# Patient Record
Sex: FEMALE | Race: BLACK OR AFRICAN AMERICAN | Employment: OTHER | URBAN - METROPOLITAN AREA
[De-identification: names, ages, dates, MRNs, and addresses within clinical notes are randomized per-mention and may not be internally consistent; named-entity substitution may affect disease eponyms.]

---

## 2017-03-16 ENCOUNTER — OFFICE VISIT (OUTPATIENT)
Dept: FAMILY MEDICINE CLINIC | Age: 72
End: 2017-03-16

## 2017-03-16 VITALS
RESPIRATION RATE: 16 BRPM | BODY MASS INDEX: 30.63 KG/M2 | TEMPERATURE: 98 F | SYSTOLIC BLOOD PRESSURE: 130 MMHG | HEART RATE: 60 BPM | HEIGHT: 64 IN | WEIGHT: 179.4 LBS | DIASTOLIC BLOOD PRESSURE: 78 MMHG | OXYGEN SATURATION: 99 %

## 2017-03-16 DIAGNOSIS — R00.1 BRADYCARDIA: ICD-10-CM

## 2017-03-16 DIAGNOSIS — G40.909 SEIZURE DISORDER (HCC): ICD-10-CM

## 2017-03-16 DIAGNOSIS — E03.9 HYPOTHYROIDISM, UNSPECIFIED TYPE: ICD-10-CM

## 2017-03-16 DIAGNOSIS — E78.5 HYPERLIPIDEMIA, UNSPECIFIED HYPERLIPIDEMIA TYPE: ICD-10-CM

## 2017-03-16 DIAGNOSIS — F32.A DEPRESSION, UNSPECIFIED DEPRESSION TYPE: ICD-10-CM

## 2017-03-16 DIAGNOSIS — I48.91 ATRIAL FIBRILLATION, UNSPECIFIED TYPE (HCC): ICD-10-CM

## 2017-03-16 DIAGNOSIS — E11.65 TYPE 2 DIABETES MELLITUS WITH HYPERGLYCEMIA, WITHOUT LONG-TERM CURRENT USE OF INSULIN (HCC): ICD-10-CM

## 2017-03-16 DIAGNOSIS — I10 ESSENTIAL HYPERTENSION: Primary | ICD-10-CM

## 2017-03-16 DIAGNOSIS — I80.201 DEEP VEIN THROMBOPHLEBITIS OF RIGHT LEG (HCC): ICD-10-CM

## 2017-03-16 RX ORDER — METOPROLOL SUCCINATE 50 MG/1
TABLET, EXTENDED RELEASE ORAL DAILY
COMMUNITY
End: 2017-04-24 | Stop reason: SDUPTHER

## 2017-03-16 RX ORDER — METOPROLOL TARTRATE 50 MG/1
50 TABLET ORAL 2 TIMES DAILY
COMMUNITY
End: 2017-03-16 | Stop reason: ALTCHOICE

## 2017-03-16 RX ORDER — OLMESARTAN MEDOXOMIL 40 MG/1
40 TABLET ORAL DAILY
COMMUNITY
End: 2017-04-24 | Stop reason: SDUPTHER

## 2017-03-16 RX ORDER — HYDROCHLOROTHIAZIDE 25 MG/1
25 TABLET ORAL DAILY
COMMUNITY
End: 2017-04-24 | Stop reason: SDUPTHER

## 2017-03-16 RX ORDER — GUAIFENESIN 100 MG/5ML
81 LIQUID (ML) ORAL DAILY
COMMUNITY
End: 2022-09-07

## 2017-03-16 NOTE — PATIENT INSTRUCTIONS
Learning About Atrial Fibrillation  What is atrial fibrillation? Atrial fibrillation (say \"AY-tree-jennifer kfc-rffe-HUA-shun\") is the most common type of irregular heartbeat (arrhythmia). Normally, the heart beats in a strong, steady rhythm. In atrial fibrillation, a problem with the heart's electrical system causes the two upper parts of the heart (the atria) to quiver, or fibrillate. Your heart rate also may be faster than normal.  Atrial fibrillation can be dangerous because if the heartbeat isn't strong and steady, blood can collect, or pool, in the atria. And pooled blood is more likely to form clots. Clots can travel to the brain, block blood flow, and cause a stroke. Atrial fibrillation can also lead to heart failure. Treatment for atrial fibrillation helps prevent stroke and heart failure. It also helps relieve symptoms. Atrial fibrillation is often caused by another heart problem. It may happen after heart surgery. It may also be caused by other problems, such as an overactive thyroid gland or lung disease. Many people with atrial fibrillation are able to live full and active lives. What are the symptoms? Some people feel symptoms when they have episodes of atrial fibrillation. But other people don't notice any symptoms. If you have symptoms, you may feel:  · A fluttering, racing, or pounding feeling in your chest called palpitations. · Weak or tired. · Dizzy or lightheaded. · Short of breath. · Chest pain. · Confused. You may notice signs of atrial fibrillation when you check your pulse. Your pulse may seem uneven or fast.  What can you expect when you have atrial fibrillation? At first, spells of atrial fibrillation may come on suddenly and last a short time. It may go away on its own or it goes away after treatment. This is called paroxysmal atrial fibrillation. Over time, the spells may last longer and occur more often. They often don't go away on their own.   How is it treated? Treatments can help you feel better and prevent future problems, especially stroke and heart failure. The main types of treatment slow the heart rate, control the heart rhythm, and help prevent stroke. Your treatment will depend on the cause of your atrial fibrillation, your symptoms, and your risk for stroke. · Heart rate treatment. Medicine may be used to slow your heart rate. Your heartbeat may still be irregular. But these medicines keep your heart from beating too fast. They may also help relieve your symptoms. · Heart rhythm treatment. Different treatments may be used to try to stop atrial fibrillation and keep it from returning. They can also relieve symptoms. These treatments include medicine, electrical cardioversion to shock the heart back to a normal rhythm, a procedure called catheter ablation, and heart surgery. · Stroke prevention. You and your doctor can decide how to lower your risk. You may decide to take a blood-thinning medicine, such as aspirin or an anticoagulant. How can you live well with it? You can live well and help manage atrial fibrillation by having a heart-healthy lifestyle. To have a heart-healthy lifestyle:  · Don't smoke. · Eat heart-healthy foods. · Be active. Talk to your doctor about what type and level of exercise is safe for you. · Stay at a healthy weight. Lose weight if you need to. · Manage stress. · Avoid alcohol if it triggers symptoms. · Manage other health problems such as high blood pressure, high cholesterol, and diabetes. · Avoid getting sick from the flu. Get a flu shot every year. When should you call for help? Call 911 anytime you think you may need emergency care. For example, call if:  · You have symptoms of a stroke. These may include:  ¨ Sudden numbness, tingling, weakness, or loss of movement in your face, arm, or leg, especially on only one side of your body. ¨ Sudden vision changes. ¨ Sudden trouble speaking.   ¨ Sudden confusion or trouble understanding simple statements. ¨ Sudden problems with walking or balance. ¨ A sudden, severe headache that is different from past headaches. Call your doctor now or seek immediate medical care if:  · You have new or increased shortness of breath. · You feel dizzy or lightheaded, or you feel like you may faint. Watch closely for changes in your health, and be sure to contact your doctor if you have any problems. Follow-up care is a key part of your treatment and safety. Be sure to make and go to all appointments, and call your doctor if you are having problems. It's also a good idea to know your test results and keep a list of the medicines you take. Where can you learn more? Go to http://cecelia-[place].info/. Enter [phone] in the search box to learn more about \"Learning About Atrial Fibrillation. \"  Current as of: March 28, 2016  Content Version: 11.1  © 2509-0408 [employer]. Care instructions adapted under license by [employer] (which disclaims liability or warranty for this information). If you have questions about a medical condition or this instruction, always ask your healthcare professional. [place] [address] any warranty or liability for your use of this information. Depression and Chronic Disease: Care Instructions  Your Care Instructions  A chronic disease is one that you have for a long time. Some chronic diseases can be controlled, but they usually cannot be cured. Depression is common in people with chronic diseases, but it often goes unnoticed. Many people have concerns about seeking treatment for a mental health problem. You may think it's a sign of weakness, or you don't want people to know about it. It's important to overcome these reasons for not seeking treatment. Treating depression or anxiety is good for your health. Follow-up care is a key part of your treatment and safety.  Be sure to make and go to all appointments, and call your doctor if you are having problems. It's also a good idea to know your test results and keep a list of the medicines you take. How can you care for yourself at home? Watch for symptoms of depression  The symptoms of depression are often subtle at first. You may think they are caused by your disease rather than depression. Or you may think it is normal to be depressed when you have a chronic disease. If you are depressed you may:  · Feel sad or hopeless. · Feel guilty or worthless. · Not enjoy the things you used to enjoy. · Feel hopeless, as though life is not worth living. · Have trouble thinking or remembering. · Have low energy, and you may not eat or sleep well. · Pull away from others. · Think often about death or killing yourself. (Keep the numbers for these national suicide hotlines: 2-392-318-TALK [1-580.454.6510] and 1-872-LPLALIN [1-583.946.8349]. )  Get treatment  By treating your depression, you can feel more hopeful and have more energy. If you feel better, you may take better care of yourself, so your health may improve. · Talk to your doctor if you have any changes in mood during treatment for your disease. · Ask your doctor for help. Counseling, antidepressant medicine, or a combination of the two can help most people with depression. Often a combination works best. Counseling can also help you cope with having a chronic disease. When should you call for help? Call 911 anytime you think you may need emergency care. For example, call if:  · You feel like hurting yourself or someone else. · Someone you know has depression and is about to attempt or is attempting suicide. Call your doctor now or seek immediate medical care if:  · You hear voices. · Someone you know has depression and:  ¨ Starts to give away his or her possessions. ¨ Uses illegal drugs or drinks alcohol heavily.   ¨ Talks or writes about death, including writing suicide notes or talking about guns, knives, or pills. ¨ Starts to spend a lot of time alone. ¨ Acts very aggressively or suddenly appears calm. Watch closely for changes in your health, and be sure to contact your doctor if:  · You do not get better as expected. Where can you learn more? Go to http://cecelia-[place]ia.info/. Enter N041 in the search box to learn more about \"Depression and Chronic Disease: Care Instructions. \"  Current as of: July 26, 2016  Content Version: 11.1  © 4111-9632 [employer]. Care instructions adapted under license by [employer] (which disclaims liability or warranty for this information). If you have questions about a medical condition or this instruction, always ask your healthcare professional. [address] any warranty or liability for your use of this information. Learning About Diabetes Food Guidelines  Your Care Instructions  Meal planning is important to manage diabetes. It helps keep your blood sugar at a target level (which you set with your doctor). You don't have to eat special foods. You can eat what your family eats, including sweets once in a while. But you do have to pay attention to how often you eat and how much you eat of certain foods. You may want to work with a dietitian or a certified diabetes educator (CDE) to help you plan meals and snacks. A dietitian or CDE can also help you lose weight if that is one of your goals. What should you know about eating carbs? Managing the amount of carbohydrate (carbs) you eat is an important part of healthy meals when you have diabetes. Carbohydrate is found in many foods. · Learn which foods have carbs. And learn the amounts of carbs in different foods. ¨ Bread, cereal, pasta, and rice have about 15 grams of carbs in a serving. A serving is 1 slice of bread (1 ounce), ½ cup of cooked cereal, or 1/3 cup of cooked pasta or rice. ¨ Fruits have 15 grams of carbs in a serving.  A serving is 1 small fresh fruit, such as an apple or orange; ½ of a banana; ½ cup of cooked or canned fruit; ½ cup of fruit juice; 1 cup of melon or raspberries; or 2 tablespoons of dried fruit. ¨ Milk and no-sugar-added yogurt have 15 grams of carbs in a serving. A serving is 1 cup of milk or 2/3 cup of no-sugar-added yogurt. ¨ Starchy vegetables have 15 grams of carbs in a serving. A serving is ½ cup of mashed potatoes or sweet potato; 1 cup winter squash; ½ of a small baked potato; ½ cup of cooked beans; or ½ cup cooked corn or green peas. · Learn how much carbs to eat each day and at each meal. A dietitian or CDE can teach you how to keep track of the amount of carbs you eat. This is called carbohydrate counting. · If you are not sure how to count carbohydrate grams, use the Plate Method to plan meals. It is a good, quick way to make sure that you have a balanced meal. It also helps you spread carbs throughout the day. ¨ Divide your plate by types of foods. Put non-starchy vegetables on half the plate, meat or other protein food on one-quarter of the plate, and a grain or starchy vegetable in the final quarter of the plate. To this you can add a small piece of fruit and 1 cup of milk or yogurt, depending on how many carbs you are supposed to eat at a meal.  · Try to eat about the same amount of carbs at each meal. Do not \"save up\" your daily allowance of carbs to eat at one meal.  · Proteins have very little or no carbs per serving. Examples of proteins are beef, chicken, turkey, fish, eggs, tofu, cheese, cottage cheese, and peanut butter. A serving size of meat is 3 ounces, which is about the size of a deck of cards. Examples of meat substitute serving sizes (equal to 1 ounce of meat) are 1/4 cup of cottage cheese, 1 egg, 1 tablespoon of peanut butter, and ½ cup of tofu. How can you eat out and still eat healthy? · Learn to estimate the serving sizes of foods that have carbohydrate.  If you measure food at home, it will be easier to estimate the amount in a serving of restaurant food. · If the meal you order has too much carbohydrate (such as potatoes, corn, or baked beans), ask to have a low-carbohydrate food instead. Ask for a salad or green vegetables. · If you use insulin, check your blood sugar before and after eating out to help you plan how much to eat in the future. · If you eat more carbohydrate at a meal than you had planned, take a walk or do other exercise. This will help lower your blood sugar. What else should you know? · Limit saturated fat, such as the fat from meat and dairy products. This is a healthy choice because people who have diabetes are at higher risk of heart disease. So choose lean cuts of meat and nonfat or low-fat dairy products. Use olive or canola oil instead of butter or shortening when cooking. · Don't skip meals. Your blood sugar may drop too low if you skip meals and take insulin or certain medicines for diabetes. · Check with your doctor before you drink alcohol. Alcohol can cause your blood sugar to drop too low. Alcohol can also cause a bad reaction if you take certain diabetes medicines. Follow-up care is a key part of your treatment and safety. Be sure to make and go to all appointments, and call your doctor if you are having problems. It's also a good idea to know your test results and keep a list of the medicines you take. Where can you learn more? Go to http://cecelia-[place].info/. Enter S108 in the search box to learn more about \"Learning About Diabetes Food Guidelines. \"  Current as of: May 23, 2016  Content Version: 11.1  © 6483-3925 [employer]. Care instructions adapted under license by [employer] (which disclaims liability or warranty for this information).  If you have questions about a medical condition or this instruction, always ask your healthcare professional. Arie Greer disclaims any warranty or liability for your use of this information. Low Sodium Diet (2,000 Milligram): Care Instructions  Your Care Instructions  Too much sodium causes your body to hold on to extra water. This can raise your blood pressure and force your heart and kidneys to work harder. In very serious cases, this could cause you to be put in the hospital. It might even be life-threatening. By limiting sodium, you will feel better and lower your risk of serious problems. The most common source of sodium is salt. People get most of the salt in their diet from canned, prepared, and packaged foods. Fast food and restaurant meals also are very high in sodium. Your doctor will probably limit your sodium to less than 2,000 milligrams (mg) a day. This limit counts all the sodium in prepared and packaged foods and any salt you add to your food. Follow-up care is a key part of your treatment and safety. Be sure to make and go to all appointments, and call your doctor if you are having problems. It's also a good idea to know your test results and keep a list of the medicines you take. How can you care for yourself at home? Read food labels  · Read labels on cans and food packages. The labels tell you how much sodium is in each serving. Make sure that you look at the serving size. If you eat more than the serving size, you have eaten more sodium. · Food labels also tell you the Percent Daily Value for sodium. Choose products with low Percent Daily Values for sodium. · Be aware that sodium can come in forms other than salt, including monosodium glutamate (MSG), sodium citrate, and sodium bicarbonate (baking soda). MSG is often added to Asian food. When you eat out, you can sometimes ask for food without MSG or added salt. Buy low-sodium foods  · Buy foods that are labeled \"unsalted\" (no salt added), \"sodium-free\" (less than 5 mg of sodium per serving), or \"low-sodium\" (less than 140 mg of sodium per serving).  Foods labeled \"reduced-sodium\" and \"light sodium\" may still have too much sodium. Be sure to read the label to see how much sodium you are getting. · Buy fresh vegetables, or frozen vegetables without added sauces. Buy low-sodium versions of canned vegetables, soups, and other canned goods. Prepare low-sodium meals  · Cut back on the amount of salt you use in cooking. This will help you adjust to the taste. Do not add salt after cooking. One teaspoon of salt has about 2,300 mg of sodium. · Take the salt shaker off the table. · Flavor your food with garlic, lemon juice, onion, vinegar, herbs, and spices. Do not use soy sauce, lite soy sauce, steak sauce, onion salt, garlic salt, celery salt, mustard, or ketchup on your food. · Use low-sodium salad dressings, sauces, and ketchup. Or make your own salad dressings and sauces without adding salt. · Use less salt (or none) when recipes call for it. You can often use half the salt a recipe calls for without losing flavor. Other foods such as rice, pasta, and grains do not need added salt. · Rinse canned vegetables, and cook them in fresh water. This removes some--but not all--of the salt. · Avoid water that is naturally high in sodium or that has been treated with water softeners, which add sodium. Call your local water company to find out the sodium content of your water supply. If you buy bottled water, read the label and choose a sodium-free brand. Avoid high-sodium foods  · Avoid eating:  ¨ Smoked, cured, salted, and canned meat, fish, and poultry. ¨ Ham, morales, hot dogs, and luncheon meats. ¨ Regular, hard, and processed cheese and regular peanut butter. ¨ Crackers with salted tops, and other salted snack foods such as pretzels, chips, and salted popcorn. ¨ Frozen prepared meals, unless labeled low-sodium. ¨ Canned and dried soups, broths, and bouillon, unless labeled sodium-free or low-sodium. ¨ Canned vegetables, unless labeled sodium-free or low-sodium.   [facility] fries, pizza, tacos, and other fast foods. ¨ Pickles, olives, ketchup, and other condiments, especially soy sauce, unless labeled sodium-free or low-sodium. Where can you learn more? Go to http://cecelia-[place].info/. Enter M035 in the search box to learn more about \"Low Sodium Diet (2,000 Milligram): Care Instructions. \"  Current as of: July 26, 2016  Content Version: 11.1  © 0629-0710 [employer]. Care instructions adapted under license by [employer] (which disclaims liability or warranty for this information). If you have questions about a medical condition or this instruction, always ask your healthcare professional. [address] any warranty or liability for your use of this information.

## 2017-03-16 NOTE — PROGRESS NOTES
HISTORY OF PRESENT ILLNESS  Vanessa Hollingsworth is a 66 y.o. female. HPI: here as a new patient. Moved from [place]. Multiple medical problems. H/o hypertension, bradycardia s/p pacemaker, h/o a.fib post ablation on anticoagulation. She needs cardiology referral to get establish locally. Per her time to check pace maker next month. Lately mentioned that she has been checking her blood pressure at home and always comes low. Also she feels at that time dizziness and some fatigue. No chest pain or sob. No headache. Wondering if she needs lower dose of medication. This she noted since her last knee replacement in July 2016. i have talked to her that it could be because she is no more in pain from knee and could make her blood pressure improve some. Visit Vitals    /78 (BP 1 Location: Left arm, BP Patient Position: Sitting)    Pulse 60    Temp 98 °F (36.7 °C) (Oral)    Resp 16    Ht 5' 3.5\" (1.613 m)    Wt 179 lb 6.4 oz (81.4 kg)    SpO2 99%    BMI 31.28 kg/m2     H/o type 2 diabetes. Not on medication. Will check labs. No signs of hypoglycemia. Average blood sugar is around 100. She feels her blood sugar is lately higher than what she used to have. Will check labs. No nausea or vomiting. No abdominal pain. No unusual fatigue. Has h/o DVT over rt lower ext after left knee replacement. She is on eliquis. Also h/o a.fib. H/o hypothyroidism and she wants a referral to endocrinologist. Currently will do labs and a referral.   H/o seizure disorder. First episode in 2006. Put on medication. No reoccurrence since then so stopped medication by neurologist in 2015. She was still following specialist in [place]. Now will do referral again for her to get establish locally. Per son she has been depressed since she moved her. It has been 2 wks. Per patient it is situational. Going on since last 7 years since her  passed away. She feels her kids does not understands her.  She is not abused but feels emotional. She feels no friends and family. She is away here. She moved to her daughter's house first then another son and now back in [place] away from what she was comfortable with. Considering counseling. i have also talk to her briefly is time is the key. It is short time to adjust to new place. She was in tears but consolable. No suicidal ideation. Sleep fair. Per son she is trying to do whatever he can. He is driving her around where she wants to be but he has limitation as he has to work. ROS: see HPI     Physical Exam   Constitutional: She is oriented to person, place, and time. No distress. Neck: No thyromegaly present. Cardiovascular: Normal heart sounds. Pulmonary/Chest: No respiratory distress. She has no wheezes. Abdominal: Soft. There is no tenderness. Musculoskeletal: She exhibits no edema. Neurological: She is oriented to person, place, and time. Psychiatric: Her behavior is normal. Thought content normal.       ASSESSMENT and PLAN    ICD-10-CM ICD-9-CM    1. Essential hypertension: for now per patient request will do metoprolol once a day as she was taking succinate Er twice a day. Will observe and f./u next visit and see if her dizziness on and off and low reading of blood pressure at home has been improved or not. Also done cardiology referral.  I10 401.9 olmesartan (BENICAR) 40 mg tablet      hydroCHLOROthiazide (HYDRODIURIL) 25 mg tablet      METABOLIC PANEL, COMPREHENSIVE   2. Type 2 diabetes mellitus with hyperglycemia, without long-term current use of insulin [facility]): checking labs. Continue current management. Said she has been doing well. Not on medication. Will f/u after lab result. E11.65 250.00 CBC W/O DIFF     811.51 METABOLIC PANEL, COMPREHENSIVE      TSH 3RD GENERATION      HEMOGLOBIN A1C WITH EAG   3. Bradycardia/ post pacemaker. s/p ablation: for now sending her to cardiology to get establish. Will make metoprolol succinate Er to once a day from twice daily.  Will follow specialist recommendations. R00.1 427.89 apixaban (ELIQUIS) 5 mg tablet      REFERRAL TO CARDIOLOGY   4. Atrial fibrillation, unspecified type (HCC)/post ablation I48.91 427.31 aspirin 81 mg chewable tablet      apixaban (ELIQUIS) 5 mg tablet      REFERRAL TO CARDIOLOGY   5. h/o Deep vein thrombophlebitis of right leg (HCC)/ post knee replacement in july 2016 I80.201 451.19 apixaban (ELIQUIS) 5 mg tablet      REFERRAL TO CARDIOLOGY   6. Hyperlipidemia, unspecified hyperlipidemia type: checking labs. E78.5 272.4 DOCOSAHEXANOIC ACID/EPA (FISH OIL PO)      COQ10, LIPOSOMAL UBIQUINOL, PO      LIPID PANEL   7. Hypothyroidism, unspecified type: checking labs. Per her request sending to endocrinologist.  E03.9 244.9 REFERRAL TO ENDOCRINOLOGY      T4, FREE   8. Seizure disorder ([facility] [address]): seizure free since 2006. One time episode. Off medication since 2015. Was following neurologist. Now will do referral to get establish locally. G40.909 345.90 REFERRAL TO NEUROLOGY   9. Depression, unspecified depression type: for now sending for counseling. Will f/u next visit. F32.9 311 REFERRAL TO PSYCHOLOGY   Pt understood and agrees with above plan. Not able to give much info about HM . Will obtain records. Follow-up Disposition:  Return in about 1 month (around 4/16/2017).

## 2017-03-16 NOTE — PROGRESS NOTES
1. Have you been to the ER, urgent care clinic since your last visit? Hospitalized since your last visit? Yes 10/12/16 right knee total replacement in [place]. 2. Have you seen or consulted any other health care providers outside of the [address] [place] [address] since your last visit? Include any pap smears or colon screening. No    Last mammo 3/2016 - normal - Dr. Wandy Jeffrey    Last flu vaccine 10/2016     Patient not sure if she had a pneumonia vaccine.

## 2017-03-16 NOTE — MR AVS SNAPSHOT
Visit Information Date & Time Provider Department Dept. Phone Encounter #  
 3/16/2017  9:00 AM James Garrison, [address] [place] [address] [phone] [id number] Follow-up Instructions Return in about 1 month (around 4/16/2017). Upcoming Health Maintenance Date Due DTaP/Tdap/Td series (1 - Tdap) 8/21/1965 BREAST CANCER SCRN MAMMOGRAM 8/21/1994 FOBT Q 1 YEAR AGE 50-75 8/21/1994 ZOSTER VACCINE AGE 60> 8/21/2004 GLAUCOMA SCREENING Q2Y 8/21/2009 OSTEOPOROSIS SCREENING (DEXA) 8/21/2009 Pneumococcal 65+ Low/Medium Risk (1 of 2 - PCV13) 8/21/2009 MEDICARE YEARLY EXAM 8/21/2009 Allergies as of 3/16/2017  Review Complete On: 3/16/2017 By: James Garrison MD  
 No Known Allergies Current Immunizations  Never Reviewed Name Date Influenza Vaccine 10/1/2016 Not reviewed this visit You Were Diagnosed With   
  
 Codes Comments Essential hypertension    -  Primary ICD-10-CM: I10 
ICD-9-CM: 401.9 Type 2 diabetes mellitus with hyperglycemia, without long-term current use of insulin (HCC)     ICD-10-CM: E11.65 ICD-9-CM: 250.00, 790.29 Bradycardia     ICD-10-CM: R00.1 ICD-9-CM: 427.89 Atrial fibrillation, unspecified type ([facility]     ICD-10-CM: I48.91 
ICD-9-CM: 427.31 Deep vein thrombophlebitis of right leg (HCC)     ICD-10-CM: I80.201 ICD-9-CM: 451.19 Hyperlipidemia, unspecified hyperlipidemia type     ICD-10-CM: E78.5 ICD-9-CM: 272.4 Hypothyroidism, unspecified type     ICD-10-CM: E03.9 ICD-9-CM: 244.9 Seizure disorder ([facility].)     ICD-10-CM: G40.909 ICD-9-CM: 345.90 Depression, unspecified depression type     ICD-10-CM: F32.9 ICD-9-CM: 162 Vitals BP Pulse Temp Resp Height(growth percentile) Weight(growth percentile) 130/78 (BP 1 Location: Left arm, BP Patient Position: Sitting) 60 98 °F (36.7 °C) (Oral) 16 5' 3.5\" (1.613 m) 179 lb 6.4 oz (81.4 kg) SpO2 BMI OB Status Smoking Status 99% 31.28 kg/m2 Hysterectomy Never Smoker Vitals History BMI and BSA Data Body Mass Index Body Surface Area  
 31.28 kg/m 2 1.91 m 2 Preferred Pharmacy Pharmacy Name Phone [facility]/PHARMACY #[id number] Pete Orozco, [address] [facility] [facility] [phone] Your Updated Medication List  
  
   
This list is accurate as of: 3/16/17 10:15 AM.  Always use your most recent med list.  
  
  
  
  
 apixaban 5 mg tablet Commonly known as:  [place] Vipul Take 1 Tab by mouth two (2) times a day. aspirin 81 mg chewable tablet Take 81 mg by mouth daily. BENICAR 40 mg tablet Generic drug:  olmesartan Take 40 mg by mouth daily. COQ10 (LIPOSOMAL UBIQUINOL) PO Take 200 mg by mouth daily. FISH OIL PO Take 100 mg by mouth daily. hydroCHLOROthiazide 25 mg tablet Commonly known as:  HYDRODIURIL Take 25 mg by mouth daily. metoprolol succinate 50 mg XL tablet Commonly known as:  TOPROL-XL Take  by mouth daily. Prescriptions Sent to Pharmacy Refills  
 apixaban (ELIQUIS) 5 mg tablet 2 Sig: Take 1 Tab by mouth two (2) times a day. Class: Normal  
 Pharmacy: [facility]/pharmacy [address] [place] [address], [address] [facility] #: [phone] Route: Oral  
  
We Performed the Following REFERRAL TO CARDIOLOGY [XCI14 Custom] Comments:  
 Please evaluate patient for h/o a.fib. Due for pacemaker check. REFERRAL TO ENDOCRINOLOGY [AZA38 Custom] Comments:  
 Please evaluate patient for hypothyroidism REFERRAL TO NEUROLOGY [INZ14 Custom] Comments:  
 Please evaluate patient for seizure disorder REFERRAL TO PSYCHOLOGY [QNZ18 Custom] Comments:  
 Please evaluate patient for depression Follow-up Instructions Return in about 1 month (around 4/16/2017). To-Do List   
 03/16/2017 Lab:  CBC W/O DIFF   
  
 03/16/2017 Lab:  HEMOGLOBIN A1C WITH EAG   
  
 03/16/2017 Lab:  LIPID PANEL   
  
 03/16/2017 Lab: METABOLIC PANEL, COMPREHENSIVE   
  
 03/16/2017 Lab:  T4, FREE   
  
 03/16/2017 Lab:  TSH 3RD GENERATION Referral Information Referral ID Referred By Referred To  
  
 [id number] [facility], Melecio Nageotte, MD   
   
 Visits Status Start Date End Date 1 New Request 3/16/17 3/16/18 If your referral has a status of pending review or denied, additional information will be sent to support the outcome of this decision. Referral ID Referred By Referred To  
 [id number] [facility] R Not Available Visits Status Start Date End Date 1 New Request 3/16/17 3/16/18 If your referral has a status of pending review or denied, additional information will be sent to support the outcome of this decision. Referral ID Referred By Referred To  
 [id number] [facility] R Not Available Visits Status Start Date End Date 1 New Request 3/16/17 3/16/18 If your referral has a status of pending review or denied, additional information will be sent to support the outcome of this decision. Referral ID Referred By Referred To  
 [id number] [facility] R Not Available Visits Status Start Date End Date 1 New Request 3/16/17 3/16/18 If your referral has a status of pending review or denied, additional information will be sent to support the outcome of this decision. Patient Instructions Learning About Atrial Fibrillation What is atrial fibrillation? Atrial fibrillation (say \"AY-tree-jennifer rtu-azwn-QUX-shun\") is the most common type of irregular heartbeat (arrhythmia). Normally, the heart beats in a strong, steady rhythm. In atrial fibrillation, a problem with the heart's electrical system causes the two upper parts of the heart (the atria) to quiver, or fibrillate. Your heart rate also may be faster than normal. 
Atrial fibrillation can be dangerous because if the heartbeat isn't strong and steady, blood can collect, or pool, in the atria.  And pooled blood is more likely to form clots. Clots can travel to the brain, block blood flow, and cause a stroke. Atrial fibrillation can also lead to heart failure. Treatment for atrial fibrillation helps prevent stroke and heart failure. It also helps relieve symptoms. Atrial fibrillation is often caused by another heart problem. It may happen after heart surgery. It may also be caused by other problems, such as an overactive thyroid gland or lung disease. Many people with atrial fibrillation are able to live full and active lives. What are the symptoms? Some people feel symptoms when they have episodes of atrial fibrillation. But other people don't notice any symptoms. If you have symptoms, you may feel: · A fluttering, racing, or pounding feeling in your chest called palpitations. · Weak or tired. · Dizzy or lightheaded. · Short of breath. · Chest pain. · Confused. You may notice signs of atrial fibrillation when you check your pulse. Your pulse may seem uneven or fast. 
What can you expect when you have atrial fibrillation? At first, spells of atrial fibrillation may come on suddenly and last a short time. It may go away on its own or it goes away after treatment. This is called paroxysmal atrial fibrillation. Over time, the spells may last longer and occur more often. They often don't go away on their own. How is it treated? Treatments can help you feel better and prevent future problems, especially stroke and heart failure. The main types of treatment slow the heart rate, control the heart rhythm, and help prevent stroke. Your treatment will depend on the cause of your atrial fibrillation, your symptoms, and your risk for stroke. · Heart rate treatment. Medicine may be used to slow your heart rate. Your heartbeat may still be irregular. But these medicines keep your heart from beating too fast. They may also help relieve your symptoms. · Heart rhythm treatment. Different treatments may be used to try to stop atrial fibrillation and keep it from returning. They can also relieve symptoms. These treatments include medicine, electrical cardioversion to shock the heart back to a normal rhythm, a procedure called catheter ablation, and heart surgery. · Stroke prevention. You and your doctor can decide how to lower your risk. You may decide to take a blood-thinning medicine, such as aspirin or an anticoagulant. How can you live well with it? You can live well and help manage atrial fibrillation by having a heart-healthy lifestyle. To have a heart-healthy lifestyle: · Don't smoke. · Eat heart-healthy foods. · Be active. Talk to your doctor about what type and level of exercise is safe for you. · Stay at a healthy weight. Lose weight if you need to. · Manage stress. · Avoid alcohol if it triggers symptoms. · Manage other health problems such as high blood pressure, high cholesterol, and diabetes. · Avoid getting sick from the flu. Get a flu shot every year. When should you call for help? Call 911 anytime you think you may need emergency care. For example, call if: 
· You have symptoms of a stroke. These may include: 
¨ Sudden numbness, tingling, weakness, or loss of movement in your face, arm, or leg, especially on only one side of your body. ¨ Sudden vision changes. ¨ Sudden trouble speaking. ¨ Sudden confusion or trouble understanding simple statements. ¨ Sudden problems with walking or balance. ¨ A sudden, severe headache that is different from past headaches. Call your doctor now or seek immediate medical care if: 
· You have new or increased shortness of breath. · You feel dizzy or lightheaded, or you feel like you may faint. Watch closely for changes in your health, and be sure to contact your doctor if you have any problems. Follow-up care is a key part of your treatment and safety.  Be sure to make and go to all appointments, and call your doctor if you are having problems. It's also a good idea to know your test results and keep a list of the medicines you take. Where can you learn more? Go to http://cecelia-vir[place]ia.info/. Enter [phone] in the search box to learn more about \"Learning About Atrial Fibrillation. \" Current as of: March 28, 2016 Content Version: 11.1 © 8475-6241 [employer]. Care instructions adapted under license by [employer] (which disclaims liability or warranty for this information). If you have questions about a medical condition or this instruction, always ask your healthcare professional. [address] any warranty or liability for your use of this information. Depression and Chronic Disease: Care Instructions Your Care Instructions A chronic disease is one that you have for a long time. Some chronic diseases can be controlled, but they usually cannot be cured. Depression is common in people with chronic diseases, but it often goes unnoticed. Many people have concerns about seeking treatment for a mental health problem. You may think it's a sign of weakness, or you don't want people to know about it. It's important to overcome these reasons for not seeking treatment. Treating depression or anxiety is good for your health. Follow-up care is a key part of your treatment and safety. Be sure to make and go to all appointments, and call your doctor if you are having problems. It's also a good idea to know your test results and keep a list of the medicines you take. How can you care for yourself at home? Watch for symptoms of depression The symptoms of depression are often subtle at first. You may think they are caused by your disease rather than depression. Or you may think it is normal to be depressed when you have a chronic disease. If you are depressed you may: · Feel sad or hopeless. · Feel guilty or worthless. · Not enjoy the things you used to enjoy. · Feel hopeless, as though life is not worth living. · Have trouble thinking or remembering. · Have low energy, and you may not eat or sleep well. · Pull away from others. · Think often about death or killing yourself. (Keep the numbers for these national suicide hotlines: 3-887-854-TALK [1-970.276.7833] and 9-548-DZDXJVK [1-354.339.1305]. ) Get treatment By treating your depression, you can feel more hopeful and have more energy. If you feel better, you may take better care of yourself, so your health may improve. · Talk to your doctor if you have any changes in mood during treatment for your disease. · Ask your doctor for help. Counseling, antidepressant medicine, or a combination of the two can help most people with depression. Often a combination works best. Counseling can also help you cope with having a chronic disease. When should you call for help? Call 911 anytime you think you may need emergency care. For example, call if: 
· You feel like hurting yourself or someone else. · Someone you know has depression and is about to attempt or is attempting suicide. Call your doctor now or seek immediate medical care if: 
· You hear voices. · Someone you know has depression and: 
¨ Starts to give away his or her possessions. ¨ Uses illegal drugs or drinks alcohol heavily. ¨ Talks or writes about death, including writing suicide notes or talking about guns, knives, or pills. ¨ Starts to spend a lot of time alone. ¨ Acts very aggressively or suddenly appears calm. Watch closely for changes in your health, and be sure to contact your doctor if: 
· You do not get better as expected. Where can you learn more? Go to http://cecelia-[place]ia.info/. Enter B276 in the search box to learn more about \"Depression and Chronic Disease: Care Instructions. \" Current as of: July 26, 2016 Content Version: 11.1 © 4578-3851 Healthwise[employer] Incorporated. Care instructions adapted under license by [employer] (which disclaims liability or warranty for this information). If you have questions about a medical condition or this instruction, always ask your healthcare professional. [address] any warranty or liability for your use of this information. Learning About Diabetes Food Guidelines Your Care Instructions Meal planning is important to manage diabetes. It helps keep your blood sugar at a target level (which you set with your doctor). You don't have to eat special foods. You can eat what your family eats, including sweets once in a while. But you do have to pay attention to how often you eat and how much you eat of certain foods. You may want to work with a dietitian or a certified diabetes educator (CDE) to help you plan meals and snacks. A dietitian or CDE can also help you lose weight if that is one of your goals. What should you know about eating carbs? Managing the amount of carbohydrate (carbs) you eat is an important part of healthy meals when you have diabetes. Carbohydrate is found in many foods. · Learn which foods have carbs. And learn the amounts of carbs in different foods. ¨ Bread, cereal, pasta, and rice have about 15 grams of carbs in a serving. A serving is 1 slice of bread (1 ounce), ½ cup of cooked cereal, or 1/3 cup of cooked pasta or rice. ¨ Fruits have 15 grams of carbs in a serving. A serving is 1 small fresh fruit, such as an apple or orange; ½ of a banana; ½ cup of cooked or canned fruit; ½ cup of fruit juice; 1 cup of melon or raspberries; or 2 tablespoons of dried fruit. ¨ Milk and no-sugar-added yogurt have 15 grams of carbs in a serving. A serving is 1 cup of milk or 2/3 cup of no-sugar-added yogurt. ¨ Starchy vegetables have 15 grams of carbs in a serving.  A serving is ½ cup of mashed potatoes or sweet potato; 1 cup winter squash; ½ of a small baked potato; ½ cup of cooked beans; or ½ cup cooked corn or green peas. · Learn how much carbs to eat each day and at each meal. A dietitian or CDE can teach you how to keep track of the amount of carbs you eat. This is called carbohydrate counting. · If you are not sure how to count carbohydrate grams, use the Plate Method to plan meals. It is a good, quick way to make sure that you have a balanced meal. It also helps you spread carbs throughout the day. ¨ Divide your plate by types of foods. Put non-starchy vegetables on half the plate, meat or other protein food on one-quarter of the plate, and a grain or starchy vegetable in the final quarter of the plate. To this you can add a small piece of fruit and 1 cup of milk or yogurt, depending on how many carbs you are supposed to eat at a meal. 
· Try to eat about the same amount of carbs at each meal. Do not \"save up\" your daily allowance of carbs to eat at one meal. 
· Proteins have very little or no carbs per serving. Examples of proteins are beef, chicken, turkey, fish, eggs, tofu, cheese, cottage cheese, and peanut butter. A serving size of meat is 3 ounces, which is about the size of a deck of cards. Examples of meat substitute serving sizes (equal to 1 ounce of meat) are 1/4 cup of cottage cheese, 1 egg, 1 tablespoon of peanut butter, and ½ cup of tofu. How can you eat out and still eat healthy? · Learn to estimate the serving sizes of foods that have carbohydrate. If you measure food at home, it will be easier to estimate the amount in a serving of restaurant food. · If the meal you order has too much carbohydrate (such as potatoes, corn, or baked beans), ask to have a low-carbohydrate food instead. Ask for a salad or green vegetables. · If you use insulin, check your blood sugar before and after eating out to help you plan how much to eat in the future.  
· If you eat more carbohydrate at a meal than you had planned, take a walk or do other exercise. This will help lower your blood sugar. What else should you know? · Limit saturated fat, such as the fat from meat and dairy products. This is a healthy choice because people who have diabetes are at higher risk of heart disease. So choose lean cuts of meat and nonfat or low-fat dairy products. Use olive or canola oil instead of butter or shortening when cooking. · Don't skip meals. Your blood sugar may drop too low if you skip meals and take insulin or certain medicines for diabetes. · Check with your doctor before you drink alcohol. Alcohol can cause your blood sugar to drop too low. Alcohol can also cause a bad reaction if you take certain diabetes medicines. Follow-up care is a key part of your treatment and safety. Be sure to make and go to all appointments, and call your doctor if you are having problems. It's also a good idea to know your test results and keep a list of the medicines you take. Where can you learn more? Go to http://cecelia-[place].info/. Enter W391 in the search box to learn more about \"Learning About Diabetes Food Guidelines. \" Current as of: May 23, 2016 Content Version: 11.1 © 6919-6452 [employer]. Care instructions adapted under license by [employer] (which disclaims liability or warranty for this information). If you have questions about a medical condition or this instruction, always ask your healthcare professional. [place] [address] any warranty or liability for your use of this information. Low Sodium Diet (2,000 Milligram): Care Instructions Your Care Instructions Too much sodium causes your body to hold on to extra water. This can raise your blood pressure and force your heart and kidneys to work harder. In very serious cases, this could cause you to be put in the hospital. It might even be life-threatening.  By limiting sodium, you will feel better and lower your risk of serious problems. The most common source of sodium is salt. People get most of the salt in their diet from canned, prepared, and packaged foods. Fast food and restaurant meals also are very high in sodium. Your doctor will probably limit your sodium to less than 2,000 milligrams (mg) a day. This limit counts all the sodium in prepared and packaged foods and any salt you add to your food. Follow-up care is a key part of your treatment and safety. Be sure to make and go to all appointments, and call your doctor if you are having problems. It's also a good idea to know your test results and keep a list of the medicines you take. How can you care for yourself at home? Read food labels · Read labels on cans and food packages. The labels tell you how much sodium is in each serving. Make sure that you look at the serving size. If you eat more than the serving size, you have eaten more sodium. · Food labels also tell you the Percent Daily Value for sodium. Choose products with low Percent Daily Values for sodium. · Be aware that sodium can come in forms other than salt, including monosodium glutamate (MSG), sodium citrate, and sodium bicarbonate (baking soda). MSG is often added to Asian food. When you eat out, you can sometimes ask for food without MSG or added salt. Buy low-sodium foods · Buy foods that are labeled \"unsalted\" (no salt added), \"sodium-free\" (less than 5 mg of sodium per serving), or \"low-sodium\" (less than 140 mg of sodium per serving). Foods labeled \"reduced-sodium\" and \"light sodium\" may still have too much sodium. Be sure to read the label to see how much sodium you are getting. · Buy fresh vegetables, or frozen vegetables without added sauces. Buy low-sodium versions of canned vegetables, soups, and other canned goods. Prepare low-sodium meals · Cut back on the amount of salt you use in cooking.  This will help you adjust to the taste. Do not add salt after cooking. One teaspoon of salt has about 2,300 mg of sodium. · Take the salt shaker off the table. · Flavor your food with garlic, lemon juice, onion, vinegar, herbs, and spices. Do not use soy sauce, lite soy sauce, steak sauce, onion salt, garlic salt, celery salt, mustard, or ketchup on your food. · Use low-sodium salad dressings, sauces, and ketchup. Or make your own salad dressings and sauces without adding salt. · Use less salt (or none) when recipes call for it. You can often use half the salt a recipe calls for without losing flavor. Other foods such as rice, pasta, and grains do not need added salt. · Rinse canned vegetables, and cook them in fresh water. This removes somebut not allof the salt. · Avoid water that is naturally high in sodium or that has been treated with water softeners, which add sodium. Call your local water company to find out the sodium content of your water supply. If you buy bottled water, read the label and choose a sodium-free brand. Avoid high-sodium foods · Avoid eating: ¨ Smoked, cured, salted, and canned meat, fish, and poultry. ¨ Ham, morales, hot dogs, and luncheon meats. ¨ Regular, hard, and processed cheese and regular peanut butter. ¨ Crackers with salted tops, and other salted snack foods such as pretzels, chips, and salted popcorn. ¨ Frozen prepared meals, unless labeled low-sodium. ¨ Canned and dried soups, broths, and bouillon, unless labeled sodium-free or low-sodium. ¨ Canned vegetables, unless labeled sodium-free or low-sodium. ¨ Western Consuelo fries, pizza, tacos, and other fast foods. ¨ Pickles, olives, ketchup, and other condiments, especially soy sauce, unless labeled sodium-free or low-sodium. Where can you learn more? Go to http://cecelia-[place].info/. Enter L449 in the search box to learn more about \"Low Sodium Diet (2,000 Milligram): Care Instructions. \" Current as of: July 26, 2016 Content Version: 11.1 © 5586-7329 [employer]CODE[employer]. Care instructions adapted under license by [employer] (which disclaims liability or warranty for this information). If you have questions about a medical condition or this instruction, always ask your healthcare professional. [address] any warranty or liability for your use of this information. Introducing [facility]! Hayde Theron introduces Cellmemore patient portal. Now you can access parts of your medical record, email your doctor's office, and request medication refills online. 1. In your internet browser, go to [email or web address] 2. Click on the First Time User? Click Here link in the Sign In box. You will see the New Member Sign Up page. 3. Enter your Cellmemore Access Code exactly as it appears below. You will not need to use this code after youve completed the sign-up process. If you do not sign up before the expiration date, you must request a new code. · Cellmemore Access Code: [facility] Expires: 6/14/2017  9:25 AM 
 
4. Enter the last four digits of your Social Security Number (xxxx) and Date of Birth (mm/dd/yyyy) as indicated and click Submit. You will be taken to the next sign-up page. 5. Create a Cellmemore ID. This will be your Cellmemore login ID and cannot be changed, so think of one that is secure and easy to remember. 6. Create a Cell[employer]ore password. You can change your password at any time. 7. Enter your Password Reset Question and Answer. This can be used at a later time if you forget your password. 8. Enter your e-mail address. You will receive e-mail notification when new information is available in [address] 9. Click Sign Up. You can now view and download portions of your medical record. 10. Click the Download Summary menu link to download a portable copy of your medical information. If you have questions, please visit the Frequently Asked Questions section of the [employer] website. Remember, [employer] is NOT to be used for urgent needs. For medical emergencies, dial 911. Now available from your iPhone and Android! Please provide this summary of care documentation to your next provider. Your primary care clinician is listed as Margarita Wilson. If you have any questions after today's visit, please call [phone].

## 2017-04-08 ENCOUNTER — HOSPITAL ENCOUNTER (OUTPATIENT)
Dept: LAB | Age: 72
Discharge: HOME | End: 2017-04-08
Payer: MEDICARE

## 2017-04-08 LAB
ALBUMIN SERPL BCP-MCNC: 3.6 G/DL (ref 3.4–5)
ALBUMIN/GLOB SERPL: 1 {RATIO} (ref 0.8–1.7)
ALP SERPL-CCNC: 79 U/L (ref 45–117)
ALT SERPL-CCNC: 17 U/L (ref 13–56)
ANION GAP BLD CALC-SCNC: 7 MMOL/L (ref 3–18)
AST SERPL W P-5'-P-CCNC: 14 U/L (ref 15–37)
BILIRUB SERPL-MCNC: 0.5 MG/DL (ref 0.2–1)
BUN SERPL-MCNC: 25 MG/DL (ref 7–18)
BUN/CREAT SERPL: 21 (ref 12–20)
CALCIUM SERPL-MCNC: 10 MG/DL (ref 8.5–10.1)
CHLORIDE SERPL-SCNC: 103 MMOL/L (ref 100–108)
CHOLEST SERPL-MCNC: 195 MG/DL
CO2 SERPL-SCNC: 30 MMOL/L (ref 21–32)
CREAT SERPL-MCNC: 1.2 MG/DL (ref 0.6–1.3)
ERYTHROCYTE [DISTWIDTH] IN BLOOD BY AUTOMATED COUNT: 14.9 % (ref 11.6–14.5)
EST. AVERAGE GLUCOSE BLD GHB EST-MCNC: 137 MG/DL
GLOBULIN SER CALC-MCNC: 3.6 G/DL (ref 2–4)
GLUCOSE SERPL-MCNC: 103 MG/DL (ref 74–99)
HBA1C MFR BLD: 6.4 % (ref 4.2–5.6)
HCT VFR BLD AUTO: 35.7 % (ref 35–45)
HDLC SERPL-MCNC: 48 MG/DL (ref 40–60)
HDLC SERPL: 4.1 {RATIO} (ref 0–5)
HGB BLD-MCNC: 11.1 G/DL (ref 12–16)
LDLC SERPL CALC-MCNC: 121.2 MG/DL (ref 0–100)
LIPID PROFILE,FLP: ABNORMAL
MCH RBC QN AUTO: 26.3 PG (ref 24–34)
MCHC RBC AUTO-ENTMCNC: 31.1 G/DL (ref 31–37)
MCV RBC AUTO: 84.6 FL (ref 74–97)
PLATELET # BLD AUTO: 205 K/UL (ref 135–420)
PMV BLD AUTO: 11.3 FL (ref 9.2–11.8)
POTASSIUM SERPL-SCNC: 4.2 MMOL/L (ref 3.5–5.5)
PROT SERPL-MCNC: 7.2 G/DL (ref 6.4–8.2)
RBC # BLD AUTO: 4.22 M/UL (ref 4.2–5.3)
SODIUM SERPL-SCNC: 140 MMOL/L (ref 136–145)
T4 FREE SERPL-MCNC: 1 NG/DL (ref 0.7–1.5)
TRIGL SERPL-MCNC: 129 MG/DL (ref ?–150)
TSH SERPL DL<=0.05 MIU/L-ACNC: 0.92 UIU/ML (ref 0.36–3.74)
VLDLC SERPL CALC-MCNC: 25.8 MG/DL
WBC # BLD AUTO: 6.7 K/UL (ref 4.6–13.2)

## 2017-04-08 PROCEDURE — 36415 COLL VENOUS BLD VENIPUNCTURE: CPT | Performed by: FAMILY MEDICINE

## 2017-04-08 PROCEDURE — 80061 LIPID PANEL: CPT | Performed by: FAMILY MEDICINE

## 2017-04-08 PROCEDURE — 85027 COMPLETE CBC AUTOMATED: CPT | Performed by: FAMILY MEDICINE

## 2017-04-08 PROCEDURE — 84439 ASSAY OF FREE THYROXINE: CPT | Performed by: FAMILY MEDICINE

## 2017-04-08 PROCEDURE — 83036 HEMOGLOBIN GLYCOSYLATED A1C: CPT | Performed by: FAMILY MEDICINE

## 2017-04-08 PROCEDURE — 80053 COMPREHEN METABOLIC PANEL: CPT | Performed by: FAMILY MEDICINE

## 2017-04-08 PROCEDURE — 84443 ASSAY THYROID STIM HORMONE: CPT | Performed by: FAMILY MEDICINE

## 2017-04-13 NOTE — PROGRESS NOTES
Spoke with patient (2 verifiers name/) regarding labs showed borderline diabetes and the need to keep follow up appt on 2017 with Dr Leidy Mcgowan for further discussion as she knows the patient is not taking any medication. Patient voiced understanding.

## 2017-04-17 ENCOUNTER — OFFICE VISIT (OUTPATIENT)
Dept: CARDIOLOGY CLINIC | Age: 72
End: 2017-04-17

## 2017-04-17 ENCOUNTER — OFFICE VISIT (OUTPATIENT)
Dept: NEUROLOGY | Age: 72
End: 2017-04-17

## 2017-04-17 ENCOUNTER — CLINICAL SUPPORT (OUTPATIENT)
Dept: CARDIOLOGY CLINIC | Age: 72
End: 2017-04-17

## 2017-04-17 VITALS
BODY MASS INDEX: 29.06 KG/M2 | OXYGEN SATURATION: 98 % | WEIGHT: 180.8 LBS | DIASTOLIC BLOOD PRESSURE: 80 MMHG | SYSTOLIC BLOOD PRESSURE: 151 MMHG | HEART RATE: 60 BPM | HEIGHT: 66 IN | TEMPERATURE: 98.6 F

## 2017-04-17 VITALS
OXYGEN SATURATION: 99 % | DIASTOLIC BLOOD PRESSURE: 80 MMHG | HEART RATE: 60 BPM | BODY MASS INDEX: 31.07 KG/M2 | SYSTOLIC BLOOD PRESSURE: 140 MMHG | WEIGHT: 182 LBS | HEIGHT: 64 IN

## 2017-04-17 DIAGNOSIS — R00.1 BRADYCARDIA: ICD-10-CM

## 2017-04-17 DIAGNOSIS — R00.1 BRADYCARDIA: Primary | ICD-10-CM

## 2017-04-17 DIAGNOSIS — I10 ESSENTIAL HYPERTENSION: ICD-10-CM

## 2017-04-17 DIAGNOSIS — Z86.69 HISTORY OF SEIZURE DISORDER: Primary | ICD-10-CM

## 2017-04-17 DIAGNOSIS — E78.00 PURE HYPERCHOLESTEROLEMIA: ICD-10-CM

## 2017-04-17 DIAGNOSIS — Z95.0 PACEMAKER: ICD-10-CM

## 2017-04-17 DIAGNOSIS — E11.65 TYPE 2 DIABETES MELLITUS WITH HYPERGLYCEMIA, WITHOUT LONG-TERM CURRENT USE OF INSULIN (HCC): ICD-10-CM

## 2017-04-17 DIAGNOSIS — I48.91 ATRIAL FIBRILLATION, UNSPECIFIED TYPE (HCC): Primary | ICD-10-CM

## 2017-04-17 NOTE — MR AVS SNAPSHOT
Visit Information Date & Time Provider Department Dept. Phone Encounter #  
 4/17/2017  9:40 AM Ever Weber MD [facility] [address] [id number] Your Appointments 4/17/2017 11:15 AM  
New Patient with Ankita Villafana MD  
[address] [place] [address]) Appt Note: NP-Seizure disorder id ins card med list arr 30 prior loc confirmed  syb 04/17  
 [address] [facility] [address] [place] [address] [phone]  
  
   
 [place] [id number]  
  
    
 4/24/2017  8:15 AM  
ROUTINE CARE with Nina Stark MD  
[facility] ([address]) Appt Note: routine f/u 1mo; DUE AWV  due now; pt r/s from 04/17/2017 [address] [facility] [address]  
[address] [facility] [address] [facility] [address] Upcoming Health Maintenance Date Due  
 FOOT EXAM Q1 8/21/1955 MICROALBUMIN Q1 8/21/1954 EYE EXAM RETINAL OR DILATED Q1 8/21/1954 DTaP/Tdap/Td series (1 - Tdap) 8/21/1965 BREAST CANCER SCRN MAMMOGRAM 8/21/1994 FOBT Q 1 YEAR AGE 50-75 8/21/1994 ZOSTER VACCINE AGE 60> 8/21/2004 GLAUCOMA SCREENING Q2Y 8/21/2009 OSTEOPOROSIS SCREENING (DEXA) 8/21/2009 Pneumococcal 65+ Low/Medium Risk (1 of 2 - PCV13) 8/21/2009 MEDICARE YEARLY EXAM 8/21/2009 HEMOGLOBIN A1C Q6M 10/8/2017 LIPID PANEL Q1 4/8/2018 Allergies as of 4/17/2017  Review Complete On: 4/17/2017 By: Ever Weber MD  
 Not on File Current Immunizations  Never Reviewed Name Date Influenza Vaccine 10/1/2016 Not reviewed this visit You Were Diagnosed With   
  
 Codes Comments Bradycardia    -  Primary ICD-10-CM: R00.1 ICD-9-CM: 427.89 Atrial fibrillation, unspecified [facility]     ICD-10-CM: I48.91 
ICD-9-CM: 427.31 Vitals BP Pulse Height(growth percentile) Weight(growth percentile) SpO2 BMI 140/80 (BP 1 Location: Right arm, BP Patient Position: Sitting) 60 5' 3.5\" (1.613 m) 182 lb (82.6 kg) 99% 31.73 kg/m2 OB Status Smoking Status Hysterectomy Never Smoker Vitals History BMI and BSA Data Body Mass Index Body Surface Area 31.73 kg/m 2 1.92 m 2 Preferred Pharmacy Pharmacy Name Phone [id number] Ethel Jolly, [address] [facility] [facility] [phone] Your Updated Medication List  
  
   
This list is accurate as of: 4/17/17 10:43 AM.  Always use your most recent med list.  
  
  
  
  
 apixaban 5 mg tablet Commonly known as:  Tami Her Take 1 Tab by mouth two (2) times a day. aspirin 81 mg chewable tablet Take 81 mg by mouth daily. BENICAR 40 mg tablet Generic drug:  olmesartan Take 40 mg by mouth daily. COQ10 (LIPOSOMAL UBIQUINOL) PO Take 200 mg by mouth daily. FISH OIL PO Take 100 mg by mouth daily. hydroCHLOROthiazide 25 mg tablet Commonly known as:  HYDRODIURIL Take 25 mg by mouth daily. metoprolol succinate 50 mg XL tablet Commonly known as:  TOPROL-XL Take  by mouth daily. Per patient she does not take if her BP is low We Performed the Following AMB POC EKG ROUTINE W/ 12 LEADS, INTER & REP [30151 CPT(R)] Introducing [facility] SERVICES! Marlena Vidal introduces [employer] patient portal. Now you can access parts of your medical record, email your doctor's office, and request medication refills online. 1. In your internet browser, go to [email or web address] 2. Click on the First Time User? Click Here link in the Sign In box. You will see the New Member Sign Up page. 3. Enter your [employer] Access Code exactly as it appears below. You will not need to use this code after youve completed the sign-up process. If you do not sign up before the expiration date, you must request a new code. · [employer] Access Code: [facility] Expires: 6/14/2017  9:25 AM 
 
4. Enter the last four digits of your Social Security Number (xxxx) and Date of Birth (mm/dd/yyyy) as indicated and click Submit. You will be taken to the next sign-up page. 5. Create a Capevo ID. This will be your [employer]vo login ID and cannot be changed, so think of one that is secure and easy to remember. 6. Create a [employer]vo password. You can change your password at any time. 7. Enter your Password Reset Question and Answer. This can be used at a later time if you forget your password. 8. Enter your e-mail address. You will receive e-mail notification when new information is available in [address] 9. Click Sign Up. You can now view and download portions of your medical record. 10. Click the Download Summary menu link to download a portable copy of your medical information. If you have questions, please visit the Frequently Asked Questions section of the Capevo website. Remember, [employer] is NOT to be used for urgent needs. For medical emergencies, dial 911. Now available from your iPhone and Android! Please provide this summary of care documentation to your next provider. Your primary care clinician is listed as Angelic Fink. If you have any questions after today's visit, please call [phone].

## 2017-04-17 NOTE — PROGRESS NOTES
1. Have you been to the ER, urgent care clinic since your last visit? Hospitalized since your last visit? no  2. Have you seen or consulted any other health care providers outside of the [address] [place] [address] since your last visit? Include any pap smears or colon screening.   New patient moved here from [place] last seen by previous cardiologist Feb. 2017

## 2017-04-17 NOTE — MR AVS SNAPSHOT
Visit Information Date & Time Provider Department Dept. Phone Encounter #  
 4/17/2017 11:15 AM Kamryn Cortes  [facility] [place] [address] [address] [id number] Follow-up Instructions Return in about 3 months (around 7/17/2017). Your Appointments 4/24/2017  8:15 AM  
ROUTINE CARE with Thanh Iraheta MD  
[address] [facility] ([address] [place] [address]) Appt Note: routine f/u 1mo; DUE AWV  due now; pt r/s from 04/17/2017 [address] [facility] [address]  
[address] [facility] [address] [facility] [address] 7/17/2017  8:45 AM  
Follow Up with Kamryn Cortes MD  
[facility] [address] [place] [address]) Appt Note: 3 month fu  
 [address] [place] [address]  
[phone]  
  
   
 [place] [address]  
  
    
 10/18/2017  8:00 AM  
Follow Up with Adebayo Sagastume MD  
[facility] [address] [facility] ([address] [place] [address]) Appt Note: 6 month f/up w EKG  
 [place] [address] [address] [id number] [phone] [place] Brandi  
  
    
 10/18/2017  8:00 AM  
PROCEDURE with Rhett Acevedo [facility] [address] ([address] [place] [address]) [place] [address] [address] [address]  
[phone] [address] Upcoming Health Maintenance Date Due  
 FOOT EXAM Q1 8/21/1955 MICROALBUMIN Q1 8/21/1954 EYE EXAM RETINAL OR DILATED Q1 8/21/1954 DTaP/Tdap/Td series (1 - Tdap) 8/21/1965 BREAST CANCER SCRN MAMMOGRAM 8/21/1994 FOBT Q 1 YEAR AGE 50-75 8/21/1994 ZOSTER VACCINE AGE 60> 8/21/2004 GLAUCOMA SCREENING Q2Y 8/21/2009 OSTEOPOROSIS SCREENING (DEXA) 8/21/2009 Pneumococcal 65+ Low/Medium Risk (1 of 2 - PCV13) 8/21/2009 MEDICARE YEARLY EXAM 8/21/2009 HEMOGLOBIN A1C Q6M 10/8/2017 LIPID PANEL Q1 4/8/2018 Allergies as of 4/17/2017  Review Complete On: 4/17/2017 By: Danita Wallace MD  
 No Known Allergies Current Immunizations  Never Reviewed Name Date Influenza Vaccine 10/1/2016 Not reviewed this visit You Were Diagnosed With   
  
 Codes Comments History of seizure disorder    -  Primary ICD-10-CM: Z86.69 
ICD-9-CM: V12.49 Type 2 diabetes mellitus with hyperglycemia, without long-term current use of insulin (HCC)     ICD-10-CM: E11.65 ICD-9-CM: 250.00, 790.29 Essential hypertension     ICD-10-CM: I10 
ICD-9-CM: 401.9 Bradycardia     ICD-10-CM: R00.1 ICD-9-CM: 427.89 Vitals BP Pulse Temp Height(growth percentile) Weight(growth percentile) SpO2  
 151/80 60 98.6 °F (37 °C) (Oral) 5' 6\" (1.676 m) 180 lb 12.8 oz (82 kg) 98% BMI OB Status Smoking Status 29.18 kg/m2 Hysterectomy Never Smoker BMI and BSA Data Body Mass Index Body Surface Area  
 29.18 kg/m 2 1.95 m 2 Preferred Pharmacy Pharmacy Name Phone [id number] [address] [facility] [phone] Your Updated Medication List  
  
   
This list is accurate as of: 4/17/17 12:00 PM.  Always use your most recent med list.  
  
  
  
  
 apixaban 5 mg tablet Commonly known as:  Ciera Lukes Take 1 Tab by mouth two (2) times a day. aspirin 81 mg chewable tablet Take 81 mg by mouth daily. BENICAR 40 mg tablet Generic drug:  olmesartan Take 40 mg by mouth daily. COQ10 (LIPOSOMAL UBIQUINOL) PO Take 200 mg by mouth daily. FISH OIL PO Take 100 mg by mouth daily. hydroCHLOROthiazide 25 mg tablet Commonly known as:  HYDRODIURIL Take 25 mg by mouth daily. metoprolol succinate 50 mg XL tablet Commonly known as:  TOPROL-XL Take  by mouth daily. Per patient she does not take if her BP is low Follow-up Instructions Return in about 3 months (around 7/17/2017). Patient Instructions Patient to  Sign release for records from previous neurologist 
 
  
Introducing [facility]! Anatoly More introduces [employer] patient portal. Now you can access parts of your medical record, email your doctor's office, and request medication refills online. 1. In your internet browser, go to [email or web address] 2. Click on the First Time User? Click Here link in the Sign In box. You will see the New Member Sign Up page. 3. Enter your [employer] Access Code exactly as it appears below. You will not need to use this code after youve completed the sign-up process. If you do not sign up before the expiration date, you must request a new code. · [employer] Access Code: [facility] Expires: 6/14/2017  9:25 AM 
 
4. Enter the last four digits of your Social Security Number (xxxx) and Date of Birth (mm/dd/yyyy) as indicated and click Submit. You will be taken to the next sign-up page. 5. Create a [employer] ID. This will be your [employer] login ID and cannot be changed, so think of one that is secure and easy to remember. 6. Create a [employer] password. You can change your password at any time. 7. Enter your Password Reset Question and Answer. This can be used at a later time if you forget your password. 8. Enter your e-mail address. You will receive e-mail notification when new information is available in [address] 9. Click Sign Up. You can now view and download portions of your medical record. 10. Click the Download Summary menu link to download a portable copy of your medical information. If you have questions, please visit the Frequently Asked Questions section of the [employer] website. Remember, [employer] is NOT to be used for urgent needs. For medical emergencies, dial 911. Now available from your iPhone and Android! Please provide this summary of care documentation to your next provider. Your primary care clinician is listed as Magdiel Montgomery. If you have any questions after today's visit, please call [phone].

## 2017-04-17 NOTE — PROGRESS NOTES
I have personally seen and evaluated the device findings. Interrogation reviewed and I agree with assessment.     Octaviano Harmon

## 2017-04-17 NOTE — PROGRESS NOTES
HISTORY OF PRESENT ILLNESS  Osorio Van is a 66 y.o. female. HPI  She is referred to my office for continued cardiac care. She recently moved here from [place] to be with her son. She has had no recurrent cardiac issues lately. She denies recurrent palpitations. She denies chest pain, dyspnea, orthopnea or PND. She has had some dizziness for which she has been holding her metoprolol - believing that her blood pressure is low. She has had no syncope. She has had no symptoms to indicate TIA or amaurosis fugax. She has known history of cardiac problems. 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 She had atrial fibrillation and finally underwent the atrial fibrillation ablation successfully in March of 2016. She was treated with flecainide briefly. As the flecainide was discontinued, she has been anticoagulated with Eliquis. She has previous history of DVT after knee surgery. She has history of hypercholesterolemia, but has not been able to tolerate statins because of severe myalgia and other side effects. She is known to have had type 2 diabetes. She has history of seizure disorder; however, she has not had any recurrent seizures since 2006 off medication. Review of Systems   Constitutional: Negative for malaise/fatigue and weight loss. HENT: Negative for hearing loss. Eyes: Negative for blurred vision and double vision. Respiratory: Negative for shortness of breath. Cardiovascular: Negative for chest pain, palpitations, orthopnea, claudication, leg swelling and PND. Gastrointestinal: Negative for blood in stool, heartburn and melena. Genitourinary: Negative for dysuria, frequency, hematuria and urgency. Musculoskeletal: Positive for joint pain. Negative for back pain. Skin: Negative for itching and rash. Neurological: Positive for dizziness and seizures. Negative for loss of consciousness, weakness and headaches. Psychiatric/Behavioral: Negative for depression and memory loss. Physical Exam   Constitutional: She is oriented to person, place, and time. She appears well-developed and well-nourished. HENT:   Head: Normocephalic and atraumatic. Eyes: Conjunctivae are normal.   Neck: Normal range of motion. Neck supple. No JVD present. Cardiovascular: Normal rate, regular rhythm, S1 normal and S2 normal.   No extrasystoles are present. PMI is not displaced. Exam reveals no friction rub. Murmur heard. Harsh early systolic murmur is present with a grade of 1/6  at the upper right sternal border radiating to the neck  Pulses:       Carotid pulses are 3+ on the right side with bruit, and 3+ on the left side with bruit. Pulmonary/Chest: Effort normal. She has no rales. Abdominal: Soft. There is no tenderness. Musculoskeletal: She exhibits no edema. Neurological: She is alert and oriented to person, place, and time. No cranial nerve deficit. Skin: Skin is warm and dry. Psychiatric: She has a normal mood and affect. Her behavior is normal.     Visit Vitals    /80 (BP 1 Location: Right arm, BP Patient Position: Sitting)    Pulse 60    Ht 5' 3.5\" (1.613 m)    Wt 82.6 kg (182 lb)    SpO2 99%    BMI 31.73 kg/m2       Past Medical History:   Diagnosis Date    Bradycardia     Diabetes (HCC)     DVT (deep venous thrombosis) ([facility] 2010    right    History of ablation March 2016     History of atrial fibrillation     Hypertension     Osteoporosis     Seizure ([facility]     [employer] pacemaker implant Set. 2013 Dr. Celia Watkins, [place]     Thyroid disease        Social History     Social History    Marital status:      Spouse name: N/A    Number of children: N/A    Years of education: N/A     Occupational History    Not on file.      Social History Main Topics    Smoking status: Never Smoker    Smokeless tobacco: Never Used    Alcohol use No    Drug use: No    Sexual activity: Not Currently     Partners: Male     Birth control/ protection: Surgical      Comment: hysterectomy     Other Topics Concern    Not on file     Social History Narrative       Family History   Problem Relation Age of Onset    Diabetes Father     Hypertension Sister        Past Surgical History:   Procedure Laterality Date    HX CATARACT REMOVAL      HX HYSTERECTOMY      HX KNEE REPLACEMENT      left knee in 1/2010 and right knee 7/2016    HX PACEMAKER         Current Outpatient Prescriptions   Medication Sig Dispense Refill    olmesartan (BENICAR) 40 mg tablet Take 40 mg by mouth daily.  aspirin 81 mg chewable tablet Take 81 mg by mouth daily.  DOCOSAHEXANOIC ACID/EPA (FISH OIL PO) Take 100 mg by mouth daily.  COQ10, LIPOSOMAL UBIQUINOL, PO Take 200 mg by mouth daily.  hydroCHLOROthiazide (HYDRODIURIL) 25 mg tablet Take 25 mg by mouth daily.  apixaban (ELIQUIS) 5 mg tablet Take 1 Tab by mouth two (2) times a day. 60 Tab 2    metoprolol succinate (TOPROL-XL) 50 mg XL tablet Take  by mouth daily. EKG: there are no previous tracings available for comparison, atrially paced rhythm. ASSESSMENT and PLAN  Encounter Diagnoses   Name Primary?  Atrial fibrillation, s/p ablation March 2016 Yes    Bradycardia/ status post [facility]ney pacemaker implant Sept. 2013 Dr. Ankita Fitzpatrick, [place]     Essential hypertension     Pure hypercholesterolemia    She has been doing very well. She has had no recurrent atrial fibrillation since March of 2016. This was discussed with the patient and her son, who accompanied her, and they were advised that I would watch for another six months. If there is no evidence of atrial fibrillation by interrogating her pacemaker, I would then consider discontinuing Eliquis altogether. We discussed the increased risk factors for coronary artery disease and the importance of risk modification.   The patient insists that she is unable to tolerate the statin and she was made aware of the increased risk. She was advised to notify me if she develops exertional chest tightness or pressure, throat tightness, jaw pain, upper back pain or arm pain. She was also advised to check her blood pressure if she has any symptoms of dizziness to see if it is really related to low blood pressure before holding her metoprolol. I spent over forty-five minutes with the patient and her son in face-to-face consultation of which greater than fifty percent was spent in counseling and discussion. Addendum: Reviewing medical records from [place], [place], the patient had a negative Lexiscan myocardial perfusion imaging in October 2016 with normal LV function. She has a history of negative vascular study of her renal arteries for renal artery stenosis in 2011.

## 2017-04-17 NOTE — PROGRESS NOTES
[facility]             [address] [place], [address]    4/17/2017    HPI:  Refugio Ford is a 66 y.o., right handed,   female, who presents  With history of seizures. she reports episodes of nausea with loc starting in 2005 . She was diagnosed with seizures and placed on Trileptal she remains seizure-free since 2006 she was followed by a neurologist in [place] He repeated EEG last one in 2016 one year after tapering her off medication. She did not like taking the Trileptal 600 mg daily in the morning 300 at night. Current Outpatient Prescriptions   Medication Sig Dispense Refill    olmesartan (BENICAR) 40 mg tablet Take 40 mg by mouth daily.  aspirin 81 mg chewable tablet Take 81 mg by mouth daily.  DOCOSAHEXANOIC ACID/EPA (FISH OIL PO) Take 100 mg by mouth daily.  COQ10, LIPOSOMAL UBIQUINOL, PO Take 200 mg by mouth daily.  hydroCHLOROthiazide (HYDRODIURIL) 25 mg tablet Take 25 mg by mouth daily.  apixaban (ELIQUIS) 5 mg tablet Take 1 Tab by mouth two (2) times a day. 60 Tab 2    metoprolol succinate (TOPROL-XL) 50 mg XL tablet Take  by mouth daily. Per patient she does not take if her BP is low         Past Medical History:   Diagnosis Date    Bradycardia     Diabetes ([address]     DVT (deep venous thrombosis) ([address] 2010    right    History of ablation March 2016     History of atrial fibrillation     Hypertension     Osteoporosis     Seizure ([address]     [employer] pacemaker implant Set.  2013 Dr. Le Yeager, [place]     Thyroid disease        Past Surgical History:   Procedure Laterality Date    HX CATARACT REMOVAL      HX HYSTERECTOMY      HX KNEE REPLACEMENT      left knee in 1/2010 and right knee 7/2016    HX KNEE REPLACEMENT      right knee    HX PACEMAKER       Family History   Problem Relation Age of Onset    Diabetes Father     Hypertension Sister      No Known Allergies    Review of Systems: Review of Systems - History obtained from the patient  General ROS: negative  Psychological ROS: negative  ENT ROS: negative  Hematological and Lymphatic ROS: negative  Endocrine ROS: negative  Respiratory ROS: no cough, shortness of breath, or wheezing  Cardiovascular ROS: no chest pain or dyspnea on exertion  Gastrointestinal ROS: no abdominal pain, change in bowel habits, or black or bloody stools  Genito-Urinary ROS: no dysuria, trouble voiding, or hematuria  Musculoskeletal ROS: negative  Neurological ROS: negative  Dermatological ROS: negative    PHYSICAL EXAMINATION:    Visit Vitals    /80    Pulse 60    Temp 98.6 °F (37 °C) (Oral)    Ht 5' 6\" (1.676 m)    Wt 82 kg (180 lb 12.8 oz)    SpO2 98%    BMI 29.18 kg/m2     General:  Well defined, nourished, and groomed individual in no acute distress. Neck: Supple, nontender, thyroid within normal limits, no JVD, no bruits, no pain with resistance to active range of motion. Heart: Regular rate and rhythm, no murmurs, rub, or gallop. Normal S1S2. Lungs:  Clear to auscultation bilaterally with equal chest expansion, no cough, no wheeze  Musculoskeletal:  Extremities revealed no edema and had full range of motion of joints. Psych:  Good mood and normal affect    NEUROLOGICAL EXAMINATION:     Mental Status:   Alert and oriented to person, place, and time with recent and remote memory intact. Attention span and concentration are normal. Speech is fluent with a full fund of knowledge. Cranial Nerves:    II, III, IV, VI:  Visual acuity grossly intact. Visual fields are normal.    Pupils are equal, round, and reactive to light and accommodation. Extra-ocular movements are full and fluid. Fundoscopic exam was benign, no ptosis or nystagmus. V-XII: Hearing is grossly intact. Facial features are symmetric, with normal sensation and strength. The palate rises symmetrically and the tongue protrudes midline. Sternocleidomastoids 5/5. Motor Examination: Normal tone, bulk, and strength, 5/5 muscle strength throughout. No cogwheel rigidity or clonus present. Sensory exam:  Normal throughout to pinprick, temperature, and vibration sense. Coordination:  Heel-to-shin wassymmetrical bilaterally. Finger to nose and rapid arm movement testing wasapraxic  No resting or intention tremor    Gait and Station: Slow,Steady while walking  Fair walking on toes, heels, and  tandem walking. Normal arm swing. No Romberg or pronator drift. No muscle wasting or fasiculations noted. Reflexes:  DTRs depresssed throughout. Toes downgoing. Assessment and Plan:   Beatrice Weir is a 66 y.o. right handed female whose history and physical are consistent with history of seizures. Beatrice Weir who has risk factors including   Alex Newark was seen today for seizure. Diagnoses and all orders for this visit:    History of seizure disorder    Type 2 diabetes mellitus with hyperglycemia, without long-term current use of insulin ([facility]    Essential hypertension    Bradycardia/ post pacemaker. s/p ablation      Follow-up Disposition:  Return in about 3 months (around 7/17/2017). Reviewed need for records from previous neurologist before deciding on further evaluation    I spent 50  minutes with the patient in face-to-face consultation, of which greater than 50% was spent in counseling and coordination of care as described above.

## 2017-04-24 ENCOUNTER — OFFICE VISIT (OUTPATIENT)
Dept: FAMILY MEDICINE CLINIC | Age: 72
End: 2017-04-24

## 2017-04-24 VITALS
HEIGHT: 66 IN | RESPIRATION RATE: 16 BRPM | DIASTOLIC BLOOD PRESSURE: 84 MMHG | HEART RATE: 60 BPM | SYSTOLIC BLOOD PRESSURE: 132 MMHG | BODY MASS INDEX: 29.44 KG/M2 | WEIGHT: 183.2 LBS | OXYGEN SATURATION: 99 %

## 2017-04-24 DIAGNOSIS — Z13.39 SCREENING FOR ALCOHOLISM: ICD-10-CM

## 2017-04-24 DIAGNOSIS — I80.201 DEEP VEIN THROMBOPHLEBITIS OF RIGHT LEG (HCC): ICD-10-CM

## 2017-04-24 DIAGNOSIS — R00.1 BRADYCARDIA: ICD-10-CM

## 2017-04-24 DIAGNOSIS — Z12.39 SCREENING FOR BREAST CANCER: ICD-10-CM

## 2017-04-24 DIAGNOSIS — G40.909 SEIZURE DISORDER (HCC): ICD-10-CM

## 2017-04-24 DIAGNOSIS — Z12.11 SCREENING FOR COLON CANCER: ICD-10-CM

## 2017-04-24 DIAGNOSIS — F32.A DEPRESSION, UNSPECIFIED DEPRESSION TYPE: ICD-10-CM

## 2017-04-24 DIAGNOSIS — E03.9 HYPOTHYROIDISM, UNSPECIFIED TYPE: ICD-10-CM

## 2017-04-24 DIAGNOSIS — E11.9 WELL CONTROLLED DIABETES MELLITUS (HCC): ICD-10-CM

## 2017-04-24 DIAGNOSIS — Z00.00 ROUTINE GENERAL MEDICAL EXAMINATION AT A HEALTH CARE FACILITY: Primary | ICD-10-CM

## 2017-04-24 DIAGNOSIS — M54.41 CHRONIC RIGHT-SIDED LOW BACK PAIN WITH RIGHT-SIDED SCIATICA: ICD-10-CM

## 2017-04-24 DIAGNOSIS — I48.91 ATRIAL FIBRILLATION, UNSPECIFIED TYPE (HCC): ICD-10-CM

## 2017-04-24 DIAGNOSIS — I10 ESSENTIAL HYPERTENSION: ICD-10-CM

## 2017-04-24 DIAGNOSIS — G89.29 CHRONIC RIGHT-SIDED LOW BACK PAIN WITH RIGHT-SIDED SCIATICA: ICD-10-CM

## 2017-04-24 PROBLEM — Z71.89 ADVANCE DIRECTIVE DISCUSSED WITH PATIENT: Status: ACTIVE | Noted: 2017-04-24

## 2017-04-24 LAB
MICROALBUMIN UR TEST STR-MCNC: 10 MG/L
MICROALBUMIN/CREAT RATIO POC: <30 MG/G

## 2017-04-24 RX ORDER — HYDROCHLOROTHIAZIDE 25 MG/1
25 TABLET ORAL DAILY
Qty: 90 TAB | Refills: 0 | Status: SHIPPED | OUTPATIENT
Start: 2017-04-24 | End: 2017-07-24 | Stop reason: SDUPTHER

## 2017-04-24 RX ORDER — METOPROLOL SUCCINATE 50 MG/1
50 TABLET, EXTENDED RELEASE ORAL DAILY
Qty: 90 TAB | Refills: 0 | Status: SHIPPED | OUTPATIENT
Start: 2017-04-24 | End: 2017-07-24 | Stop reason: SDUPTHER

## 2017-04-24 RX ORDER — OLMESARTAN MEDOXOMIL 40 MG/1
40 TABLET ORAL DAILY
Qty: 90 TAB | Refills: 0 | Status: SHIPPED | OUTPATIENT
Start: 2017-04-24 | End: 2017-07-24 | Stop reason: SDUPTHER

## 2017-04-24 RX ORDER — GABAPENTIN 100 MG/1
100 CAPSULE ORAL
Qty: 30 CAP | Refills: 1 | Status: SHIPPED | OUTPATIENT
Start: 2017-04-24 | End: 2017-10-17

## 2017-04-24 NOTE — PROGRESS NOTES
This is a Subsequent Medicare Annual Wellness Visit providing Personalized Prevention Plan Services (PPPS) (Performed 12 months after initial AWV and PPPS )    I have reviewed the patient's medical history in detail and updated the computerized patient record. History     Past Medical History:   Diagnosis Date    Bradycardia     [facility]     DVT (deep venous thrombosis) ([facility] 2010    right    History of ablation March 2016     History of atrial fibrillation     Hypertension     Osteoporosis     Seizure ([facility]     [employer] pacemaker implant Set. 2013 Dr. Jeannette Fernandez, [place]     Thyroid disease       Past Surgical History:   Procedure Laterality Date    HX CATARACT REMOVAL      HX HYSTERECTOMY      HX KNEE REPLACEMENT      left knee in 1/2010 and right knee 7/2016    HX KNEE REPLACEMENT      right knee    HX PACEMAKER       Current Outpatient Prescriptions   Medication Sig Dispense Refill    olmesartan (BENICAR) 40 mg tablet Take 40 mg by mouth daily.  aspirin 81 mg chewable tablet Take 81 mg by mouth daily.  DOCOSAHEXANOIC ACID/EPA (FISH OIL PO) Take 100 mg by mouth daily.  COQ10, LIPOSOMAL UBIQUINOL, PO Take 200 mg by mouth daily.  hydroCHLOROthiazide (HYDRODIURIL) 25 mg tablet Take 25 mg by mouth daily.  apixaban (ELIQUIS) 5 mg tablet Take 1 Tab by mouth two (2) times a day. 60 Tab 2    metoprolol succinate (TOPROL-XL) 50 mg XL tablet Take  by mouth daily. Per patient she does not take if her BP is low       No Known Allergies  Family History   Problem Relation Age of Onset    Diabetes Father     Hypertension Sister      Social History   Substance Use Topics    Smoking status: Never Smoker    Smokeless tobacco: Never Used    Alcohol use No     Patient Active Problem List   Diagnosis Code    Type 2 diabetes mellitus with hyperglycemia, without long-term current use of insulin ([facility] E11.65    Essential hypertension I10    Bradycardia/ post pacemaker.  s/p ablation R00.1    Atrial fibrillation (HCC)/ post ablation I48.91    h/o Deep vein thrombophlebitis of right leg (HCC)/ post knee replacement in july 2016 I80.201    Hyperlipidemia E78.5    Hypothyroidism E03.9    Seizure disorder (HCC)/ diagnosed in 2006. was taken off from medication in 2015. since then no seizure. G40.909       Depression Risk Factor Screening:     PHQ 2 / 9, over the last two weeks 3/16/2017   Little interest or pleasure in doing things Not at all   Feeling down, depressed or hopeless Nearly every day   Total Score PHQ 2 3     Alcohol Risk Factor Screening: On any occasion during the past 3 months, have you had more than 3 drinks containing alcohol? No    Do you average more than 7 drinks per week? No      Functional Ability and Level of Safety:     Hearing Loss   Patient denies any hearing loss    Activities of Daily Living   Self-care. Requires assistance with: no ADLs    Fall Risk     Fall Risk Assessment, last 12 mths 3/16/2017   Able to walk? Yes   Fall in past 12 months? No     Abuse Screen   Patient is not abused    Review of Systems   per Dr Robi Squires    Physical Examination     Evaluation of Cognitive Function:  Mood/affect:  happy  Appearance: age appropriate  Family member/caregiver input: patient is here with her son. Son states he thinks his mother should have more activities and walk more to help with her pain in her right leg. Cognitive functions intact     Patient Care Team:  Alma Khan MD as PCP - General (Family Practice)  Margaret Quinones MD (Cardiology)    Advice/Referrals/Counseling   Education and counseling provided:  Are appropriate based on today's review and evaluation  Review nurses note and assessment. Agree with that. Discussed 5 years health plan and given copy in AVS.  Discussed advance directive. Discussed DNR and DNI. She has living wheel made. She wants to make some changes.  She will provide a copy       Assessment/Plan       ICD-10-CM ICD-9-CM    1. Routine general medical examination at a health care facility Z00.00 V70.0    2. Screening for alcoholism Z13.89 V79.1    3. Screening for breast cancer Z12.39 V76.10 TIAN MAMMO BI SCREENING INCL CAD   4. Screening for colon cancer Z12.11 V76.51 REFERRAL TO GASTROENTEROLOGY   Pt understood and agrees with above plan. Review HM  She had dexa scan done 2 years ago. Will wait for records. Waiting for records for vaccination. Tdap and pneumococcal.  She has not taken shingle vaccine due to insurance did not cover. Had an eye exam done in July 2016 at [facility] as she just moved. Flu shot had it in OCT 2016 during her knee replacement at [facility]   Follow-up Disposition:  Return in about 3 months (around 7/24/2017). August Matos

## 2017-04-24 NOTE — PROGRESS NOTES
HISTORY OF PRESENT ILLNESS  Romaine Harry is a 66 y.o. female. HPI: Here for follow up on multiple medical problem. Recently moved to the area from [place]. Recently established as a new patient. Has h/o hypertension, bradycardia, s/p pacemaker, a.fib post ablation. Seen cardiology recently to get establish locally. Had pacemaker checked as well. No evidence of A.fib on interrogation so plan to d/c eliquis after monitoring for 6 more months. Currently denies any concern. Asymptomatic. Also was feeling fatigue and muscle ache. Felt better after stopping statin. Currently off medication and taking fish oil. Visit Vitals    /84 (BP 1 Location: Left arm, BP Patient Position: Sitting)    Pulse 60    Resp 16    Ht 5' 6\" (1.676 m)    Wt 183 lb 3.2 oz (83.1 kg)    SpO2 99%    BMI 29.57 kg/m2     Also prior h/o DVT after knee surgery. Continue on eliquis at this time. Has h/o seizure but no seizure episode since 2006. Now off medication. Seen neurology. Now plan to obtain records and repeat EEG in 3 months. H/o diabetes. Said her fasting sugar is above 100. Recent HBA1C is 6.4. Asymptomatic. No fatigue. No urinary or bowel complains. No abdominal pain. No nausea or vomiting. Was feeling sad/ depressed since she moved here. Mayrosa Yeager it is going on since her  passed away. Not on any medication. Her son was with her last visit and during this visit. Both agree that it is improving. Smile on face. Both feeling adjustment is going well and not concern about seeing counseling or specialist. For now observe. Denies any headache, dizziness, no chest pain or sob. No nausea or vomiting. No abdominal pain, no urinary or bowel complains. H/o hypothyroidism. On medication. Recent labs ok. Asymptomatic.      Lab Results   Component Value Date/Time    Hemoglobin A1c 6.4 04/08/2017 08:35 AM     Lab Results   Component Value Date/Time    TSH 0.92 04/08/2017 08:35 AM     Lab Results   Component Value Date/Time    WBC 6.7 04/08/2017 08:35 AM    HGB 11.1 04/08/2017 08:35 AM    HCT 35.7 04/08/2017 08:35 AM    PLATELET 450 89/48/8092 08:35 AM    MCV 84.6 04/08/2017 08:35 AM     Lab Results   Component Value Date/Time    Sodium 140 04/08/2017 08:35 AM    Potassium 4.2 04/08/2017 08:35 AM    Chloride 103 04/08/2017 08:35 AM    CO2 30 04/08/2017 08:35 AM    Anion gap 7 04/08/2017 08:35 AM    Glucose 103 04/08/2017 08:35 AM    BUN 25 04/08/2017 08:35 AM    Creatinine 1.20 04/08/2017 08:35 AM    BUN/Creatinine ratio 21 04/08/2017 08:35 AM    GFR est AA 54 04/08/2017 08:35 AM    GFR est non-AA 44 04/08/2017 08:35 AM    Calcium 10.0 04/08/2017 08:35 AM    Bilirubin, total 0.5 04/08/2017 08:35 AM    AST (SGOT) 14 04/08/2017 08:35 AM    Alk. phosphatase 79 04/08/2017 08:35 AM    Protein, total 7.2 04/08/2017 08:35 AM    Albumin 3.6 04/08/2017 08:35 AM    Globulin 3.6 04/08/2017 08:35 AM    A-G Ratio 1.0 04/08/2017 08:35 AM    ALT (SGPT) 17 04/08/2017 08:35 AM     Lab Results   Component Value Date/Time    Cholesterol, total 195 04/08/2017 08:35 AM    HDL Cholesterol 48 04/08/2017 08:35 AM    LDL, calculated 121.2 04/08/2017 08:35 AM    VLDL, calculated 25.8 04/08/2017 08:35 AM    Triglyceride 129 04/08/2017 08:35 AM    CHOL/HDL Ratio 4.1 04/08/2017 08:35 AM     Lab Results   Component Value Date/Time    Microalbumin urine (POC) 10 04/24/2017 09:02 AM     H/o chronic lower back pain. Recently feeling radiation of pain to rt hip and rt lower ext mainly back of rt upper thigh. Mild to moderate in intensity. On and off. Not taking any medication. No fall or injury. Review labs. ROS: see HPI     Physical Exam   Constitutional: She is oriented to person, place, and time. No distress. Neck: No thyromegaly present. Cardiovascular: Normal rate, regular rhythm and normal heart sounds. Pulmonary/Chest:   CTA   Abdominal: Soft. Bowel sounds are normal. There is no tenderness. Musculoskeletal: She exhibits no edema. Lymphadenopathy:     She has no cervical adenopathy. Neurological: She is oriented to person, place, and time. Psychiatric: Her behavior is normal.       ASSESSMENT and PLAN  1. Essential hypertension: stable. Last visit changed metoprolol to once a day. She is feeling better. No more dizziness and fatigue feeling. I10 401.9 olmesartan (BENICAR) 40 mg tablet      metoprolol succinate (TOPROL-XL) 50 mg XL tablet      hydroCHLOROthiazide (HYDRODIURIL) 25 mg tablet   2. Bradycardia/ post pacemaker. s/p ablation: stable. Seen cardiology and also EP specialist.  R00.1 427.89 apixaban (ELIQUIS) 5 mg tablet   3. Atrial fibrillation, unspecified type (HCC)/post ablation: per interrogation no evidence of a.fib. Now plan to observe for 6 months and take her off eliquis. I48.91 427.31 apixaban (ELIQUIS) 5 mg tablet   4. h/o Deep vein thrombophlebitis of right leg (HCC)/ post knee replacement in july 2016: now on eliquis. Will observe. See above  I80.201 451.19 apixaban (ELIQUIS) 5 mg tablet   5. Well controlled diabetes mellitus ([facility]): now HBA1C at goal. Continue current plan. Diet modification. E11.9 250.00 AMB POC URINE, MICROALBUMIN, SEMIQUANTITATIVE   6. Hypothyroidism, unspecified type:\" labs ok. Asymptomatic. Will observe and continue current management. E03.9 244.9    7. Seizure disorder (HCC)/ diagnosed in 2006. was taken off from medication in 2015. since then no seizure. : seen neurology. Plan to obtain records, EEG in 3 months. Off medication. G40.909 345.90    8. Chronic right-sided low back pain with right-sided sciatica: going on since long time. Lately feeling radiating pain over rt lower ext. Giving trial of gabapentin. She does not drive. Discussed medication side effects. Will f/u next visit. She is going out of town for a week. She will make a follow up appt once she comes back. Also discuss use heating pad. M54.41 724.2 gabapentin (NEURONTIN) 100 mg capsule    G89.29 724.3      338.29    9. Depression, unspecified depression type: stable. Adjusting gradually to new area. See HPI  F32.9 311    Pt understood and agrees with above plan. Review HM   Follow-up Disposition:  Return in about 3 months (around 7/24/2017).

## 2017-04-24 NOTE — MR AVS SNAPSHOT
Visit Information Date & Time Provider Department Dept. Phone Encounter #  
 4/24/2017  8:15 AM [place], [address] [id number] Follow-up Instructions Return in about 3 months (around 7/24/2017). Your Appointments 7/17/2017  8:45 AM  
Follow Up with Anna Neely MD  
[facility]) Appt Note: 3 month fu  
 [address] [facility] [address] [place] [address] [phone]  
  
   
 [place] [address]  
  
    
 10/18/2017  8:00 AM  
Follow Up with Mendoza Laura MD  
[facility] ([facility]) Appt Note: 6 month f/up w EKG  
 [place] [address] [phone] Haw[place] Brandi  
  
    
 10/18/2017  8:00 AM  
PROCEDURE with Rhett Acevedo [facility] ([facility]) [place] [address] [phone] [address] [place] [address] [address] Upcoming Health Maintenance Date Due  
 FOOT EXAM Q1 8/21/1955 MICROALBUMIN Q1 8/21/1954 EYE EXAM RETINAL OR DILATED Q1 8/21/1954 COLONOSCOPY 8/21/1962 DTaP/Tdap/Td series (1 - Tdap) 8/21/1965 BREAST CANCER SCRN MAMMOGRAM 8/21/1994 GLAUCOMA SCREENING Q2Y 8/21/2009 OSTEOPOROSIS SCREENING (DEXA) 8/21/2009 Pneumococcal 65+ Low/Medium Risk (1 of 2 - PCV13) 8/21/2009 MEDICARE YEARLY EXAM 8/21/2009 HEMOGLOBIN A1C Q6M 10/8/2017 LIPID PANEL Q1 4/8/2018 Allergies as of 4/24/2017  Review Complete On: 4/24/2017 By: Alma Russell LPN No Known Allergies Current Immunizations  Never Reviewed Name Date Influenza Vaccine 10/1/2016 Not reviewed this visit You Were Diagnosed With   
  
 Codes Comments Routine general medical examination at a health care facility    -  Primary ICD-10-CM: Z00.00 ICD-9-CM: V70.0 Screening for alcoholism     ICD-10-CM: Z13.89 ICD-9-CM: V79.1 Essential hypertension     ICD-10-CM: I10 
ICD-9-CM: 401.9 Bradycardia     ICD-10-CM: R00.1 ICD-9-CM: 427.89 Atrial fibrillation, unspecified type ([facility]     ICD-10-CM: I48.91 
ICD-9-CM: 427.31 Deep vein thrombophlebitis of right leg (HCC)     ICD-10-CM: I80.201 ICD-9-CM: 451.19 Well controlled diabetes mellitus ([facility].)     ICD-10-CM: E11.9 ICD-9-CM: 250.00 Hypothyroidism, unspecified type     ICD-10-CM: E03.9 ICD-9-CM: 244.9 Seizure disorder ([facility].)     ICD-10-CM: G40.909 ICD-9-CM: 345.90 Chronic right-sided low back pain with right-sided sciatica     ICD-10-CM: M54.41, G89.29 ICD-9-CM: 724.2, 724.3, 338.29 Screening for breast cancer     ICD-10-CM: Z12.39 
ICD-9-CM: V76.10 Screening for colon cancer     ICD-10-CM: Z12.11 ICD-9-CM: V76.51 Vitals BP Pulse Resp Height(growth percentile) Weight(growth percentile) SpO2  
 132/84 (BP 1 Location: Left arm, BP Patient Position: Sitting) 60 16 5' 6\" (1.676 m) 183 lb 3.2 oz (83.1 kg) 99% BMI OB Status Smoking Status 29.57 kg/m2 Hysterectomy Never Smoker BMI and BSA Data Body Mass Index Body Surface Area  
 29.57 kg/m 2 1.97 m 2 Preferred Pharmacy Pharmacy Name Phone [id number] Esther Silverio, [address] [facility],[address] [facility] [phone] Your Updated Medication List  
  
   
This list is accurate as of: 4/24/17  9:11 AM.  Always use your most recent med list.  
  
  
  
  
 apixaban 5 mg tablet Commonly known as:  Saroj Purchase Take 1 Tab by mouth two (2) times a day. aspirin 81 mg chewable tablet Take 81 mg by mouth daily. COQ10 (LIPOSOMAL UBIQUINOL) PO Take 200 mg by mouth daily. FISH OIL PO Take 100 mg by mouth daily. gabapentin 100 mg capsule Commonly known as:  NEURONTIN Take 1 Cap by mouth nightly as needed. hydroCHLOROthiazide 25 mg tablet Commonly known as:  HYDRODIURIL Take 1 Tab by mouth daily. metoprolol succinate 50 mg XL tablet Commonly known as:  TOPROL-XL Take 1 Tab by mouth daily. Per patient she does not take if her BP is low  
  
 olmesartan 40 mg tablet Commonly known as:  [employer] Take 1 Tab by mouth daily. Prescriptions Sent to Pharmacy Refills  
 olmesartan (BENICAR) 40 mg tablet 0 Sig: Take 1 Tab by mouth daily. Class: Normal  
 Pharmacy: [facility]/pharmacy [address] [place] [address],[address] [place] [address] Ph #: [phone] Route: Oral  
 metoprolol succinate (TOPROL-XL) 50 mg XL tablet 0 Sig: Take 1 Tab by mouth daily. Per patient she does not take if her BP is low Class: Normal  
 Pharmacy: [facility]/pharmacy [address] [place] [address],[address] [place] [address] [place] [address] Ph #: [phone] Route: Oral  
 apixaban (ELIQUIS) 5 mg tablet 0 Sig: Take 1 Tab by mouth two (2) times a day. Class: Normal  
 Pharmacy: [facility]/pharmacy [address] [place] [address], [address] [place] [address],[address] [facility] [place] [address] Ph #: [phone] Route: Oral  
 hydroCHLOROthiazide (HYDRODIURIL) 25 mg tablet 0 Sig: Take 1 Tab by mouth daily. Class: Normal  
 Pharmacy: [facility]/pharmacy [address] [place] [address],[address] [facility] [place] [address] Ph #: [phone] Route: Oral  
 gabapentin (NEURONTIN) 100 mg capsule 1 Sig: Take 1 Cap by mouth nightly as needed. Class: Normal  
 Pharmacy: [facility]/pharmacy [address] [place] [address], [address] [place] [address],[address] [place] [address] Ph #: [phone] Route: Oral  
  
We Performed the Following AMB POC URINE, MICROALBUMIN, SEMIQUANTITATIVE [29635 CPT(R)] REFERRAL TO GASTROENTEROLOGY [DOI78 Custom] Comments:  
 Please evaluate patient for screening colonoscopy Follow-up Instructions Return in about 3 months (around 7/24/2017). To-Do List   
 04/24/2017 Imaging:  TIAN MAMMO BI SCREENING INCL CAD Referral Information Referral ID Referred By Referred To  
  
 [id number] [facility] Not Available Visits Status Start Date End Date 1 New Request 4/24/17 4/24/18 If your referral has a status of pending review or denied, additional information will be sent to support the outcome of this decision. Patient Instructions Medicare Part B Preventive Services Limitations Recommendation Scheduled Bone Mass Measurement 
(age 72 & older, biennial) Requires diagnosis related to osteoporosis or estrogen deficiency. Biennial benefit unless patient has history of long-term glucocorticoid tx or baseline is needed because initial test was by other method Had it 2 years ago. Will wait for records  Due 
  
Cardiovascular Screening Blood Tests (every 5 years) Total cholesterol, HDL, Triglycerides Order as a panel if possible Up to date Colorectal Cancer Screening 
-Fecal occult blood test (annual) -Flexible sigmoidoscopy (5y) 
-Screening colonoscopy (10y) -Barium Enema  Had it in 2006. Due now. Ordered GI referral  Every 5-10 years Counseling to Prevent Tobacco Use (up to 8 sessions per year) - Counseling greater than 3 and up to 10 minutes - Counseling greater than 10 minutes Patients must be asymptomatic of tobacco-related conditions to receive as preventive service N/a Diabetes Screening Tests (at least every 3 years, Medicare covers annually or at 6-month intervals for prediabetic patients) Fasting blood sugar (FBS) or glucose tolerance test (GTT) Patient must be diagnosed with one of the following: 
-Hypertension, Dyslipidemia, obesity, previous impaired FBS or GTT 
Or any two of the following: overweight, FH of diabetes, age ? 72, history of gestational diabetes, birth of baby weighing more than 9 pounds Up to date Diabetes Self-Management Training (DSMT) (no USPSTF recommendation) Requires referral by treating physician for patient with diabetes or renal disease. 10 hours of initial DSMT session of no less than 30 minutes each in a continuous 12-month period. 2 hours of follow-up DSMT in subsequent years. N/a Glaucoma Screening (no USPSTF recommendation) Diabetes mellitus, family history, , age 48 or over,  American, age 72 or over Had it in July at [address] [facility] Human Immunodeficiency Virus (HIV) Screening (annually for increased risk patients) HIV-1 and HIV-2 by EIA, MARCELA, rapid antibody test, or oral mucosa transudate Patient must be at increased risk for HIV infection per USPSTF guidelines or pregnant. Tests covered annually for patients at increased risk. Pregnant patients may receive up to 3 test during pregnancy. N/a Medical Nutrition Therapy (MNT) (for diabetes or renal disease not recommended schedule) Requires referral by treating physician for patient with diabetes or renal disease. Can be provided in same year as diabetes self-management training (DSMT), and CMS recommends medical nutrition therapy take place after DSMT. Up to 3 hours for initial year and 2 hours in subsequent years. N/a Shingles Vaccination A shingles vaccine is also recommended once in a lifetime after age 61 Pt declined  At age 61 Seasonal Influenza Vaccination (annually)  Up to date Had it in [employer] Pneumococcal Vaccination (once after 65)  Not sure. Will wait for records Hepatitis B Vaccinations (if medium/high risk) Medium/high risk factors:  End-stage renal disease, Hemophiliacs who received Factor VIII or IX concentrates, Clients of institutions for the mentally retarded, Persons who live in the same house as a HepB virus carrier, Homosexual men, Illicit injectable drug abusers. NA Screening Mammography (biennial age 54-69) Annually (age 36 or over) Ordered today as due  Due 
  
Screening Pap Tests and Pelvic Examination (up to age 79 and after 79 if unknown history or abnormal study last 10 years) Every 24 months except high risk N/a Ultrasound Screening for Abdominal Aortic Aneurysm (AAA) (once) Patient must be referred through IPPE and not have had a screening for abdominal aortic aneurysm before under Medicare. Limited to patients who meet one of the following criteria: 
- Men who are 73-68 years old and have smoked more than 100 cigarettes in their lifetime. 
-Anyone with a FH of AAA 
-Anyone recommended for screening by USPSTF N/a Learning About Diabetes Food Guidelines Your Care Instructions Meal planning is important to manage diabetes. It helps keep your blood sugar at a target level (which you set with your doctor). You don't have to eat special foods. You can eat what your family eats, including sweets once in a while. But you do have to pay attention to how often you eat and how much you eat of certain foods. You may want to work with a dietitian or a certified diabetes educator (CDE) to help you plan meals and snacks. A dietitian or CDE can also help you lose weight if that is one of your goals. What should you know about eating carbs? Managing the amount of carbohydrate (carbs) you eat is an important part of healthy meals when you have diabetes. Carbohydrate is found in many foods. · Learn which foods have carbs. And learn the amounts of carbs in different foods. ¨ Bread, cereal, pasta, and rice have about 15 grams of carbs in a serving. A serving is 1 slice of bread (1 ounce), ½ cup of cooked cereal, or 1/3 cup of cooked pasta or rice. ¨ Fruits have 15 grams of carbs in a serving. A serving is 1 small fresh fruit, such as an apple or orange; ½ of a banana; ½ cup of cooked or canned fruit; ½ cup of fruit juice; 1 cup of melon or raspberries; or 2 tablespoons of dried fruit. ¨ Milk and no-sugar-added yogurt have 15 grams of carbs in a serving. A serving is 1 cup of milk or 2/3 cup of no-sugar-added yogurt. ¨ Starchy vegetables have 15 grams of carbs in a serving. A serving is ½ cup of mashed potatoes or sweet potato; 1 cup winter squash; ½ of a small baked potato; ½ cup of cooked beans; or ½ cup cooked corn or green peas. · Learn how much carbs to eat each day and at each meal. A dietitian or CDE can teach you how to keep track of the amount of carbs you eat. This is called carbohydrate counting. · If you are not sure how to count carbohydrate grams, use the Plate Method to plan meals. It is a good, quick way to make sure that you have a balanced meal. It also helps you spread carbs throughout the day. ¨ Divide your plate by types of foods. Put non-starchy vegetables on half the plate, meat or other protein food on one-quarter of the plate, and a grain or starchy vegetable in the final quarter of the plate. To this you can add a small piece of fruit and 1 cup of milk or yogurt, depending on how many carbs you are supposed to eat at a meal. 
· Try to eat about the same amount of carbs at each meal. Do not \"save up\" your daily allowance of carbs to eat at one meal. 
· Proteins have very little or no carbs per serving. Examples of proteins are beef, chicken, turkey, fish, eggs, tofu, cheese, cottage cheese, and peanut butter. A serving size of meat is 3 ounces, which is about the size of a deck of cards. Examples of meat substitute serving sizes (equal to 1 ounce of meat) are 1/4 cup of cottage cheese, 1 egg, 1 tablespoon of peanut butter, and ½ cup of tofu. How can you eat out and still eat healthy? · Learn to estimate the serving sizes of foods that have carbohydrate. If you measure food at home, it will be easier to estimate the amount in a serving of restaurant food. · If the meal you order has too much carbohydrate (such as potatoes, corn, or baked beans), ask to have a low-carbohydrate food instead. Ask for a salad or green vegetables.  
· If you use insulin, check your blood sugar before and after eating out to help you plan how much to eat in the future. · If you eat more carbohydrate at a meal than you had planned, take a walk or do other exercise. This will help lower your blood sugar. What else should you know? · Limit saturated fat, such as the fat from meat and dairy products. This is a healthy choice because people who have diabetes are at higher risk of heart disease. So choose lean cuts of meat and nonfat or low-fat dairy products. Use olive or canola oil instead of butter or shortening when cooking. · Don't skip meals. Your blood sugar may drop too low if you skip meals and take insulin or certain medicines for diabetes. · Check with your doctor before you drink alcohol. Alcohol can cause your blood sugar to drop too low. Alcohol can also cause a bad reaction if you take certain diabetes medicines. Follow-up care is a key part of your treatment and safety. Be sure to make and go to all appointments, and call your doctor if you are having problems. It's also a good idea to know your test results and keep a list of the medicines you take. Where can you learn more? Go to http://cecelia-virginia.info/. Enter L899 in the search box to learn more about \"Learning About Diabetes Food Guidelines. \" Current as of: May 23, 2016 Content Version: 11.2 © 8433-2823 [employer]. Care instructions adapted under license by [employer] (which disclaims liability or warranty for this information). If you have questions about a medical condition or this instruction, always ask your healthcare professional. [place] [address] any warranty or liability for your use of this information. Atrial Fibrillation: Care Instructions Your Care Instructions Atrial fibrillation is an irregular and often fast heartbeat. Treating this condition is important for several reasons.  It can cause blood clots, which can travel from your heart to your brain and cause a stroke. If you have a fast heartbeat, you may feel lightheaded, dizzy, and weak. An irregular heartbeat can also increase your risk for heart failure. Atrial fibrillation is often the result of another heart condition, such as high blood pressure or coronary artery disease. Making changes to improve your heart condition will help you stay healthy and active. Follow-up care is a key part of your treatment and safety. Be sure to make and go to all appointments, and call your doctor if you are having problems. It's also a good idea to know your test results and keep a list of the medicines you take. How can you care for yourself at home? Medicines · Take your medicines exactly as prescribed. Call your doctor if you think you are having a problem with your medicine. You will get more details on the specific medicines your doctor prescribes. · If your doctor has given you a blood thinner to prevent a stroke, be sure you get instructions about how to take your medicine safely. Blood thinners can cause serious bleeding problems. · Do not take any vitamins, over-the-counter drugs, or herbal products without talking to your doctor first. 
Lifestyle changes · Do not smoke. Smoking can increase your chance of a stroke and heart attack. If you need help quitting, talk to your doctor about stop-smoking programs and medicines. These can increase your chances of quitting for good. · Eat a heart-healthy diet. · Stay at a healthy weight. Lose weight if you need to. · Limit alcohol to 2 drinks a day for men and 1 drink a day for women. Too much alcohol can cause health problems. · Avoid colds and flu. Get a pneumococcal vaccine shot. If you have had one before, ask your doctor whether you need another dose. Get a flu shot every year. If you must be around people with colds or flu, wash your hands often. Activity · If your doctor recommends it, get more exercise. Walking is a good choice. Bit by bit, increase the amount you walk every day. Try for at least 30 minutes on most days of the week. You also may want to swim, bike, or do other activities. Your doctor may suggest that you join a cardiac rehabilitation program so that you can have help increasing your physical activity safely. · Start light exercise if your doctor says it is okay. Even a small amount will help you get stronger, have more energy, and manage stress. Walking is an easy way to get exercise. Start out by walking a little more than you did in the hospital. Gradually increase the amount you walk. · When you exercise, watch for signs that your heart is working too hard. You are pushing too hard if you cannot talk while you are exercising. If you become short of breath or dizzy or have chest pain, sit down and rest immediately. · Check your pulse regularly. Place two fingers on the artery at the palm side of your wrist, in line with your thumb. If your heartbeat seems uneven or fast, talk to your doctor. When should you call for help? Call 911 anytime you think you may need emergency care. For example, call if: 
· You have symptoms of a heart attack. These may include: ¨ Chest pain or pressure, or a strange feeling in the chest. 
¨ Sweating. ¨ Shortness of breath. ¨ Nausea or vomiting. ¨ Pain, pressure, or a strange feeling in the back, neck, jaw, or upper belly or in one or both shoulders or arms. ¨ Lightheadedness or sudden weakness. ¨ A fast or irregular heartbeat. After you call 911, the  may tell you to chew 1 adult-strength or 2 to 4 low-dose aspirin. Wait for an ambulance. Do not try to drive yourself. · You have symptoms of a stroke. These may include: 
¨ Sudden numbness, tingling, weakness, or loss of movement in your face, arm, or leg, especially on only one side of your body. ¨ Sudden vision changes. ¨ Sudden trouble speaking. ¨ Sudden confusion or trouble understanding simple statements. ¨ Sudden problems with walking or balance. ¨ A sudden, severe headache that is different from past headaches. · You passed out (lost consciousness). Call your doctor now or seek immediate medical care if: 
· You have new or increased shortness of breath. · You feel dizzy or lightheaded, or you feel like you may faint. · Your heart rate becomes irregular. · You can feel your heart flutter in your chest or skip heartbeats. Tell your doctor if these symptoms are new or worse. Watch closely for changes in your health, and be sure to contact your doctor if you have any problems. Where can you learn more? Go to http://cecelia-virginia.info/. Enter U020 in the search box to learn more about \"Atrial Fibrillation: Care Instructions. \" Current as of: January 27, 2016 Content Version: 11.2 © 9694-8172 [employer]. Care instructions adapted under license by [employer] (which disclaims liability or warranty for this information). If you have questions about a medical condition or this instruction, always ask your healthcare professional. [place] [address] any warranty or liability for your use of this information. Low Back Pain: Exercises Your Care Instructions Here are some examples of typical rehabilitation exercises for your condition. Start each exercise slowly. Ease off the exercise if you start to have pain. Your doctor or physical therapist will tell you when you can start these exercises and which ones will work best for you. How to do the exercises Press-up 1. Lie on your stomach, supporting your body with your forearms. 2. Press your elbows down into the floor to raise your upper back. As you do this, relax your stomach muscles and allow your back to arch without using your back muscles.  As your press up, do not let your hips or pelvis come off the floor. 3. Hold for 15 to 30 seconds, then relax. 4. Repeat 2 to 4 times. Alternate arm and leg (bird dog) exercise Note: Do this exercise slowly. Try to keep your body straight at all times, and do not let one hip drop lower than the other. 1. Start on the floor, on your hands and knees. 2. Tighten your belly muscles. 3. Raise one leg off the floor, and hold it straight out behind you. Be careful not to let your hip drop down, because that will twist your trunk. 4. Hold for about 6 seconds, then lower your leg and switch to the other leg. 5. Repeat 8 to 12 times on each leg. 6. Over time, work up to holding for 10 to 30 seconds each time. 7. If you feel stable and secure with your leg raised, try raising the opposite arm straight out in front of you at the same time. Knee-to-chest exercise 1. Lie on your back with your knees bent and your feet flat on the floor. 2. Bring one knee to your chest, keeping the other foot flat on the floor (or keeping the other leg straight, whichever feels better on your lower back). 3. Keep your lower back pressed to the floor. Hold for at least 15 to 30 seconds. 4. Relax, and lower the knee to the starting position. 5. Repeat with the other leg. Repeat 2 to 4 times with each leg. 6. To get more stretch, put your other leg flat on the floor while pulling your knee to your chest. 
Curl-ups 1. Lie on the floor on your back with your knees bent at a 90-degree angle. Your feet should be flat on the floor, about 12 inches from your buttocks. 2. Cross your arms over your chest. If this bothers your neck, try putting your hands behind your neck (not your head), with your elbows spread apart. 3. Slowly tighten your belly muscles and raise your shoulder blades off the floor. 4. Keep your head in line with your body, and do not press your chin to your chest. 
5. Hold this position for 1 or 2 seconds, then slowly lower yourself back down to the floor. 6. Repeat 8 to 12 times. Pelvic tilt exercise 1. Lie on your back with your knees bent. 2. \"Brace\" your stomach. This means to tighten your muscles by pulling in and imagining your belly button moving toward your spine. You should feel like your back is pressing to the floor and your hips and pelvis are rocking back. 3. Hold for about 6 seconds while you breathe smoothly. 4. Repeat 8 to 12 times. Heel dig bridging 1. Lie on your back with both knees bent and your ankles bent so that only your heels are digging into the floor. Your knees should be bent about 90 degrees. 2. Then push your heels into the floor, squeeze your buttocks, and lift your hips off the floor until your shoulders, hips, and knees are all in a straight line. 3. Hold for about 6 seconds as you continue to breathe normally, and then slowly lower your hips back down to the floor and rest for up to 10 seconds. 4. Do 8 to 12 repetitions. Hamstring stretch in doorway 1. Lie on your back in a doorway, with one leg through the open door. 2. Slide your leg up the wall to straighten your knee. You should feel a gentle stretch down the back of your leg. 3. Hold the stretch for at least 15 to 30 seconds. Do not arch your back, point your toes, or bend either knee. Keep one heel touching the floor and the other heel touching the wall. 4. Repeat with your other leg. 5. Do 2 to 4 times for each leg. Hip flexor stretch 1. Kneel on the floor with one knee bent and one leg behind you. Place your forward knee over your foot. Keep your other knee touching the floor. 2. Slowly push your hips forward until you feel a stretch in the upper thigh of your rear leg. 3. Hold the stretch for at least 15 to 30 seconds. Repeat with your other leg. 4. Do 2 to 4 times on each side. Wall sit 1. Stand with your back 10 to 12 inches away from a wall. 2. Lean into the wall until your back is flat against it. 3. Slowly slide down until your knees are slightly bent, pressing your lower back into the wall. 4. Hold for about 6 seconds, then slide back up the wall. 5. Repeat 8 to 12 times. Follow-up care is a key part of your treatment and safety. Be sure to make and go to all appointments, and call your doctor if you are having problems. It's also a good idea to know your test results and keep a list of the medicines you take. Where can you learn more? Go to http://cecelia-[place].info/. Enter R798 in the search box to learn more about \"Low Back Pain: Exercises. \" Current as of: May 23, 2016 Content Version: 11.2 © 5040-4510 [employer]. Care instructions adapted under license by [employer] (which disclaims liability or warranty for this information). If you have questions about a medical condition or this instruction, always ask your healthcare professional. [address] any warranty or liability for your use of this information. Introducing [facility]! Janice Simpson introduces OnHand patient portal. Now you can access parts of your medical record, email your doctor's office, and request medication refills online. 1. In your internet browser, go to [email or web address] 2. Click on the First Time User? Click Here link in the Sign In box. You will see the New Member Sign Up page. 3. Enter your OnHand Access Code exactly as it appears below. You will not need to use this code after youve completed the sign-up process. If you do not sign up before the expiration date, you must request a new code. · OnHand Access Code: [facility] Expires: 6/14/2017  9:25 AM 
 
4. Enter the last four digits of your Social Security Number (xxxx) and Date of Birth (mm/dd/yyyy) as indicated and click Submit. You will be taken to the next sign-up page. 5. Create a [place] ID.  This will be your OnHand login ID and cannot be changed, so think of one that is secure and easy to remember. 6. Create a Cambly password. You can change your password at any time. 7. Enter your Password Reset Question and Answer. This can be used at a later time if you forget your password. 8. Enter your e-mail address. You will receive e-mail notification when new information is available in [address] 9. Click Sign Up. You can now view and download portions of your medical record. 10. Click the Download Summary menu link to download a portable copy of your medical information. If you have questions, please visit the Frequently Asked Questions section of the Cambly website. Remember, Cambly is NOT to be used for urgent needs. For medical emergencies, dial 911. Now available from your iPhone and Android! Please provide this summary of care documentation to your next provider. Your primary care clinician is listed as Charan Rothman. If you have any questions after today's visit, please call [phone].

## 2017-04-24 NOTE — PATIENT INSTRUCTIONS
Medicare Part B Preventive Services Limitations Recommendation Scheduled   Bone Mass Measurement  (age 72 & older, biennial) Requires diagnosis related to osteoporosis or estrogen deficiency. Biennial benefit unless patient has history of long-term glucocorticoid tx or baseline is needed because initial test was by other method Had it 2 years ago. Will wait for records  Due     Cardiovascular Screening Blood Tests (every 5 years)  Total cholesterol, HDL, Triglycerides Order as a panel if possible Up to date     Colorectal Cancer Screening  -Fecal occult blood test (annual)  -Flexible sigmoidoscopy (5y)  -Screening colonoscopy (10y)  -Barium Enema  Had it in 2006. Due now. Ordered GI referral  Every 5-10 years    Counseling to Prevent Tobacco Use (up to 8 sessions per year)  - Counseling greater than 3 and up to 10 minutes  - Counseling greater than 10 minutes Patients must be asymptomatic of tobacco-related conditions to receive as preventive service N/a      Diabetes Screening Tests (at least every 3 years, Medicare covers annually or at 6-month intervals for prediabetic patients)    Fasting blood sugar (FBS) or glucose tolerance test (GTT) Patient must be diagnosed with one of the following:  -Hypertension, Dyslipidemia, obesity, previous impaired FBS or GTT  Or any two of the following: overweight, FH of diabetes, age ? 72, history of gestational diabetes, birth of baby weighing more than 9 pounds Up to date    Diabetes Self-Management Training (DSMT) (no USPSTF recommendation) Requires referral by treating physician for patient with diabetes or renal disease. 10 hours of initial DSMT session of no less than 30 minutes each in a continuous 12-month period. 2 hours of follow-up DSMT in subsequent years.  N/a      Glaucoma Screening (no USPSTF recommendation) Diabetes mellitus, family history, , age 48 or over,  American, age 72 or over Had it in July at [address]  Immunodeficiency Virus (HIV) Screening (annually for increased risk patients)  HIV-1 and HIV-2 by EIA, MARCELA, rapid antibody test, or oral mucosa transudate Patient must be at increased risk for HIV infection per USPSTF guidelines or pregnant. Tests covered annually for patients at increased risk. Pregnant patients may receive up to 3 test during pregnancy. N/a      Medical Nutrition Therapy (MNT) (for diabetes or renal disease not recommended schedule) Requires referral by treating physician for patient with diabetes or renal disease. Can be provided in same year as diabetes self-management training (DSMT), and CMS recommends medical nutrition therapy take place after DSMT. Up to 3 hours for initial year and 2 hours in subsequent years. N/a      Shingles Vaccination A shingles vaccine is also recommended once in a lifetime after age 61 Pt declined  At age 60   Seasonal Influenza Vaccination (annually)  Up to date   Had it in oct  Yearly    Pneumococcal Vaccination (once after 72)  Not sure. Will wait for records     Hepatitis B Vaccinations (if medium/high risk) Medium/high risk factors:  End-stage renal disease,  Hemophiliacs who received Factor VIII or IX concentrates, Clients of institutions for the mentally retarded, Persons who live in the same house as a HepB virus carrier, Homosexual men, Illicit injectable drug abusers. NA    Screening Mammography (biennial age 54-69) Annually (age 36 or over) Ordered today as due  Due     Screening Pap Tests and Pelvic Examination (up to age 79 and after 79 if unknown history or abnormal study last 10 years) Every 25 months except high risk N/a      Ultrasound Screening for Abdominal Aortic Aneurysm (AAA) (once) Patient must be referred through IPPE and not have had a screening for abdominal aortic aneurysm before under Medicare.   Limited to patients who meet one of the following criteria:  - Men who are 73-68 years old and have smoked more than 100 cigarettes in their lifetime.  -Anyone with a FH of AAA  -Anyone recommended for screening by USPSTF N/a             Learning About Diabetes Food Guidelines  Your Care Instructions  Meal planning is important to manage diabetes. It helps keep your blood sugar at a target level (which you set with your doctor). You don't have to eat special foods. You can eat what your family eats, including sweets once in a while. But you do have to pay attention to how often you eat and how much you eat of certain foods. You may want to work with a dietitian or a certified diabetes educator (CDE) to help you plan meals and snacks. A dietitian or CDE can also help you lose weight if that is one of your goals. What should you know about eating carbs? Managing the amount of carbohydrate (carbs) you eat is an important part of healthy meals when you have diabetes. Carbohydrate is found in many foods. · Learn which foods have carbs. And learn the amounts of carbs in different foods. ¨ Bread, cereal, pasta, and rice have about 15 grams of carbs in a serving. A serving is 1 slice of bread (1 ounce), ½ cup of cooked cereal, or 1/3 cup of cooked pasta or rice. ¨ Fruits have 15 grams of carbs in a serving. A serving is 1 small fresh fruit, such as an apple or orange; ½ of a banana; ½ cup of cooked or canned fruit; ½ cup of fruit juice; 1 cup of melon or raspberries; or 2 tablespoons of dried fruit. ¨ Milk and no-sugar-added yogurt have 15 grams of carbs in a serving. A serving is 1 cup of milk or 2/3 cup of no-sugar-added yogurt. ¨ Starchy vegetables have 15 grams of carbs in a serving. A serving is ½ cup of mashed potatoes or sweet potato; 1 cup winter squash; ½ of a small baked potato; ½ cup of cooked beans; or ½ cup cooked corn or green peas. · Learn how much carbs to eat each day and at each meal. A dietitian or CDE can teach you how to keep track of the amount of carbs you eat. This is called carbohydrate counting.   · If you are not sure how to count carbohydrate grams, use the Plate Method to plan meals. It is a good, quick way to make sure that you have a balanced meal. It also helps you spread carbs throughout the day. ¨ Divide your plate by types of foods. Put non-starchy vegetables on half the plate, meat or other protein food on one-quarter of the plate, and a grain or starchy vegetable in the final quarter of the plate. To this you can add a small piece of fruit and 1 cup of milk or yogurt, depending on how many carbs you are supposed to eat at a meal.  · Try to eat about the same amount of carbs at each meal. Do not \"save up\" your daily allowance of carbs to eat at one meal.  · Proteins have very little or no carbs per serving. Examples of proteins are beef, chicken, turkey, fish, eggs, tofu, cheese, cottage cheese, and peanut butter. A serving size of meat is 3 ounces, which is about the size of a deck of cards. Examples of meat substitute serving sizes (equal to 1 ounce of meat) are 1/4 cup of cottage cheese, 1 egg, 1 tablespoon of peanut butter, and ½ cup of tofu. How can you eat out and still eat healthy? · Learn to estimate the serving sizes of foods that have carbohydrate. If you measure food at home, it will be easier to estimate the amount in a serving of restaurant food. · If the meal you order has too much carbohydrate (such as potatoes, corn, or baked beans), ask to have a low-carbohydrate food instead. Ask for a salad or green vegetables. · If you use insulin, check your blood sugar before and after eating out to help you plan how much to eat in the future. · If you eat more carbohydrate at a meal than you had planned, take a walk or do other exercise. This will help lower your blood sugar. What else should you know? · Limit saturated fat, such as the fat from meat and dairy products. This is a healthy choice because people who have diabetes are at higher risk of heart disease.  So choose lean cuts of meat and nonfat or low-fat dairy products. Use olive or canola oil instead of butter or shortening when cooking. · Don't skip meals. Your blood sugar may drop too low if you skip meals and take insulin or certain medicines for diabetes. · Check with your doctor before you drink alcohol. Alcohol can cause your blood sugar to drop too low. Alcohol can also cause a bad reaction if you take certain diabetes medicines. Follow-up care is a key part of your treatment and safety. Be sure to make and go to all appointments, and call your doctor if you are having problems. It's also a good idea to know your test results and keep a list of the medicines you take. Where can you learn more? Go to http://ceceliaLogly[place]ia.info/. Enter W254 in the search box to learn more about \"Learning About Diabetes Food Guidelines. \"  Current as of: May 23, 2016  Content Version: 11.2  © 5964-5433 [employer]. Care instructions adapted under license by [employer] (which disclaims liability or warranty for this information). If you have questions about a medical condition or this instruction, always ask your healthcare professional. [address] any warranty or liability for your use of this information. Atrial Fibrillation: Care Instructions  Your Care Instructions    Atrial fibrillation is an irregular and often fast heartbeat. Treating this condition is important for several reasons. It can cause blood clots, which can travel from your heart to your brain and cause a stroke. If you have a fast heartbeat, you may feel lightheaded, dizzy, and weak. An irregular heartbeat can also increase your risk for heart failure. Atrial fibrillation is often the result of another heart condition, such as high blood pressure or coronary artery disease. Making changes to improve your heart condition will help you stay healthy and active. Follow-up care is a key part of your treatment and safety.  Be sure to make and go to all appointments, and call your doctor if you are having problems. It's also a good idea to know your test results and keep a list of the medicines you take. How can you care for yourself at home? Medicines  · Take your medicines exactly as prescribed. Call your doctor if you think you are having a problem with your medicine. You will get more details on the specific medicines your doctor prescribes. · If your doctor has given you a blood thinner to prevent a stroke, be sure you get instructions about how to take your medicine safely. Blood thinners can cause serious bleeding problems. · Do not take any vitamins, over-the-counter drugs, or herbal products without talking to your doctor first.  Lifestyle changes  · Do not smoke. Smoking can increase your chance of a stroke and heart attack. If you need help quitting, talk to your doctor about stop-smoking programs and medicines. These can increase your chances of quitting for good. · Eat a heart-healthy diet. · Stay at a healthy weight. Lose weight if you need to. · Limit alcohol to 2 drinks a day for men and 1 drink a day for women. Too much alcohol can cause health problems. · Avoid colds and flu. Get a pneumococcal vaccine shot. If you have had one before, ask your doctor whether you need another dose. Get a flu shot every year. If you must be around people with colds or flu, wash your hands often. Activity  · If your doctor recommends it, get more exercise. Walking is a good choice. Bit by bit, increase the amount you walk every day. Try for at least 30 minutes on most days of the week. You also may want to swim, bike, or do other activities. Your doctor may suggest that you join a cardiac rehabilitation program so that you can have help increasing your physical activity safely. · Start light exercise if your doctor says it is okay. Even a small amount will help you get stronger, have more energy, and manage stress.  Walking is an easy way to get exercise. Start out by walking a little more than you did in the hospital. Gradually increase the amount you walk. · When you exercise, watch for signs that your heart is working too hard. You are pushing too hard if you cannot talk while you are exercising. If you become short of breath or dizzy or have chest pain, sit down and rest immediately. · Check your pulse regularly. Place two fingers on the artery at the palm side of your wrist, in line with your thumb. If your heartbeat seems uneven or fast, talk to your doctor. When should you call for help? Call 911 anytime you think you may need emergency care. For example, call if:  · You have symptoms of a heart attack. These may include:  ¨ Chest pain or pressure, or a strange feeling in the chest.  ¨ Sweating. ¨ Shortness of breath. ¨ Nausea or vomiting. ¨ Pain, pressure, or a strange feeling in the back, neck, jaw, or upper belly or in one or both shoulders or arms. ¨ Lightheadedness or sudden weakness. ¨ A fast or irregular heartbeat. After you call 911, the  may tell you to chew 1 adult-strength or 2 to 4 low-dose aspirin. Wait for an ambulance. Do not try to drive yourself. · You have symptoms of a stroke. These may include:  ¨ Sudden numbness, tingling, weakness, or loss of movement in your face, arm, or leg, especially on only one side of your body. ¨ Sudden vision changes. ¨ Sudden trouble speaking. ¨ Sudden confusion or trouble understanding simple statements. ¨ Sudden problems with walking or balance. ¨ A sudden, severe headache that is different from past headaches. · You passed out (lost consciousness). Call your doctor now or seek immediate medical care if:  · You have new or increased shortness of breath. · You feel dizzy or lightheaded, or you feel like you may faint. · Your heart rate becomes irregular. · You can feel your heart flutter in your chest or skip heartbeats.  Tell your doctor if these symptoms are new or worse. Watch closely for changes in your health, and be sure to contact your doctor if you have any problems. Where can you learn more? Go to http://cecelia-[place].info/. Enter U020 in the search box to learn more about \"Atrial Fibrillation: Care Instructions. \"  Current as of: January 27, 2016  Content Version: 11.2  © 9200-0266 [employer]. Care instructions adapted under license by [employer] (which disclaims liability or warranty for this information). If you have questions about a medical condition or this instruction, always ask your healthcare professional. [address] any warranty or liability for your use of this information. Low Back Pain: Exercises  Your Care Instructions  Here are some examples of typical rehabilitation exercises for your condition. Start each exercise slowly. Ease off the exercise if you start to have pain. Your doctor or physical therapist will tell you when you can start these exercises and which ones will work best for you. How to do the exercises  Press-up    1. Lie on your stomach, supporting your body with your forearms. 2. Press your elbows down into the floor to raise your upper back. As you do this, relax your stomach muscles and allow your back to arch without using your back muscles. As your press up, do not let your hips or pelvis come off the floor. 3. Hold for 15 to 30 seconds, then relax. 4. Repeat 2 to 4 times. Alternate arm and leg (bird dog) exercise    Note: Do this exercise slowly. Try to keep your body straight at all times, and do not let one hip drop lower than the other. 1. Start on the floor, on your hands and knees. 2. Tighten your belly muscles. 3. Raise one leg off the floor, and hold it straight out behind you. Be careful not to let your hip drop down, because that will twist your trunk. 4. Hold for about 6 seconds, then lower your leg and switch to the other leg.   5. Repeat 8 to 12 times on each leg. 6. Over time, work up to holding for 10 to 30 seconds each time. 7. If you feel stable and secure with your leg raised, try raising the opposite arm straight out in front of you at the same time. Knee-to-chest exercise    1. Lie on your back with your knees bent and your feet flat on the floor. 2. Bring one knee to your chest, keeping the other foot flat on the floor (or keeping the other leg straight, whichever feels better on your lower back). 3. Keep your lower back pressed to the floor. Hold for at least 15 to 30 seconds. 4. Relax, and lower the knee to the starting position. 5. Repeat with the other leg. Repeat 2 to 4 times with each leg. 6. To get more stretch, put your other leg flat on the floor while pulling your knee to your chest.  Curl-ups    1. Lie on the floor on your back with your knees bent at a 90-degree angle. Your feet should be flat on the floor, about 12 inches from your buttocks. 2. Cross your arms over your chest. If this bothers your neck, try putting your hands behind your neck (not your head), with your elbows spread apart. 3. Slowly tighten your belly muscles and raise your shoulder blades off the floor. 4. Keep your head in line with your body, and do not press your chin to your chest.  5. Hold this position for 1 or 2 seconds, then slowly lower yourself back down to the floor. 6. Repeat 8 to 12 times. Pelvic tilt exercise    1. Lie on your back with your knees bent. 2. \"Brace\" your stomach. This means to tighten your muscles by pulling in and imagining your belly button moving toward your spine. You should feel like your back is pressing to the floor and your hips and pelvis are rocking back. 3. Hold for about 6 seconds while you breathe smoothly. 4. Repeat 8 to 12 times. Heel dig bridging    1. Lie on your back with both knees bent and your ankles bent so that only your heels are digging into the floor.  Your knees should be bent about 90 degrees. 2. Then push your heels into the floor, squeeze your buttocks, and lift your hips off the floor until your shoulders, hips, and knees are all in a straight line. 3. Hold for about 6 seconds as you continue to breathe normally, and then slowly lower your hips back down to the floor and rest for up to 10 seconds. 4. Do 8 to 12 repetitions. Hamstring stretch in doorway    1. Lie on your back in a doorway, with one leg through the open door. 2. Slide your leg up the wall to straighten your knee. You should feel a gentle stretch down the back of your leg. 3. Hold the stretch for at least 15 to 30 seconds. Do not arch your back, point your toes, or bend either knee. Keep one heel touching the floor and the other heel touching the wall. 4. Repeat with your other leg. 5. Do 2 to 4 times for each leg. Hip flexor stretch    1. Kneel on the floor with one knee bent and one leg behind you. Place your forward knee over your foot. Keep your other knee touching the floor. 2. Slowly push your hips forward until you feel a stretch in the upper thigh of your rear leg. 3. Hold the stretch for at least 15 to 30 seconds. Repeat with your other leg. 4. Do 2 to 4 times on each side. Wall sit    1. Stand with your back 10 to 12 inches away from a wall. 2. Lean into the wall until your back is flat against it. 3. Slowly slide down until your knees are slightly bent, pressing your lower back into the wall. 4. Hold for about 6 seconds, then slide back up the wall. 5. Repeat 8 to 12 times. Follow-up care is a key part of your treatment and safety. Be sure to make and go to all appointments, and call your doctor if you are having problems. It's also a good idea to know your test results and keep a list of the medicines you take. Where can you learn more? Go to http://cecelia-[place].info/. Enter E910 in the search box to learn more about \"Low Back Pain: Exercises. \"  Current as of: May 23, 2016  Content Version: 11.2  © 7762-9269 [employer]. Care instructions adapted under license by [employer] (which disclaims liability or warranty for this information). If you have questions about a medical condition or this instruction, always ask your healthcare professional. [address] any warranty or liability for your use of this information.

## 2017-06-21 ENCOUNTER — OFFICE VISIT (OUTPATIENT)
Dept: SURGERY | Age: 72
End: 2017-06-21

## 2017-06-21 VITALS
SYSTOLIC BLOOD PRESSURE: 124 MMHG | TEMPERATURE: 98.4 F | RESPIRATION RATE: 18 BRPM | DIASTOLIC BLOOD PRESSURE: 66 MMHG | WEIGHT: 182.98 LBS | HEART RATE: 64 BPM | HEIGHT: 66 IN | BODY MASS INDEX: 29.41 KG/M2

## 2017-06-21 DIAGNOSIS — Z12.11 ENCOUNTER FOR SCREENING COLONOSCOPY: Primary | ICD-10-CM

## 2017-06-21 NOTE — PROGRESS NOTES
Review of Systems   Constitutional: Negative. HENT: Negative. Eyes: Negative. Respiratory: Negative. Cardiovascular: Negative. Gastrointestinal: Negative. Genitourinary: Negative. Musculoskeletal: Negative. Skin: Positive for rash. Negative for itching. Neurological: Negative. Endo/Heme/Allergies: Negative for environmental allergies and polydipsia. Bruises/bleeds easily. Psychiatric/Behavioral: Negative. Colon Screen    Patient: Leandro Watt MRN: [id number]  SSN: [id number]    YOB: 1945  Age: 66 y.o. Sex: female        Subjective:   Leandro Watt was referred by Dr. Quintin Hough. PCP is Dior Ho MD.  Patient referred for colonoscopy for   Screening colonoscopy. Patient denies rectal pain or bleeding. Abdominal surgeries as described below, specifically hysterectomy  Family history as described below, specifically none. Last colonoscopy was 10 years ago in [place]. Pt to obtain permission to stop eliquis and aspirin prior to procedure. No Known Allergies    Past Medical History:   Diagnosis Date    Bradycardia     Diabetes ([address]     DVT (deep venous thrombosis) ([address] 2010    right    History of ablation March 2016     History of atrial fibrillation     Hypercholesterolemia     Hypertension     Long term current use of anticoagulant therapy     Osteoporosis     Seizure ([address]     Seizures ([address]     on medication from 2006 to 2013 no seizure since 2006    [employer] pacemaker implant Set.  2013 Dr. Towanda Claude, [place]     Thyroid disease      Past Surgical History:   Procedure Laterality Date    CARDIAC SURG PROCEDURE UNLIST      ablation for a-fib    HX CATARACT REMOVAL      HX HYSTERECTOMY      HX KNEE REPLACEMENT      left knee in 1/2010 and right knee 7/2016    HX KNEE REPLACEMENT      right knee    HX PACEMAKER        Family History   Problem Relation Age of Onset    Diabetes Father     Hypertension Sister      Social History Substance Use Topics    Smoking status: Never Smoker    Smokeless tobacco: Never Used    Alcohol use No      Prior to Admission medications    Medication Sig Start Date End Date Taking? Authorizing Provider   olmesartan (BENICAR) 40 mg tablet Take 1 Tab by mouth daily. 4/24/17  Yes Carisa Payton MD   metoprolol succinate (TOPROL-XL) 50 mg XL tablet Take 1 Tab by mouth daily. Per patient she does not take if her BP is low 4/24/17  Yes Carisa Payton MD   apixaban (ELIQUIS) 5 mg tablet Take 1 Tab by mouth two (2) times a day. 4/24/17  Yes Carisa Payton MD   aspirin 81 mg chewable tablet Take 81 mg by mouth daily. Yes Historical Provider   DOCOSAHEXANOIC ACID/EPA (FISH OIL PO) Take 100 mg by mouth daily. Yes Historical Provider   COQ10, LIPOSOMAL UBIQUINOL, PO Take 200 mg by mouth daily. Yes Historical Provider   hydroCHLOROthiazide (HYDRODIURIL) 25 mg tablet Take 1 Tab by mouth daily. 4/24/17   Carisa Payton MD   gabapentin (NEURONTIN) 100 mg capsule Take 1 Cap by mouth nightly as needed.  4/24/17   Carisa Payton MD          Review of Systems:        Risks colonoscopy described- colon injury, missed lesion, anesthesia problems, bleeding       Evangelista Del Valle RN  June 21, 2017  8:51 AM

## 2017-06-21 NOTE — MR AVS SNAPSHOT
Visit Information Date & Time Provider Department Dept. Phone Encounter #  
 6/21/2017  8:30 AM TSS HBV NURSE VISIT [facility] [phone] [id number] Your Appointments 7/17/2017  8:45 AM  
Follow Up with Rohith Blair MD  
[facility]) Appt Note: 3 month fu  
 [address] [facility] [address] [place] [address]  
[phone]  
  
   
 [place] [address]  
  
    
 7/24/2017  8:15 AM  
ROUTINE CARE with Taz Dutton MD  
[facility] ([facility] Appt Note: 3 month followup [address] [facility]  
[address] [facility] [address] [address] [address] [address]  
  
    
 10/18/2017  8:00 AM  
Follow Up with Ramirez Hsu MD  
[facility] ([facility]) Appt Note: 6 month f/up w EKG  
 [place] [address] [address] [id number] [phone] Chandan Paz  
  
    
 10/18/2017  8:00 AM  
PROCEDURE with Rhett Acevedo [facility] ([facility]) [place] [address] [address] [address] [phone] [facility] Upcoming Health Maintenance Date Due  
 FOOT EXAM Q1 8/21/1955 EYE EXAM RETINAL OR DILATED Q1 8/21/1954 COLONOSCOPY 8/21/1962 DTaP/Tdap/Td series (1 - Tdap) 8/21/1965 BREAST CANCER SCRN MAMMOGRAM 8/21/1994 GLAUCOMA SCREENING Q2Y 8/21/2009 OSTEOPOROSIS SCREENING (DEXA) 8/21/2009 Pneumococcal 65+ Low/Medium Risk (1 of 2 - PCV13) 8/21/2009 INFLUENZA AGE 9 TO ADULT 8/1/2017 HEMOGLOBIN A1C Q6M 10/8/2017 LIPID PANEL Q1 4/8/2018 MICROALBUMIN Q1 4/24/2018 MEDICARE YEARLY EXAM 4/25/2018 Allergies as of 6/21/2017  Review Complete On: 6/21/2017 By: Felicia Lentz RN No Known Allergies Current Immunizations  Never Reviewed Name Date Influenza Vaccine 10/1/2016 Not reviewed this visit Vitals BP Pulse Temp Resp Height(growth percentile) Weight(growth percentile) 124/66 64 98.4 °F (36.9 °C) 18 5' 6\" (1.676 m) 182 lb 15.7 oz (83 kg) BMI OB Status Smoking Status 29.53 kg/m2 Hysterectomy Never Smoker BMI and BSA Data Body Mass Index Body Surface Area  
 29.53 kg/m 2 1.97 m 2 Preferred Pharmacy Pharmacy Name Phone [id number], [address] [facility] [facility] [phone] Your Updated Medication List  
  
   
This list is accurate as of: 6/21/17  8:52 AM.  Always use your most recent med list.  
  
  
  
  
 apixaban 5 mg tablet Commonly known as:  Kenziequita Best Take 1 Tab by mouth two (2) times a day. aspirin 81 mg chewable tablet Take 81 mg by mouth daily. COQ10 (LIPOSOMAL UBIQUINOL) PO Take 200 mg by mouth daily. FISH OIL PO Take 100 mg by mouth daily. gabapentin 100 mg capsule Commonly known as:  NEURONTIN Take 1 Cap by mouth nightly as needed. hydroCHLOROthiazide 25 mg tablet Commonly known as:  HYDRODIURIL Take 1 Tab by mouth daily. metoprolol succinate 50 mg XL tablet Commonly known as:  TOPROL-XL Take 1 Tab by mouth daily. Per patient she does not take if her BP is low  
  
 olmesartan 40 mg tablet Commonly known as:  [employer] Take 1 Tab by mouth daily. To-Do List   
 06/27/2017 7:00 AM  
  Appointment with SHANTA BECERRA 2 at [address] [place] [address] ([phone]) OUTSIDE FILMS  - Any outside films related to the study being scheduled should be brought with you on the day of the exam.  If this cannot be done there may be a delay in the reading of the study.   MEDICATIONS  - Patient must bring a complete list of all medications currently taking to include prescriptions, over-the-counter meds, herbals, vitamins & any dietary supplements  GENERAL INSTRUCTIONS  - On the day of your exam do not use any bath powder, deodorant or lotions on the armpit area. -Tenderness of breasts may cause an increase of discomfort during procedure. If you are experiencing breast tenderness on the day of your appointment and would like to reschedule, please call [phone]. Introducing [facility] & HEALTH SERVICES! Gunjan Disla introduces [employer] patient portal. Now you can access parts of your medical record, email your doctor's office, and request medication refills online. 1. In your internet browser, go to [email or web address] 2. Click on the First Time User? Click Here link in the Sign In box. You will see the New Member Sign Up page. 3. Enter your [employer] Access Code exactly as it appears below. You will not need to use this code after youve completed the sign-up process. If you do not sign up before the expiration date, you must request a new code. · [employer] Access Code: [email or web address] Expires: 9/19/2017  8:52 AM 
 
4. Enter the last four digits of your Social Security Number (xxxx) and Date of Birth (mm/dd/yyyy) as indicated and click Submit. You will be taken to the next sign-up page. 5. Create a [employer] ID. This will be your [employer] login ID and cannot be changed, so think of one that is secure and easy to remember. 6. Create a [employer] password. You can change your password at any time. 7. Enter your Password Reset Question and Answer. This can be used at a later time if you forget your password. 8. Enter your e-mail address. You will receive e-mail notification when new information is available in [address] 9. Click Sign Up. You can now view and download portions of your medical record.  
10. Click the Download Summary menu link to download a portable copy of your medical information. If you have questions, please visit the Frequently Asked Questions section of the [employer] website. Remember, [employer] is NOT to be used for urgent needs. For medical emergencies, dial 911. Now available from your iPhone and Android! Please provide this summary of care documentation to your next provider. Your primary care clinician is listed as Hudson Miranda. If you have any questions after today's visit, please call [phone].

## 2017-06-27 ENCOUNTER — HOSPITAL ENCOUNTER (OUTPATIENT)
Dept: MAMMOGRAPHY | Age: 72
Discharge: HOME | End: 2017-06-27
Attending: FAMILY MEDICINE
Payer: MEDICARE

## 2017-06-27 DIAGNOSIS — Z12.39 SCREENING FOR BREAST CANCER: ICD-10-CM

## 2017-06-27 DIAGNOSIS — Z12.31 ENCOUNTER FOR SCREENING MAMMOGRAM FOR BREAST CANCER: ICD-10-CM

## 2017-06-27 PROCEDURE — 77063 BREAST TOMOSYNTHESIS BI: CPT

## 2017-06-27 NOTE — LETTER
7/11/2017 3:53 PM 
 
Ms. Cinthia Glaser [address] [facility] [address] Dear Cinthia Glaser: 
 
Please find your most recent results below. Resulted Orders TIAN 3D CANDELARIA W MAMMO BI SCREENING INCL CAD Narrative SCREENING MAMMOGRAM  BILATERAL  3D tomosynthesis HISTORY: Screening. COMPARISON: [id number] TECHNIQUE: Digital mammography (2-D and 3-D) was performed with CAD. Routine 
views were performed. FINDINGS:  
No suspicious mass or suspicious calcifications are seen . Impression IMPRESSION:   
 
No evidence for malignancy. Recommend routine annual followup. BIRADS 1:  Negative Breast Density B: There are scattered areas of fibroglandular density. RECOMMENDATIONS: 
 
Your mammogram is ok. Yearly mammogram recommended. Please call me if you have any questions: [phone] Sincerely, Timur Qureshi MD

## 2017-07-02 DIAGNOSIS — R00.1 BRADYCARDIA: ICD-10-CM

## 2017-07-02 DIAGNOSIS — I80.201 DEEP VEIN THROMBOPHLEBITIS OF RIGHT LEG (HCC): ICD-10-CM

## 2017-07-02 DIAGNOSIS — I48.91 ATRIAL FIBRILLATION, UNSPECIFIED TYPE (HCC): ICD-10-CM

## 2017-07-02 RX ORDER — APIXABAN 5 MG/1
TABLET, FILM COATED ORAL
Qty: 180 TAB | Refills: 0 | Status: SHIPPED | OUTPATIENT
Start: 2017-07-02 | End: 2017-11-01 | Stop reason: SDUPTHER

## 2017-07-17 ENCOUNTER — DOCUMENTATION ONLY (OUTPATIENT)
Dept: NEUROLOGY | Age: 72
End: 2017-07-17

## 2017-07-17 ENCOUNTER — OFFICE VISIT (OUTPATIENT)
Dept: NEUROLOGY | Age: 72
End: 2017-07-17

## 2017-07-17 VITALS
SYSTOLIC BLOOD PRESSURE: 130 MMHG | BODY MASS INDEX: 29.83 KG/M2 | HEART RATE: 60 BPM | DIASTOLIC BLOOD PRESSURE: 80 MMHG | HEIGHT: 66 IN | WEIGHT: 185.6 LBS | OXYGEN SATURATION: 98 % | TEMPERATURE: 98.4 F

## 2017-07-17 DIAGNOSIS — I48.91 ATRIAL FIBRILLATION, UNSPECIFIED TYPE (HCC): ICD-10-CM

## 2017-07-17 DIAGNOSIS — R00.1 BRADYCARDIA: ICD-10-CM

## 2017-07-17 DIAGNOSIS — I10 ESSENTIAL HYPERTENSION: ICD-10-CM

## 2017-07-17 DIAGNOSIS — Z86.69 HISTORY OF SEIZURE DISORDER: Primary | ICD-10-CM

## 2017-07-17 NOTE — MR AVS SNAPSHOT
Visit Information Date & Time Provider Department Dept. Phone Encounter #  
 7/17/2017  8:45 AM Gregg Oliva MD [address] [phone] [id number] Follow-up Instructions Return in about 1 month (around 8/17/2017). Follow-up and Disposition History Your Appointments 7/24/2017  8:15 AM  
ROUTINE CARE with Kishan Gotti MD  
[facility] ([address] [place] [address] Road) Appt Note: 3 month followup [address] Count includes the [facility] [address] \[facility]\"" Street  
  
    
 8/25/2017  3:00 PM  
Follow Up with Gregg Oliva MD  
[address] [facility] [address] [address] [address]) Appt Note: 1mon f/u; EEG  
 [address] Count includes the [facility] [address] [facility] [address] [facility] [address] [address] [address] [address]  
  
    
 10/18/2017  8:00 AM  
Follow Up with Jerry Carbone MD  
[facility] ([address] [place] [address]) Appt Note: 6 month f/up w EKG  
 [place] [address] [address] [address] [phone] Hawley Brandi  
  
    
 10/18/2017  8:00 AM  
PROCEDURE with Pacer Hv [facility] ([address] [place] [address]) [place] [address] [address] [address] [phone] [address] [facility] [address] Upcoming Health Maintenance Date Due  
 FOOT EXAM Q1 8/21/1955 EYE EXAM RETINAL OR DILATED Q1 8/21/1954 COLONOSCOPY 8/21/1962 DTaP/Tdap/Td series (1 - Tdap) 8/21/1965 GLAUCOMA SCREENING Q2Y 8/21/2009 OSTEOPOROSIS SCREENING (DEXA) 8/21/2009 Pneumococcal 65+ Low/Medium Risk (1 of 2 - PCV13) 8/21/2009 INFLUENZA AGE 9 TO ADULT 8/1/2017 HEMOGLOBIN A1C Q6M 10/8/2017 LIPID PANEL Q1 4/8/2018 MICROALBUMIN Q1 4/24/2018 MEDICARE YEARLY EXAM 4/25/2018 BREAST CANCER SCRN MAMMOGRAM 6/27/2019 Allergies as of 7/17/2017  Review Complete On: 7/17/2017 By: Turner Villarreal MD  
 No Known Allergies Current Immunizations  Never Reviewed Name Date Influenza Vaccine 10/1/2016 Not reviewed this visit You Were Diagnosed With   
  
 Codes Comments History of seizure disorder    -  Primary ICD-10-CM: Z86.69 
ICD-9-CM: V12.49 Essential hypertension     ICD-10-CM: I10 
ICD-9-CM: 401.9 Bradycardia     ICD-10-CM: R00.1 ICD-9-CM: 427.89 Atrial fibrillation, unspecified type ([facility]     ICD-10-CM: I48.91 
ICD-9-CM: 427.31 Vitals BP Pulse Temp Height(growth percentile) Weight(growth percentile) SpO2  
 130/80 60 98.4 °F (36.9 °C) (Oral) 5' 6\" (1.676 m) 185 lb 9.6 oz (84.2 kg) 98% BMI OB Status Smoking Status 29.96 kg/m2 Hysterectomy Never Smoker BMI and BSA Data Body Mass Index Body Surface Area  
 29.96 kg/m 2 1.98 m 2 Preferred Pharmacy Pharmacy Name Phone [id number] [facility] [facility] [phone] Your Updated Medication List  
  
   
This list is accurate as of: 7/17/17  9:40 AM.  Always use your most recent med list.  
  
  
  
  
 aspirin 81 mg chewable tablet Take 81 mg by mouth daily. COQ10 (LIPOSOMAL UBIQUINOL) PO Take 200 mg by mouth daily. ELIQUIS 5 mg tablet Generic drug:  apixaban TAKE 1 TAB BY MOUTH TWO (2) TIMES A DAY. FISH OIL PO Take 100 mg by mouth daily. gabapentin 100 mg capsule Commonly known as:  NEURONTIN Take 1 Cap by mouth nightly as needed. hydroCHLOROthiazide 25 mg tablet Commonly known as:  HYDRODIURIL Take 1 Tab by mouth daily. metoprolol succinate 50 mg XL tablet Commonly known as:  TOPROL-XL Take 1 Tab by mouth daily. Per patient she does not take if her BP is low  
  
 olmesartan 40 mg tablet Commonly known as:  [employer] Take 1 Tab by mouth daily. Follow-up Instructions Return in about 1 month (around 8/17/2017). To-Do List   
 07/17/2017 Neurology:  EEG AWAKE AND ASLEEP Introducing [facility] & HEALTH SERVICES! Lamar Mak introduces Web[employer]y patient portal. Now you can access parts of your medical record, email your doctor's office, and request medication refills online. 1. In your internet browser, go to [email or web address] 2. Click on the First Time User? Click Here link in the Sign In box. You will see the New Member Sign Up page. 3. Enter your Webspy Access Code exactly as it appears below. You will not need to use this code after youve completed the sign-up process. If you do not sign up before the expiration date, you must request a new code. · Web[employer]y Access Code: [email or web address] Expires: 9/19/2017  8:52 AM 
 
4. Enter the last four digits of your Social Security Number (xxxx) and Date of Birth (mm/dd/yyyy) as indicated and click Submit. You will be taken to the next sign-up page. 5. Create a Webspy ID. This will be your Webspy login ID and cannot be changed, so think of one that is secure and easy to remember. 6. Create a Webspy password. You can change your password at any time. 7. Enter your Password Reset Question and Answer. This can be used at a later time if you forget your password. 8. Enter your e-mail address. You will receive e-mail notification when new information is available in [address] 9. Click Sign Up. You can now view and download portions of your medical record. 10. Click the Download Summary menu link to download a portable copy of your medical information. If you have questions, please visit the Frequently Asked Questions section of the Web[employer]y website. Remember, Web[employer]y is NOT to be used for urgent needs. For medical emergencies, dial 911. Now available from your iPhone and Android! Please provide this summary of care documentation to your next provider. Your primary care clinician is listed as Tiffany Snyder. If you have any questions after today's visit, please call [phone].

## 2017-07-17 NOTE — PROGRESS NOTES
[facility]             [address] [place], [address] [facility] [address]    7/17/2017    HPI:  Sindhu Gross is a 66 y.o., right handed,   female, who presents  With history of seizures. she reports episodes of nausea with loc starting in 2005 . She was diagnosed with seizures and placed on Trileptal she remains seizure-free since 2006 she was followed by a neurologist in [place] He repeated EEG last one in 2016 one year after tapering her off medication. She did not like taking the Trileptal 600 mg daily in the morning 300 at night. She returns reporting no seizures. She has not had seizures since starting medication in 2006. She has been off Trileptal for 2 years. She denies any new medical difficulties  Current Outpatient Prescriptions   Medication Sig Dispense Refill    ELIQUIS 5 mg tablet TAKE 1 TAB BY MOUTH TWO (2) TIMES A DAY. 180 Tab 0    olmesartan (BENICAR) 40 mg tablet Take 1 Tab by mouth daily. 90 Tab 0    metoprolol succinate (TOPROL-XL) 50 mg XL tablet Take 1 Tab by mouth daily. Per patient she does not take if her BP is low 90 Tab 0    hydroCHLOROthiazide (HYDRODIURIL) 25 mg tablet Take 1 Tab by mouth daily. 90 Tab 0    aspirin 81 mg chewable tablet Take 81 mg by mouth daily.  DOCOSAHEXANOIC ACID/EPA (FISH OIL PO) Take 100 mg by mouth daily.  COQ10, LIPOSOMAL UBIQUINOL, PO Take 200 mg by mouth daily.  gabapentin (NEURONTIN) 100 mg capsule Take 1 Cap by mouth nightly as needed.  30 Cap 1       Past Medical History:   Diagnosis Date    Bradycardia     Diabetes ([address]     DVT (deep venous thrombosis) ([address] 2010    right    History of ablation March 2016     History of atrial fibrillation     Hypercholesterolemia     Hypertension     Long term current use of anticoagulant therapy     Osteoporosis     Seizure ([address]     Seizures ([address]     on medication from 2006 to 2013 no seizure since 2006    [address] [place] [address] pacemaker implant Set. 2013 Dr. Hermilo Bergeron, [place]     Thyroid disease        Past Surgical History:   Procedure Laterality Date    CARDIAC SURG PROCEDURE UNLIST      ablation for a-fib    HX CATARACT REMOVAL      HX HYSTERECTOMY      HX KNEE REPLACEMENT      left knee in 1/2010 and right knee 7/2016    HX KNEE REPLACEMENT      right knee    HX PACEMAKER       Family History   Problem Relation Age of Onset    Diabetes Father     Hypertension Sister      No Known Allergies    Review of Systems:   Review of Systems - History obtained from the patient  General ROS: negative  Psychological ROS: negative  ENT ROS: negative  Hematological and Lymphatic ROS: negative  Endocrine ROS: negative  Respiratory ROS: no cough, shortness of breath, or wheezing  Cardiovascular ROS: no chest pain or dyspnea on exertion  Gastrointestinal ROS: no abdominal pain, change in bowel habits, or black or bloody stools  Genito-Urinary ROS: no dysuria, trouble voiding, or hematuria  Musculoskeletal ROS: negative  Neurological ROS: negative  Dermatological ROS: negative    PHYSICAL EXAMINATION:    Visit Vitals    /80    Pulse 60    Temp 98.4 °F (36.9 °C) (Oral)    Ht 5' 6\" (1.676 m)    Wt 84.2 kg (185 lb 9.6 oz)    SpO2 98%    BMI 29.96 kg/m2     General:  Well defined, nourished, and groomed individual in no acute distress. Neck: Supple, nontender, thyroid within normal limits, no JVD, no bruits, no pain with resistance to active range of motion. Heart: Regular rate and rhythm, no murmurs, rub, or gallop. Normal S1S2. Lungs:  Clear to auscultation bilaterally with equal chest expansion, no cough, no wheeze  Musculoskeletal:  Extremities revealed no edema and had full range of motion of joints. Psych:  Good mood and normal affect    NEUROLOGICAL EXAMINATION:     Mental Status:   Alert and oriented to person, place, and time with recent and remote memory intact.   Attention span and concentration are normal. Speech is fluent with a full fund of knowledge. Cranial Nerves:    II, III, IV, VI:  Visual acuity grossly intact. Visual fields are normal.    Pupils are equal, round, and reactive to light and accommodation. Extra-ocular movements are full and fluid. , no ptosis or nystagmus. V-XII: Hearing is grossly intact. Facial features are symmetric,     Motor Examination: Normal tone, bulk, and strength, 5/5 muscle strength throughout. No cogwheel rigidity or clonus present. Coordination: No resting or intention tremor    Gait and Station: Slow,Steady while walking Normal arm swing. No muscle wasting or fasiculations noted. Assessment and Plan:   Estrella Johnston is a 66 y.o. right handed female whose history and physical are consistent with history of seizures. Estrella Johnston who has risk factors including   Shakeel Safe was seen today for seizure. Diagnoses and all orders for this visit:    History of seizure disorder  -     EEG AWAKE AND ASLEEP; Future    Essential hypertension  -     EEG AWAKE AND ASLEEP; Future    Bradycardia/ post pacemaker. s/p ablation  -     EEG AWAKE AND ASLEEP; Future    Atrial fibrillation, unspecified type ([address]  -     EEG AWAKE AND ASLEEP; Future      Follow-up Disposition:  Return in about 1 month (around 8/17/2017). Reviewed need for records from previous neurologist  Will obtain eeg since no records received  If eeg normal will not restart aed    I spent 25 minutes with the patient in face-to-face consultation, of which greater than 50% was spent in counseling and coordination of care as described above.

## 2017-07-24 ENCOUNTER — TELEPHONE (OUTPATIENT)
Dept: FAMILY MEDICINE CLINIC | Age: 72
End: 2017-07-24

## 2017-07-24 ENCOUNTER — OFFICE VISIT (OUTPATIENT)
Dept: FAMILY MEDICINE CLINIC | Age: 72
End: 2017-07-24

## 2017-07-24 VITALS
DIASTOLIC BLOOD PRESSURE: 78 MMHG | RESPIRATION RATE: 16 BRPM | TEMPERATURE: 97.7 F | HEIGHT: 66 IN | OXYGEN SATURATION: 97 % | SYSTOLIC BLOOD PRESSURE: 116 MMHG | BODY MASS INDEX: 29.7 KG/M2 | WEIGHT: 184.8 LBS | HEART RATE: 60 BPM

## 2017-07-24 DIAGNOSIS — N95.9 POST MENOPAUSAL PROBLEMS: ICD-10-CM

## 2017-07-24 DIAGNOSIS — B37.2 SKIN YEAST INFECTION: ICD-10-CM

## 2017-07-24 DIAGNOSIS — I48.20 CHRONIC ATRIAL FIBRILLATION (HCC): ICD-10-CM

## 2017-07-24 DIAGNOSIS — E11.9 WELL CONTROLLED DIABETES MELLITUS (HCC): ICD-10-CM

## 2017-07-24 DIAGNOSIS — E03.9 HYPOTHYROIDISM, UNSPECIFIED TYPE: ICD-10-CM

## 2017-07-24 DIAGNOSIS — Z13.5 SCREENING FOR GLAUCOMA: ICD-10-CM

## 2017-07-24 DIAGNOSIS — G40.909 SEIZURE DISORDER (HCC): ICD-10-CM

## 2017-07-24 DIAGNOSIS — F32.89 OTHER DEPRESSION: ICD-10-CM

## 2017-07-24 DIAGNOSIS — I10 ESSENTIAL HYPERTENSION: Primary | ICD-10-CM

## 2017-07-24 RX ORDER — METOPROLOL SUCCINATE 50 MG/1
50 TABLET, EXTENDED RELEASE ORAL DAILY
Qty: 90 TAB | Refills: 3 | Status: SHIPPED | OUTPATIENT
Start: 2017-07-24 | End: 2018-07-11 | Stop reason: SDUPTHER

## 2017-07-24 RX ORDER — HYDROCHLOROTHIAZIDE 25 MG/1
25 TABLET ORAL DAILY
Qty: 90 TAB | Refills: 0 | Status: SHIPPED | OUTPATIENT
Start: 2017-07-24 | End: 2017-10-23 | Stop reason: SDUPTHER

## 2017-07-24 RX ORDER — CLOTRIMAZOLE AND BETAMETHASONE DIPROPIONATE 10; .5 MG/ML; MG/ML
LOTION TOPICAL 2 TIMES DAILY
Qty: 30 ML | Refills: 0 | Status: SHIPPED | OUTPATIENT
Start: 2017-07-24 | End: 2017-07-25 | Stop reason: ALTCHOICE

## 2017-07-24 RX ORDER — OLMESARTAN MEDOXOMIL 40 MG/1
40 TABLET ORAL DAILY
Qty: 90 TAB | Refills: 0 | Status: SHIPPED | OUTPATIENT
Start: 2017-07-24 | End: 2017-11-01 | Stop reason: SDUPTHER

## 2017-07-24 NOTE — PROGRESS NOTES
1. Have you been to the ER, urgent care clinic since your last visit? Hospitalized since your last visit? No    2. Have you seen or consulted any other health care providers outside of the [address] [place] [address] since your last visit? Include any pap smears or colon screening. No    Patient wants to discuss alternate Rx for Eliquis; med cost almost $500 to fill. Patient has not had a dm foot exam or dm eye exam.  Patient is not sure if she had a pneumonia vaccine. Patient wants to discuss medical transportation; interested in signing up for service.

## 2017-07-24 NOTE — TELEPHONE ENCOUNTER
[facility] is requesting a Prior Auth for Rx Lotrisone. Please call [phone]. Sent fax request to nurses.

## 2017-07-24 NOTE — MR AVS SNAPSHOT
Visit Information Date & Time Provider Department Dept. Phone Encounter #  
 7/24/2017  8:15 AM Julio C Garcia, [address] [id number] Follow-up Instructions Return in about 1 week (around 7/31/2017). Your Appointments 8/25/2017  3:00 PM  
Follow Up with Mana Wilson MD  
[facility] [address] Appt Note: 1mon f/u; EEG  
 [address] [facility] [address] [facility] [address] [address] [address] [address]  
  
    
 10/18/2017  8:00 AM  
Follow Up with Marena Bumpers, MD  
[facility] ([facility]) Appt Note: 6 month f/up w EKG  
 [place] [address] [address] [address] [phone] Chandan Paz  
  
    
 10/18/2017  8:00 AM  
PROCEDURE with Pacer Hv C[facility] ([facility]) [place] [address] [address] [address] [phone] [address] [place] [address] [address] Upcoming Health Maintenance Date Due  
 EYE EXAM RETINAL OR DILATED Q1 8/21/1955 COLONOSCOPY 8/21/1962 GLAUCOMA SCREENING Q2Y 8/21/2009 OSTEOPOROSIS SCREENING (DEXA) 8/21/2009 Pneumococcal 65+ Low/Medium Risk (1 of 2 - PCV13) 8/21/2009 INFLUENZA AGE 9 TO ADULT 8/1/2017 HEMOGLOBIN A1C Q6M 10/8/2017 LIPID PANEL Q1 4/8/2018 MICROALBUMIN Q1 4/24/2018 MEDICARE YEARLY EXAM 4/25/2018 FOOT EXAM Q1 7/24/2018 BREAST CANCER SCRN MAMMOGRAM 6/27/2019 DTaP/Tdap/Td series (2 - Td) 7/24/2027 Allergies as of 7/24/2017  Review Complete On: 7/24/2017 By: Julio C Garcia MD  
 No Known Allergies Current Immunizations  Never Reviewed Name Date Influenza Vaccine 10/1/2016 Not reviewed this visit You Were Diagnosed With   
  
 Codes Comments Essential hypertension    -  Primary ICD-10-CM: I10 
ICD-9-CM: 401.9 Well controlled diabetes mellitus ([facility]     ICD-10-CM: E11.9 ICD-9-CM: 250.00 Seizure disorder ([facility]     ICD-10-CM: G40.909 ICD-9-CM: 345.90 Hypothyroidism, unspecified type     ICD-10-CM: E03.9 ICD-9-CM: 244.9 Post menopausal problems     ICD-10-CM: N95.9 ICD-9-CM: 627.9 Screening for glaucoma     ICD-10-CM: Z13.5 ICD-9-CM: V80.1 Skin yeast infection     ICD-10-CM: B37.2 ICD-9-CM: 112.3 Vitals BP Pulse Temp Resp Height(growth percentile) Weight(growth percentile) 116/78 (BP 1 Location: Left arm, BP Patient Position: Sitting) 60 97.7 °F (36.5 °C) (Oral) 16 5' 6\" (1.676 m) 184 lb 12.8 oz (83.8 kg) SpO2 BMI OB Status Smoking Status 97% 29.83 kg/m2 Hysterectomy Never Smoker BMI and BSA Data Body Mass Index Body Surface Area  
 29.83 kg/m 2 1.98 m 2 Preferred Pharmacy Pharmacy Name Phone [facility]/PHARMACY #[id number] Jose Frost, [facility] [facility] [phone] Your Updated Medication List  
  
   
This list is accurate as of: 7/24/17  9:10 AM.  Always use your most recent med list.  
  
  
  
  
 aspirin 81 mg chewable tablet Take 81 mg by mouth daily. clotrimazole-betamethasone 1-0.05 % lotion Commonly known as:  Flavio Kussmaul Apply  to affected area two (2) times a day. COQ10 (LIPOSOMAL UBIQUINOL) PO Take 200 mg by mouth daily. ELIQUIS 5 mg tablet Generic drug:  apixaban TAKE 1 TAB BY MOUTH TWO (2) TIMES A DAY. FISH OIL PO Take 100 mg by mouth daily. gabapentin 100 mg capsule Commonly known as:  NEURONTIN Take 1 Cap by mouth nightly as needed. glucose blood VI test strips strip Commonly known as:  blood glucose test  
by Does Not Apply route See Admin Instructions. Check once a day  
  
 hydroCHLOROthiazide 25 mg tablet Commonly known as:  HYDRODIURIL Take 1 Tab by mouth daily. metoprolol succinate 50 mg XL tablet Commonly known as:  TOPROL-XL Take 1 Tab by mouth daily. Per patient she does not take if her BP is low  
  
 olmesartan 40 mg tablet Commonly known as:  [employer] Take 1 Tab by mouth daily. Prescriptions Sent to Pharmacy Refills  
 hydroCHLOROthiazide (HYDRODIURIL) 25 mg tablet 0 Sig: Take 1 Tab by mouth daily. Class: Normal  
 [facility] [address] [place] [address],[address] [facility] [place] [address] Ph #: [phone] Route: Oral  
 olmesartan (BENICAR) 40 mg tablet 0 Sig: Take 1 Tab by mouth daily. Class: Normal  
 [facility] [address] [place] [address],[address] [place] [address] [place] [address] Ph #: [phone] Route: Oral  
 metoprolol succinate (TOPROL-XL) 50 mg XL tablet 3 Sig: Take 1 Tab by mouth daily. Per patient she does not take if her BP is low Class: Normal  
 [facility] [address] [place] [address],[address] [place] [address] [place] [address] Ph #: [phone] Route: Oral  
 glucose blood VI test strips (BLOOD GLUCOSE TEST) strip 6 Sig: by Does Not Apply route See Admin Instructions. Check once a day Class: Normal  
 [facility] [address] [place] [address],[address] [place] [address] [place] [address] Ph #: [phone] Route: Does Not Apply  
 clotrimazole-betamethasone (LOTRISONE) 1-0.05 % lotion 0 Sig: Apply  to affected area two (2) times a day. Class: Normal  
 [facility] [address] [place] [address],[address] [place] [address] [place] [address] Ph #: [phone] Route: Topical  
  
We Performed the Following REFERRAL TO OPHTHALMOLOGY [REF57 Custom] Comments:  
 Please evaluate patient for diabetic eye exam and screening for glaucoma Follow-up Instructions Return in about 1 week (around 7/31/2017). To-Do List   
 07/24/2017 Imaging:  DEXA BONE DENSITY STUDY AXIAL   
  
 07/24/2017 Lab:  HEMOGLOBIN A1C WITH EAG   
  
 07/24/2017   Lab:  METABOLIC PANEL, COMPREHENSIVE   
  
 07/28/2017 8:00 AM  
 Appointment with [facility] EEG RM 1 at [facility] ([phone]) All patients (\"Awake only\" and \"Awake & Asleep\"): 1) Hair must be clean, free of oils, gels, spray, mousse. 2) Do not consume any caffeine products (coffee, tea, soda, chocolate) for 24 hours prior to test. 3) Have someone available to drive the patient home if patient is sedated. 4) May take medications or eat meals prior to study as normal.  If doing AWAKE ONLY --- There are no sleep instructions for this test and meals are allowed. If doing AWAKE & ASLEEP:  patient must stay up until 2:00 am and then wake up at 6:00 am on the day of study (sleep 4 hours only), without the aid of Caffeine. Additional instructions for \"Awake & Asleep\" only study: ADULT patients should ONLY get four (4) hours of sleep the night prior to study. (Preferred: Stay awake until 2:00 am and sleep until 6:00 am). CHILDREN should ONLY get five (5) hours of sleep the night prior to study. (Preferred: Stay awake until 12:00 midnight and sleep until 5:00 am) Appointments scheduled before 3:00pm:  Please arrive in the [address] [place] [address] and check in with the registrar 15 minutes prior to your scheduled appointment time. This is the same entrance as the [facility], facing [address] Parking: turn off [address] onto [address]. Proceed one block and make a right onto [address] [place] will be on your left). Go to the end of [address] and parking is located on your left. Appointments scheduled at 3:00pm or later:  Registration in the [address] [place] [address] CLOSES at 3:00 p.m. Patients should report to Patient Access/Admitting located on the left after entering the MAIN entrance of [facility]. Patient should plan to arrive 20 minutes prior to their appointment time if going to Patient Access/Admitting. After test, patient may exit from the [address] [place] [address] or [facility] Entrance. Referral Information Referral ID Referred By Referred To  
  
 [id number] [facility], [address] [place], [address] [address] Phone: [phone] Fax: [phone] Visits Status Start Date End Date 1 New Request 7/24/17 7/24/18 If your referral has a status of pending review or denied, additional information will be sent to support the outcome of this decision. Introducing [facility]! Cinthia Friedman introduces WSO2 patient portal. Now you can access parts of your medical record, email your doctor's office, and request medication refills online. 1. In your internet browser, go to [email or web address] [email or web address] 2. Click on the First Time User? Click Here link in the Sign In box. You will see the New Member Sign Up page. 3. Enter your WSO2 Access Code exactly as it appears below. You will not need to use this code after youve completed the sign-up process. If you do not sign up before the expiration date, you must request a new code. · [employer] Access Code: [email or web address] Expires: 9/19/2017  8:52 AM 
 
4. Enter the last four digits of your Social Security Number (xxxx) and Date of Birth (mm/dd/yyyy) as indicated and click Submit. You will be taken to the next sign-up page. 5. Create a WSO2 ID. This will be your WSO2 login ID and cannot be changed, so think of one that is secure and easy to remember. 6. Create a WSO2 password. You can change your password at any time. 7. Enter your Password Reset Question and Answer. This can be used at a later time if you forget your password. 8. Enter your e-mail address. You will receive e-mail notification when new information is available in [address] 9. Click Sign Up. You can now view and download portions of your medical record. 10. Click the Download Summary menu link to download a portable copy of your medical information. If you have questions, please visit the Frequently Asked Questions section of the [employer] website. Remember, [employer] is NOT to be used for urgent needs. For medical emergencies, dial 911. Now available from your iPhone and Android! Please provide this summary of care documentation to your next provider. Your primary care clinician is listed as Girma Marte. If you have any questions after today's visit, please call [phone].

## 2017-07-24 NOTE — PROGRESS NOTES
HISTORY OF PRESENT ILLNESS  Carlo Cook is a 66 y.o. female. HPI: Here for routine follow up. H/o hypertension. Stable vitals. Asymptomatic. Recently moved with her son. Was feeling depressed as she was having some adjustment trouble. Now gradually feeling better. Still has some concern like [facility] citizen [place] has waiting list for transportation and all. Will ask nurse navigator to help her if she has any resources. H/o diabetes. Well controlled. Compliance with medication. Today given info about yearly eye exam. She will schedule an appt. Had colonoscopy in last 10 years . Records from [place] did not receive them yet. Also no records for immunization received yet. Visit Vitals    /78 (BP 1 Location: Left arm, BP Patient Position: Sitting)    Pulse 60    Temp 97.7 °F (36.5 °C) (Oral)    Resp 16    Ht 5' 6\" (1.676 m)    Wt 184 lb 12.8 oz (83.8 kg)    SpO2 97%    BMI 29.83 kg/m2     Review medication list, vitals, problem list,allergies. Denies any headache, dizziness, no chest pain or trouble breathing, no arm or leg weakness. No nausea or vomiting, no weight or appetite changes. No urine or bowel complains, no palpitation, no diaphoresis. Today c/o rash around genital area. External. Redness and itching. Due to scratching some break down on skin. No pain. No vaginal discharge or itching. H/o hypothyroidism. On medication. Asymptomatic. H/o a.fib. Post ablation. Now on anticoagulation. Rate well controlled. Denies any chest pain or sob. No palpitation. H/o seizure disorder. Had EMG ordered. Pending results. Following neurology recommendations. ROS: see HPI     Physical Exam   Constitutional: She is oriented to person, place, and time. No distress. Cardiovascular: Normal heart sounds. Pulmonary/Chest: No respiratory distress. She has no wheezes. Abdominal: Soft. There is no tenderness. Musculoskeletal: She exhibits no edema.    Foot exam: no callus or open skin area,  Monofilament test normal.  Peripheral pulsations of dorsalis pedis palpable both lower ext. Neurological: She is oriented to person, place, and time. Skin:   Generalize erythema around external genitalia. Scattered break of skin due to probably scratching. Itching. Non tender or swelling. Psychiatric: Her behavior is normal.       ASSESSMENT and PLAN    ICD-10-CM ICD-9-CM    1. Essential hypertension: stable. Continue current plan. Low salt diet. I10 401.9 hydroCHLOROthiazide (HYDRODIURIL) 25 mg tablet      olmesartan (BENICAR) 40 mg tablet      metoprolol succinate (TOPROL-XL) 50 mg XL tablet   2. Well controlled diabetes mellitus ([address]): HBA1C at goal. Continue current plan and diet modification. E11.9 250.00 glucose blood VI test strips (BLOOD GLUCOSE TEST) strip      HEMOGLOBIN A1C WITH EAG      METABOLIC PANEL, COMPREHENSIVE      REFERRAL TO OPHTHALMOLOGY   3. Seizure disorder (HCC)/ diagnosed in 2006. was taken off from medication in 2015. since then no seizure. G40.909 345.90    4. Hypothyroidism, unspecified type: continue  Current plan. E03.9 244.9    5. Post menopausal problems N95.9 627.9 DEXA BONE DENSITY STUDY AXIAL   6. Screening for glaucoma Z13.5 V80.1    7. Skin yeast infection: for now given topical cream. F/u next visit. B37.2 112.3 clotrimazole-betamethasone (LOTRISONE) 1-0.05 % lotion   8. A.fib: well controlled on current medication. Continue current plan. 9. Depression: getting adjusted with new place and gradually improving. She has brought the concern about eliquis coverage. Offered her coumadin but she does not want to . Discussed to speak with cardiology office if any resources. Also sent a message to our nurse navigator to help find any resources. Pt understood and agrees with above plan. Review hM   Follow-up Disposition:  Return in about 1 week (around 7/31/2017).

## 2017-07-25 ENCOUNTER — PATIENT OUTREACH (OUTPATIENT)
Dept: FAMILY MEDICINE CLINIC | Age: 72
End: 2017-07-25

## 2017-07-25 DIAGNOSIS — B37.2 SKIN YEAST INFECTION: Primary | ICD-10-CM

## 2017-07-25 RX ORDER — NYSTATIN 100000 U/G
OINTMENT TOPICAL 2 TIMES DAILY
Qty: 15 G | Refills: 0 | Status: SHIPPED | OUTPATIENT
Start: 2017-07-25 | End: 2017-08-25 | Stop reason: ALTCHOICE

## 2017-07-25 NOTE — PROGRESS NOTES
Call placed to patient introduced my self and the purpose of my call. Patient's biggest concern is that she is unable to get to the [facility]. I told her that I would send her the number for [employer] . She said she has not been able to get a return call from [address] [place] [address] after numerous calls. I told her that I would include a couple of other numbers I had found on the Internet. As for the elequist. The patient has lost her Part D coverage since she moved to [place].   I offered to send her an elequist copay card for assistance with medication cost.  She said the cost of her Elequist has gone from 3 dollars to over $400

## 2017-07-26 ENCOUNTER — PATIENT OUTREACH (OUTPATIENT)
Dept: FAMILY MEDICINE CLINIC | Age: 72
End: 2017-07-26

## 2017-07-28 ENCOUNTER — HOSPITAL ENCOUNTER (OUTPATIENT)
Dept: LAB | Age: 72
Discharge: HOME | End: 2017-07-28
Payer: MEDICARE

## 2017-07-28 ENCOUNTER — HOSPITAL ENCOUNTER (OUTPATIENT)
Dept: NEUROLOGY | Age: 72
Discharge: HOME | End: 2017-07-28
Attending: PSYCHIATRY & NEUROLOGY
Payer: MEDICARE

## 2017-07-28 DIAGNOSIS — R00.1 BRADYCARDIA: ICD-10-CM

## 2017-07-28 DIAGNOSIS — I10 ESSENTIAL HYPERTENSION: ICD-10-CM

## 2017-07-28 DIAGNOSIS — Z86.69 HISTORY OF SEIZURE DISORDER: ICD-10-CM

## 2017-07-28 DIAGNOSIS — E11.9 WELL CONTROLLED DIABETES MELLITUS (HCC): ICD-10-CM

## 2017-07-28 DIAGNOSIS — I48.91 ATRIAL FIBRILLATION, UNSPECIFIED TYPE (HCC): ICD-10-CM

## 2017-07-28 LAB
ALBUMIN SERPL BCP-MCNC: 3.9 G/DL (ref 3.4–5)
ALBUMIN/GLOB SERPL: 1 {RATIO} (ref 0.8–1.7)
ALP SERPL-CCNC: 71 U/L (ref 45–117)
ALT SERPL-CCNC: 19 U/L (ref 13–56)
ANION GAP BLD CALC-SCNC: 4 MMOL/L (ref 3–18)
AST SERPL W P-5'-P-CCNC: 12 U/L (ref 15–37)
BILIRUB SERPL-MCNC: 0.5 MG/DL (ref 0.2–1)
BUN SERPL-MCNC: 21 MG/DL (ref 7–18)
BUN/CREAT SERPL: 19 (ref 12–20)
CALCIUM SERPL-MCNC: 10.5 MG/DL (ref 8.5–10.1)
CHLORIDE SERPL-SCNC: 104 MMOL/L (ref 100–108)
CO2 SERPL-SCNC: 32 MMOL/L (ref 21–32)
CREAT SERPL-MCNC: 1.12 MG/DL (ref 0.6–1.3)
EST. AVERAGE GLUCOSE BLD GHB EST-MCNC: 137 MG/DL
GLOBULIN SER CALC-MCNC: 3.8 G/DL (ref 2–4)
GLUCOSE SERPL-MCNC: 117 MG/DL (ref 74–99)
HBA1C MFR BLD: 6.4 % (ref 4.2–5.6)
POTASSIUM SERPL-SCNC: 4.7 MMOL/L (ref 3.5–5.5)
PROT SERPL-MCNC: 7.7 G/DL (ref 6.4–8.2)
SODIUM SERPL-SCNC: 140 MMOL/L (ref 136–145)

## 2017-07-28 PROCEDURE — 95819 EEG AWAKE AND ASLEEP: CPT

## 2017-07-31 ENCOUNTER — OFFICE VISIT (OUTPATIENT)
Dept: FAMILY MEDICINE CLINIC | Age: 72
End: 2017-07-31

## 2017-07-31 VITALS
HEART RATE: 60 BPM | WEIGHT: 188.8 LBS | RESPIRATION RATE: 16 BRPM | BODY MASS INDEX: 30.34 KG/M2 | DIASTOLIC BLOOD PRESSURE: 90 MMHG | SYSTOLIC BLOOD PRESSURE: 136 MMHG | HEIGHT: 66 IN | OXYGEN SATURATION: 95 %

## 2017-07-31 DIAGNOSIS — R21 RASH: Primary | ICD-10-CM

## 2017-07-31 NOTE — PROGRESS NOTES
Chief Complaint   Patient presents with    Hypertension     Patient states she is here for one week follow up for HTN.

## 2017-07-31 NOTE — PATIENT INSTRUCTIONS
Yeast Skin Infection: Care Instructions  Your Care Instructions    Yeast normally lives on your skin. Sometimes too much yeast can overgrow in certain areas of the skin and cause an infection. The infection causes red, scaly, moist patches on your skin that may itch. Common areas for skin yeast infections are skin folds under the breasts or belly area. The warm and moist areas in the skin folds can make it easier for yeast to overgrow. Yeast infections also can be found on other parts of the body such as the groin or armpits. You will probably get a cream or ointment that contains an antifungal medicine. Examples of these are miconazole and clotrimazole. You put it on your skin to treat the infection. Your doctor may give you a prescription for the cream or ointment. Or you may be able to buy it without a prescription at most drugstores. If the infection is severe, the doctor will prescribe antifungal pills. A yeast infection usually goes away after about a week of treatment. But it's important to use the medicine for as long as your doctor tells you to. Follow-up care is a key part of your treatment and safety. Be sure to make and go to all appointments, and call your doctor if you are having problems. It's also a good idea to know your test results and keep a list of the medicines you take. How can you care for yourself at home? · Be safe with medicines. Take your medicines exactly as prescribed. Call your doctor if you think you are having a problem with your medicine. · Keep your skin clean and dry. Your doctor may suggest using powder that contains an antifungal medicine in the skin folds. · Wear loose clothing. When should you call for help? Call your doctor now or seek immediate medical care if:  · You have symptoms of infection, such as:  ¨ Increased pain, swelling, warmth, or redness. ¨ Red streaks leading from the area. ¨ Pus draining from the area. ¨ A fever.   Watch closely for changes in your health, and be sure to contact your doctor if:  · You do not get better as expected. Where can you learn more? Go to http://cecelia-vir[place]ia.info/. Enter H164 in the search box to learn more about \"Yeast Skin Infection: Care Instructions. \"  Current as of: November 3, 2016  Content Version: 11.3  © 4585-1307 [employer]. Care instructions adapted under license by [employer] (which disclaims liability or warranty for this information). If you have questions about a medical condition or this instruction, always ask your healthcare professional. [place] [address] any warranty or liability for your use of this information.

## 2017-07-31 NOTE — MR AVS SNAPSHOT
Visit Information Date & Time Provider Department Dept. Phone Encounter #  
 7/31/2017  1:45 PM Mo Ibarra, [address] [id number] Follow-up Instructions Return in about 3 months (around 10/31/2017). Your Appointments 8/25/2017  3:00 PM  
Follow Up with Rupa Sweeney MD  
[facility] at [address]) Appt Note: 1mon f/u; EEG  
 [address] [facility], [facility] [address] [facility] [address] [facility] [address] [address] [address] [address]  
  
    
 10/18/2017  8:00 AM  
Follow Up with Meliton Rodriguez MD  
[facility] ([address] [place] [address] Road) Appt Note: 6 month f/up w EKG  
 [place] [address] [address] [address] [phone] Chandan Paz  
  
    
 10/18/2017  8:00 AM  
PROCEDURE with Pacer Hv Csi [facility] ([address] [place] [address]) [place] [address] [address] [address]  
[phone] [address] [place] [address] [address] Upcoming Health Maintenance Date Due  
 EYE EXAM RETINAL OR DILATED Q1 8/21/1954 COLONOSCOPY 8/21/1962 GLAUCOMA SCREENING Q2Y 8/21/2009 OSTEOPOROSIS SCREENING (DEXA) 8/21/2009 INFLUENZA AGE 9 TO ADULT 8/1/2017 HEMOGLOBIN A1C Q6M 1/28/2018 LIPID PANEL Q1 4/8/2018 MICROALBUMIN Q1 4/24/2018 MEDICARE YEARLY EXAM 4/25/2018 FOOT EXAM Q1 7/24/2018 Pneumococcal 65+ Low/Medium Risk (2 of 2 - PPSV23) 7/31/2018 BREAST CANCER SCRN MAMMOGRAM 6/27/2019 DTaP/Tdap/Td series (2 - Td) 7/24/2027 Allergies as of 7/31/2017  Review Complete On: 7/31/2017 By: Carlos Han LPN No Known Allergies Current Immunizations  Never Reviewed Name Date Influenza Vaccine 10/1/2016 Not reviewed this visit You Were Diagnosed With   
  
 Codes Comments Rash    -  Primary ICD-10-CM: R21 
ICD-9-CM: 782.1 Vitals BP Pulse Resp Height(growth percentile) Weight(growth percentile) SpO2  
 136/90 (BP 1 Location: Left arm, BP Patient Position: Sitting) 60 16 5' 6\" (1.676 m) 188 lb 12.8 oz (85.6 kg) 95% BMI OB Status Smoking Status 30.47 kg/m2 Hysterectomy Never Smoker BMI and BSA Data Body Mass Index Body Surface Area  
 30.47 kg/m 2 2 m 2 Preferred Pharmacy Pharmacy Name Phone [id number] [place], [address] [facility] [facility] [phone] Your Updated Medication List  
  
   
This list is accurate as of: 7/31/17  2:19 PM.  Always use your most recent med list.  
  
  
  
  
 aspirin 81 mg chewable tablet Take 81 mg by mouth daily. COQ10 (LIPOSOMAL UBIQUINOL) PO Take 200 mg by mouth daily. ELIQUIS 5 mg tablet Generic drug:  apixaban TAKE 1 TAB BY MOUTH TWO (2) TIMES A DAY. FISH OIL PO Take 100 mg by mouth daily. gabapentin 100 mg capsule Commonly known as:  NEURONTIN Take 1 Cap by mouth nightly as needed. glucose blood VI test strips strip Commonly known as:  blood glucose test  
by Does Not Apply route See Admin Instructions. Check once a day  
  
 hydroCHLOROthiazide 25 mg tablet Commonly known as:  HYDRODIURIL Take 1 Tab by mouth daily. metoprolol succinate 50 mg XL tablet Commonly known as:  TOPROL-XL Take 1 Tab by mouth daily. Per patient she does not take if her BP is low  
  
 nystatin 100,000 unit/gram ointment Commonly known as:  MYCOSTATIN Apply  to affected area two (2) times a day. olmesartan 40 mg tablet Commonly known as:  [employer] Take 1 Tab by mouth daily. Follow-up Instructions Return in about 3 months (around 10/31/2017). Patient Instructions Yeast Skin Infection: Care Instructions Your Care Instructions Yeast normally lives on your skin.  Sometimes too much yeast can overgrow in certain areas of the skin and cause an infection. The infection causes red, scaly, moist patches on your skin that may itch. Common areas for skin yeast infections are skin folds under the breasts or belly area. The warm and moist areas in the skin folds can make it easier for yeast to overgrow. Yeast infections also can be found on other parts of the body such as the groin or armpits. You will probably get a cream or ointment that contains an antifungal medicine. Examples of these are miconazole and clotrimazole. You put it on your skin to treat the infection. Your doctor may give you a prescription for the cream or ointment. Or you may be able to buy it without a prescription at most drugstores. If the infection is severe, the doctor will prescribe antifungal pills. A yeast infection usually goes away after about a week of treatment. But it's important to use the medicine for as long as your doctor tells you to. Follow-up care is a key part of your treatment and safety. Be sure to make and go to all appointments, and call your doctor if you are having problems. It's also a good idea to know your test results and keep a list of the medicines you take. How can you care for yourself at home? · Be safe with medicines. Take your medicines exactly as prescribed. Call your doctor if you think you are having a problem with your medicine. · Keep your skin clean and dry. Your doctor may suggest using powder that contains an antifungal medicine in the skin folds. · Wear loose clothing. When should you call for help? Call your doctor now or seek immediate medical care if: 
· You have symptoms of infection, such as: 
¨ Increased pain, swelling, warmth, or redness. ¨ Red streaks leading from the area. ¨ Pus draining from the area. ¨ A fever. Watch closely for changes in your health, and be sure to contact your doctor if: 
· You do not get better as expected. Where can you learn more? Go to http://cecelia-[place].info/. Enter N948 in the search box to learn more about \"Yeast Skin Infection: Care Instructions. \" Current as of: November 3, 2016 Content Version: 11.3 © 2762-9782 [employer]. Care instructions adapted under license by [employer] (which disclaims liability or warranty for this information). If you have questions about a medical condition or this instruction, always ask your healthcare professional. [address] any warranty or liability for your use of this information. Please provide this summary of care documentation to your next provider. Your primary care clinician is listed as Hien Desai. If you have any questions after today's visit, please call [phone].

## 2017-07-31 NOTE — PROGRESS NOTES
HISTORY OF PRESENT ILLNESS  Alisha Mejia is a 66 y.o. female. HPI: Here for follow up on rash over genital area and abdominal fold area. Felt itching over affected area so was scratching and she had break down of skin. Last visit given nystatin and she has significant improvement in redness and itching. Currently healed broken skin. No concern. Feeling much better. Discussed with her that it is a yeast infection. More from moisture / sweating and all and more during summer time. Wear cotton under wear etc. Avoid frequency washing and avoid using any fragrances. Visit Vitals    /90 (BP 1 Location: Left arm, BP Patient Position: Sitting)    Pulse 60    Resp 16    Ht 5' 6\" (1.676 m)    Wt 188 lb 12.8 oz (85.6 kg)    SpO2 95%    BMI 30.47 kg/m2     Review medication list, vitals, problem list,allergies. No other concern at this time. ROS: see HPI     Physical Exam   Constitutional: She is oriented to person, place, and time. No distress. Neurological: She is oriented to person, place, and time. Skin:   No rash over abdominal and inguinal fold area. No rash over external genitalia. ASSESSMENT and PLAN    ICD-10-CM ICD-9-CM    1. Rash/ abdominal fold, inguinal fold: had yeast infection. Improved with nystatin. Will observe and done some education to prevent moisture so can prevent yeast infection. R21 782. 1    Pt understood and agrees with above plan. She has brought pneumococcal 23 paper from pharmacy as she has received this immunization but not has put the date on it. She will think about getting prevnar 13. Per her pneumococcal 23 must be taken few years back. Follow-up Disposition:  Return in about 3 months (around 10/31/2017).

## 2017-08-01 NOTE — PROCEDURES
[place]    Name:  Soumya Doty  MR#:  [id number]  :  1945  Account #:  [de-identified]  Date of Adm:  2017  Date of Service:  2017      REFERRING PHYSICIAN: Dr. Clelia Frankel    EEG NUMBER: [facility]     CLINICAL: This is an apparently wakeful EEG on this 66year-old  patient being evaluated for possible seizure. She has a history of  seizures. She reports episodes of nausea with loss of consciousness  starting in . She was diagnosed with seizures and placed on  Trileptal. She remains free of these events since . MEDICATIONS  Include:  1. Eliquis. 2. Benicar. 3. Toprol. 4. HydroDIURIL. 5. Aspirin. 6. Lisinopril. 7. Neurontin. EEG REPORT: The predominant apparently wakeful background  consists of 40-50 microvolts, sinusoidal and symmetrical, 9.5 to 10.5  Hz waves, which attenuate well with eye opening. Bifrontal 3 to 5  microvolts, 16 to 20 Hz activity seen, which are symmetrical in their  occurrence. Step-flash photic stimulation was performed that caused symmetrical  driving between 8 and 18 flashes per second. IMPRESSION: This is a normal wakeful electroencephalogram. No  epileptiform or focal abnormality was identified.         MD Minal Gallegos / [place]  D:  2017   17:12  T:  2017   22:06  Job #:  [id number]

## 2017-08-10 ENCOUNTER — TELEPHONE (OUTPATIENT)
Dept: FAMILY MEDICINE CLINIC | Age: 72
End: 2017-08-10

## 2017-08-10 NOTE — TELEPHONE ENCOUNTER
Spoke with patient (all identifiers verified) to advise of upcoming eye appointment with Dr. Tania Palacio on 8/31/17 at 1:15 p.m. She was asked to arrive 15 minutes prior to the appointment and bring photo ID, insurance cards, medication list and co-pay if applicable. Patient verbalized understanding. Patient [facility] [address] ., [place], [address] [address]; [phone]).

## 2017-08-25 ENCOUNTER — OFFICE VISIT (OUTPATIENT)
Dept: NEUROLOGY | Age: 72
End: 2017-08-25

## 2017-08-25 VITALS
OXYGEN SATURATION: 98 % | SYSTOLIC BLOOD PRESSURE: 116 MMHG | WEIGHT: 184 LBS | HEIGHT: 66 IN | HEART RATE: 60 BPM | TEMPERATURE: 99.5 F | DIASTOLIC BLOOD PRESSURE: 76 MMHG | BODY MASS INDEX: 29.57 KG/M2 | RESPIRATION RATE: 12 BRPM

## 2017-08-25 DIAGNOSIS — Z86.69 HISTORY OF SEIZURE DISORDER: Primary | ICD-10-CM

## 2017-08-25 DIAGNOSIS — I10 ESSENTIAL HYPERTENSION: ICD-10-CM

## 2017-08-25 DIAGNOSIS — R00.1 BRADYCARDIA: ICD-10-CM

## 2017-08-25 NOTE — PROGRESS NOTES
[facility]             [address] [facility], [address]    8/25/2017    HPI:  Ghazala Connell is a 67 y.o., right handed,   female, who presents  With history of seizures. she reports episodes of nausea with loc starting in 2005 . She was diagnosed with seizures and placed on Trileptal she remains seizure-free since 2006 she was followed by a neurologist in [place] He repeated EEG last one in 2016 one year after tapering her off medication. She did not like taking the Trileptal 600 mg daily in the morning 300 at night. She returns reporting no seizures. She has not had seizures since starting medication in 2006. She has been off Trileptal for 2 years. She denies any new medical difficulties She did get eeg requested  Current Outpatient Prescriptions   Medication Sig Dispense Refill    hydroCHLOROthiazide (HYDRODIURIL) 25 mg tablet Take 1 Tab by mouth daily. 90 Tab 0    olmesartan (BENICAR) 40 mg tablet Take 1 Tab by mouth daily. 90 Tab 0    metoprolol succinate (TOPROL-XL) 50 mg XL tablet Take 1 Tab by mouth daily. Per patient she does not take if her BP is low 90 Tab 3    glucose blood VI test strips (BLOOD GLUCOSE TEST) strip by Does Not Apply route See Admin Instructions. Check once a day 100 Strip 6    ELIQUIS 5 mg tablet TAKE 1 TAB BY MOUTH TWO (2) TIMES A DAY. 180 Tab 0    gabapentin (NEURONTIN) 100 mg capsule Take 1 Cap by mouth nightly as needed. 30 Cap 1    aspirin 81 mg chewable tablet Take 81 mg by mouth daily.  DOCOSAHEXANOIC ACID/EPA (FISH OIL PO) Take 100 mg by mouth daily.  COQ10, LIPOSOMAL UBIQUINOL, PO Take 200 mg by mouth daily.          Past Medical History:   Diagnosis Date    Bradycardia     Diabetes ([address]     DVT (deep venous thrombosis) [facility] [address] 2010    right    History of ablation March 2016     History of atrial fibrillation     Hypercholesterolemia     Hypertension     Long term current use of anticoagulant therapy     Osteoporosis     Seizure ([address]     Seizures ([address]     on medication from 2006 to 2013 no seizure since 2006    [employer] pacemaker implant Set. 2013 Dr. Mauro Iyer, [place]     Thyroid disease        Past Surgical History:   Procedure Laterality Date    CARDIAC SURG PROCEDURE UNLIST      ablation for a-fib    HX CATARACT REMOVAL      HX HYSTERECTOMY      HX KNEE REPLACEMENT      left knee in 1/2010 and right knee 7/2016    HX KNEE REPLACEMENT      right knee    HX PACEMAKER       Family History   Problem Relation Age of Onset    Diabetes Father     Hypertension Sister      No Known Allergies    Review of Systems:   Review of Systems - History obtained from the patient  General ROS: negative  Psychological ROS: negative  ENT ROS: negative  Hematological and Lymphatic ROS: negative  Endocrine ROS: negative  Respiratory ROS: no cough, shortness of breath, or wheezing  Cardiovascular ROS: no chest pain or dyspnea on exertion  Gastrointestinal ROS: no abdominal pain, change in bowel habits, or black or bloody stools  Genito-Urinary ROS: no dysuria, trouble voiding, or hematuria  Musculoskeletal ROS: negative  Neurological ROS: negative  Dermatological ROS: negative    PHYSICAL EXAMINATION:    Visit Vitals    /76 (BP 1 Location: Right arm, BP Patient Position: Sitting)    Pulse 60    Temp 99.5 °F (37.5 °C) (Oral)    Resp 12    Ht 5' 6\" (1.676 m)    Wt 83.5 kg (184 lb)    SpO2 98%    BMI 29.7 kg/m2     General:  Well defined, nourished, and groomed individual in no acute distress. Neck: Supple, nontender, thyroid within normal limits, no JVD, no bruits, no pain with resistance to active range of motion. Heart: Regular rate and rhythm, no murmurs, rub, or gallop. Normal S1S2. Lungs:  Clear to auscultation bilaterally with equal chest expansion, no cough, no wheeze  Musculoskeletal:  Extremities revealed no edema and had full range of motion of joints.     Psych: Good mood and normal affect    NEUROLOGICAL EXAMINATION:     Mental Status:   Alert and oriented to person, place, and time with recent and remote memory intact. Attention span and concentration are normal. Speech is fluent with a full fund of knowledge. Cranial Nerves:    II, III, IV, VI:  Visual acuity grossly intact. Visual fields are normal.    Pupils are equal, round, and reactive to light and accommodation. Extra-ocular movements are full and fluid. , no ptosis or nystagmus. V-XII: Hearing is grossly intact. Facial features are symmetric,     Motor Examination: Normal tone, bulk, and strength, 5/5 muscle strength throughout. No cogwheel rigidity or clonus present. Coordination: No resting or intention tremor    Gait and Station: Slow,Steady while walking Normal arm swing. No muscle wasting or fasiculations noted. eeg normal  Assessment and Plan:   Pawan Otto is a 67 y.o. right handed female whose history and physical are consistent with history of seizures. Pawan Otto who has risk factors including history none for more than 2 years  Diagnoses and all orders for this visit:    1. History of seizure disorder    2. Essential hypertension    3. Bradycardia/ post pacemaker. s/p ablation      Follow-up Disposition:  Return if symptoms worsen or fail to improve. Reviewed eeg normal  aswill not restart aed    I spent 15 minutes with the patient in face-to-face consultation, of which greater than 50% was spent in counseling and coordination of care as described above.

## 2017-08-25 NOTE — MR AVS SNAPSHOT
Visit Information Date & Time Provider Department Dept. Phone Encounter #  
 8/25/2017  3:00 PM aSima Hawkins  [facility] at [address] [place] [address] [id number] Follow-up Instructions Return if symptoms worsen or fail to improve. Follow-up and Disposition History Your Appointments 10/18/2017  8:00 AM  
Follow Up with Corinne Maxwell MD  
[facility] [place] [facility] ([facility]) Appt Note: 6 month f/up w EKG  
 [facility] [address] [address] [address] [phone] Chandan Paz  
  
    
 10/18/2017  8:00 AM  
PROCEDURE with Pacer Hv Csi [facility] [place] [facility] ([facility]) [address] [address] [phone] [place] [address]  
  
    
 11/1/2017  9:30 AM  
ROUTINE CARE with Edelmira Villafana MD  
[address] [place] [address] ([facility]) Appt Note: 3 month followup [address] [place] [address]. [address] Upcoming Health Maintenance Date Due  
 EYE EXAM RETINAL OR DILATED Q1 8/21/1954 COLONOSCOPY 8/21/1962 GLAUCOMA SCREENING Q2Y 8/21/2009 OSTEOPOROSIS SCREENING (DEXA) 8/21/2009 INFLUENZA AGE 9 TO ADULT 8/1/2017 HEMOGLOBIN A1C Q6M 1/28/2018 LIPID PANEL Q1 4/8/2018 MICROALBUMIN Q1 4/24/2018 MEDICARE YEARLY EXAM 4/25/2018 FOOT EXAM Q1 7/24/2018 Pneumococcal 65+ Low/Medium Risk (2 of 2 - PPSV23) 7/31/2018 BREAST CANCER SCRN MAMMOGRAM 6/27/2019 DTaP/Tdap/Td series (2 - Td) 7/24/2027 Allergies as of 8/25/2017  Review Complete On: 8/25/2017 By: Garth Monaco LPN No Known Allergies Current Immunizations  Never Reviewed Name Date Influenza Vaccine 10/1/2016 Not reviewed this visit You Were Diagnosed With   
  
 Codes Comments History of seizure disorder    -  Primary ICD-10-CM: Z86.69 
ICD-9-CM: V12.49 Essential hypertension     ICD-10-CM: I10 
ICD-9-CM: 401.9 Bradycardia     ICD-10-CM: R00.1 ICD-9-CM: 427.89 Vitals BP Pulse Temp Resp Height(growth percentile) Weight(growth percentile) 116/76 (BP 1 Location: Right arm, BP Patient Position: Sitting) 60 99.5 °F (37.5 °C) (Oral) 12 5' 6\" (1.676 m) 184 lb (83.5 kg) SpO2 BMI OB Status Smoking Status 98% 29.7 kg/m2 Hysterectomy Never Smoker Vitals History BMI and BSA Data Body Mass Index Body Surface Area  
 29.7 kg/m 2 1.97 m 2 Preferred Pharmacy Pharmacy Name Phone [id number] [address] [facility] [phone] Your Updated Medication List  
  
   
This list is accurate as of: 8/25/17  4:06 PM.  Always use your most recent med list.  
  
  
  
  
 aspirin 81 mg chewable tablet Take 81 mg by mouth daily. COQ10 (LIPOSOMAL UBIQUINOL) PO Take 200 mg by mouth daily. ELIQUIS 5 mg tablet Generic drug:  apixaban TAKE 1 TAB BY MOUTH TWO (2) TIMES A DAY. FISH OIL PO Take 100 mg by mouth daily. gabapentin 100 mg capsule Commonly known as:  NEURONTIN Take 1 Cap by mouth nightly as needed. glucose blood VI test strips strip Commonly known as:  blood glucose test  
by Does Not Apply route See Admin Instructions. Check once a day  
  
 hydroCHLOROthiazide 25 mg tablet Commonly known as:  HYDRODIURIL Take 1 Tab by mouth daily. metoprolol succinate 50 mg XL tablet Commonly known as:  TOPROL-XL Take 1 Tab by mouth daily. Per patient she does not take if her BP is low  
  
 olmesartan 40 mg tablet Commonly known as:  [employer] Take 1 Tab by mouth daily. Follow-up Instructions Return if symptoms worsen or fail to improve. Please provide this summary of care documentation to your next provider. Your primary care clinician is listed as Gus Ward. If you have any questions after today's visit, please call [phone].

## 2017-10-18 ENCOUNTER — TELEPHONE (OUTPATIENT)
Dept: CARDIOLOGY CLINIC | Age: 72
End: 2017-10-18

## 2017-10-18 ENCOUNTER — CLINICAL SUPPORT (OUTPATIENT)
Dept: CARDIOLOGY CLINIC | Age: 72
End: 2017-10-18

## 2017-10-18 DIAGNOSIS — R00.1 BRADYCARDIA: Primary | ICD-10-CM

## 2017-10-18 DIAGNOSIS — Z95.0 CARDIAC PACEMAKER IN SITU: ICD-10-CM

## 2017-10-19 PROBLEM — Z95.0 CARDIAC PACEMAKER IN SITU: Status: ACTIVE | Noted: 2017-10-19

## 2017-10-19 NOTE — PROGRESS NOTES
I have personally seen and evaluated the device findings. Interrogation reviewed and I agree with assessment.     Jose Henson

## 2017-10-24 ENCOUNTER — ANESTHESIA EVENT (OUTPATIENT)
Dept: ENDOSCOPY | Age: 72
End: 2017-10-24
Payer: MEDICARE

## 2017-10-25 ENCOUNTER — ANESTHESIA (OUTPATIENT)
Dept: ENDOSCOPY | Age: 72
End: 2017-10-25
Payer: MEDICARE

## 2017-10-25 ENCOUNTER — HOSPITAL ENCOUNTER (OUTPATIENT)
Age: 72
Setting detail: OUTPATIENT SURGERY
Discharge: HOME | End: 2017-10-25
Attending: COLON & RECTAL SURGERY | Admitting: COLON & RECTAL SURGERY
Payer: MEDICARE

## 2017-10-25 VITALS
HEART RATE: 61 BPM | HEIGHT: 66 IN | OXYGEN SATURATION: 100 % | RESPIRATION RATE: 19 BRPM | BODY MASS INDEX: 28.93 KG/M2 | WEIGHT: 180 LBS | DIASTOLIC BLOOD PRESSURE: 58 MMHG | TEMPERATURE: 98.3 F | SYSTOLIC BLOOD PRESSURE: 106 MMHG

## 2017-10-25 LAB — GLUCOSE BLD STRIP.AUTO-MCNC: 127 MG/DL (ref 70–110)

## 2017-10-25 PROCEDURE — 76040000019: Performed by: COLON & RECTAL SURGERY

## 2017-10-25 PROCEDURE — 74011250636 HC RX REV CODE- 250/636: Performed by: ANESTHESIOLOGY

## 2017-10-25 PROCEDURE — 82962 GLUCOSE BLOOD TEST: CPT

## 2017-10-25 PROCEDURE — 76060000031 HC ANESTHESIA FIRST 0.5 HR: Performed by: COLON & RECTAL SURGERY

## 2017-10-25 PROCEDURE — 74011250636 HC RX REV CODE- 250/636

## 2017-10-25 RX ORDER — SODIUM CHLORIDE 0.9 % (FLUSH) 0.9 %
5-10 SYRINGE (ML) INJECTION EVERY 8 HOURS
Status: DISCONTINUED | OUTPATIENT
Start: 2017-10-25 | End: 2017-10-25 | Stop reason: HOSPADM

## 2017-10-25 RX ORDER — SODIUM CHLORIDE 0.9 % (FLUSH) 0.9 %
5-10 SYRINGE (ML) INJECTION AS NEEDED
Status: DISCONTINUED | OUTPATIENT
Start: 2017-10-25 | End: 2017-10-25 | Stop reason: HOSPADM

## 2017-10-25 RX ORDER — SODIUM CHLORIDE, SODIUM LACTATE, POTASSIUM CHLORIDE, CALCIUM CHLORIDE 600; 310; 30; 20 MG/100ML; MG/100ML; MG/100ML; MG/100ML
75 INJECTION, SOLUTION INTRAVENOUS CONTINUOUS
Status: DISCONTINUED | OUTPATIENT
Start: 2017-10-25 | End: 2017-10-25 | Stop reason: HOSPADM

## 2017-10-25 RX ORDER — INSULIN LISPRO 100 [IU]/ML
INJECTION, SOLUTION INTRAVENOUS; SUBCUTANEOUS ONCE
Status: CANCELLED | OUTPATIENT
Start: 2017-10-25 | End: 2017-10-25

## 2017-10-25 RX ORDER — DEXTROSE 50 % IN WATER (D50W) INTRAVENOUS SYRINGE
25-50 AS NEEDED
Status: CANCELLED | OUTPATIENT
Start: 2017-10-25

## 2017-10-25 RX ORDER — MAGNESIUM SULFATE 100 %
4 CRYSTALS MISCELLANEOUS AS NEEDED
Status: CANCELLED | OUTPATIENT
Start: 2017-10-25

## 2017-10-25 RX ORDER — INSULIN LISPRO 100 [IU]/ML
INJECTION, SOLUTION INTRAVENOUS; SUBCUTANEOUS ONCE
Status: DISCONTINUED | OUTPATIENT
Start: 2017-10-25 | End: 2017-10-25 | Stop reason: HOSPADM

## 2017-10-25 RX ORDER — LIDOCAINE HYDROCHLORIDE 10 MG/ML
0.1 INJECTION, SOLUTION EPIDURAL; INFILTRATION; INTRACAUDAL; PERINEURAL AS NEEDED
Status: DISCONTINUED | OUTPATIENT
Start: 2017-10-25 | End: 2017-10-25 | Stop reason: HOSPADM

## 2017-10-25 RX ORDER — SODIUM CHLORIDE, SODIUM LACTATE, POTASSIUM CHLORIDE, CALCIUM CHLORIDE 600; 310; 30; 20 MG/100ML; MG/100ML; MG/100ML; MG/100ML
INJECTION, SOLUTION INTRAVENOUS
Status: DISCONTINUED | OUTPATIENT
Start: 2017-10-25 | End: 2017-10-25 | Stop reason: HOSPADM

## 2017-10-25 RX ORDER — SODIUM CHLORIDE 0.9 % (FLUSH) 0.9 %
5-10 SYRINGE (ML) INJECTION AS NEEDED
Status: CANCELLED | OUTPATIENT
Start: 2017-10-25

## 2017-10-25 RX ORDER — PROPOFOL 10 MG/ML
INJECTION, EMULSION INTRAVENOUS AS NEEDED
Status: DISCONTINUED | OUTPATIENT
Start: 2017-10-25 | End: 2017-10-25 | Stop reason: HOSPADM

## 2017-10-25 RX ADMIN — PROPOFOL 50 MG: 10 INJECTION, EMULSION INTRAVENOUS at 09:26

## 2017-10-25 RX ADMIN — PROPOFOL 50 MG: 10 INJECTION, EMULSION INTRAVENOUS at 09:30

## 2017-10-25 RX ADMIN — PROPOFOL 100 MG: 10 INJECTION, EMULSION INTRAVENOUS at 09:19

## 2017-10-25 RX ADMIN — SODIUM CHLORIDE, SODIUM LACTATE, POTASSIUM CHLORIDE, AND CALCIUM CHLORIDE 75 ML/HR: 600; 310; 30; 20 INJECTION, SOLUTION INTRAVENOUS at 08:52

## 2017-10-25 RX ADMIN — PROPOFOL 50 MG: 10 INJECTION, EMULSION INTRAVENOUS at 09:36

## 2017-10-25 RX ADMIN — SODIUM CHLORIDE, SODIUM LACTATE, POTASSIUM CHLORIDE, CALCIUM CHLORIDE: 600; 310; 30; 20 INJECTION, SOLUTION INTRAVENOUS at 09:19

## 2017-10-25 NOTE — DISCHARGE INSTRUCTIONS
Colonoscopy: What to Expect at [address] [place] [address]  After you have a colonoscopy, you will stay at the clinic for 1 to 2 hours until the medicines wear off. Then you can go home. But you will need to arrange for a ride. Your doctor will tell you when you can eat and do your other usual activities. Your doctor will talk to you about when you will need your next colonoscopy. Your doctor can help you decide how often you need to be checked. This will depend on the results of your test and your risk for colorectal cancer. After the test, you may be bloated or have gas pains. You may need to pass gas. If a biopsy was done or a polyp was removed, you may have streaks of blood in your stool (feces) for a few days. This care sheet gives you a general idea about how long it will take for you to recover. But each person recovers at a different pace. Follow the steps below to get better as quickly as possible. How can you care for yourself at home? Activity  · Rest when you feel tired. · You can do your normal activities when it feels okay to do so. Diet  · Follow your doctor's directions for eating. · Unless your doctor has told you not to, drink plenty of fluids. This helps to replace the fluids that were lost during the colon prep. · Do not drink alcohol. Medicines  · If polyps were removed or a biopsy was done during the test, your doctor may tell you not to take aspirin or other anti-inflammatory medicines for a few days. These include ibuprofen (Advil, Motrin) and naproxen (Aleve). Other instructions  · For your safety, do not drive or operate machinery until the medicine wears off and you can think clearly. Your doctor may tell you not to drive or operate machinery until the day after your test.  · Do not sign legal documents or make major decisions until the medicine wears off and you can think clearly. The anesthesia can make it hard for you to fully understand what you are agreeing to.   Follow-up care is a key part of your treatment and safety. Be sure to make and go to all appointments, and call your doctor if you are having problems. It's also a good idea to know your test results and keep a list of the medicines you take. When should you call for help? Call 911 anytime you think you may need emergency care. For example, call if:  · You passed out (lost consciousness). · You pass maroon or bloody stools. · You have severe belly pain. Call your doctor now or seek immediate medical care if:  · Your stools are black and tarlike. · Your stools have streaks of blood, but you did not have a biopsy or any polyps removed. · You have belly pain, or your belly is swollen and firm. · You vomit. · You have a fever. · You are very dizzy. Watch closely for changes in your health, and be sure to contact your doctor if you have any problems. Where can you learn more? Go to [employer]  Enter E264 in the search box to learn more about \"Colonoscopy: What to Expect at Home. \"   © 3724-4801 [employer], [employer]. Care instructions adapted under license by Priscilla Ruvalcaba (which disclaims liability or warranty for this information). This care instruction is for use with your licensed healthcare professional. If you have questions about a medical condition or this instruction, always ask your healthcare professional. [place] [address] any warranty or liability for your use of this information. Content Version: 58.1.816581; Current as of: November 14, 2014      DISCHARGE SUMMARY from Nurse     POST-PROCEDURE INSTRUCTIONS:    Call your Physician if you:  ? Observe any excess bleeding. ? Develop a temperature over 100.5o F.  ? Experience abdominal, shoulder or chest pain. ? Notice any signs of decreased circulation or nerve impairment to an extremity such as a change in color, persistent numbness, tingling, coldness or increase in pain. ?  Vomit blood or you have nausea and vomiting lasting longer than 4 hours. ? Are unable to take medications. ? Are unable to urinate within 8 hours after discharge following general anesthesia or intravenous sedation. For the next 24 hours after receiving general anesthesia or intravenous sedation, or while taking prescription Narcotics, limit your activities:  ? Do NOT drive a motor vehicle, operate hazard machinery or power tools, or perform tasks that require coordination. The medication you received during your procedure may have some effect on your mental awareness. ? Do NOT make important personal or business decisions. The medication you received during your procedure may have some effect on your mental awareness. ? Do NOT drink alcoholic beverages. These drinks do not mix well with the medications that have been given to you. ? Upon discharge from the hospital, you must be accompanied by a responsible adult. ? Resume your diet as directed by your physician. ? Resume medications as your physician has prescribed. ? Please give a list of your current medications to your Primary Care Provider. ? Please update this list whenever your medications are discontinued, doses are changed, or new medications (including over-the-counter products) are added. ? Please carry medication information at all times in case of emergency situations. These are general instructions for a healthy lifestyle:    No smoking/ No tobacco products/ Avoid exposure to second hand smoke.  Surgeon General's Warning:  Quitting smoking now greatly reduces serious risk to your health. Obesity, smoking, and a sedentary lifestyle greatly increase your risk for illness.    A healthy diet, regular physical exercise & weight monitoring are important for maintaining a healthy lifestyle   You may be retaining fluid if you have a history of heart failure or if you experience any of the following symptoms:  Weight gain of 3 pounds or more overnight or 5 pounds in a week, increased swelling in our hands or feet or shortness of breath while lying flat in bed. Please call your doctor as soon as you notice any of these symptoms; do not wait until your next office visit. Recognize signs and symptoms of STROKE:  F  -  Face looks uneven  A  -  Arms unable to move or move unevenly  S  -  Speech slurred or non-existent  T  -  Time to call 911 - as soon as signs and symptoms begin - DO NOT go back to bed or wait to see If you get better - TIME IS BRAIN. Colorectal Screening   Colorectal cancer almost always develops from precancerous polyps (abnormal growths) in the colon or rectum. Screening tests can find precancerous polyps, so that they can be removed before they turn into cancer. Screening tests can also find colorectal cancer early, when treatment works best.  [facility] Speak with your physician about when you should begin screening and how often you should be tested. Additional Information    If you have questions, please call [phone]. Remember, [employer] is NOT to be used for urgent needs. For medical emergencies, dial 911. Educational references and/or instructions provided during this visit included:    post op care      APPOINTMENTS:    Please make a follow-up appointment with your physician. Discharge information has been reviewed with the patient and son. The patient and son verbalized understanding.

## 2017-10-25 NOTE — ANESTHESIA PREPROCEDURE EVALUATION
Anesthetic History   No history of anesthetic complications            Review of Systems / Medical History  Patient summary reviewed and pertinent labs reviewed    Pulmonary  Within defined limits                 Neuro/Psych     seizures: well controlled         Cardiovascular    Hypertension: well controlled        Dysrhythmias : atrial fibrillation  Pacemaker         GI/Hepatic/Renal  Within defined limits              Endo/Other    Diabetes: type 1  Hypothyroidism: well controlled       Other Findings   Comments:   Risk Factors for Postoperative nausea/vomiting:       History of postoperative nausea/vomiting? NO       Female? YES       Motion sickness? NO       Intended opioid administration for postoperative analgesia? NO      Smoking Abstinence  Current Smoker? NO  Elective Surgery? YES  Seen preoperatively by anesthesiologist or proxy prior to day of surgery? YES  Pt abstained from smoking 24 hours prior to anesthesia?  N/A           Physical Exam    Airway  Mallampati: II  TM Distance: 4 - 6 cm  Neck ROM: normal range of motion   Mouth opening: Normal     Cardiovascular  Regular rate and rhythm,  S1 and S2 normal,  no murmur, click, rub, or gallop             Dental  No notable dental hx       Pulmonary  Breath sounds clear to auscultation               Abdominal  GI exam deferred       Other Findings            Anesthetic Plan    ASA: 4  Anesthesia type: MAC          Induction: Intravenous  Anesthetic plan and risks discussed with: Patient

## 2017-10-25 NOTE — H&P
HPI: Hamlet Gilbert is a 67 y.o. female presenting with chief complain of need for colorectal cancer screening. Past Medical History:   Diagnosis Date    Bradycardia     Diabetes ([address]     borderline    DVT (deep venous thrombosis) ([address] 2010    right    History of ablation March 2016     History of atrial fibrillation     Hypercholesterolemia     Hypertension     Long term current use of anticoagulant therapy     Osteoporosis     Seizure ([address]     Seizures ([address]     on medication from 2006 to 2013 no seizure since 2006    [employer] pacemaker implant Set. 2013 Dr. Dalia Zheng, [place]     Thyroid disease        Past Surgical History:   Procedure Laterality Date    CARDIAC SURG PROCEDURE UNLIST      ablation for a-fib    HX CATARACT REMOVAL      HX HYSTERECTOMY      HX KNEE REPLACEMENT      left knee in 1/2010 and right knee 7/2016    HX KNEE REPLACEMENT      right knee    HX PACEMAKER         Family History   Problem Relation Age of Onset    Diabetes Father     Hypertension Sister        Social History     Social History    Marital status:      Spouse name: N/A    Number of children: N/A    Years of education: N/A     Social History Main Topics    Smoking status: Never Smoker    Smokeless tobacco: Never Used    Alcohol use No    Drug use: No    Sexual activity: Not Currently     Partners: Male     Birth control/ protection: Surgical      Comment: hysterectomy     Other Topics Concern    None     Social History Narrative       Review of Systems - negative    Outpatient Prescriptions Marked as Taking for the 10/25/17 encounter [facility] Encounter)   Medication Sig Dispense Refill    hydroCHLOROthiazide (HYDRODIURIL) 25 mg tablet TAKE 1 TABLET BY MOUTH ONCE DAILY. 90 Tab 1    olmesartan (BENICAR) 40 mg tablet Take 1 Tab by mouth daily. 90 Tab 0    metoprolol succinate (TOPROL-XL) 50 mg XL tablet Take 1 Tab by mouth daily.  Per patient she does not take if her BP is low 90 Tab 3  glucose blood VI test strips (BLOOD GLUCOSE TEST) strip by Does Not Apply route See Admin Instructions. Check once a day 100 Strip 6    [DISCONTINUED] hydroCHLOROthiazide (HYDRODIURIL) 25 mg tablet Take 1 Tab by mouth daily. 90 Tab 0    ELIQUIS 5 mg tablet TAKE 1 TAB BY MOUTH TWO (2) TIMES A DAY. 180 Tab 0    aspirin 81 mg chewable tablet Take 81 mg by mouth daily.  DOCOSAHEXANOIC ACID/EPA (FISH OIL PO) Take 100 mg by mouth daily.  COQ10, LIPOSOMAL UBIQUINOL, PO Take 200 mg by mouth daily. No Known Allergies    Vitals:    10/17/17 1039 10/25/17 0841   BP:  152/68   Pulse:  60   Resp:  20   Temp:  98.3 °F (36.8 °C)   SpO2:  100%   Weight: 81.6 kg (180 lb)    Height: 5' 6\" (1.676 m)        Physical Exam   Constitutional: She appears well-developed and well-nourished. HENT:   Head: Normocephalic and atraumatic. Eyes: Conjunctivae and EOM are normal.   Abdominal: Soft. She exhibits no distension. There is no tenderness. Musculoskeletal: Normal range of motion. Lymphadenopathy:     She has no cervical adenopathy. Right: No inguinal adenopathy present. Left: No inguinal adenopathy present. Neurological: She exhibits normal muscle tone. Skin: Skin is warm and dry. No rash noted. Psychiatric: She has a normal mood and affect. Her speech is normal.       Assessment / Plan    colonoscopy    The diagnoses and plan were discussed with the patient. All questions answered. Plan of care agreed to by all concerned.

## 2017-10-25 NOTE — PROCEDURES
[facility]  [address] [facility] [address], [address]  [phone]                    Colonoscopy Procedure Note      Alejandro Grimes  [birth year]  [id number]                Date of Procedure: 10/25/2017    Preoperative diagnosis: Z12.11,  Colon cancer Screening    Postoperative diagnosis: normal exam.     :  Monalisa Lucas MD    Assistant(s): Endoscopy Technician-1: Jeff Zimmerman  Endoscopy RN-1: Abbey Hinojosa RN    Sedation: MAC    Procedure Details:  Prior to the procedure, a history and physical were performed. The patients medications, allergies and sensitivities were reviewed and all questions were answered. After informed consent was obtained for the procedure, with all risks and benefits of procedure explained. The patient was taken to the endoscopy suite and placed in the left lateral decubitus position. Patient identification and proposed procedure were verified prior to the procedure by the nurse and I. Following sequential administration of sedation as per Anesthesia, a digital rectal exam was performed and was normal.  The Olympus video colonoscope was introduced through the anus and advanced to cecum, which was identified by the ileocecal valve and appendiceal orifice. The quality of preparation was good. The colonoscope was slowly withdrawn and the mucosa examined for any abnormalities. Cecal withdrawl time was greater than six minutes. The patient tolerated the procedure well. Findings/Interventions:   Polyps - none    EBL: none    Recommendations: -Repeat colonoscopy in 5 years. Resume normal medication(s).      Discharge Disposition:  Arias Pierce MD  10/25/2017  9:42 AM

## 2017-10-25 NOTE — IP AVS SNAPSHOT
Omar Barajas 
 
 
 [address] [place] [address] [address] [address] [phone] Patient: Alejandro Grimes MRN: [id number] About your hospitalization You were admitted on:  October 25, 2017 You last received care in the:  [facility] You were discharged on:  October 25, 2017 Why you were hospitalized Your primary diagnosis was:  Not on File Things You Need To Do (next 8 weeks) Follow up with Monalisa Lucas MD  
  
Phone:  [phone] Where:  [address] [facility], [address] [facility] Follow up with Farooq Strong MD  
  
Phone:  [phone] Where:  [address] [place], [address], [address], [address] [address] [address] Wednesday Nov 01, 2017 ROUTINE CARE with Farooq Strong MD at  9:30 AM  
Where:  [address] ([facility]) Discharge Orders None A check jeffrey indicates which time of day the medication should be taken. My Medications TAKE these medications as instructed Instructions Each Dose to Equal  
 Morning Noon Evening Bedtime  
 aspirin 81 mg chewable tablet Your last dose was: Your next dose is: Take 81 mg by mouth daily. 81 mg  
    
   
   
   
  
 COQ10 (LIPOSOMAL UBIQUINOL) PO Your last dose was: Your next dose is: Take 200 mg by mouth daily. 200 mg  
    
   
   
   
  
 ELIQUIS 5 mg tablet Generic drug:  apixaban Your last dose was: Your next dose is: TAKE 1 TAB BY MOUTH TWO (2) TIMES A DAY. FISH OIL PO Your last dose was: Your next dose is: Take 100 mg by mouth daily. 100 mg  
    
   
   
   
  
 glucose blood VI test strips strip Commonly known as:  blood glucose test  
   
Your last dose was: Your next dose is: by Does Not Apply route See Admin Instructions. Check once a day  
     
   
   
   
  
 hydroCHLOROthiazide 25 mg tablet Commonly known as:  HYDRODIURIL Your last dose was: Your next dose is: TAKE 1 TABLET BY MOUTH ONCE DAILY. metoprolol succinate 50 mg XL tablet Commonly known as:  TOPROL-XL Your last dose was: Your next dose is: Take 1 Tab by mouth daily. Per patient she does not take if her BP is low 50 mg  
    
   
   
   
  
 olmesartan 40 mg tablet Commonly known as:  [employer] Your last dose was: Your next dose is: Take 1 Tab by mouth daily. 40 mg Discharge Instructions Colonoscopy: What to Expect at [facility] Your Recovery After you have a colonoscopy, you will stay at the clinic for 1 to 2 hours until the medicines wear off. Then you can go home. But you will need to arrange for a ride. Your doctor will tell you when you can eat and do your other usual activities. Your doctor will talk to you about when you will need your next colonoscopy. Your doctor can help you decide how often you need to be checked. This will depend on the results of your test and your risk for colorectal cancer. After the test, you may be bloated or have gas pains. You may need to pass gas. If a biopsy was done or a polyp was removed, you may have streaks of blood in your stool (feces) for a few days. This care sheet gives you a general idea about how long it will take for you to recover. But each person recovers at a different pace. Follow the steps below to get better as quickly as possible. How can you care for yourself at home? Activity · Rest when you feel tired. · You can do your normal activities when it feels okay to do so. Diet · Follow your doctor's directions for eating. · Unless your doctor has told you not to, drink plenty of fluids.  This helps to replace the fluids that were lost during the colon prep. · Do not drink alcohol. Medicines · If polyps were removed or a biopsy was done during the test, your doctor may tell you not to take aspirin or other anti-inflammatory medicines for a few days. These include ibuprofen (Advil, Motrin) and naproxen (Aleve). Other instructions · For your safety, do not drive or operate machinery until the medicine wears off and you can think clearly. Your doctor may tell you not to drive or operate machinery until the day after your test. 
· Do not sign legal documents or make major decisions until the medicine wears off and you can think clearly. The anesthesia can make it hard for you to fully understand what you are agreeing to. Follow-up care is a key part of your treatment and safety. Be sure to make and go to all appointments, and call your doctor if you are having problems. It's also a good idea to know your test results and keep a list of the medicines you take. When should you call for help? Call 911 anytime you think you may need emergency care. For example, call if: 
· You passed out (lost consciousness). · You pass maroon or bloody stools. · You have severe belly pain. Call your doctor now or seek immediate medical care if: 
· Your stools are black and tarlike. · Your stools have streaks of blood, but you did not have a biopsy or any polyps removed. · You have belly pain, or your belly is swollen and firm. · You vomit. · You have a fever. · You are very dizzy. Watch closely for changes in your health, and be sure to contact your doctor if you have any problems. Where can you learn more? Go to [employer] Enter E264 in the search box to learn more about \"Colonoscopy: What to Expect at Home. \"  
© 7682-0971 [employer].  Care instructions adapted under license by [address] (which disclaims liability or warranty for this information). This care instruction is for use with your licensed healthcare professional. If you have questions about a medical condition or this instruction, always ask your healthcare professional. [address] any warranty or liability for your use of this information. Content Version: 07.8.143395; Current as of: November 14, 2014 DISCHARGE SUMMARY from Nurse POST-PROCEDURE INSTRUCTIONS: 
 
Call your Physician if you: 
? Observe any excess bleeding. ? Develop a temperature over 100.5o F. 
? Experience abdominal, shoulder or chest pain. ? Notice any signs of decreased circulation or nerve impairment to an extremity such as a change in color, persistent numbness, tingling, coldness or increase in pain. ? Vomit blood or you have nausea and vomiting lasting longer than 4 hours. ? Are unable to take medications. ? Are unable to urinate within 8 hours after discharge following general anesthesia or intravenous sedation. For the next 24 hours after receiving general anesthesia or intravenous sedation, or while taking prescription Narcotics, limit your activities: 
? Do NOT drive a motor vehicle, operate hazard machinery or power tools, or perform tasks that require coordination. The medication you received during your procedure may have some effect on your mental awareness. ? Do NOT make important personal or business decisions. The medication you received during your procedure may have some effect on your mental awareness. ? Do NOT drink alcoholic beverages. These drinks do not mix well with the medications that have been given to you. ? Upon discharge from the hospital, you must be accompanied by a responsible adult. ? Resume your diet as directed by your physician. ? Resume medications as your physician has prescribed. ? Please give a list of your current medications to your Primary Care Provider. ? Please update this list whenever your medications are discontinued, doses are changed, or new medications (including over-the-counter products) are added. ? Please carry medication information at all times in case of emergency situations. These are general instructions for a healthy lifestyle: No smoking/ No tobacco products/ Avoid exposure to second hand smoke. ? Surgeon General's Warning:  Quitting smoking now greatly reduces serious risk to your health. Obesity, smoking, and a sedentary lifestyle greatly increase your risk for illness. ? A healthy diet, regular physical exercise & weight monitoring are important for maintaining a healthy lifestyle ? You may be retaining fluid if you have a history of heart failure or if you experience any of the following symptoms:  Weight gain of 3 pounds or more overnight or 5 pounds in a week, increased swelling in our hands or feet or shortness of breath while lying flat in bed. Please call your doctor as soon as you notice any of these symptoms; do not wait until your next office visit. Recognize signs and symptoms of STROKE: 
F  -  Face looks uneven A  -  Arms unable to move or move unevenly S  -  Speech slurred or non-existent T  -  Time to call 911 - as soon as signs and symptoms begin - DO NOT go back to bed or wait to see If you get better - TIME IS BRAIN. Colorectal Screening ? Colorectal cancer almost always develops from precancerous polyps (abnormal growths) in the colon or rectum. Screening tests can find precancerous polyps, so that they can be removed before they turn into cancer. Screening tests can also find colorectal cancer early, when treatment works best. 
? Speak with your physician about when you should begin screening and how often you should be tested. Additional Information If you have questions, please call [phone]. Remember, [employer] is NOT to be used for urgent needs. For medical emergencies, dial 911. Educational references and/or instructions provided during this visit included: 
 
post op care APPOINTMENTS: 
 
Please make a follow-up appointment with your physician. Discharge information has been reviewed with the patient and son. The patient and son verbalized understanding. Providers Seen During Your Hospitalization Provider Specialty Primary office phone Charline Eaton MD Colon and Rectal Surgery [phone] Your Primary Care Physician (PCP) Primary Care Physician Office Phone Office Fax [facility], [address] [phone] You are allergic to the following No active allergies Recent Documentation Height Weight BMI OB Status Smoking Status 1.676 m 81.6 kg 29.05 kg/m2 Hysterectomy Never Smoker Emergency Contacts Name Discharge Info Relation Home Work Mobile [address] CAREGIVER [3] Son [22] [phone] Patient Belongings The following personal items are in your possession at time of discharge: 
  Dental Appliances: None  Visual Aid: Glasses Please provide this summary of care documentation to your next provider. Signatures-by signing, you are acknowledging that this After Visit Summary has been reviewed with you and you have received a copy. Patient Signature:  ____________________________________________________________ Date:  ____________________________________________________________  
  
Lamin Sport Provider Signature:  ____________________________________________________________ Date:  ____________________________________________________________

## 2017-10-25 NOTE — ANESTHESIA POSTPROCEDURE EVALUATION
Post-Anesthesia Evaluation & Assessment    Visit Vitals    BP 96/54    Pulse 60    Temp 36.8 °C (98.3 °F)    Resp 21    Ht 5' 6\" (1.676 m)    Wt 81.6 kg (180 lb)    SpO2 100%    BMI 29.05 kg/m2       Post-operative hydration adequate. Pain score (VAS): 0 Pain Scale 1: Numeric (0 - 10) (10/25/17 0946)  Pain Intensity 1: 0 (10/25/17 0946)   Managed. Mental status & Level of consciousness: returned to baseline    Neurological status: returned to baseline    Pulmonary status: airway patent, oxygen given as needed. Complications related to anesthesia: none    Patient has met all discharge requirements.     Additional comments:        Kashif Mariee MD  October 25, 2017

## 2017-11-01 ENCOUNTER — OFFICE VISIT (OUTPATIENT)
Dept: FAMILY MEDICINE CLINIC | Age: 72
End: 2017-11-01

## 2017-11-01 ENCOUNTER — PATIENT OUTREACH (OUTPATIENT)
Dept: FAMILY MEDICINE CLINIC | Age: 72
End: 2017-11-01

## 2017-11-01 VITALS
HEIGHT: 66 IN | RESPIRATION RATE: 16 BRPM | SYSTOLIC BLOOD PRESSURE: 128 MMHG | WEIGHT: 189.6 LBS | BODY MASS INDEX: 30.47 KG/M2 | HEART RATE: 60 BPM | DIASTOLIC BLOOD PRESSURE: 68 MMHG | TEMPERATURE: 98.1 F | OXYGEN SATURATION: 99 %

## 2017-11-01 DIAGNOSIS — R00.1 BRADYCARDIA: ICD-10-CM

## 2017-11-01 DIAGNOSIS — R45.89 FEELING SAD: ICD-10-CM

## 2017-11-01 DIAGNOSIS — I80.201 DEEP VEIN THROMBOPHLEBITIS OF RIGHT LEG (HCC): ICD-10-CM

## 2017-11-01 DIAGNOSIS — Z23 ENCOUNTER FOR IMMUNIZATION: ICD-10-CM

## 2017-11-01 DIAGNOSIS — I10 ESSENTIAL HYPERTENSION: Primary | ICD-10-CM

## 2017-11-01 DIAGNOSIS — I48.91 ATRIAL FIBRILLATION, UNSPECIFIED TYPE (HCC): ICD-10-CM

## 2017-11-01 DIAGNOSIS — E11.9 WELL CONTROLLED DIABETES MELLITUS (HCC): ICD-10-CM

## 2017-11-01 RX ORDER — NYSTATIN 100000 U/G
OINTMENT TOPICAL
Refills: 0 | COMMUNITY
Start: 2017-07-25 | End: 2018-02-21

## 2017-11-01 RX ORDER — OLMESARTAN MEDOXOMIL 40 MG/1
40 TABLET ORAL DAILY
Qty: 90 TAB | Refills: 0 | Status: SHIPPED | OUTPATIENT
Start: 2017-11-01 | End: 2018-02-01 | Stop reason: SDUPTHER

## 2017-11-01 NOTE — PROGRESS NOTES
HISTORY OF PRESENT ILLNESS  Delice Goltz is a 67 y.o. female. HPI: Here for routine follow  Up. H/o hypertension. Well controlled. Vitals stable. Asymptomatic. She moved her wit her son from [place]. Was sad while coming to visit initially due to new changes and was also feeling her life has changed significantly since her  . Was missing her grand kids whom she used to live with etc. Not required any depression medication. Now started doing things on her own. Some adjustment she has adopted here in few months. Looking more comfortable. Yesterday was the 8 years that her  passed away. Was almost tearful while talking about it but still said she is getting adjusted here. Goes to [employer], does not like there but has gym and now once she comes back from vacation she needs a form to fill out to start using that. Has h/o a.fib. On anticoagulation. Prior h/o DVT from knee replacement surgery. Currently no concern. Denies any headache, dizziness, no chest pain or trouble breathing, no arm or leg weakness. No nausea or vomiting, no weight or appetite changes, no depression or anxiety. No urine or bowel complains, no palpitation, no diaphoresis. Complaint with taking medication. No side effects. No easy bruise or rash. Visit Vitals    /68 (BP 1 Location: Right arm, BP Patient Position: Sitting)    Pulse 60    Temp 98.1 °F (36.7 °C) (Oral)    Resp 16    Ht 5' 6\" (1.676 m)    Wt 189 lb 9.6 oz (86 kg)    SpO2 99%    BMI 30.6 kg/m2     Review medication list, vitals, problem list,allergies. H/o diabetes. Not on any medication. On diet modification. She is compliant with diet modification. Review labs.    Lab Results   Component Value Date/Time    Hemoglobin A1c 6.4 2017 09:49 AM     Lab Results   Component Value Date/Time    Sodium 140 2017 09:49 AM    Potassium 4.7 2017 09:49 AM    Chloride 104 2017 09:49 AM    CO2 32 2017 09:49 AM    Anion gap 4 07/28/2017 09:49 AM    Glucose 117 07/28/2017 09:49 AM    BUN 21 07/28/2017 09:49 AM    Creatinine 1.12 07/28/2017 09:49 AM    BUN/Creatinine ratio 19 07/28/2017 09:49 AM    GFR est AA 58 07/28/2017 09:49 AM    GFR est non-AA 48 07/28/2017 09:49 AM    Calcium 10.5 07/28/2017 09:49 AM    Bilirubin, total 0.5 07/28/2017 09:49 AM    AST (SGOT) 12 07/28/2017 09:49 AM    Alk. phosphatase 71 07/28/2017 09:49 AM    Protein, total 7.7 07/28/2017 09:49 AM    Albumin 3.9 07/28/2017 09:49 AM    Globulin 3.8 07/28/2017 09:49 AM    A-G Ratio 1.0 07/28/2017 09:49 AM    ALT (SGPT) 19 07/28/2017 09:49 AM     Lab Results   Component Value Date/Time    Cholesterol, total 195 04/08/2017 08:35 AM    HDL Cholesterol 48 04/08/2017 08:35 AM    LDL, calculated 121.2 04/08/2017 08:35 AM    VLDL, calculated 25.8 04/08/2017 08:35 AM    Triglyceride 129 04/08/2017 08:35 AM    CHOL/HDL Ratio 4.1 04/08/2017 08:35 AM     Lab Results   Component Value Date/Time    TSH 0.92 04/08/2017 08:35 AM     Lab Results   Component Value Date/Time    Microalbumin urine (POC) 10 04/24/2017 09:02 AM     Lab Results   Component Value Date/Time    WBC 6.7 04/08/2017 08:35 AM    HGB 11.1 04/08/2017 08:35 AM    HCT 35.7 04/08/2017 08:35 AM    PLATELET 584 51/64/4637 08:35 AM    MCV 84.6 04/08/2017 08:35 AM         ROS: see HPI     Physical Exam   Constitutional: She is oriented to person, place, and time. No distress. Neck: No thyromegaly present. Cardiovascular: Normal rate, regular rhythm and normal heart sounds. Pulmonary/Chest:   CTA   Abdominal: Soft. Bowel sounds are normal. There is no tenderness. Musculoskeletal: She exhibits no edema. Neurological: She is oriented to person, place, and time. Psychiatric: Her behavior is normal.       ASSESSMENT and PLAN    ICD-10-CM ICD-9-CM    1. Essential hypertension: stable at this time. Low salt diet. Exercise as tolerated. Will continue current plan. I10 401.9 olmesartan (BENICAR) 40 mg tablet   2. Well controlled diabetes mellitus ([facility] [address]): for now on diet modification. Continue current plan. Discussed to start metformin once a day but she would like to continue current management. Will observe. E11.9 250.00 glucose blood VI test strips (BLOOD GLUCOSE TEST) strip   3. Bradycardia/ post pacemaker. s/p ablation: stable. Following cardiology. Will follow their recommendations and advised to continue current plan.  R00.1 427.89 apixaban (ELIQUIS) 5 mg tablet   4. Atrial fibrillation, unspecified type (HCC)/post ablation: stable. Will follow cardiology recommendations. On anticoagulation. I48.91 427.31 apixaban (ELIQUIS) 5 mg tablet   5. h/o Deep vein thrombophlebitis of right leg (HCC)/ post knee replacement in july 2016 I80.201 451.19 apixaban (ELIQUIS) 5 mg tablet   6. Feeling sad: more from environmental changes. Now getting adjusted. Still miss [place] and her . Will observe and f/u next visit. R45.89 300.4    7. Encounter for immunization Z23 V03.89 INFLUENZA VIRUS VACCINE, HIGH DOSE SEASONAL, PRESERVATIVE FREE   Need gym form to be filled up to say ok to do exercise. She will bring it back. Pt understood and agree with the plan   Review HM   She was given contact number to schedule dexa scan. Follow-up Disposition:  Return in about 3 months (around 2/1/2018).

## 2017-11-01 NOTE — PATIENT INSTRUCTIONS
Low Sodium Diet (2,000 Milligram): Care Instructions  Your Care Instructions    Too much sodium causes your body to hold on to extra water. This can raise your blood pressure and force your heart and kidneys to work harder. In very serious cases, this could cause you to be put in the hospital. It might even be life-threatening. By limiting sodium, you will feel better and lower your risk of serious problems. The most common source of sodium is salt. People get most of the salt in their diet from canned, prepared, and packaged foods. Fast food and restaurant meals also are very high in sodium. Your doctor will probably limit your sodium to less than 2,000 milligrams (mg) a day. This limit counts all the sodium in prepared and packaged foods and any salt you add to your food. Follow-up care is a key part of your treatment and safety. Be sure to make and go to all appointments, and call your doctor if you are having problems. It's also a good idea to know your test results and keep a list of the medicines you take. How can you care for yourself at home? Read food labels  · Read labels on cans and food packages. The labels tell you how much sodium is in each serving. Make sure that you look at the serving size. If you eat more than the serving size, you have eaten more sodium. · Food labels also tell you the Percent Daily Value for sodium. Choose products with low Percent Daily Values for sodium. · Be aware that sodium can come in forms other than salt, including monosodium glutamate (MSG), sodium citrate, and sodium bicarbonate (baking soda). MSG is often added to Asian food. When you eat out, you can sometimes ask for food without MSG or added salt. Buy low-sodium foods  · Buy foods that are labeled \"unsalted\" (no salt added), \"sodium-free\" (less than 5 mg of sodium per serving), or \"low-sodium\" (less than 140 mg of sodium per serving).  Foods labeled \"reduced-sodium\" and \"light sodium\" may still have too much sodium. Be sure to read the label to see how much sodium you are getting. · Buy fresh vegetables, or frozen vegetables without added sauces. Buy low-sodium versions of canned vegetables, soups, and other canned goods. Prepare low-sodium meals  · Cut back on the amount of salt you use in cooking. This will help you adjust to the taste. Do not add salt after cooking. One teaspoon of salt has about 2,300 mg of sodium. · Take the salt shaker off the table. · Flavor your food with garlic, lemon juice, onion, vinegar, herbs, and spices. Do not use soy sauce, lite soy sauce, steak sauce, onion salt, garlic salt, celery salt, mustard, or ketchup on your food. · Use low-sodium salad dressings, sauces, and ketchup. Or make your own salad dressings and sauces without adding salt. · Use less salt (or none) when recipes call for it. You can often use half the salt a recipe calls for without losing flavor. Other foods such as rice, pasta, and grains do not need added salt. · Rinse canned vegetables, and cook them in fresh water. This removes some-but not all-of the salt. · Avoid water that is naturally high in sodium or that has been treated with water softeners, which add sodium. Call your local water company to find out the sodium content of your water supply. If you buy bottled water, read the label and choose a sodium-free brand. Avoid high-sodium foods  · Avoid eating:  ¨ Smoked, cured, salted, and canned meat, fish, and poultry. ¨ Ham, morales, hot dogs, and luncheon meats. ¨ Regular, hard, and processed cheese and regular peanut butter. ¨ Crackers with salted tops, and other salted snack foods such as pretzels, chips, and salted popcorn. ¨ Frozen prepared meals, unless labeled low-sodium. ¨ Canned and dried soups, broths, and bouillon, unless labeled sodium-free or low-sodium. ¨ Canned vegetables, unless labeled sodium-free or low-sodium. ¨ [employer] fries, pizza, tacos, and other fast foods.   [employer], olives, ketchup, and other condiments, especially soy sauce, unless labeled sodium-free or low-sodium. Where can you learn more? Go to http://cecelia-[place].info/. Enter W604 in the search box to learn more about \"Low Sodium Diet (2,000 Milligram): Care Instructions. \"  Current as of: May 12, 2017  Content Version: 11.4  © 6219-6685 [employer]. Care instructions adapted under license by [employer] (which disclaims liability or warranty for this information). If you have questions about a medical condition or this instruction, always ask your healthcare professional. [address] any warranty or liability for your use of this information. Atrial Fibrillation: Care Instructions  Your Care Instructions    Atrial fibrillation is an irregular and often fast heartbeat. Treating this condition is important for several reasons. It can cause blood clots, which can travel from your heart to your brain and cause a stroke. If you have a fast heartbeat, you may feel lightheaded, dizzy, and weak. An irregular heartbeat can also increase your risk for heart failure. Atrial fibrillation is often the result of another heart condition, such as high blood pressure or coronary artery disease. Making changes to improve your heart condition will help you stay healthy and active. Follow-up care is a key part of your treatment and safety. Be sure to make and go to all appointments, and call your doctor if you are having problems. It's also a good idea to know your test results and keep a list of the medicines you take. How can you care for yourself at home? Medicines  ? · Take your medicines exactly as prescribed. Call your doctor if you think you are having a problem with your medicine. You will get more details on the specific medicines your doctor prescribes.    ? · If your doctor has given you a blood thinner to prevent a stroke, be sure you get instructions about how to take your medicine safely. Blood thinners can cause serious bleeding problems. ? · Do not take any vitamins, over-the-counter drugs, or herbal products without talking to your doctor first.   ? Lifestyle changes  ? · Do not smoke. Smoking can increase your chance of a stroke and heart attack. If you need help quitting, talk to your doctor about stop-smoking programs and medicines. These can increase your chances of quitting for good. ? · Eat a heart-healthy diet. ? · Stay at a healthy weight. Lose weight if you need to.   ? · Limit alcohol to 2 drinks a day for men and 1 drink a day for women. Too much alcohol can cause health problems. ? · Avoid colds and flu. Get a pneumococcal vaccine shot. If you have had one before, ask your doctor whether you need another dose. Get a flu shot every year. If you must be around people with colds or flu, wash your hands often. Activity  ? · If your doctor recommends it, get more exercise. Walking is a good choice. Bit by bit, increase the amount you walk every day. Try for at least 30 minutes on most days of the week. You also may want to swim, bike, or do other activities. Your doctor may suggest that you join a cardiac rehabilitation program so that you can have help increasing your physical activity safely. ? · Start light exercise if your doctor says it is okay. Even a small amount will help you get stronger, have more energy, and manage stress. Walking is an easy way to get exercise. Start out by walking a little more than you did in the hospital. Gradually increase the amount you walk. ? · When you exercise, watch for signs that your heart is working too hard. You are pushing too hard if you cannot talk while you are exercising. If you become short of breath or dizzy or have chest pain, sit down and rest immediately. ? · Check your pulse regularly. Place two fingers on the artery at the palm side of your wrist, in line with your thumb.  If your heartbeat seems uneven or fast, talk to your doctor. When should you call for help? Call 911 anytime you think you may need emergency care. For example, call if:  ? · You have symptoms of a heart attack. These may include:  ¨ Chest pain or pressure, or a strange feeling in the chest.  ¨ Sweating. ¨ Shortness of breath. ¨ Nausea or vomiting. ¨ Pain, pressure, or a strange feeling in the back, neck, jaw, or upper belly or in one or both shoulders or arms. ¨ Lightheadedness or sudden weakness. ¨ A fast or irregular heartbeat. After you call 911, the  may tell you to chew 1 adult-strength or 2 to 4 low-dose aspirin. Wait for an ambulance. Do not try to drive yourself. ? · You have symptoms of a stroke. These may include:  ¨ Sudden numbness, tingling, weakness, or loss of movement in your face, arm, or leg, especially on only one side of your body. ¨ Sudden vision changes. ¨ Sudden trouble speaking. ¨ Sudden confusion or trouble understanding simple statements. ¨ Sudden problems with walking or balance. ¨ A sudden, severe headache that is different from past headaches. ? · You passed out (lost consciousness). ?Call your doctor now or seek immediate medical care if:  ? · You have new or increased shortness of breath. ? · You feel dizzy or lightheaded, or you feel like you may faint. ? · Your heart rate becomes irregular. ? · You can feel your heart flutter in your chest or skip heartbeats. Tell your doctor if these symptoms are new or worse. ? Watch closely for changes in your health, and be sure to contact your doctor if you have any problems. Where can you learn more? Go to http://cecelia-[place].info/. Enter U020 in the search box to learn more about \"Atrial Fibrillation: Care Instructions. \"  Current as of: September 21, 2016  Content Version: 11.4  © 8061-5198 [employer].  Care instructions adapted under license by [employer] (which disclaims liability or warranty for this information). If you have questions about a medical condition or this instruction, always ask your healthcare professional. [address] any warranty or liability for your use of this information. Deep Vein Thrombosis: Care Instructions  Your Care Instructions    A deep vein thrombosis (DVT) is a blood clot in certain veins of the legs, pelvis, or arms. Blood clots in these veins need to be treated because they can get bigger, break loose, and travel through the bloodstream to the lungs. A blood clot in a lung can be life-threatening. The doctor may have given you a blood thinner (anticoagulant). A blood thinner can stop the blood clot from growing larger and prevent new clots from forming. You will need to take a blood thinner for 3 to 6 months or longer. The doctor has checked you carefully, but problems can develop later. If you notice any problems or new symptoms, get medical treatment right away. Follow-up care is a key part of your treatment and safety. Be sure to make and go to all appointments, and call your doctor if you are having problems. It's also a good idea to know your test results and keep a list of the medicines you take. How can you care for yourself at home? · Take your medicines exactly as prescribed. Call your doctor if you think you are having a problem with your medicine. · If you are taking a blood thinner, be sure you get instructions about how to take your medicine safely. Blood thinners can cause serious bleeding problems. · Wear compression stockings if your doctor recommends them. These stockings are tighter at the feet than on the legs. They may reduce pain and swelling in your legs. But there are different types of stockings, and they need to fit right. So your doctor will recommend what you need. · When you sit, use a pillow to raise the arm or leg that has the blood clot.  Try to keep it above the level of your heart. When should you call for help? Call 911 anytime you think you may need emergency care. For example, call if:  ? · You passed out (lost consciousness). ? · You have symptoms of a blood clot in your lung (called a pulmonary embolism). These include:  ¨ Sudden chest pain. ¨ Trouble breathing. ¨ Coughing up blood. ?Call your doctor now or seek immediate medical care if:  ? · You have new or worse trouble breathing. ? · You are dizzy or lightheaded, or you feel like you may faint. ? · You have symptoms of a blood clot in your arm or leg. These may include:  ¨ Pain in the arm, calf, back of the knee, thigh, or groin. ¨ Redness and swelling in the arm, leg, or groin. ? Watch closely for changes in your health, and be sure to contact your doctor if:  ? · You do not get better as expected. Where can you learn more? Go to http://ceceliaCartiCure[place].info/. Enter R311 in the search box to learn more about \"Deep Vein Thrombosis: Care Instructions. \"  Current as of: March 20, 2017  Content Version: 11.4  © 2099-2427 [employer]. Care instructions adapted under license by [employer] (which disclaims liability or warranty for this information). If you have questions about a medical condition or this instruction, always ask your healthcare professional. [place] [address] any warranty or liability for your use of this information. Learning About Meal Planning for Diabetes  Why plan your meals? Meal planning can be a key part of managing diabetes. Planning meals and snacks with the right balance of carbohydrate, protein, and fat can help you keep your blood sugar at the target level you set with your doctor. You don't have to eat special foods. You can eat what your family eats, including sweets once in a while. But you do have to pay attention to how often you eat and how much you eat of certain foods.   You may want to work with a dietitian or a certified diabetes educator. He or she can give you tips and meal ideas and can answer your questions about meal planning. This health professional can also help you reach a healthy weight if that is one of your goals. What plan is right for you? Your dietitian or diabetes educator may suggest that you start with the plate format or carbohydrate counting. The plate format  The plate format is a simple way to help you manage how you eat. You plan meals by learning how much space each food should take on a plate. Using the plate format helps you spread carbohydrate throughout the day. It can make it easier to keep your blood sugar level within your target range. It also helps you see if you're eating healthy portion sizes. To use the plate format, you put non-starchy vegetables on half your plate. Add meat or meat substitutes on one-quarter of the plate. Put a grain or starchy vegetable (such as brown rice or a potato) on the final quarter of the plate. You can add a small piece of fruit and some low-fat or fat-free milk or yogurt, depending on your carbohydrate goal for each meal.  Here are some tips for using the plate format:  · Make sure that you are not using an oversized plate. A 9-inch plate is best. Many restaurants use larger plates. · Get used to using the plate format at home. Then you can use it when you eat out. · Write down your questions about using the plate format. Talk to your doctor, a dietitian, or a diabetes educator about your concerns. Carbohydrate counting  With carbohydrate counting, you plan meals based on the amount of carbohydrate in each food. Carbohydrate raises blood sugar higher and more quickly than any other nutrient. It is found in desserts, breads and cereals, and fruit. It's also found in starchy vegetables such as potatoes and corn, grains such as rice and pasta, and milk and yogurt.  Spreading carbohydrate throughout the day helps keep your blood sugar levels within your target range. Your daily amount depends on several things, including your weight, how active you are, which diabetes medicines you take, and what your goals are for your blood sugar levels. A registered dietitian or diabetes educator can help you plan how much carbohydrate to include in each meal and snack. A guideline for your daily amount of carbohydrate is:  · 45 to 60 grams at each meal. That's about the same as 3 to 4 carbohydrate servings. · 15 to 20 grams at each snack. That's about the same as 1 carbohydrate serving. The Nutrition Facts label on packaged foods tells you how much carbohydrate is in a serving of the food. First, look at the serving size on the food label. Is that the amount you eat in a serving? All of the nutrition information on a food label is based on that serving size. So if you eat more or less than that, you'll need to adjust the other numbers. Total carbohydrate is the next thing you need to look for on the label. If you count carbohydrate servings, one serving of carbohydrate is 15 grams. For foods that don't come with labels, such as fresh fruits and vegetables, you'll need a guide that lists carbohydrate in these foods. Ask your doctor, dietitian, or diabetes educator about books or other nutrition guides you can use. If you take insulin, you need to know how many grams of carbohydrate are in a meal. This lets you know how much rapid-acting insulin to take before you eat. If you use an insulin pump, you get a constant rate of insulin during the day. So the pump must be programmed at meals to give you extra insulin to cover the rise in blood sugar after meals. When you know how much carbohydrate you will eat, you can take the right amount of insulin. Or, if you always use the same amount of insulin, you need to make sure that you eat the same amount of carbohydrate at meals.   If you need more help to understand carbohydrate counting and food labels, ask your doctor, dietitian, or diabetes educator. How do you get started with meal planning? Here are some tips to get started:  · Plan your meals a week at a time. Don't forget to include snacks too. · Use cookbooks or online recipes to plan several main meals. Plan some quick meals for busy nights. You also can double some recipes that freeze well. Then you can save half for other busy nights when you don't have time to cook. · Make sure you have the ingredients you need for your recipes. If you're running low on basic items, put these items on your shopping list too. · List foods that you use to make breakfasts, lunches, and snacks. List plenty of fruits and vegetables. · Post this list on the refrigerator. Add to it as you think of more things you need. · Take the list to the store to do your weekly shopping. Follow-up care is a key part of your treatment and safety. Be sure to make and go to all appointments, and call your doctor if you are having problems. It's also a good idea to know your test results and keep a list of the medicines you take. Where can you learn more? Go to http://ceceliaGT Channel.info/. Matilde San in the search box to learn more about \"Learning About Meal Planning for Diabetes. \"  Current as of: March 13, 2017  Content Version: 11.4  © 3605-9571 [facility]. Care instructions adapted under license by [employer] (which disclaims liability or warranty for this information). If you have questions about a medical condition or this instruction, always ask your healthcare professional. [place] [address] any warranty or liability for your use of this information. Vaccine Information Statement    Influenza (Flu) Vaccine (Inactivated or Recombinant): What you need to know    Many Vaccine Information Statements are available in [place] and other languages.  See www.immunize.org/vis  Hojas de Información Sobre Vacunas están disponibles en Español y en muchos magno daniel. Visite www.immunize.org/vis    1. Why get vaccinated? Influenza (flu) is a contagious disease that spreads around the [place] every year, usually between October and May. Flu is caused by influenza viruses, and is spread mainly by coughing, sneezing, and close contact. Anyone can get flu. Flu strikes suddenly and can last several days. Symptoms vary by age, but can include:   fever/chills   sore throat   muscle aches   fatigue   cough   headache    runny or stuffy nose    Flu can also lead to pneumonia and blood infections, and cause diarrhea and seizures in children. If you have a medical condition, such as heart or lung disease, flu can make it worse. Flu is more dangerous for some people. Infants and young children, people 72years of age and older, pregnant women, and people with certain health conditions or a weakened immune system are at greatest risk. Each year thousands of people in the [facility] die from flu, and many more are hospitalized. Flu vaccine can:   keep you from getting flu,   make flu less severe if you do get it, and   keep you from spreading flu to your family and other people. 2. Inactivated and recombinant flu vaccines    A dose of flu vaccine is recommended every flu season. Children 6 months through 6years of age may need two doses during the same flu season. Everyone else needs only one dose each flu season. Some inactivated flu vaccines contain a very small amount of a mercury-based preservative called thimerosal. Studies have not shown thimerosal in vaccines to be harmful, but flu vaccines that do not contain thimerosal are available. There is no live flu virus in flu shots. They cannot cause the flu. There are many flu viruses, and they are always changing. Each year a new flu vaccine is made to protect against three or four viruses that are likely to cause disease in the upcoming flu season.  But even when the vaccine doesnt exactly match these viruses, it may still provide some protection    Flu vaccine cannot prevent:   flu that is caused by a virus not covered by the vaccine, or   illnesses that look like flu but are not. It takes about 2 weeks for protection to develop after vaccination, and protection lasts through the flu season. 3. Some people should not get this vaccine    Tell the person who is giving you the vaccine:     If you have any severe, life-threatening allergies. If you ever had a life-threatening allergic reaction after a dose of flu vaccine, or have a severe allergy to any part of this vaccine, you may be advised not to get vaccinated. Most, but not all, types of flu vaccine contain a small amount of egg protein.  If you ever had Guillain-Barré Syndrome (also called GBS). Some people with a history of GBS should not get this vaccine. This should be discussed with your doctor.  If you are not feeling well. It is usually okay to get flu vaccine when you have a mild illness, but you might be asked to come back when you feel better. 4. Risks of a vaccine reaction    With any medicine, including vaccines, there is a chance of reactions. These are usually mild and go away on their own, but serious reactions are also possible. Most people who get a flu shot do not have any problems with it. Minor problems following a flu shot include:    soreness, redness, or swelling where the shot was given     hoarseness   sore, red or itchy eyes   cough   fever   aches   headache   itching   fatigue  If these problems occur, they usually begin soon after the shot and last 1 or 2 days. More serious problems following a flu shot can include the following:     There may be a small increased risk of Guillain-Barré Syndrome (GBS) after inactivated flu vaccine. This risk has been estimated at 1 or 2 additional cases per million people vaccinated.  This is much lower than the risk of severe complications from flu, which can be prevented by flu vaccine.  Young children who get the flu shot along with pneumococcal vaccine (PCV13) and/or DTaP vaccine at the same time might be slightly more likely to have a seizure caused by fever. Ask your doctor for more information. Tell your doctor if a child who is getting flu vaccine has ever had a seizure. Problems that could happen after any injected vaccine:      People sometimes faint after a medical procedure, including vaccination. Sitting or lying down for about 15 minutes can help prevent fainting, and injuries caused by a fall. Tell your doctor if you feel dizzy, or have vision changes or ringing in the ears.  Some people get severe pain in the shoulder and have difficulty moving the arm where a shot was given. This happens very rarely.  Any medication can cause a severe allergic reaction. Such reactions from a vaccine are very rare, estimated at about 1 in a million doses, and would happen within a few minutes to a few hours after the vaccination. As with any medicine, there is a very remote chance of a vaccine causing a serious injury or death. The safety of vaccines is always being monitored. For more information, visit: www.cdc.gov/vaccinesafety/    5. What if there is a serious reaction? What should I look for?  Look for anything that concerns you, such as signs of a severe allergic reaction, very high fever, or unusual behavior. Signs of a severe allergic reaction can include hives, swelling of the face and throat, difficulty breathing, a fast heartbeat, dizziness, and weakness - usually within a few minutes to a few hours after the vaccination. What should I do?  If you think it is a severe allergic reaction or other emergency that cant wait, call 9-1-1 and get the person to the nearest hospital. Otherwise, call your doctor.      Reactions should be reported to the Vaccine Adverse Event Reporting System (VAERS). Your doctor should file this report, or you can do it yourself through  the VAERS web site at www.vaers. [facility].gov, or by calling 2-605.309.2686. VAERS does not give medical advice. 6. The National Vaccine Injury Compensation Program    The [facility] Vaccine Injury Compensation Program (VICP) is a federal program that was created to compensate people who may have been injured by certain vaccines. Persons who believe they may have been injured by a vaccine can learn about the program and about filing a claim by calling 1-815.864.9107 or visiting the [employer] website at www.[email or web address]/vaccinecompensation. There is a time limit to file a claim for compensation. 7. How can I learn more?  Ask your healthcare provider. He or she can give you the vaccine package insert or suggest other sources of information.  Call your local or state health department.  Contact the Centers for Disease Control and Prevention (CDC):  - Call 3-817.983.2386 (1-800-CDC-INFO) or  - Visit CDCs website at www.cdc.gov/flu    Vaccine Information Statement   Inactivated Influenza Vaccine   8/7/2015  42 JC Soto [id number]    Department of Health and Human Services  Centers for Disease Control and Prevention    Office Use Only

## 2017-11-01 NOTE — MR AVS SNAPSHOT
Visit Information Date & Time Provider Department Dept. Phone Encounter #  
 11/1/2017  9:30 AM Taisha Reyna, [address] [id number] Follow-up Instructions Return in about 3 months (around 2/1/2018). Your Appointments 4/18/2018  9:00 AM  
PROCEDURE with [facility] ([facility]) Appt Note: 6 month device check [place] [id number] [place] [id number] [phone] [address] [place] [address] [address] Upcoming Health Maintenance Date Due OSTEOPOROSIS SCREENING (DEXA) 8/21/2009 INFLUENZA AGE 9 TO ADULT 8/1/2017 HEMOGLOBIN A1C Q6M 1/28/2018 LIPID PANEL Q1 4/8/2018 MICROALBUMIN Q1 4/24/2018 MEDICARE YEARLY EXAM 4/25/2018 FOOT EXAM Q1 7/24/2018 Pneumococcal 65+ Low/Medium Risk (2 of 2 - PPSV23) 7/31/2018 EYE EXAM RETINAL OR DILATED Q1 9/6/2018 BREAST CANCER SCRN MAMMOGRAM 6/27/2019 GLAUCOMA SCREENING Q2Y 9/6/2019 DTaP/Tdap/Td series (2 - Td) 7/24/2027 COLONOSCOPY 10/25/2027 Allergies as of 11/1/2017  Review Complete On: 11/1/2017 By: Taisha Reyna MD  
 No Known Allergies Current Immunizations  Never Reviewed Name Date Influenza High Dose Vaccine PF 11/1/2017 Influenza Vaccine 10/1/2016 Not reviewed this visit You Were Diagnosed With   
  
 Codes Comments Essential hypertension    -  Primary ICD-10-CM: I10 
ICD-9-CM: 401.9 Well controlled diabetes mellitus ([facility] [address]     ICD-10-CM: E11.9 ICD-9-CM: 250.00 Bradycardia     ICD-10-CM: R00.1 ICD-9-CM: 427.89 Atrial fibrillation, unspecified type ([address]     ICD-10-CM: I48.91 
ICD-9-CM: 427.31 Deep vein thrombophlebitis of right leg (HCC)     ICD-10-CM: I80.201 ICD-9-CM: 451.19 Feeling sad     ICD-10-CM: R45.89 ICD-9-CM: 300.4 Encounter for immunization     ICD-10-CM: Q81 ICD-9-CM: V03.89 Vitals BP Pulse Temp Resp Height(growth percentile) Weight(growth percentile) 128/68 (BP 1 Location: Right arm, BP Patient Position: Sitting) 60 98.1 °F (36.7 °C) (Oral) 16 5' 6\" (1.676 m) 189 lb 9.6 oz (86 kg) SpO2 BMI OB Status Smoking Status 99% 30.6 kg/m2 Hysterectomy Never Smoker BMI and BSA Data Body Mass Index Body Surface Area  
 30.6 kg/m 2 2 m 2 Preferred Pharmacy Pharmacy Name Phone [facility]/PHARMACY #[id number] Pete Orozco, [address] [facility],[address] [facility] [phone] Your Updated Medication List  
  
   
This list is accurate as of: 11/1/17 10:01 AM.  Always use your most recent med list.  
  
  
  
  
 apixaban 5 mg tablet Commonly known as:  [place] Vipul Take 1 Tab by mouth two (2) times a day. aspirin 81 mg chewable tablet Take 81 mg by mouth daily. COQ10 (LIPOSOMAL UBIQUINOL) PO Take 200 mg by mouth daily. FISH OIL PO Take 100 mg by mouth daily. glucose blood VI test strips strip Commonly known as:  blood glucose test  
by Does Not Apply route See Admin Instructions. Check once a day  
  
 hydroCHLOROthiazide 25 mg tablet Commonly known as:  HYDRODIURIL  
TAKE 1 TABLET BY MOUTH ONCE DAILY. metoprolol succinate 50 mg XL tablet Commonly known as:  TOPROL-XL Take 1 Tab by mouth daily. Per patient she does not take if her BP is low  
  
 nystatin 100,000 unit/gram ointment Commonly known as:  MYCOSTATIN  
APPLY TO AFFECTED AREA TWO (2) TIMES A DAY. olmesartan 40 mg tablet Commonly known as:  [employer] Take 1 Tab by mouth daily. Prescriptions Sent to Pharmacy Refills  
 olmesartan (BENICAR) 40 mg tablet 0 Sig: Take 1 Tab by mouth daily. Class: Normal  
 Pharmacy: [facility]/pharmacy [address] [facility], [address] [facility],[address] #: [phone] Route: Oral  
 glucose blood VI test strips (BLOOD GLUCOSE TEST) strip 6 Sig: by Does Not Apply route See Admin Instructions. Check once a day Class: Normal  
 Pharmacy: [facility]/pharmacy [address] [facility], [address] [facility] [place],[address] [facility] [address] Ph #: [phone] Route: Does Not Apply  
 apixaban (ELIQUIS) 5 mg tablet 0 Sig: Take 1 Tab by mouth two (2) times a day. Class: Normal  
 Pharmacy: [facility]/pharmacy [address] [facility], [address] [facility] [place],[address] R [address] Ph #: [phone] Route: Oral  
  
We Performed the Following INFLUENZA VIRUS VACCINE, HIGH DOSE SEASONAL, PRESERVATIVE FREE [76017 CPT(R)] Follow-up Instructions Return in about 3 months (around 2/1/2018). Patient Instructions Low Sodium Diet (2,000 Milligram): Care Instructions Your Care Instructions Too much sodium causes your body to hold on to extra water. This can raise your blood pressure and force your heart and kidneys to work harder. In very serious cases, this could cause you to be put in the hospital. It might even be life-threatening. By limiting sodium, you will feel better and lower your risk of serious problems. The most common source of sodium is salt. People get most of the salt in their diet from canned, prepared, and packaged foods. Fast food and restaurant meals also are very high in sodium. Your doctor will probably limit your sodium to less than 2,000 milligrams (mg) a day. This limit counts all the sodium in prepared and packaged foods and any salt you add to your food. Follow-up care is a key part of your treatment and safety. Be sure to make and go to all appointments, and call your doctor if you are having problems. It's also a good idea to know your test results and keep a list of the medicines you take. How can you care for yourself at home? Read food labels · Read labels on cans and food packages. The labels tell you how much sodium is in each serving. Make sure that you look at the serving size. If you eat more than the serving size, you have eaten more sodium. · Food labels also tell you the Percent Daily Value for sodium.  Choose products with low Percent Daily Values for sodium. · Be aware that sodium can come in forms other than salt, including monosodium glutamate (MSG), sodium citrate, and sodium bicarbonate (baking soda). MSG is often added to Asian food. When you eat out, you can sometimes ask for food without MSG or added salt. Buy low-sodium foods · Buy foods that are labeled \"unsalted\" (no salt added), \"sodium-free\" (less than 5 mg of sodium per serving), or \"low-sodium\" (less than 140 mg of sodium per serving). Foods labeled \"reduced-sodium\" and \"light sodium\" may still have too much sodium. Be sure to read the label to see how much sodium you are getting. · Buy fresh vegetables, or frozen vegetables without added sauces. Buy low-sodium versions of canned vegetables, soups, and other canned goods. Prepare low-sodium meals · Cut back on the amount of salt you use in cooking. This will help you adjust to the taste. Do not add salt after cooking. One teaspoon of salt has about 2,300 mg of sodium. · Take the salt shaker off the table. · Flavor your food with garlic, lemon juice, onion, vinegar, herbs, and spices. Do not use soy sauce, lite soy sauce, steak sauce, onion salt, garlic salt, celery salt, mustard, or ketchup on your food. · Use low-sodium salad dressings, sauces, and ketchup. Or make your own salad dressings and sauces without adding salt. · Use less salt (or none) when recipes call for it. You can often use half the salt a recipe calls for without losing flavor. Other foods such as rice, pasta, and grains do not need added salt. · Rinse canned vegetables, and cook them in fresh water. This removes some-but not all-of the salt. · Avoid water that is naturally high in sodium or that has been treated with water softeners, which add sodium. Call your local water company to find out the sodium content of your water supply. If you buy bottled water, read the label and choose a sodium-free brand. Avoid high-sodium foods · Avoid eating: ¨ Smoked, cured, salted, and canned meat, fish, and poultry. ¨ Ham, morales, hot dogs, and luncheon meats. ¨ Regular, hard, and processed cheese and regular peanut butter. ¨ Crackers with salted tops, and other salted snack foods such as pretzels, chips, and salted popcorn. ¨ Frozen prepared meals, unless labeled low-sodium. ¨ Canned and dried soups, broths, and bouillon, unless labeled sodium-free or low-sodium. ¨ Canned vegetables, unless labeled sodium-free or low-sodium. ¨ Western Consuelo fries, pizza, tacos, and other fast foods. ¨ Pickles, olives, ketchup, and other condiments, especially soy sauce, unless labeled sodium-free or low-sodium. Where can you learn more? Go to http://cecelia-virginia.info/. Enter S106 in the search box to learn more about \"Low Sodium Diet (2,000 Milligram): Care Instructions. \" Current as of: May 12, 2017 Content Version: 11.4 © 6473-0809 [employer]. Care instructions adapted under license by [employer] (which disclaims liability or warranty for this information). If you have questions about a medical condition or this instruction, always ask your healthcare professional. [address] any warranty or liability for your use of this information. Atrial Fibrillation: Care Instructions Your Care Instructions Atrial fibrillation is an irregular and often fast heartbeat. Treating this condition is important for several reasons. It can cause blood clots, which can travel from your heart to your brain and cause a stroke. If you have a fast heartbeat, you may feel lightheaded, dizzy, and weak. An irregular heartbeat can also increase your risk for heart failure. Atrial fibrillation is often the result of another heart condition, such as high blood pressure or coronary artery disease. Making changes to improve your heart condition will help you stay healthy and active. Follow-up care is a key part of your treatment and safety. Be sure to make and go to all appointments, and call your doctor if you are having problems. It's also a good idea to know your test results and keep a list of the medicines you take. How can you care for yourself at home? Medicines ? · Take your medicines exactly as prescribed. Call your doctor if you think you are having a problem with your medicine. You will get more details on the specific medicines your doctor prescribes. ? · If your doctor has given you a blood thinner to prevent a stroke, be sure you get instructions about how to take your medicine safely. Blood thinners can cause serious bleeding problems. ? · Do not take any vitamins, over-the-counter drugs, or herbal products without talking to your doctor first. ? Lifestyle changes ? · Do not smoke. Smoking can increase your chance of a stroke and heart attack. If you need help quitting, talk to your doctor about stop-smoking programs and medicines. These can increase your chances of quitting for good. ? · Eat a heart-healthy diet. ? · Stay at a healthy weight. Lose weight if you need to.  
? · Limit alcohol to 2 drinks a day for men and 1 drink a day for women. Too much alcohol can cause health problems. ? · Avoid colds and flu. Get a pneumococcal vaccine shot. If you have had one before, ask your doctor whether you need another dose. Get a flu shot every year. If you must be around people with colds or flu, wash your hands often. Activity ? · If your doctor recommends it, get more exercise. Walking is a good choice. Bit by bit, increase the amount you walk every day. Try for at least 30 minutes on most days of the week. You also may want to swim, bike, or do other activities. Your doctor may suggest that you join a cardiac rehabilitation program so that you can have help increasing your physical activity safely. ? · Start light exercise if your doctor says it is okay. Even a small amount will help you get stronger, have more energy, and manage stress. Walking is an easy way to get exercise. Start out by walking a little more than you did in the hospital. Gradually increase the amount you walk. ? · When you exercise, watch for signs that your heart is working too hard. You are pushing too hard if you cannot talk while you are exercising. If you become short of breath or dizzy or have chest pain, sit down and rest immediately. ? · Check your pulse regularly. Place two fingers on the artery at the palm side of your wrist, in line with your thumb. If your heartbeat seems uneven or fast, talk to your doctor. When should you call for help? Call 911 anytime you think you may need emergency care. For example, call if: 
? · You have symptoms of a heart attack. These may include: ¨ Chest pain or pressure, or a strange feeling in the chest. 
¨ Sweating. ¨ Shortness of breath. ¨ Nausea or vomiting. ¨ Pain, pressure, or a strange feeling in the back, neck, jaw, or upper belly or in one or both shoulders or arms. ¨ Lightheadedness or sudden weakness. ¨ A fast or irregular heartbeat. After you call 911, the  may tell you to chew 1 adult-strength or 2 to 4 low-dose aspirin. Wait for an ambulance. Do not try to drive yourself. ? · You have symptoms of a stroke. These may include: 
¨ Sudden numbness, tingling, weakness, or loss of movement in your face, arm, or leg, especially on only one side of your body. ¨ Sudden vision changes. ¨ Sudden trouble speaking. ¨ Sudden confusion or trouble understanding simple statements. ¨ Sudden problems with walking or balance. ¨ A sudden, severe headache that is different from past headaches. ? · You passed out (lost consciousness). ?Call your doctor now or seek immediate medical care if: 
? · You have new or increased shortness of breath. ? · You feel dizzy or lightheaded, or you feel like you may faint. ? · Your heart rate becomes irregular. ? · You can feel your heart flutter in your chest or skip heartbeats. Tell your doctor if these symptoms are new or worse. ? Watch closely for changes in your health, and be sure to contact your doctor if you have any problems. Where can you learn more? Go to http://cecelia-virginia.info/. Enter U020 in the search box to learn more about \"Atrial Fibrillation: Care Instructions. \" Current as of: September 21, 2016 Content Version: 11.4 © 9471-8043 [employer]ent Software. Care instructions adapted under license by "[employer] (which disclaims liability or warranty for this information). If you have questions about a medical condition or this instruction, always ask your healthcare professional. [address] any warranty or liability for your use of this information. Deep Vein Thrombosis: Care Instructions Your Care Instructions A deep vein thrombosis (DVT) is a blood clot in certain veins of the legs, pelvis, or arms. Blood clots in these veins need to be treated because they can get bigger, break loose, and travel through the bloodstream to the lungs. A blood clot in a lung can be life-threatening. The doctor may have given you a blood thinner (anticoagulant). A blood thinner can stop the blood clot from growing larger and prevent new clots from forming. You will need to take a blood thinner for 3 to 6 months or longer. The doctor has checked you carefully, but problems can develop later. If you notice any problems or new symptoms, get medical treatment right away. Follow-up care is a key part of your treatment and safety. Be sure to make and go to all appointments, and call your doctor if you are having problems. It's also a good idea to know your test results and keep a list of the medicines you take. How can you care for yourself at home? · Take your medicines exactly as prescribed. Call your doctor if you think you are having a problem with your medicine. · If you are taking a blood thinner, be sure you get instructions about how to take your medicine safely. Blood thinners can cause serious bleeding problems. · Wear compression stockings if your doctor recommends them. These stockings are tighter at the feet than on the legs. They may reduce pain and swelling in your legs. But there are different types of stockings, and they need to fit right. So your doctor will recommend what you need. · When you sit, use a pillow to raise the arm or leg that has the blood clot. Try to keep it above the level of your heart. When should you call for help? Call 911 anytime you think you may need emergency care. For example, call if: 
? · You passed out (lost consciousness). ? · You have symptoms of a blood clot in your lung (called a pulmonary embolism). These include: 
¨ Sudden chest pain. ¨ Trouble breathing. ¨ Coughing up blood. ?Call your doctor now or seek immediate medical care if: 
? · You have new or worse trouble breathing. ? · You are dizzy or lightheaded, or you feel like you may faint. ? · You have symptoms of a blood clot in your arm or leg. These may include: 
¨ Pain in the arm, calf, back of the knee, thigh, or groin. ¨ Redness and swelling in the arm, leg, or groin. ? Watch closely for changes in your health, and be sure to contact your doctor if: 
? · You do not get better as expected. Where can you learn more? Go to http://cecelia-virginia.info/. Enter X467 in the search box to learn more about \"Deep Vein Thrombosis: Care Instructions. \" Current as of: March 20, 2017 Content Version: 11.4 © 1860-7331 [employer].  Care instructions adapted under license by [employer] (which disclaims liability or warranty for this information). If you have questions about a medical condition or this instruction, always ask your healthcare professional. [address] any warranty or liability for your use of this information. Learning About Meal Planning for Diabetes Why plan your meals? Meal planning can be a key part of managing diabetes. Planning meals and snacks with the right balance of carbohydrate, protein, and fat can help you keep your blood sugar at the target level you set with your doctor. You don't have to eat special foods. You can eat what your family eats, including sweets once in a while. But you do have to pay attention to how often you eat and how much you eat of certain foods. You may want to work with a dietitian or a certified diabetes educator. He or she can give you tips and meal ideas and can answer your questions about meal planning. This health professional can also help you reach a healthy weight if that is one of your goals. What plan is right for you? Your dietitian or diabetes educator may suggest that you start with the plate format or carbohydrate counting. The plate format The plate format is a simple way to help you manage how you eat. You plan meals by learning how much space each food should take on a plate. Using the plate format helps you spread carbohydrate throughout the day. It can make it easier to keep your blood sugar level within your target range. It also helps you see if you're eating healthy portion sizes. To use the plate format, you put non-starchy vegetables on half your plate. Add meat or meat substitutes on one-quarter of the plate. Put a grain or starchy vegetable (such as brown rice or a potato) on the final quarter of the plate. You can add a small piece of fruit and some low-fat or fat-free milk or yogurt, depending on your carbohydrate goal for each meal. 
Here are some tips for using the plate format: · Make sure that you are not using an oversized plate. A 9-inch plate is best. Many restaurants use larger plates. · Get used to using the plate format at home. Then you can use it when you eat out. · Write down your questions about using the plate format. Talk to your doctor, a dietitian, or a diabetes educator about your concerns. Carbohydrate counting With carbohydrate counting, you plan meals based on the amount of carbohydrate in each food. Carbohydrate raises blood sugar higher and more quickly than any other nutrient. It is found in desserts, breads and cereals, and fruit. It's also found in starchy vegetables such as potatoes and corn, grains such as rice and pasta, and milk and yogurt. Spreading carbohydrate throughout the day helps keep your blood sugar levels within your target range. Your daily amount depends on several things, including your weight, how active you are, which diabetes medicines you take, and what your goals are for your blood sugar levels. A registered dietitian or diabetes educator can help you plan how much carbohydrate to include in each meal and snack. A guideline for your daily amount of carbohydrate is: · 45 to 60 grams at each meal. That's about the same as 3 to 4 carbohydrate servings. · 15 to 20 grams at each snack. That's about the same as 1 carbohydrate serving. The Nutrition Facts label on packaged foods tells you how much carbohydrate is in a serving of the food. First, look at the serving size on the food label. Is that the amount you eat in a serving? All of the nutrition information on a food label is based on that serving size. So if you eat more or less than that, you'll need to adjust the other numbers. Total carbohydrate is the next thing you need to look for on the label. If you count carbohydrate servings, one serving of carbohydrate is 15 grams.  
For foods that don't come with labels, such as fresh fruits and vegetables, you'll need a guide that lists carbohydrate in these foods. Ask your doctor, dietitian, or diabetes educator about books or other nutrition guides you can use. If you take insulin, you need to know how many grams of carbohydrate are in a meal. This lets you know how much rapid-acting insulin to take before you eat. If you use an insulin pump, you get a constant rate of insulin during the day. So the pump must be programmed at meals to give you extra insulin to cover the rise in blood sugar after meals. When you know how much carbohydrate you will eat, you can take the right amount of insulin. Or, if you always use the same amount of insulin, you need to make sure that you eat the same amount of carbohydrate at meals. If you need more help to understand carbohydrate counting and food labels, ask your doctor, dietitian, or diabetes educator. How do you get started with meal planning? Here are some tips to get started: 
· Plan your meals a week at a time. Don't forget to include snacks too. · Use cookbooks or online recipes to plan several main meals. Plan some quick meals for busy nights. You also can double some recipes that freeze well. Then you can save half for other busy nights when you don't have time to cook. · Make sure you have the ingredients you need for your recipes. If you're running low on basic items, put these items on your shopping list too. · List foods that you use to make breakfasts, lunches, and snacks. List plenty of fruits and vegetables. · Post this list on the refrigerator. Add to it as you think of more things you need. · Take the list to the store to do your weekly shopping. Follow-up care is a key part of your treatment and safety. Be sure to make and go to all appointments, and call your doctor if you are having problems. It's also a good idea to know your test results and keep a list of the medicines you take. Where can you learn more? Go to http://cecelia-virginia.info/. Justina Lowry in the search box to learn more about \"Learning About Meal Planning for Diabetes. \" Current as of: March 13, 2017 Content Version: 11.4 © 2178-2070 Egny[employer]. Care instructions adapted under license by [employer] (which disclaims liability or warranty for this information). If you have questions about a medical condition or this instruction, always ask your healthcare professional. [place] [address] any warranty or liability for your use of this information. Vaccine Information Statement Influenza (Flu) Vaccine (Inactivated or Recombinant): What you need to know Many Vaccine Information Statements are available in Chinese and other languages. See www.immunize.org/vis Hojas de Información Sobre Vacunas están disponibles en Español y en muchos otros idiomas. Visite www.immunize.org/vis 1. Why get vaccinated? Influenza (flu) is a contagious disease that spreads around the [facility] every year, usually between October and May. Flu is caused by influenza viruses, and is spread mainly by coughing, sneezing, and close contact. Anyone can get flu. Flu strikes suddenly and can last several days. Symptoms vary by age, but can include: 
 fever/chills  sore throat  muscle aches  fatigue  cough  headache  runny or stuffy nose Flu can also lead to pneumonia and blood infections, and cause diarrhea and seizures in children. If you have a medical condition, such as heart or lung disease, flu can make it worse. Flu is more dangerous for some people. Infants and young children, people 72years of age and older, pregnant women, and people with certain health conditions or a weakened immune system are at greatest risk. Each year thousands of people in the [facility] die from flu, and many more are hospitalized.   
 
Flu vaccine can: 
 keep you from getting flu, 
  make flu less severe if you do get it, and 
 keep you from spreading flu to your family and other people. 2. Inactivated and recombinant flu vaccines A dose of flu vaccine is recommended every flu season. Children 6 months through 6years of age may need two doses during the same flu season. Everyone else needs only one dose each flu season. Some inactivated flu vaccines contain a very small amount of a mercury-based preservative called thimerosal. Studies have not shown thimerosal in vaccines to be harmful, but flu vaccines that do not contain thimerosal are available. There is no live flu virus in flu shots. They cannot cause the flu. There are many flu viruses, and they are always changing. Each year a new flu vaccine is made to protect against three or four viruses that are likely to cause disease in the upcoming flu season. But even when the vaccine doesnt exactly match these viruses, it may still provide some protection Flu vaccine cannot prevent: 
 flu that is caused by a virus not covered by the vaccine, or 
 illnesses that look like flu but are not. It takes about 2 weeks for protection to develop after vaccination, and protection lasts through the flu season. 3. Some people should not get this vaccine Tell the person who is giving you the vaccine:  If you have any severe, life-threatening allergies. If you ever had a life-threatening allergic reaction after a dose of flu vaccine, or have a severe allergy to any part of this vaccine, you may be advised not to get vaccinated. Most, but not all, types of flu vaccine contain a small amount of egg protein.  If you ever had Guillain-Barré Syndrome (also called GBS). Some people with a history of GBS should not get this vaccine. This should be discussed with your doctor.  If you are not feeling well.    
It is usually okay to get flu vaccine when you have a mild illness, but you might be asked to come back when you feel better. 4. Risks of a vaccine reaction With any medicine, including vaccines, there is a chance of reactions. These are usually mild and go away on their own, but serious reactions are also possible. Most people who get a flu shot do not have any problems with it. Minor problems following a flu shot include:  
 soreness, redness, or swelling where the shot was given  hoarseness  sore, red or itchy eyes  cough  fever  aches  headache  itching  fatigue If these problems occur, they usually begin soon after the shot and last 1 or 2 days. More serious problems following a flu shot can include the following:  There may be a small increased risk of Guillain-Barré Syndrome (GBS) after inactivated flu vaccine. This risk has been estimated at 1 or 2 additional cases per million people vaccinated. This is much lower than the risk of severe complications from flu, which can be prevented by flu vaccine.  Young children who get the flu shot along with pneumococcal vaccine (PCV13) and/or DTaP vaccine at the same time might be slightly more likely to have a seizure caused by fever. Ask your doctor for more information. Tell your doctor if a child who is getting flu vaccine has ever had a seizure. Problems that could happen after any injected vaccine:  People sometimes faint after a medical procedure, including vaccination. Sitting or lying down for about 15 minutes can help prevent fainting, and injuries caused by a fall. Tell your doctor if you feel dizzy, or have vision changes or ringing in the ears.  Some people get severe pain in the shoulder and have difficulty moving the arm where a shot was given. This happens very rarely.  Any medication can cause a severe allergic reaction.  Such reactions from a vaccine are very rare, estimated at about 1 in a million doses, and would happen within a few minutes to a few hours after the vaccination. As with any medicine, there is a very remote chance of a vaccine causing a serious injury or death. The safety of vaccines is always being monitored. For more information, visit: www.cdc.gov/vaccinesafety/ 
 
5. What if there is a serious reaction? What should I look for?  Look for anything that concerns you, such as signs of a severe allergic reaction, very high fever, or unusual behavior. Signs of a severe allergic reaction can include hives, swelling of the face and throat, difficulty breathing, a fast heartbeat, dizziness, and weakness  usually within a few minutes to a few hours after the vaccination. What should I do?  If you think it is a severe allergic reaction or other emergency that cant wait, call 9-1-1 and get the person to the nearest hospital. Otherwise, call your doctor.  Reactions should be reported to the Vaccine Adverse Event Reporting System (VAERS). Your doctor should file this report, or you can do it yourself through  the VAERS web site at www.vaers. [facility].gov, or by calling 0-823.428.1145. VAERS does not give medical advice. 6. The National Vaccine Injury Compensation Program 
 
The [facility] Vaccine Injury Compensation Program (VICP) is a federal program that was created to compensate people who may have been injured by certain vaccines. Persons who believe they may have been injured by a vaccine can learn about the program and about filing a claim by calling 9-417.383.3737 or visiting the [address] website at www.[facility].gov/vaccinecompensation. There is a time limit to file a claim for compensation. 7. How can I learn more?  Ask your healthcare provider. He or she can give you the vaccine package insert or suggest other sources of information.  Call your local or state health department.  
 Contact the Centers for Disease Control and Prevention (CDC): 
 - Call 8-435.353.4852 (1-800-CDC-INFO) or 
- Visit CDCs website at www.cdc.gov/flu Vaccine Information Statement Inactivated Influenza Vaccine 8/7/2015 
[address] [id number] [facility] and [place] Centers for Disease Control and Prevention Office Use Only Please provide this summary of care documentation to your next provider. Your primary care clinician is listed as [place]. If you have any questions after today's visit, please call [phone].

## 2017-11-01 NOTE — PROGRESS NOTES
1. Have you been to the ER, urgent care clinic since your last visit? Hospitalized since your last visit? No    2. Have you seen or consulted any other health care providers outside of the [address] [place] [place] [address] since your last visit? Include any pap smears or colon screening. No    Last flu vaccine 10/2016; requests flu vaccine today.

## 2017-11-01 NOTE — PROGRESS NOTES
Patient reports that she has been trying with very little success to get rides from either the [employer] transportation or [employer]. [employer] does not answer the phone and when she leaves messages no one ever calls her back. She shared with me the flier for a program called [employer] which is [facility] for seniors. I recommended that she contact her health insurance company and see if there is a transportation program through them.

## 2017-11-01 NOTE — PROGRESS NOTES
Verbal order for high dose influenza vaccine    Patient given high dose influenza vaccine in R deltoid. Patient tolerated well. No adverse effects noted.     Lot [id number]  Exp 5/24/2018  [address]  [address] [facility] [address] [phone]

## 2018-01-12 PROBLEM — Z98.890 S/P COLONOSCOPY: Status: ACTIVE | Noted: 2018-01-12

## 2018-02-01 DIAGNOSIS — I10 ESSENTIAL HYPERTENSION: ICD-10-CM

## 2018-02-02 RX ORDER — OLMESARTAN MEDOXOMIL 40 MG/1
TABLET ORAL
Qty: 90 TAB | Refills: 0 | Status: SHIPPED | OUTPATIENT
Start: 2018-02-02 | End: 2018-04-18 | Stop reason: SDUPTHER

## 2018-02-07 DIAGNOSIS — I48.91 ATRIAL FIBRILLATION, UNSPECIFIED TYPE (HCC): ICD-10-CM

## 2018-02-07 DIAGNOSIS — R00.1 BRADYCARDIA: ICD-10-CM

## 2018-02-07 DIAGNOSIS — I80.201 DEEP VEIN THROMBOPHLEBITIS OF RIGHT LEG (HCC): ICD-10-CM

## 2018-02-08 RX ORDER — APIXABAN 5 MG/1
TABLET, FILM COATED ORAL
Qty: 180 TAB | Refills: 0 | Status: SHIPPED | OUTPATIENT
Start: 2018-02-08 | End: 2018-03-21 | Stop reason: ALTCHOICE

## 2018-02-21 ENCOUNTER — OFFICE VISIT (OUTPATIENT)
Dept: FAMILY MEDICINE CLINIC | Age: 73
End: 2018-02-21

## 2018-02-21 VITALS
BODY MASS INDEX: 30.53 KG/M2 | HEIGHT: 66 IN | TEMPERATURE: 98.1 F | WEIGHT: 190 LBS | RESPIRATION RATE: 16 BRPM | OXYGEN SATURATION: 99 % | DIASTOLIC BLOOD PRESSURE: 64 MMHG | HEART RATE: 60 BPM | SYSTOLIC BLOOD PRESSURE: 106 MMHG

## 2018-02-21 DIAGNOSIS — Z78.0 POST-MENOPAUSAL: ICD-10-CM

## 2018-02-21 DIAGNOSIS — E11.65 TYPE 2 DIABETES MELLITUS WITH HYPERGLYCEMIA, WITHOUT LONG-TERM CURRENT USE OF INSULIN (HCC): Primary | ICD-10-CM

## 2018-02-21 DIAGNOSIS — E03.9 HYPOTHYROIDISM, UNSPECIFIED TYPE: ICD-10-CM

## 2018-02-21 DIAGNOSIS — I48.0 PAROXYSMAL ATRIAL FIBRILLATION (HCC): ICD-10-CM

## 2018-02-21 DIAGNOSIS — I10 ESSENTIAL HYPERTENSION: ICD-10-CM

## 2018-02-21 NOTE — PATIENT INSTRUCTIONS
Learning About Diabetes Food Guidelines  Your Care Instructions    Meal planning is important to manage diabetes. It helps keep your blood sugar at a target level (which you set with your doctor). You don't have to eat special foods. You can eat what your family eats, including sweets once in a while. But you do have to pay attention to how often you eat and how much you eat of certain foods. You may want to work with a dietitian or a certified diabetes educator (CDE) to help you plan meals and snacks. A dietitian or CDE can also help you lose weight if that is one of your goals. What should you know about eating carbs? Managing the amount of carbohydrate (carbs) you eat is an important part of healthy meals when you have diabetes. Carbohydrate is found in many foods. · Learn which foods have carbs. And learn the amounts of carbs in different foods. ¨ Bread, cereal, pasta, and rice have about 15 grams of carbs in a serving. A serving is 1 slice of bread (1 ounce), ½ cup of cooked cereal, or 1/3 cup of cooked pasta or rice. ¨ Fruits have 15 grams of carbs in a serving. A serving is 1 small fresh fruit, such as an apple or orange; ½ of a banana; ½ cup of cooked or canned fruit; ½ cup of fruit juice; 1 cup of melon or raspberries; or 2 tablespoons of dried fruit. ¨ Milk and no-sugar-added yogurt have 15 grams of carbs in a serving. A serving is 1 cup of milk or 2/3 cup of no-sugar-added yogurt. ¨ Starchy vegetables have 15 grams of carbs in a serving. A serving is ½ cup of mashed potatoes or sweet potato; 1 cup winter squash; ½ of a small baked potato; ½ cup of cooked beans; or ½ cup cooked corn or green peas. · Learn how much carbs to eat each day and at each meal. A dietitian or CDE can teach you how to keep track of the amount of carbs you eat. This is called carbohydrate counting. · If you are not sure how to count carbohydrate grams, use the Plate Method to plan meals.  It is a good, quick way to make sure that you have a balanced meal. It also helps you spread carbs throughout the day. ¨ Divide your plate by types of foods. Put non-starchy vegetables on half the plate, meat or other protein food on one-quarter of the plate, and a grain or starchy vegetable in the final quarter of the plate. To this you can add a small piece of fruit and 1 cup of milk or yogurt, depending on how many carbs you are supposed to eat at a meal.  · Try to eat about the same amount of carbs at each meal. Do not \"save up\" your daily allowance of carbs to eat at one meal.  · Proteins have very little or no carbs per serving. Examples of proteins are beef, chicken, turkey, fish, eggs, tofu, cheese, cottage cheese, and peanut butter. A serving size of meat is 3 ounces, which is about the size of a deck of cards. Examples of meat substitute serving sizes (equal to 1 ounce of meat) are 1/4 cup of cottage cheese, 1 egg, 1 tablespoon of peanut butter, and ½ cup of tofu. How can you eat out and still eat healthy? · Learn to estimate the serving sizes of foods that have carbohydrate. If you measure food at home, it will be easier to estimate the amount in a serving of restaurant food. · If the meal you order has too much carbohydrate (such as potatoes, corn, or baked beans), ask to have a low-carbohydrate food instead. Ask for a salad or green vegetables. · If you use insulin, check your blood sugar before and after eating out to help you plan how much to eat in the future. · If you eat more carbohydrate at a meal than you had planned, take a walk or do other exercise. This will help lower your blood sugar. What else should you know? · Limit saturated fat, such as the fat from meat and dairy products. This is a healthy choice because people who have diabetes are at higher risk of heart disease. So choose lean cuts of meat and nonfat or low-fat dairy products.  Use olive or canola oil instead of butter or shortening when cooking. · Don't skip meals. Your blood sugar may drop too low if you skip meals and take insulin or certain medicines for diabetes. · Check with your doctor before you drink alcohol. Alcohol can cause your blood sugar to drop too low. Alcohol can also cause a bad reaction if you take certain diabetes medicines. Follow-up care is a key part of your treatment and safety. Be sure to make and go to all appointments, and call your doctor if you are having problems. It's also a good idea to know your test results and keep a list of the medicines you take. Where can you learn more? Go to http://cecelia-[place].info/. Enter A092 in the search box to learn more about \"Learning About Diabetes Food Guidelines. \"  Current as of: March 13, 2017  Content Version: 11.4  © 8647-1141 [employer]. Care instructions adapted under license by [employer] (which disclaims liability or warranty for this information). If you have questions about a medical condition or this instruction, always ask your healthcare professional. [place] [address] any warranty or liability for your use of this information. Low Sodium Diet (2,000 Milligram): Care Instructions  Your Care Instructions    Too much sodium causes your body to hold on to extra water. This can raise your blood pressure and force your heart and kidneys to work harder. In very serious cases, this could cause you to be put in the hospital. It might even be life-threatening. By limiting sodium, you will feel better and lower your risk of serious problems. The most common source of sodium is salt. People get most of the salt in their diet from canned, prepared, and packaged foods. Fast food and restaurant meals also are very high in sodium. Your doctor will probably limit your sodium to less than 2,000 milligrams (mg) a day.  This limit counts all the sodium in prepared and packaged foods and any salt you add to your food.  Follow-up care is a key part of your treatment and safety. Be sure to make and go to all appointments, and call your doctor if you are having problems. It's also a good idea to know your test results and keep a list of the medicines you take. How can you care for yourself at home? Read food labels  · Read labels on cans and food packages. The labels tell you how much sodium is in each serving. Make sure that you look at the serving size. If you eat more than the serving size, you have eaten more sodium. · Food labels also tell you the Percent Daily Value for sodium. Choose products with low Percent Daily Values for sodium. · Be aware that sodium can come in forms other than salt, including monosodium glutamate (MSG), sodium citrate, and sodium bicarbonate (baking soda). MSG is often added to Asian food. When you eat out, you can sometimes ask for food without MSG or added salt. Buy low-sodium foods  · Buy foods that are labeled \"unsalted\" (no salt added), \"sodium-free\" (less than 5 mg of sodium per serving), or \"low-sodium\" (less than 140 mg of sodium per serving). Foods labeled \"reduced-sodium\" and \"light sodium\" may still have too much sodium. Be sure to read the label to see how much sodium you are getting. · Buy fresh vegetables, or frozen vegetables without added sauces. Buy low-sodium versions of canned vegetables, soups, and other canned goods. Prepare low-sodium meals  · Cut back on the amount of salt you use in cooking. This will help you adjust to the taste. Do not add salt after cooking. One teaspoon of salt has about 2,300 mg of sodium. · Take the salt shaker off the table. · Flavor your food with garlic, lemon juice, onion, vinegar, herbs, and spices. Do not use soy sauce, lite soy sauce, steak sauce, onion salt, garlic salt, celery salt, mustard, or ketchup on your food. · Use low-sodium salad dressings, sauces, and ketchup.  Or make your own salad dressings and sauces without adding salt.  · Use less salt (or none) when recipes call for it. You can often use half the salt a recipe calls for without losing flavor. Other foods such as rice, pasta, and grains do not need added salt. · Rinse canned vegetables, and cook them in fresh water. This removes some-but not all-of the salt. · Avoid water that is naturally high in sodium or that has been treated with water softeners, which add sodium. Call your local water company to find out the sodium content of your water supply. If you buy bottled water, read the label and choose a sodium-free brand. Avoid high-sodium foods  · Avoid eating:  ¨ Smoked, cured, salted, and canned meat, fish, and poultry. ¨ Ham, morales, hot dogs, and luncheon meats. ¨ Regular, hard, and processed cheese and regular peanut butter. ¨ Crackers with salted tops, and other salted snack foods such as pretzels, chips, and salted popcorn. ¨ Frozen prepared meals, unless labeled low-sodium. ¨ Canned and dried soups, broths, and bouillon, unless labeled sodium-free or low-sodium. ¨ Canned vegetables, unless labeled sodium-free or low-sodium. ¨ [employer] fries, pizza, tacos, and other fast foods. ¨ Pickles, olives, ketchup, and other condiments, especially soy sauce, unless labeled sodium-free or low-sodium. Where can you learn more? Go to http://cecelia-[place].info/. Enter K425 in the search box to learn more about \"Low Sodium Diet (2,000 Milligram): Care Instructions. \"  Current as of: May 12, 2017  Content Version: 11.4  © 0961-5239 [employer]. Care instructions adapted under license by [employer] (which disclaims liability or warranty for this information). If you have questions about a medical condition or this instruction, always ask your healthcare professional. [address] any warranty or liability for your use of this information.        Advance Directives: Care Instructions  Your Care Instructions  An advance directive is a legal way to state your wishes at the end of your life. It tells your family and your doctor what to do if you can no longer say what you want. There are two main types of advance directives. You can change them any time that your wishes change. · A living will tells your family and your doctor your wishes about life support and other treatment. · A durable power of  for health care lets you name a person to make treatment decisions for you when you can't speak for yourself. This person is called a health care agent. If you do not have an advance directive, decisions about your medical care may be made by a doctor or a  who doesn't know you. It may help to think of an advance directive as a gift to the people who care for you. If you have one, they won't have to make tough decisions by themselves. Follow-up care is a key part of your treatment and safety. Be sure to make and go to all appointments, and call your doctor if you are having problems. It's also a good idea to know your test results and keep a list of the medicines you take. How can you care for yourself at home? · Discuss your wishes with your loved ones and your doctor. This way, there are no surprises. · Many states have a unique form. Or you might use a universal form that has been approved by many states. This kind of form can sometimes be completed and stored online. Your electronic copy will then be available wherever you have a connection to the Internet. In most cases, doctors will respect your wishes even if you have a form from a different state. · You don't need a  to do an advance directive. But you may want to get legal advice. · Think about these questions when you prepare an advance directive:  ¨ Who do you want to make decisions about your medical care if you are not able to? Many people choose a family member or close friend.   ¨ Do you know enough about life support methods that might be used? If not, talk to your doctor so you understand. ¨ What are you most afraid of that might happen? You might be afraid of having pain, losing your independence, or being kept alive by machines. ¨ Where would you prefer to die? Choices include your home, a hospital, or a nursing home. ¨ Would you like to have information about hospice care to support you and your family? ¨ Do you want to donate organs when you die? ¨ Do you want certain Zoroastrian practices performed before you die? If so, put your wishes in the advance directive. · Read your advance directive every year, and make changes as needed. When should you call for help? Be sure to contact your doctor if you have any questions. Where can you learn more? Go to http://cecelia-[place].info/. Enter R264 in the search box to learn more about \"Advance Directives: Care Instructions. \"  Current as of: September 24, 2016  Content Version: 11.4  © 3269-7987 [employer]. Care instructions adapted under license by [employer] (which disclaims liability or warranty for this information). If you have questions about a medical condition or this instruction, always ask your healthcare professional. [place] [address] any warranty or liability for your use of this information. Deciding About Being on a Ventilator When You Have a Terminal Illness  Deciding About Being on a Ventilator When You Have a Terminal Illness  ? Your Care Instructions  ? A ventilator is a life-support machine that helps you breathe if you can no longer breathe on your own. The machine provides oxygen to your lungs through a tube. The tube enters your mouth and goes down your throat to your lungs. Most people on ventilators have to be fed through another tube that goes into the stomach. ?You may feel that being on a ventilator would prevent a \"natural\" death or would keep you alive longer than necessary.  Or you may feel that being on a ventilator would extend your life so you can do certain things, such as saying good-bye to loved ones. ?The decision about whether to be on a ventilator is a personal one. Be sure to talk it over with your doctor and loved ones. ?Follow-up care is a key part of your treatment and safety. Be sure to make and go to all appointments, and call your doctor if you are having problems. It's also a good idea to know your test results and keep a list of the medicines you take. ? Why might you want to be on a ventilator? ? · You think you may be able to return to your normal activities. ? · You need help breathing because of a short illness or a problem that is not related to your terminal illness. ? · You would like more time to say good-bye.   ? · You feel that there are more benefits than risks. ? Why might you not want to be on a ventilator? ? · You have other long-term health problems. ? · You may not be able to return to your normal activities. ? · You want a calm, peaceful death. You do not want to spend the rest of your life on a ventilator. ? · You feel that there are more risks than benefits. When should you call for help? ? Be sure to contact your doctor if:  ? · You want to learn more about being on a ventilator. ? · You change your mind about being on a ventilator. Where can you learn more? Go to http://cecelia-[place].info/. Enter V679 in the search box to learn more about \"Deciding About Being on a Ventilator When You Have a Terminal Illness. \"  Current as of: September 24, 2016  Content Version: 11.4  © 4900-8973 [employer]. Care instructions adapted under license by [employer] (which disclaims liability or warranty for this information).  If you have questions about a medical condition or this instruction, always ask your healthcare professional. [address] any warranty or liability for your use of this information.

## 2018-02-21 NOTE — PROGRESS NOTES
HISTORY OF PRESENT ILLNESS  Palak Lynn is a 67 y.o. female. HPI: Here for routine follow up. H/o hypertension. Today vitals stable. Taking her medication with compliance. No side effects. Asymptomatic. Also h/o a.fib. Post ablation. Currently regular HR. Said on and off dizziness , happens once a week. Random even while sitting. Discuss to drink more fluid, eat on time and take time to change her position. Last for few seconds. Surrounding was spinning around. No headache. No ext weakness, no chest pain or sob. No nausea or vomiting, no vision changes. No leg swelling. Also borderline diabetes. Managed by diet modification. She has been compliant with diet modification but said fasting sugar is staying around 120-130 which is higher than usual for her. Visit Vitals    /64 (BP 1 Location: Right arm, BP Patient Position: Sitting)    Pulse 60    Temp 98.1 °F (36.7 °C) (Oral)    Resp 16    Ht 5' 6\" (1.676 m)    Wt 190 lb (86.2 kg)    SpO2 99%    BMI 30.67 kg/m2     Denies any headache,no chest pain or trouble breathing, no arm or leg weakness. No nausea or vomiting, no weight or appetite changes, no depression or anxiety. No urine or bowel complains, no palpitation, no diaphoresis. Also discussed high BMI. Discussed to do exercise as tolerated. Loosing weight will help. She is active as much as she can. Also complaint with diet modification. Review labs.    Lab Results   Component Value Date/Time    Hemoglobin A1c 6.4 (H) 07/28/2017 09:49 AM     Lab Results   Component Value Date/Time    Sodium 140 07/28/2017 09:49 AM    Potassium 4.7 07/28/2017 09:49 AM    Chloride 104 07/28/2017 09:49 AM    CO2 32 07/28/2017 09:49 AM    Anion gap 4 07/28/2017 09:49 AM    Glucose 117 (H) 07/28/2017 09:49 AM    BUN 21 (H) 07/28/2017 09:49 AM    Creatinine 1.12 07/28/2017 09:49 AM    BUN/Creatinine ratio 19 07/28/2017 09:49 AM    GFR est AA 58 (L) 07/28/2017 09:49 AM    GFR est non-AA 48 (L) 07/28/2017 09:49 AM    Calcium 10.5 (H) 07/28/2017 09:49 AM    Bilirubin, total 0.5 07/28/2017 09:49 AM    AST (SGOT) 12 (L) 07/28/2017 09:49 AM    Alk. phosphatase 71 07/28/2017 09:49 AM    Protein, total 7.7 07/28/2017 09:49 AM    Albumin 3.9 07/28/2017 09:49 AM    Globulin 3.8 07/28/2017 09:49 AM    A-G Ratio 1.0 07/28/2017 09:49 AM    ALT (SGPT) 19 07/28/2017 09:49 AM     Lab Results   Component Value Date/Time    WBC 6.7 04/08/2017 08:35 AM    HGB 11.1 (L) 04/08/2017 08:35 AM    HCT 35.7 04/08/2017 08:35 AM    PLATELET 145 89/57/6511 08:35 AM    MCV 84.6 04/08/2017 08:35 AM     Lab Results   Component Value Date/Time    Cholesterol, total 195 04/08/2017 08:35 AM    HDL Cholesterol 48 04/08/2017 08:35 AM    LDL, calculated 121.2 (H) 04/08/2017 08:35 AM    VLDL, calculated 25.8 04/08/2017 08:35 AM    Triglyceride 129 04/08/2017 08:35 AM    CHOL/HDL Ratio 4.1 04/08/2017 08:35 AM     Lab Results   Component Value Date/Time    TSH 0.92 04/08/2017 08:35 AM     Lab Results   Component Value Date/Time    Microalbumin urine (POC) 10 04/24/2017 09:02 AM     Said lately feeling lower back discomfort more over L5-S1. No radiation of pain. Discomfort more when walking and prolong sitting. No radiation of pain. Mild to moderate in tensity. No lower ext weakness, no fall or injury. No loss of urine or bowel control. H/o hypothyroidism. On medication. Asymptomatic. Shows compliance with medication and no side effects. ROS: see HPI    Physical Exam   Constitutional: She is oriented to person, place, and time. No distress. Cardiovascular: Normal rate, regular rhythm and normal heart sounds. Pulmonary/Chest:   CTA   Abdominal: Soft. Bowel sounds are normal. There is no tenderness. Musculoskeletal: She exhibits no edema. Mild discomfort around L5-S1, no paraspinal muscle discomfort present. ROM wnl. Neurological: She is oriented to person, place, and time.    Psychiatric: Her behavior is normal.       ASSESSMENT and PLAN ICD-10-CM ICD-9-CM    1. Type 2 diabetes mellitus with hyperglycemia, without long-term current use of insulin ([facility] [address]): for now on diet modification. Not taking any medication. Per her fasting sugar is around 120-130. Will repeat labs and further discussion after lab result. Discussed that can consider once a day metformin along with life style modification. Agree to wait for labs. E11.65 250.00 HEMOGLOBIN A1C WITH EAG     450.80 METABOLIC PANEL, COMPREHENSIVE      MICROALBUMIN, UR, RAND W/ MICROALBUMIN/CREA RATIO      TSH 3RD GENERATION      LIPID PANEL      CBC W/O DIFF   2. Paroxysmal atrial fibrillation [facility]): for now stable. Asymptomatic. Will continue current management. I48.0 427.31     post ablation   3. Essential hypertension: stable at this time. Low salt diet. Exercise as tolerated. Will continue current plan. I10 401.9    4. Hypothyroidism, unspecified type: asymptomatic. Complaint with medication. Recheck labs. E03.9 244.9    5. BMI 30.0-30.9,adult:  discussed high BMI. Discussed to do exercise as tolerated. Loosing weight will help. She is active as much as she can. Also complaint with diet modification. Z68.30 V85.30    6. Post-menopausal Z78.0 V49.81 DEXA BONE DENSITY STUDY AXIAL   Pt understood and agree with the plan   Review HM   Moved with one of her son from [place] after lost her . Was depressed. Now feeling some what better. Still trying to adjust her with her life style. Will observe and f/u next visit. Follow-up Disposition:  Return in about 3 months (around 5/21/2018).

## 2018-02-21 NOTE — MR AVS SNAPSHOT
[address] 
[phone] Patient: Yasmani Guaman MRN: [id number] Visit Information Date & Time Provider Department Dept. Phone Encounter #  
 2/21/2018  9:00 AM Angie Wan [facility] [phone] [id number] Follow-up Instructions Return in about 3 months (around 5/21/2018). Your Appointments 3/19/2018  1:40 PM  
Follow Up with Marc Arzola MD  
[facility] ([facility]) Appt Note: 6 month f/up w EKG; 10/3 LMOM to call office to r/s appt/letter mailed. .s.b; called 2x and mailed r/s letter - plk; 6 month f/up w EKG/r/s'd with the pt/JBJ  
 Mariel Stanton [id number]  
[phone] [address] [facility] [address] 4/18/2018  9:00 AM  
PROCEDURE with Pacer Hv C[facility] ([facility]) Appt Note: 6 month device check Mariel Stanton [id number] [phone] [address] [facility] [address] Upcoming Health Maintenance Date Due OSTEOPOROSIS SCREENING (DEXA) 8/21/2009 HEMOGLOBIN A1C Q6M 1/28/2018 LIPID PANEL Q1 4/8/2018 MICROALBUMIN Q1 4/24/2018 MEDICARE YEARLY EXAM 4/25/2018 FOOT EXAM Q1 7/24/2018 Pneumococcal 65+ Low/Medium Risk (2 of 2 - PPSV23) 7/31/2018 EYE EXAM RETINAL OR DILATED Q1 9/6/2018 BREAST CANCER SCRN MAMMOGRAM 6/27/2019 GLAUCOMA SCREENING Q2Y 9/6/2019 COLONOSCOPY 10/25/2022 DTaP/Tdap/Td series (2 - Td) 7/24/2027 Allergies as of 2/21/2018  Review Complete On: 2/21/2018 By: Angie Wan MD  
 No Known Allergies Current Immunizations  Never Reviewed Name Date Influenza High Dose Vaccine PF 11/1/2017 Influenza Vaccine 10/1/2016 Not reviewed this visit You Were Diagnosed With   
  
 Codes Comments Type 2 diabetes mellitus with hyperglycemia, without long-term current use of insulin (HCC)    -  Primary ICD-10-CM: E11.65 ICD-9-CM: 250.00, 790.29 Paroxysmal atrial fibrillation (HCC)     ICD-10-CM: I48.0 ICD-9-CM: 427.31 post ablation Essential hypertension     ICD-10-CM: I10 
ICD-9-CM: 401.9 Hypothyroidism, unspecified type     ICD-10-CM: E03.9 ICD-9-CM: 244.9 BMI 30.0-30.9,adult     ICD-10-CM: Z68.30 ICD-9-CM: V85.30 Post-menopausal     ICD-10-CM: Z78.0 ICD-9-CM: V49.81 Vitals BP Pulse Temp Resp Height(growth percentile) Weight(growth percentile) 106/64 (BP 1 Location: Right arm, BP Patient Position: Sitting) 60 98.1 °F (36.7 °C) (Oral) 16 5' 6\" (1.676 m) 190 lb (86.2 kg) SpO2 BMI OB Status Smoking Status 99% 30.67 kg/m2 Hysterectomy Never Smoker Vitals History BMI and BSA Data Body Mass Index Body Surface Area  
 30.67 kg/m 2 2 m 2 Preferred Pharmacy Pharmacy Name Phone [facility] #[id number] [address] [facility] [phone] Your Updated Medication List  
  
   
This list is accurate as of 2/21/18  9:20 AM.  Always use your most recent med list.  
  
  
  
  
 aspirin 81 mg chewable tablet Take 81 mg by mouth daily. COQ10 (LIPOSOMAL UBIQUINOL) PO Take 200 mg by mouth daily. ELIQUIS 5 mg tablet Generic drug:  apixaban TAKE 1 TABLET BY MOUTH TWICE A DAY  
  
 FISH OIL PO Take 100 mg by mouth daily. glucose blood VI test strips strip Commonly known as:  blood glucose test  
by Does Not Apply route See Admin Instructions. Check once a day  
  
 hydroCHLOROthiazide 25 mg tablet Commonly known as:  HYDRODIURIL  
TAKE 1 TABLET BY MOUTH ONCE DAILY. metoprolol succinate 50 mg XL tablet Commonly known as:  TOPROL-XL Take 1 Tab by mouth daily. Per patient she does not take if her BP is low  
  
 olmesartan 40 mg tablet Commonly known as:  BENICAR  
TAKE 1 TABLET BY MOUTH EVERY DAY Follow-up Instructions Return in about 3 months (around 5/21/2018). To-Do List   
 02/21/2018 Lab:  CBC W/O DIFF   
  
 02/21/2018 Imaging:  DEXA BONE DENSITY STUDY AXIAL   
  
 02/21/2018 Lab:  HEMOGLOBIN A1C WITH EAG   
  
 02/21/2018 Lab:  LIPID PANEL   
  
 02/21/2018 Lab:  METABOLIC PANEL, COMPREHENSIVE   
  
 02/21/2018 Lab:  MICROALBUMIN, UR, RAND W/ MICROALBUMIN/CREA RATIO   
  
 02/21/2018 Lab:  TSH 3RD GENERATION Patient Instructions Learning About Diabetes Food Guidelines Your Care Instructions Meal planning is important to manage diabetes. It helps keep your blood sugar at a target level (which you set with your doctor). You don't have to eat special foods. You can eat what your family eats, including sweets once in a while. But you do have to pay attention to how often you eat and how much you eat of certain foods. You may want to work with a dietitian or a certified diabetes educator (CDE) to help you plan meals and snacks. A dietitian or CDE can also help you lose weight if that is one of your goals. What should you know about eating carbs? Managing the amount of carbohydrate (carbs) you eat is an important part of healthy meals when you have diabetes. Carbohydrate is found in many foods. · Learn which foods have carbs. And learn the amounts of carbs in different foods. ¨ Bread, cereal, pasta, and rice have about 15 grams of carbs in a serving. A serving is 1 slice of bread (1 ounce), ½ cup of cooked cereal, or 1/3 cup of cooked pasta or rice. ¨ Fruits have 15 grams of carbs in a serving. A serving is 1 small fresh fruit, such as an apple or orange; ½ of a banana; ½ cup of cooked or canned fruit; ½ cup of fruit juice; 1 cup of melon or raspberries; or 2 tablespoons of dried fruit. ¨ Milk and no-sugar-added yogurt have 15 grams of carbs in a serving.  A serving is 1 cup of milk or 2/3 cup of no-sugar-added yogurt. ¨ Starchy vegetables have 15 grams of carbs in a serving. A serving is ½ cup of mashed potatoes or sweet potato; 1 cup winter squash; ½ of a small baked potato; ½ cup of cooked beans; or ½ cup cooked corn or green peas. · Learn how much carbs to eat each day and at each meal. A dietitian or CDE can teach you how to keep track of the amount of carbs you eat. This is called carbohydrate counting. · If you are not sure how to count carbohydrate grams, use the Plate Method to plan meals. It is a good, quick way to make sure that you have a balanced meal. It also helps you spread carbs throughout the day. ¨ Divide your plate by types of foods. Put non-starchy vegetables on half the plate, meat or other protein food on one-quarter of the plate, and a grain or starchy vegetable in the final quarter of the plate. To this you can add a small piece of fruit and 1 cup of milk or yogurt, depending on how many carbs you are supposed to eat at a meal. 
· Try to eat about the same amount of carbs at each meal. Do not \"save up\" your daily allowance of carbs to eat at one meal. 
· Proteins have very little or no carbs per serving. Examples of proteins are beef, chicken, turkey, fish, eggs, tofu, cheese, cottage cheese, and peanut butter. A serving size of meat is 3 ounces, which is about the size of a deck of cards. Examples of meat substitute serving sizes (equal to 1 ounce of meat) are 1/4 cup of cottage cheese, 1 egg, 1 tablespoon of peanut butter, and ½ cup of tofu. How can you eat out and still eat healthy? · Learn to estimate the serving sizes of foods that have carbohydrate. If you measure food at home, it will be easier to estimate the amount in a serving of restaurant food. · If the meal you order has too much carbohydrate (such as potatoes, corn, or baked beans), ask to have a low-carbohydrate food instead. Ask for a salad or green vegetables. · If you use insulin, check your blood sugar before and after eating out to help you plan how much to eat in the future. · If you eat more carbohydrate at a meal than you had planned, take a walk or do other exercise. This will help lower your blood sugar. What else should you know? · Limit saturated fat, such as the fat from meat and dairy products. This is a healthy choice because people who have diabetes are at higher risk of heart disease. So choose lean cuts of meat and nonfat or low-fat dairy products. Use olive or canola oil instead of butter or shortening when cooking. · Don't skip meals. Your blood sugar may drop too low if you skip meals and take insulin or certain medicines for diabetes. · Check with your doctor before you drink alcohol. Alcohol can cause your blood sugar to drop too low. Alcohol can also cause a bad reaction if you take certain diabetes medicines. Follow-up care is a key part of your treatment and safety. Be sure to make and go to all appointments, and call your doctor if you are having problems. It's also a good idea to know your test results and keep a list of the medicines you take. Where can you learn more? Go to http://cecelia-vir[place]ia.info/. Enter H955 in the search box to learn more about \"Learning About Diabetes Food Guidelines. \" Current as of: March 13, 2017 Content Version: 11.4 © 7321-6568 Healthwise, Incorporated. Care instructions adapted under license by nodishes.co.uk (which disclaims liability or warranty for this information). If you have questions about a medical condition or this instruction, always ask your healthcare professional. [place] [address] any warranty or liability for your use of this information. Low Sodium Diet (2,000 Milligram): Care Instructions Your Care Instructions Too much sodium causes your body to hold on to extra water.  This can raise your blood pressure and force your heart and kidneys to work harder. In very serious cases, this could cause you to be put in the hospital. It might even be life-threatening. By limiting sodium, you will feel better and lower your risk of serious problems. The most common source of sodium is salt. People get most of the salt in their diet from canned, prepared, and packaged foods. Fast food and restaurant meals also are very high in sodium. Your doctor will probably limit your sodium to less than 2,000 milligrams (mg) a day. This limit counts all the sodium in prepared and packaged foods and any salt you add to your food. Follow-up care is a key part of your treatment and safety. Be sure to make and go to all appointments, and call your doctor if you are having problems. It's also a good idea to know your test results and keep a list of the medicines you take. How can you care for yourself at home? Read food labels · Read labels on cans and food packages. The labels tell you how much sodium is in each serving. Make sure that you look at the serving size. If you eat more than the serving size, you have eaten more sodium. · Food labels also tell you the Percent Daily Value for sodium. Choose products with low Percent Daily Values for sodium. · Be aware that sodium can come in forms other than salt, including monosodium glutamate (MSG), sodium citrate, and sodium bicarbonate (baking soda). MSG is often added to Asian food. When you eat out, you can sometimes ask for food without MSG or added salt. Buy low-sodium foods · Buy foods that are labeled \"unsalted\" (no salt added), \"sodium-free\" (less than 5 mg of sodium per serving), or \"low-sodium\" (less than 140 mg of sodium per serving). Foods labeled \"reduced-sodium\" and \"light sodium\" may still have too much sodium. Be sure to read the label to see how much sodium you are getting. · Buy fresh vegetables, or frozen vegetables without added sauces.  Buy low-sodium versions of canned vegetables, soups, and other canned goods. Prepare low-sodium meals · Cut back on the amount of salt you use in cooking. This will help you adjust to the taste. Do not add salt after cooking. One teaspoon of salt has about 2,300 mg of sodium. · Take the salt shaker off the table. · Flavor your food with garlic, lemon juice, onion, vinegar, herbs, and spices. Do not use soy sauce, lite soy sauce, steak sauce, onion salt, garlic salt, celery salt, mustard, or ketchup on your food. · Use low-sodium salad dressings, sauces, and ketchup. Or make your own salad dressings and sauces without adding salt. · Use less salt (or none) when recipes call for it. You can often use half the salt a recipe calls for without losing flavor. Other foods such as rice, pasta, and grains do not need added salt. · Rinse canned vegetables, and cook them in fresh water. This removes some-but not all-of the salt. · Avoid water that is naturally high in sodium or that has been treated with water softeners, which add sodium. Call your local water company to find out the sodium content of your water supply. If you buy bottled water, read the label and choose a sodium-free brand. Avoid high-sodium foods · Avoid eating: ¨ Smoked, cured, salted, and canned meat, fish, and poultry. ¨ Ham, morales, hot dogs, and luncheon meats. ¨ Regular, hard, and processed cheese and regular peanut butter. ¨ Crackers with salted tops, and other salted snack foods such as pretzels, chips, and salted popcorn. ¨ Frozen prepared meals, unless labeled low-sodium. ¨ Canned and dried soups, broths, and bouillon, unless labeled sodium-free or low-sodium. ¨ Canned vegetables, unless labeled sodium-free or low-sodium. ¨ Western Consuelo fries, pizza, tacos, and other fast foods. ¨ Pickles, olives, ketchup, and other condiments, especially soy sauce, unless labeled sodium-free or low-sodium. Where can you learn more? Go to http://cecelia-vir[place]ia.info/. Enter X863 in the search box to learn more about \"Low Sodium Diet (2,000 Milligram): Care Instructions. \" Current as of: May 12, 2017 Content Version: 11.4 © 2619-6253 [employer]ave Media. Care instructions adapted under license by [employer] (which disclaims liability or warranty for this information). If you have questions about a medical condition or this instruction, always ask your healthcare professional. [place] [address] any warranty or liability for your use of this information. Advance Directives: Care Instructions Your Care Instructions An advance directive is a legal way to state your wishes at the end of your life. It tells your family and your doctor what to do if you can no longer say what you want. There are two main types of advance directives. You can change them any time that your wishes change. · A living will tells your family and your doctor your wishes about life support and other treatment. · A durable power of  for health care lets you name a person to make treatment decisions for you when you can't speak for yourself. This person is called a health care agent. If you do not have an advance directive, decisions about your medical care may be made by a doctor or a  who doesn't know you. It may help to think of an advance directive as a gift to the people who care for you. If you have one, they won't have to make tough decisions by themselves. Follow-up care is a key part of your treatment and safety. Be sure to make and go to all appointments, and call your doctor if you are having problems. It's also a good idea to know your test results and keep a list of the medicines you take. How can you care for yourself at home? · Discuss your wishes with your loved ones and your doctor. This way, there are no surprises. · Many states have a unique form. Or you might use a universal form that has been approved by many states. This kind of form can sometimes be completed and stored online. Your electronic copy will then be available wherever you have a connection to the Internet. In most cases, doctors will respect your wishes even if you have a form from a different state. · You don't need a  to do an advance directive. But you may want to get legal advice. · Think about these questions when you prepare an advance directive: ¨ Who do you want to make decisions about your medical care if you are not able to? Many people choose a family member or close friend. ¨ Do you know enough about life support methods that might be used? If not, talk to your doctor so you understand. ¨ What are you most afraid of that might happen? You might be afraid of having pain, losing your independence, or being kept alive by machines. ¨ Where would you prefer to die? Choices include your home, a hospital, or a nursing home. ¨ Would you like to have information about hospice care to support you and your family? ¨ Do you want to donate organs when you die? ¨ Do you want certain Confucianism practices performed before you die? If so, put your wishes in the advance directive. · Read your advance directive every year, and make changes as needed. When should you call for help? Be sure to contact your doctor if you have any questions. Where can you learn more? Go to http://cecelia-[place].info/. Enter R264 in the search box to learn more about \"Advance Directives: Care Instructions. \" Current as of: September 24, 2016 Content Version: 11.4 © 7650-0657 [employer]iere. Care instructions adapted under license by [employer]version Sound (which disclaims liability or warranty for this information).  If you have questions about a medical condition or this instruction, always ask your healthcare professional. [employer] disclaims any warranty or liability for your use of this information. Deciding About Being on a Ventilator When You Have a Terminal Illness Deciding About Being on a Ventilator When You Have a Terminal Illness ? Your Care Instructions ? A ventilator is a life-support machine that helps you breathe if you can no longer breathe on your own. The machine provides oxygen to your lungs through a tube. The tube enters your mouth and goes down your throat to your lungs. Most people on ventilators have to be fed through another tube that goes into the stomach. ?You may feel that being on a ventilator would prevent a \"natural\" death or would keep you alive longer than necessary. Or you may feel that being on a ventilator would extend your life so you can do certain things, such as saying good-bye to loved ones. ?The decision about whether to be on a ventilator is a personal one. Be sure to talk it over with your doctor and loved ones. ?Follow-up care is a key part of your treatment and safety. Be sure to make and go to all appointments, and call your doctor if you are having problems. It's also a good idea to know your test results and keep a list of the medicines you take. ? Why might you want to be on a ventilator? ? · You think you may be able to return to your normal activities. ? · You need help breathing because of a short illness or a problem that is not related to your terminal illness. ? · You would like more time to say good-bye.  
? · You feel that there are more benefits than risks. ? Why might you not want to be on a ventilator? ? · You have other long-term health problems. ? · You may not be able to return to your normal activities. ? · You want a calm, peaceful death. You do not want to spend the rest of your life on a ventilator. ? · You feel that there are more risks than benefits. When should you call for help? ? Be sure to contact your doctor if: ? · You want to learn more about being on a ventilator. ? · You change your mind about being on a ventilator. Where can you learn more? Go to http://cecelia-vir[place]ia.info/. Enter Q767 in the search box to learn more about \"Deciding About Being on a Ventilator When You Have a Terminal Illness. \" Current as of: September 24, 2016 Content Version: 11.4 © 6989-7014 Up & Net. Care instructions adapted under license by [employer] (which disclaims liability or warranty for this information). If you have questions about a medical condition or this instruction, always ask your healthcare professional. [address] any warranty or liability for your use of this information. Please provide this summary of care documentation to your next provider. Your primary care clinician is listed as Margie Barnes. If you have any questions after today's visit, please call [phone].

## 2018-02-21 NOTE — ACP (ADVANCE CARE PLANNING)
Non-Provider Advance Care Planning (ACP) Note    Date of ACP Conversation: 2/21/2018  Persons included in Conversation: patient  Length of ACP Conversation in minutes: <16 minutes (Non-Billable)    Conversation requested by:   Provider      Authorized Decision Maker (if patient is incapable of making informed decisions): This person is:  Thomas Goins ACP for ALL Patients with Decision Making Capacity:    Advance Directive Conversation with Patients who have not yet planned:  Importance of advance care planning, including choosing a healthcare agent to communicate patient's healthcare decisions if patient lost the ability to make decisions, such as after a sudden illness or accident    Review of Existing Advance Directive: (Select questions covered)  pt has living wheel done but wanted to make changes. she is in process for that.      Interventions Provided:  Recommended review of completed ACP document annually or upon change in health status  She is in process to make some changes to her living wheel

## 2018-02-21 NOTE — PROGRESS NOTES
1. Have you been to the ER, urgent care clinic since your last visit? Hospitalized since your last visit? No    2. Have you seen or consulted any other health care providers outside of the [address] [place] [address] since your last visit? Include any pap smears or colon screening.  No

## 2018-02-26 ENCOUNTER — OFFICE VISIT (OUTPATIENT)
Dept: FAMILY MEDICINE CLINIC | Age: 73
End: 2018-02-26

## 2018-02-26 VITALS
OXYGEN SATURATION: 98 % | DIASTOLIC BLOOD PRESSURE: 78 MMHG | HEIGHT: 66 IN | RESPIRATION RATE: 16 BRPM | HEART RATE: 62 BPM | TEMPERATURE: 99 F | SYSTOLIC BLOOD PRESSURE: 124 MMHG | BODY MASS INDEX: 29.89 KG/M2 | WEIGHT: 186 LBS

## 2018-02-26 DIAGNOSIS — R05.9 COUGH: ICD-10-CM

## 2018-02-26 DIAGNOSIS — J06.9 UPPER RESPIRATORY TRACT INFECTION, UNSPECIFIED TYPE: Primary | ICD-10-CM

## 2018-02-26 LAB
QUICKVUE INFLUENZA TEST: NEGATIVE
VALID INTERNAL CONTROL?: YES

## 2018-02-26 RX ORDER — CODEINE PHOSPHATE AND GUAIFENESIN 10; 100 MG/5ML; MG/5ML
5 SOLUTION ORAL
Qty: 115 ML | Refills: 0 | Status: SHIPPED | OUTPATIENT
Start: 2018-02-26 | End: 2018-03-21

## 2018-02-26 RX ORDER — AZITHROMYCIN 250 MG/1
TABLET, FILM COATED ORAL
Qty: 6 TAB | Refills: 0 | Status: SHIPPED | OUTPATIENT
Start: 2018-02-26 | End: 2018-03-19 | Stop reason: ALTCHOICE

## 2018-02-26 NOTE — PROGRESS NOTES
Hernan Capellan, 67 y.o.,  female    SUBJECTIVE  Cough x 4 days (Dr. Lexi Presley pt)    C/o cough, nasal congestion, myalgia and mild sob. Denies fever, wheezing. Says has tried otc cough meds without much relief. She has difficulty sleeping at night due to cough. Pt has h/o controlled DM and Afib post ablation. Denies cp, palpitations, lightheadedness. Received flu vaccine this season    ROS:  See HPI, all others negative        Patient Active Problem List   Diagnosis Code    Type 2 diabetes mellitus with hyperglycemia, without long-term current use of insulin ([facility] E11.65    Essential hypertension I10    Bradycardia/ post pacemaker. s/p ablation R00.1    Atrial fibrillation (HCC)/ post ablation I48.91    h/o Deep vein thrombophlebitis of right leg (HCC)/ post knee replacement in july 2016 I80.201    Hyperlipidemia E78.5    Hypothyroidism E03.9    Seizure disorder (HCC)/ diagnosed in 2006. was taken off from medication in 2015. since then no seizure. G41.12    Advance directive discussed with patient/ living wheel and she will provide a copy / want to consider changes. will provide updates  Z71.89    Cardiac pacemaker in situ Z95.0    S/P colonoscopy Z98.890       Current Outpatient Prescriptions   Medication Sig Dispense Refill    guaiFENesin-codeine (CHERATUSSIN AC) 100-10 mg/5 mL solution Take 5 mL by mouth three (3) times daily as needed for Cough. Max Daily Amount: 15 mL. 115 mL 0    azithromycin (ZITHROMAX) 250 mg tablet Take two tablets today then one tablet daily 6 Tab 0    ELIQUIS 5 mg tablet TAKE 1 TABLET BY MOUTH TWICE A  Tab 0    olmesartan (BENICAR) 40 mg tablet TAKE 1 TABLET BY MOUTH EVERY DAY 90 Tab 0    glucose blood VI test strips (BLOOD GLUCOSE TEST) strip by Does Not Apply route See Admin Instructions. Check once a day 100 Strip 6    hydroCHLOROthiazide (HYDRODIURIL) 25 mg tablet TAKE 1 TABLET BY MOUTH ONCE DAILY.  90 Tab 1    metoprolol succinate (TOPROL-XL) 50 mg XL tablet Take 1 Tab by mouth daily. Per patient she does not take if her BP is low 90 Tab 3    aspirin 81 mg chewable tablet Take 81 mg by mouth daily.  DOCOSAHEXANOIC ACID/EPA (FISH OIL PO) Take 100 mg by mouth daily.  COQ10, LIPOSOMAL UBIQUINOL, PO Take 200 mg by mouth daily. No Known Allergies    Past Medical History:   Diagnosis Date    Bradycardia     Diabetes ([address]     borderline    DVT (deep venous thrombosis) ([address] 2010    right    History of ablation March 2016     History of atrial fibrillation     Hypercholesterolemia     Hypertension     Long term current use of anticoagulant therapy     Osteoporosis     Seizure ([address]     Seizures ([address]     on medication from 2006 to 2013 no seizure since 2006    [employer] pacemaker implant Set. 2013 Dr. Jacinto Doran, [place]     Thyroid disease        Social History     Social History    Marital status:      Spouse name: N/A    Number of children: N/A    Years of education: N/A     Occupational History    Not on file. Social History Main Topics    Smoking status: Never Smoker    Smokeless tobacco: Never Used    Alcohol use No    Drug use: No    Sexual activity: Not Currently     Partners: Male     Birth control/ protection: Surgical      Comment: hysterectomy     Other Topics Concern    Not on file     Social History Narrative       Family History   Problem Relation Age of Onset    Diabetes Father     Hypertension Sister          OBJECTIVE    Physical Exam:     Visit Vitals    /78 (BP 1 Location: Left arm, BP Patient Position: Sitting)    Pulse 62    Temp 99 °F (37.2 °C) (Oral)    Resp 16    Ht 5' 6\" (1.676 m)    Wt 186 lb (84.4 kg)    SpO2 98%    BMI 30.02 kg/m2       General: alert, mildly ill-appearing,  in no apparent distress or pain  Head: atraumatic.  Non-tender maxillary and frontal sinuses  Eyes: Lids with no discharge, no matting, conjunctivae clear and non injected, full EOMs, PERLLA  Ears: pinna non-tender, external auditory canal patent, TM intact  Mouth/throat:tonsils non enlarged, pharynx non erythematous and no lesion, nasal mucosa boggy  Neck: supple, no adenopathy palpated  CVS: normal rate, regular rhythm  Lungs:clear to ausculation bilaterally, no crackles, wheezing or rhonchi noted  Psych:  mood and affect normal    Results for orders placed or performed in visit on 02/26/18   AMB POC RAPID INFLUENZA TEST   Result Value Ref Range    VALID INTERNAL CONTROL POC Yes     QuickVue Influenza test Negative Negative         ASSESSMENT/PLAN  Diagnoses and all orders for this visit:    1. Upper respiratory tract infection, unspecified type  Likely viral, symptomatic treatment for now  Given zpack if not improvement by end of the week. -     guaiFENesin-codeine (CHERATUSSIN AC) 100-10 mg/5 mL solution; Take 5 mL by mouth three (3) times daily as needed for Cough. Max Daily Amount: 15 mL. 2. Cough  -     AMB POC RAPID INFLUENZA TEST      Other orders  -     azithromycin (ZITHROMAX) 250 mg tablet; Take two tablets today then one tablet daily      Follow-up Disposition:  Return w/ dr. Lyly Rivera. Patient understands plan of care. Patient has provided input and agrees with goals.

## 2018-02-26 NOTE — PROGRESS NOTES
1. Have you been to the ER, urgent care clinic since your last visit? Hospitalized since your last visit? No    2. Have you seen or consulted any other health care providers outside of the [address] [place] [address] since your last visit? Include any pap smears or colon screening.  No

## 2018-02-26 NOTE — MR AVS SNAPSHOT
[facility] [address] [facility] 
[phone] Patient: Ang George MRN: [id number] NAF:[birth year] Visit Information Date & Time Provider Department Dept. Phone Encounter #  
 2/26/2018  1:00 PM Varghese [place], [address] [place] [address] Road [id number] Follow-up Instructions Return w/ dr. Valery Monroe. Your Appointments 3/19/2018  1:40 PM  
Follow Up with Hue Almeida MD  
[facility] ([facility]) Appt Note: 6 month f/up w EKG; 10/3 LMOM to call office to r/s appt/letter mailed. .s.b; called 2x and mailed r/s letter - plk; 6 month f/up w EKG/r/s'd with the pt/JBJ  
 Mariel Mckeon [id number]  
[phone] [address] [facility] [address] 4/18/2018  9:00 AM  
PROCEDURE with Pacer Hv Csi [facility] ([facility]) Appt Note: 6 month device check Mariel Mckeon [id number] [phone] [address] [facility] [address] Upcoming Health Maintenance Date Due OSTEOPOROSIS SCREENING (DEXA) 8/21/2009 HEMOGLOBIN A1C Q6M 1/28/2018 LIPID PANEL Q1 4/8/2018 MICROALBUMIN Q1 4/24/2018 MEDICARE YEARLY EXAM 4/25/2018 FOOT EXAM Q1 7/24/2018 Pneumococcal 65+ Low/Medium Risk (2 of 2 - PPSV23) 7/31/2018 EYE EXAM RETINAL OR DILATED Q1 9/6/2018 BREAST CANCER SCRN MAMMOGRAM 6/27/2019 GLAUCOMA SCREENING Q2Y 9/6/2019 COLONOSCOPY 10/25/2022 DTaP/Tdap/Td series (2 - Td) 7/24/2027 Allergies as of 2/26/2018  Review Complete On: 2/26/2018 By: Flores Velazquez LPN No Known Allergies Current Immunizations  Never Reviewed Name Date Influenza High Dose Vaccine PF 11/1/2017 Influenza Vaccine 10/1/2016 Not reviewed this visit You Were Diagnosed With   
  
 Codes Comments Upper respiratory tract infection, unspecified type    -  Primary ICD-10-CM: J06.9 ICD-9-CM: 465.9 Cough     ICD-10-CM: R05 ICD-9-CM: 603. 2 Type 2 diabetes mellitus with hyperglycemia, without long-term current use of insulin (HCC)     ICD-10-CM: E11.65 ICD-9-CM: 250.00, 790.29 Paroxysmal atrial fibrillation (HCC)     ICD-10-CM: I48.0 ICD-9-CM: 427.31 Vitals BP Pulse Temp Resp Height(growth percentile) Weight(growth percentile) 124/78 (BP 1 Location: Left arm, BP Patient Position: Sitting) 62 99 °F (37.2 °C) (Oral) 16 5' 6\" (1.676 m) 186 lb (84.4 kg) SpO2 BMI OB Status Smoking Status 98% 30.02 kg/m2 Hysterectomy Never Smoker Vitals History BMI and BSA Data Body Mass Index Body Surface Area 30.02 kg/m 2 1.98 m 2 Preferred Pharmacy Pharmacy Name Phone [facility][id number] Sarah Pearson, [address] [place] [address],[address] [place] [phone] Your Updated Medication List  
  
   
This list is accurate as of 2/26/18  1:41 PM.  Always use your most recent med list.  
  
  
  
  
 aspirin 81 mg chewable tablet Take 81 mg by mouth daily. azithromycin 250 mg tablet Commonly known as:  Tyra Ramon Take two tablets today then one tablet daily COQ10 (LIPOSOMAL UBIQUINOL) PO Take 200 mg by mouth daily. ELIQUIS 5 mg tablet Generic drug:  apixaban TAKE 1 TABLET BY MOUTH TWICE A DAY  
  
 FISH OIL PO Take 100 mg by mouth daily. glucose blood VI test strips strip Commonly known as:  blood glucose test  
by Does Not Apply route See Admin Instructions. Check once a day  
  
 guaiFENesin-codeine 100-10 mg/5 mL solution Commonly known as:  Glennette Curling Take 5 mL by mouth three (3) times daily as needed for Cough. Max Daily Amount: 15 mL.  
  
 hydroCHLOROthiazide 25 mg tablet Commonly known as:  HYDRODIURIL  
TAKE 1 TABLET BY MOUTH ONCE DAILY. metoprolol succinate 50 mg XL tablet Commonly known as:  TOPROL-XL Take 1 Tab by mouth daily. Per patient she does not take if her BP is low  
  
 olmesartan 40 mg tablet Commonly known as:  BENICAR  
TAKE 1 TABLET BY MOUTH EVERY DAY Prescriptions Printed Refills  
 guaiFENesin-codeine (CHERATUSSIN AC) 100-10 mg/5 mL solution 0 Sig: Take 5 mL by mouth three (3) times daily as needed for Cough. Max Daily Amount: 15 mL. Class: Print Route: Oral  
 azithromycin (ZITHROMAX) 250 mg tablet 0 Sig: Take two tablets today then one tablet daily Class: Print We Performed the Following AMB POC RAPID INFLUENZA TEST [14464 CPT(R)] Follow-up Instructions Return w/ dr. Yoshi Pascual. To-Do List   
 03/19/2018 11:30 AM  
  Appointment with [facility] BONE DEXA RM 1 at [facility] RAD BONE DENSITY ([phone]) OUTSIDE FILMS  - Any outside films related to the study being scheduled should be brought with you on the day of the exam.  If this cannot be done there may be a delay in the reading of the study. MEDICATIONS  - Patient must bring a complete list of all medications currently taking to include prescriptions, over-the-counter meds, herbals, vitamins & any dietary supplements - Patient must discontinue use of calcium, vitamins, or calcium supplements including antacids (calcium based) 24 hours before scan. GENERAL INSTRUCTIONS  - [facility] cannot accommodate patients on stretchers, patient must be able to walk into the room and be able to sit up for a portion of the exam.  
  
  
 Please provide this summary of care documentation to your next provider. Your primary care clinician is listed as Yanci Moore. If you have any questions after today's visit, please call [phone].

## 2018-03-10 ENCOUNTER — HOSPITAL ENCOUNTER (OUTPATIENT)
Dept: LAB | Age: 73
Discharge: HOME | End: 2018-03-10
Payer: MEDICARE

## 2018-03-10 LAB
ALBUMIN SERPL-MCNC: 3.7 G/DL (ref 3.4–5)
ALBUMIN/GLOB SERPL: 0.9 {RATIO} (ref 0.8–1.7)
ALP SERPL-CCNC: 73 U/L (ref 45–117)
ALT SERPL-CCNC: 21 U/L (ref 13–56)
ANION GAP SERPL CALC-SCNC: 8 MMOL/L (ref 3–18)
AST SERPL-CCNC: 20 U/L (ref 15–37)
BILIRUB SERPL-MCNC: 0.4 MG/DL (ref 0.2–1)
BUN SERPL-MCNC: 30 MG/DL (ref 7–18)
BUN/CREAT SERPL: 24 (ref 12–20)
CALCIUM SERPL-MCNC: 10.7 MG/DL (ref 8.5–10.1)
CHLORIDE SERPL-SCNC: 101 MMOL/L (ref 100–108)
CHOLEST SERPL-MCNC: 173 MG/DL
CO2 SERPL-SCNC: 29 MMOL/L (ref 21–32)
CREAT SERPL-MCNC: 1.25 MG/DL (ref 0.6–1.3)
CREAT UR-MCNC: 101 MG/DL (ref 30–125)
ERYTHROCYTE [DISTWIDTH] IN BLOOD BY AUTOMATED COUNT: 13.5 % (ref 11.6–14.5)
EST. AVERAGE GLUCOSE BLD GHB EST-MCNC: 151 MG/DL
GLOBULIN SER CALC-MCNC: 4.2 G/DL (ref 2–4)
GLUCOSE SERPL-MCNC: 107 MG/DL (ref 74–99)
HBA1C MFR BLD: 6.9 % (ref 4.2–5.6)
HCT VFR BLD AUTO: 37.6 % (ref 35–45)
HDLC SERPL-MCNC: 37 MG/DL (ref 40–60)
HDLC SERPL: 4.7 {RATIO} (ref 0–5)
HGB BLD-MCNC: 12 G/DL (ref 12–16)
LDLC SERPL CALC-MCNC: 93.4 MG/DL (ref 0–100)
LIPID PROFILE,FLP: ABNORMAL
MCH RBC QN AUTO: 26.8 PG (ref 24–34)
MCHC RBC AUTO-ENTMCNC: 31.9 G/DL (ref 31–37)
MCV RBC AUTO: 84.1 FL (ref 74–97)
MICROALBUMIN UR-MCNC: 0.84 MG/DL (ref 0–3)
MICROALBUMIN/CREAT UR-RTO: 8 MG/G (ref 0–30)
PLATELET # BLD AUTO: 269 K/UL (ref 135–420)
PMV BLD AUTO: 11.3 FL (ref 9.2–11.8)
POTASSIUM SERPL-SCNC: 4.3 MMOL/L (ref 3.5–5.5)
PROT SERPL-MCNC: 7.9 G/DL (ref 6.4–8.2)
RBC # BLD AUTO: 4.47 M/UL (ref 4.2–5.3)
SODIUM SERPL-SCNC: 138 MMOL/L (ref 136–145)
TRIGL SERPL-MCNC: 213 MG/DL (ref ?–150)
TSH SERPL DL<=0.05 MIU/L-ACNC: 1.31 UIU/ML (ref 0.36–3.74)
VLDLC SERPL CALC-MCNC: 42.6 MG/DL
WBC # BLD AUTO: 8 K/UL (ref 4.6–13.2)

## 2018-03-10 PROCEDURE — 36415 COLL VENOUS BLD VENIPUNCTURE: CPT | Performed by: FAMILY MEDICINE

## 2018-03-10 PROCEDURE — 83036 HEMOGLOBIN GLYCOSYLATED A1C: CPT | Performed by: FAMILY MEDICINE

## 2018-03-10 PROCEDURE — 85027 COMPLETE CBC AUTOMATED: CPT | Performed by: FAMILY MEDICINE

## 2018-03-10 PROCEDURE — 80053 COMPREHEN METABOLIC PANEL: CPT | Performed by: FAMILY MEDICINE

## 2018-03-10 PROCEDURE — 80061 LIPID PANEL: CPT | Performed by: FAMILY MEDICINE

## 2018-03-10 PROCEDURE — 82043 UR ALBUMIN QUANTITATIVE: CPT | Performed by: FAMILY MEDICINE

## 2018-03-10 PROCEDURE — 84443 ASSAY THYROID STIM HORMONE: CPT | Performed by: FAMILY MEDICINE

## 2018-03-12 DIAGNOSIS — E83.52 SERUM CALCIUM ELEVATED: Primary | ICD-10-CM

## 2018-03-12 NOTE — PROGRESS NOTES
Contacted patient and verified identity using name and date of birth (2- identifiers)  Spoke with patient and advised of DM increase and need for medication. Patient requested to be seen in the office to discuss starting medication.  Scheduled appt for Wednesday, March 21, 2018 11:30 AM

## 2018-03-12 NOTE — PROGRESS NOTES
Let pt know that diabetes test went up. Need to start metformin. Please ask pt can make an appt to discuss it further or can order medication and f/u in a month. Also increase triglyceride compare to prior labs. Send diet info. Elevated calcium. Please see if we can add intact PTH on drawn blood or can ask pt to redraw. Thanks.

## 2018-03-19 ENCOUNTER — CLINICAL SUPPORT (OUTPATIENT)
Dept: CARDIOLOGY CLINIC | Age: 73
End: 2018-03-19

## 2018-03-19 ENCOUNTER — OFFICE VISIT (OUTPATIENT)
Dept: CARDIOLOGY CLINIC | Age: 73
End: 2018-03-19

## 2018-03-19 ENCOUNTER — HOSPITAL ENCOUNTER (OUTPATIENT)
Dept: BONE DENSITY | Age: 73
Discharge: HOME | End: 2018-03-19
Attending: FAMILY MEDICINE
Payer: MEDICARE

## 2018-03-19 ENCOUNTER — HOSPITAL ENCOUNTER (OUTPATIENT)
Dept: LAB | Age: 73
Discharge: HOME | End: 2018-03-19
Payer: MEDICARE

## 2018-03-19 VITALS
WEIGHT: 190 LBS | OXYGEN SATURATION: 98 % | BODY MASS INDEX: 30.53 KG/M2 | DIASTOLIC BLOOD PRESSURE: 82 MMHG | HEIGHT: 66 IN | HEART RATE: 60 BPM | SYSTOLIC BLOOD PRESSURE: 120 MMHG

## 2018-03-19 DIAGNOSIS — I10 ESSENTIAL HYPERTENSION: ICD-10-CM

## 2018-03-19 DIAGNOSIS — E83.52 SERUM CALCIUM ELEVATED: ICD-10-CM

## 2018-03-19 DIAGNOSIS — Z78.0 POST-MENOPAUSAL: ICD-10-CM

## 2018-03-19 DIAGNOSIS — R00.1 BRADYCARDIA: Primary | ICD-10-CM

## 2018-03-19 DIAGNOSIS — R00.1 BRADYCARDIA: ICD-10-CM

## 2018-03-19 DIAGNOSIS — I48.0 PAROXYSMAL ATRIAL FIBRILLATION (HCC): Primary | ICD-10-CM

## 2018-03-19 DIAGNOSIS — E78.00 PURE HYPERCHOLESTEROLEMIA: ICD-10-CM

## 2018-03-19 DIAGNOSIS — Z95.0 CARDIAC PACEMAKER IN SITU: ICD-10-CM

## 2018-03-19 DIAGNOSIS — R79.89 ELEVATED PTHRP LEVEL: Primary | ICD-10-CM

## 2018-03-19 LAB
CALCIUM SERPL-MCNC: 10.4 MG/DL (ref 8.5–10.1)
PTH-INTACT SERPL-MCNC: 132 PG/ML (ref 18.4–88)

## 2018-03-19 PROCEDURE — 36415 COLL VENOUS BLD VENIPUNCTURE: CPT | Performed by: FAMILY MEDICINE

## 2018-03-19 PROCEDURE — 83970 ASSAY OF PARATHORMONE: CPT | Performed by: FAMILY MEDICINE

## 2018-03-19 PROCEDURE — 77080 DXA BONE DENSITY AXIAL: CPT

## 2018-03-19 NOTE — MR AVS SNAPSHOT
[address] [address] [address] [address] [phone] Patient: Kristin Russell MRN: [id number] SHX:[birth year] Visit Information Date & Time Provider Department Dept. Phone Encounter #  
 3/19/2018  1:40 PM Vy Hensley MD [facility] [address] [id number] Your Appointments 3/21/2018 11:30 AM  
FOLLOW UP EXAM with Feng Estes MD  
[facility] ([facility]) Appt Note: Follow-up abnormal labs [address] [facility]  
[address] [facility] [address] [facility] Upcoming Health Maintenance Date Due Bone Densitometry (Dexa) Screening 8/21/2009 MEDICARE YEARLY EXAM 4/25/2018 FOOT EXAM Q1 7/24/2018 Pneumococcal 65+ Low/Medium Risk (2 of 2 - PPSV23) 7/31/2018 EYE EXAM RETINAL OR DILATED Q1 9/6/2018 HEMOGLOBIN A1C Q6M 9/10/2018 MICROALBUMIN Q1 3/10/2019 LIPID PANEL Q1 3/10/2019 BREAST CANCER SCRN MAMMOGRAM 6/27/2019 GLAUCOMA SCREENING Q2Y 9/6/2019 COLONOSCOPY 10/25/2022 DTaP/Tdap/Td series (2 - Td) 7/24/2027 Allergies as of 3/19/2018  Review Complete On: 3/19/2018 By: Vy Hensley MD  
 Not on File Current Immunizations  Never Reviewed Name Date Influenza High Dose Vaccine PF 11/1/2017 Influenza Vaccine 10/1/2016 Not reviewed this visit You Were Diagnosed With   
  
 Codes Comments Paroxysmal atrial fibrillation (HCC)    -  Primary ICD-10-CM: I48.0 ICD-9-CM: 427.31 Bradycardia     ICD-10-CM: R00.1 ICD-9-CM: 427.89 Essential hypertension     ICD-10-CM: I10 
ICD-9-CM: 401.9 Pure hypercholesterolemia     ICD-10-CM: E78.00 ICD-9-CM: 272.0 Vitals BP Pulse Height(growth percentile) Weight(growth percentile) SpO2 BMI  
 120/82 60 5' 6\" (1.676 m) 190 lb (86.2 kg) 98% 30.67 kg/m2 OB Status Smoking Status Hysterectomy Never Smoker Vitals History BMI and BSA Data Body Mass Index Body Surface Area  
 30.67 kg/m 2 2 m 2 Preferred Pharmacy Pharmacy Name Phone [id number] Fidel Garcia, [address] [facility] [facility] [phone] Your Updated Medication List  
  
   
This list is accurate as of 3/19/18  2:29 PM.  Always use your most recent med list.  
  
  
  
  
 aspirin 81 mg chewable tablet Take 81 mg by mouth daily. COQ10 (LIPOSOMAL UBIQUINOL) PO Take 200 mg by mouth daily. ELIQUIS 5 mg tablet Generic drug:  apixaban TAKE 1 TABLET BY MOUTH TWICE A DAY  
  
 FISH OIL PO Take 100 mg by mouth daily. glucose blood VI test strips strip Commonly known as:  blood glucose test  
by Does Not Apply route See Admin Instructions. Check once a day  
  
 guaiFENesin-codeine 100-10 mg/5 mL solution Commonly known as:  Durel Sabot Take 5 mL by mouth three (3) times daily as needed for Cough. Max Daily Amount: 15 mL.  
  
 hydroCHLOROthiazide 25 mg tablet Commonly known as:  HYDRODIURIL  
TAKE 1 TABLET BY MOUTH ONCE DAILY. metoprolol succinate 50 mg XL tablet Commonly known as:  TOPROL-XL Take 1 Tab by mouth daily. Per patient she does not take if her BP is low  
  
 olmesartan 40 mg tablet Commonly known as:  BENICAR  
TAKE 1 TABLET BY MOUTH EVERY DAY We Performed the Following AMB POC EKG ROUTINE W/ 12 LEADS, INTER & REP [10491 CPT(R)] Please provide this summary of care documentation to your next provider. Your primary care clinician is listed as Tyrese Escobar. If you have any questions after today's visit, please call [phone].

## 2018-03-19 NOTE — PROGRESS NOTES
1. Have you been to the ER, urgent care clinic since your last visit? Hospitalized since your last visit? No     2. Have you seen or consulted any other health care providers outside of the [address] [place] [address] since your last visit? Include any pap smears or colon screening.  No

## 2018-03-19 NOTE — PROGRESS NOTES
HISTORY OF PRESENT ILLNESS  Alber Allison is a 67 y.o. female. HPI  She has been feeling well. She has had no recurrent palpitations. She denies chest pain, dyspnea, orthopnea or PND. She has had no dizziness or syncope. She has had no symptoms to indicate TIA or amaurosis fugax. She is concerned that her insurance company decided not to cover Eliquis. The interrogation of her pacemaker demonstrated no recurrent atrial fibrillation. She has known history of cardiac problems. 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 She had atrial fibrillation and finally underwent the atrial fibrillation ablation successfully in March of 2016. She was treated with flecainide briefly. As the flecainide was discontinued, she has been anticoagulated with Eliquis. She has previous history of DVT after knee surgery. She has history of hypercholesterolemia, but has not been able to tolerate statins because of severe myalgia and other side effects. She is known to have had type 2 diabetes. She has history of seizure disorder; however, she has not had any recurrent seizures since 2006 off medication. Review of Systems   Constitutional: Negative for malaise/fatigue and weight loss. HENT: Negative for hearing loss. Eyes: Negative for blurred vision and double vision. Respiratory: Negative for shortness of breath. Cardiovascular: Positive for palpitations. Negative for chest pain, orthopnea, claudication, leg swelling and PND. Gastrointestinal: Negative for blood in stool, heartburn and melena. Genitourinary: Negative for dysuria, frequency, hematuria and urgency. Musculoskeletal: Negative for back pain and joint pain. Skin: Negative for itching and rash. Neurological: Negative for dizziness, loss of consciousness and weakness. Psychiatric/Behavioral: Negative for depression and memory loss.        Physical Exam   Constitutional: She is oriented to person, place, and time. She appears well-developed and well-nourished. HENT:   Head: Normocephalic and atraumatic. Eyes: Conjunctivae are normal. Pupils are equal, round, and reactive to light. Neck: Normal range of motion. Neck supple. No JVD present. Cardiovascular: Normal rate, regular rhythm, S1 normal and S2 normal.   No extrasystoles are present. PMI is not displaced. Exam reveals no gallop and no friction rub. No murmur heard. Pulses:       Carotid pulses are 3+ on the right side, and 3+ on the left side. Pulmonary/Chest: Effort normal. She has no rales. Abdominal: Soft. There is no tenderness. Musculoskeletal: She exhibits no edema. Neurological: She is alert and oriented to person, place, and time. No cranial nerve deficit. Skin: Skin is warm and dry. Psychiatric: She has a normal mood and affect. Her behavior is normal.     Visit Vitals    /82    Pulse 60    Ht 5' 6\" (1.676 m)    Wt 86.2 kg (190 lb)    SpO2 98%    BMI 30.67 kg/m2       Past Medical History:   Diagnosis Date    Bradycardia     Diabetes (HCC)     borderline    DVT (deep venous thrombosis) ([address] 2010    right    History of ablation March 2016     History of atrial fibrillation     Hypercholesterolemia     Hypertension     Long term current use of anticoagulant therapy     Osteoporosis     Seizure ([address]     Seizures ([address]     on medication from 2006 to 2013 no seizure since 2006    [employer] pacemaker implant Set. 2013 Dr. Debbi Fields, [place]     Thyroid disease        Social History     Social History    Marital status:      Spouse name: N/A    Number of children: N/A    Years of education: N/A     Occupational History    Not on file.      Social History Main Topics    Smoking status: Never Smoker    Smokeless tobacco: Never Used    Alcohol use No    Drug use: No    Sexual activity: Not Currently     Partners: Male     Birth control/ protection: Surgical      Comment: hysterectomy Other Topics Concern    Not on file     Social History Narrative       Family History   Problem Relation Age of Onset    Diabetes Father     Hypertension Sister        Past Surgical History:   Procedure Laterality Date    CARDIAC SURG PROCEDURE UNLIST      ablation for a-fib    COLONOSCOPY N/A 10/25/2017    COLONOSCOPY performed by Lily Pacheco [facility] at [facility] END[facility]COPY    HX CATARACT REMOVAL      HX HYSTERECTOMY      HX KNEE REPLACEMENT      left knee in 1/2010 and right knee 7/2016    HX KNEE REPLACEMENT      right knee    HX PACEMAKER         Current Outpatient Prescriptions   Medication Sig Dispense Refill    ELIQUIS 5 mg tablet TAKE 1 TABLET BY MOUTH TWICE A  Tab 0    olmesartan (BENICAR) 40 mg tablet TAKE 1 TABLET BY MOUTH EVERY DAY 90 Tab 0    glucose blood VI test strips (BLOOD GLUCOSE TEST) strip by Does Not Apply route See Admin Instructions. Check once a day 100 Strip 6    hydroCHLOROthiazide (HYDRODIURIL) 25 mg tablet TAKE 1 TABLET BY MOUTH ONCE DAILY. 90 Tab 1    metoprolol succinate (TOPROL-XL) 50 mg XL tablet Take 1 Tab by mouth daily. Per patient she does not take if her BP is low 90 Tab 3    aspirin 81 mg chewable tablet Take 81 mg by mouth daily.  DOCOSAHEXANOIC ACID/EPA (FISH OIL PO) Take 100 mg by mouth daily.  COQ10, LIPOSOMAL UBIQUINOL, PO Take 200 mg by mouth daily.  guaiFENesin-codeine (CHERATUSSIN AC) 100-10 mg/5 mL solution Take 5 mL by mouth three (3) times daily as needed for Cough. Max Daily Amount: 15 mL. 115 mL 0       EKG: unchanged from previous tracings, atrially paced rhythm  . ASSESSMENT and PLAN  Encounter Diagnoses   Name Primary?  Paroxysmal atrial fibrillation (HCC),s/p ablation Mar.2016 Yes    Bradycardia/ post pacemaker. s/p ablation     Essential hypertension     Pure hypercholesterolemia    She has been doing well. She has had no recurrent atrial fibrillation since the catheter based ablation for atrial fibrillation.   She has difficulties with insurance coverage for the Eliquis. It seems reasonable to discontinue Eliquis at this time however the patient is very reluctant to stop the Eliquis because of fear of recurrent atrial fibrillation. After a long discussion, we agreed upon discontinuing the Eliquis altogether with more frequent follow-up of her pacemaker to monitor for recurrent atrial fibrillation.

## 2018-03-19 NOTE — PROGRESS NOTES
Let pt know that PTH is elevated . for now will consider sending her to endocrinologist to r/o hyperparathyroidism. Thanks.

## 2018-03-21 ENCOUNTER — OFFICE VISIT (OUTPATIENT)
Dept: FAMILY MEDICINE CLINIC | Age: 73
End: 2018-03-21

## 2018-03-21 VITALS
BODY MASS INDEX: 30.37 KG/M2 | RESPIRATION RATE: 16 BRPM | OXYGEN SATURATION: 94 % | DIASTOLIC BLOOD PRESSURE: 72 MMHG | TEMPERATURE: 98.5 F | HEIGHT: 66 IN | SYSTOLIC BLOOD PRESSURE: 140 MMHG | WEIGHT: 189 LBS | HEART RATE: 60 BPM

## 2018-03-21 DIAGNOSIS — E03.9 HYPOTHYROIDISM, UNSPECIFIED TYPE: ICD-10-CM

## 2018-03-21 DIAGNOSIS — I48.91 ATRIAL FIBRILLATION, UNSPECIFIED TYPE (HCC): ICD-10-CM

## 2018-03-21 DIAGNOSIS — R79.89 ELEVATED PTHRP LEVEL: ICD-10-CM

## 2018-03-21 DIAGNOSIS — E04.1 THYROID NODULE: ICD-10-CM

## 2018-03-21 DIAGNOSIS — E11.9 WELL CONTROLLED DIABETES MELLITUS (HCC): Primary | ICD-10-CM

## 2018-03-21 DIAGNOSIS — I10 ESSENTIAL HYPERTENSION: ICD-10-CM

## 2018-03-21 DIAGNOSIS — E78.1 HYPERTRIGLYCERIDEMIA: ICD-10-CM

## 2018-03-21 RX ORDER — PRAVASTATIN SODIUM 10 MG/1
10 TABLET ORAL
Qty: 30 TAB | Refills: 1 | Status: SHIPPED | OUTPATIENT
Start: 2018-03-21 | End: 2018-04-18 | Stop reason: SDUPTHER

## 2018-03-21 RX ORDER — METFORMIN HYDROCHLORIDE 500 MG/1
500 TABLET ORAL 2 TIMES DAILY WITH MEALS
Qty: 60 TAB | Refills: 2 | Status: SHIPPED | OUTPATIENT
Start: 2018-03-21 | End: 2018-06-16 | Stop reason: SDUPTHER

## 2018-03-21 NOTE — PROGRESS NOTES
I have personally seen and evaluated the device findings. Interrogation reviewed and I agree with assessment.     Fausto Mosqueda

## 2018-03-21 NOTE — PROGRESS NOTES
1. Have you been to the ER, urgent care clinic since your last visit? Hospitalized since your last visit? No    2. Have you seen or consulted any other health care providers outside of the [address] [place] [address] since your last visit? Include any pap smears or colon screening. No    Patient has not had endocrinology appointment; was out of town and was unable to attend 5/2017 appointment with Dr. Pranay Conrad. Endocrinology appointment was rescheduled during today's office visit. Patient made aware she has an upcoming endocrinology appointment with Dr. Cam on Tuesday, 4/24/18 at 11:15 a.m and to arrive 30 minutes prior for check-in. Patient verbalized understanding. Specialist office will mail new patient packet regarding appointment, as well.

## 2018-03-21 NOTE — PROGRESS NOTES
HISTORY OF PRESENT ILLNESS  Yasmani Guaman is a 67 y.o. female. HPI: Here to discuss lab results and few concerns. Discussed labs. Elevated HBA1C up to 6.9. Pt has elevated fasting blood sugar at home. Currently not on any medication and on diet modification. Discussed to restart medication. Also discussed cr 1.25 and GFR 55. Discuss to start metformin . discussed medication side effects. She agreed to start it. Also noted elevated total calcium and PTH. She agree to see endocrinologist. Also h/o hypothyroidism and thyroid nodules. She used to follow endocrinologist when she was at [facility]. Had ultrasound almost a year ago. No trouble swallowing. No change in voice. No weight or appetite changes. No mood changes. Recent TSH was wnl.   H/o a.fib. Post ablation and now off eliquis and on aspirin. Review cardiology notes and their recommendations. Had DVT post knee replacement. Denies any headache, dizziness, no chest pain or trouble breathing, no arm or leg weakness. No nausea or vomiting, no weight or appetite changes, no depression or anxiety. No urine or bowel complains, no palpitation, no diaphoresis. H/o hypertension. Stable on current medication. Per her at home systolic blood pressure is around 160. Advised her to bring her machine for few visits to compare with manual blood pressure. She agrees. Today vitals stable. She is asymptomatic as well. Visit Vitals    /72 (BP 1 Location: Left arm, BP Patient Position: Sitting)    Pulse 60    Temp 98.5 °F (36.9 °C) (Oral)    Resp 16    Ht 5' 6\" (1.676 m)    Wt 189 lb (85.7 kg)    SpO2 94%    BMI 30.51 kg/m2     Review medication list, vitals, problem list,allergies.    Lab Results   Component Value Date/Time    Calcium 10.4 (H) 03/19/2018 10:27 AM    PTH, Intact 132.0 (H) 03/19/2018 10:27 AM     Lab Results   Component Value Date/Time    WBC 8.0 03/10/2018 08:00 AM    HGB 12.0 03/10/2018 08:00 AM    HCT 37.6 03/10/2018 08:00 AM    PLATELET 651 03/10/2018 08:00 AM    MCV 84.1 03/10/2018 08:00 AM     Lab Results   Component Value Date/Time    Sodium 138 03/10/2018 08:00 AM    Potassium 4.3 03/10/2018 08:00 AM    Chloride 101 03/10/2018 08:00 AM    CO2 29 03/10/2018 08:00 AM    Anion gap 8 03/10/2018 08:00 AM    Glucose 107 (H) 03/10/2018 08:00 AM    BUN 30 (H) 03/10/2018 08:00 AM    Creatinine 1.25 03/10/2018 08:00 AM    BUN/Creatinine ratio 24 (H) 03/10/2018 08:00 AM    GFR est AA 51 (L) 03/10/2018 08:00 AM    GFR est non-AA 42 (L) 03/10/2018 08:00 AM    Calcium 10.4 (H) 03/19/2018 10:27 AM    Bilirubin, total 0.4 03/10/2018 08:00 AM    AST (SGOT) 20 03/10/2018 08:00 AM    Alk. phosphatase 73 03/10/2018 08:00 AM    Protein, total 7.9 03/10/2018 08:00 AM    Albumin 3.7 03/10/2018 08:00 AM    Globulin 4.2 (H) 03/10/2018 08:00 AM    A-G Ratio 0.9 03/10/2018 08:00 AM    ALT (SGPT) 21 03/10/2018 08:00 AM     Lab Results   Component Value Date/Time    Cholesterol, total 173 03/10/2018 08:00 AM    HDL Cholesterol 37 (L) 03/10/2018 08:00 AM    LDL, calculated 93.4 03/10/2018 08:00 AM    VLDL, calculated 42.6 03/10/2018 08:00 AM    Triglyceride 213 (H) 03/10/2018 08:00 AM    CHOL/HDL Ratio 4.7 03/10/2018 08:00 AM     Lab Results   Component Value Date/Time    TSH 1.31 03/10/2018 08:00 AM     Lab Results   Component Value Date/Time    Microalbumin/Creat ratio (mg/g creat) 8 03/10/2018 08:00 AM    Microalbumin,urine random 0.84 03/10/2018 08:00 AM    Microalbumin urine (POC) 10 04/24/2017 09:02 AM     Lab Results   Component Value Date/Time    Hemoglobin A1c 6.9 (H) 03/10/2018 08:00 AM         ROS: see HPI     Physical Exam   Constitutional: She is oriented to person, place, and time. No distress. Neck: Thyromegaly (thyroid nodule ) present. Cardiovascular: Normal rate, regular rhythm and normal heart sounds. Pulmonary/Chest:   CTA   Abdominal: Soft. Bowel sounds are normal. There is no tenderness. Musculoskeletal: She exhibits no edema.    Lymphadenopathy: She has no cervical adenopathy. Neurological: She is oriented to person, place, and time. Psychiatric: Her behavior is normal.       ASSESSMENT and PLAN    ICD-10-CM ICD-9-CM    1. Well controlled diabetes mellitus ([facility]): HBA1C around 6.9. Elevated fasting blood sugar at home. On diet modification but still blood sugar is high. Will consider restarting metformin. Discussed medication side effects. F/u next visit. E11.9 250.00 metFORMIN (GLUCOPHAGE) 500 mg tablet      METABOLIC PANEL, BASIC      pravastatin (PRAVACHOL) 10 mg tablet   2. Elevated PTHrP level ([facility] send her to endo. Pending appt. E21.5 252.9    3. Hypertriglyceridemia: she was not able to tolerate statin in the past. Now will consider starting pravastatin and see. Discussed medication side effects. She is willing to start medication and f/u next visit. E78.1 272.1 pravastatin (PRAVACHOL) 10 mg tablet   4. Essential hypertension: stable at this time. Low salt diet. Exercise as tolerated. Will continue current plan. Advised to bring blood pressure monitor to compare it with manual  Reading. I10 401.9    5. Atrial fibrillation, unspecified type [facility]): see below. I48.91 427.31     post ablation. also prior DVT after knee replacement. per cardiology discussion with pt holding eliquis now and on aspirin. also coverage concern from insurnace   6. Thyroid nodule: will be seeing endo. If needed will consider thyroid ultrasound. E04.1 241.0    7. Hypothyroidism, unspecified type: TSh wnl. Asymptomatic. Continue current plan. E03.9 244.9    Pt understood and agree with the plan   Review    Follow-up Disposition:  Return in about 1 month (around 4/21/2018).

## 2018-03-21 NOTE — MR AVS SNAPSHOT
[address] 
[phone] Patient: Brigid Vivar MRN: [id number] [id number] Visit Information Date & Time Provider Department Dept. Phone Encounter #  
 3/21/2018 11:30 AM Yanci Moore [facility] [phone] [id number] Follow-up Instructions Return in about 1 month (around 4/21/2018). Your Appointments 7/5/2018  9:15 AM  
PROCEDURE with Erlinr Kristina [facility] ([place]) Appt Note: 3 mo  [facility] [place] [address] [address] [address] [phone] [address] [facility] [address] 9/18/2018  9:00 AM  
Follow Up with Sarah Aragon MD  
[facility] ([place]) Appt Note: 6 mo f/u  
 [address] [place] [address] [address] [address] [address] [phone] 2300 [facility] [address] Upcoming Health Maintenance Date Due Bone Densitometry (Dexa) Screening 8/21/2009 MEDICARE YEARLY EXAM 4/25/2018 FOOT EXAM Q1 7/24/2018 Pneumococcal 65+ Low/Medium Risk (2 of 2 - PPSV23) 7/31/2018 EYE EXAM RETINAL OR DILATED Q1 9/6/2018 HEMOGLOBIN A1C Q6M 9/10/2018 MICROALBUMIN Q1 3/10/2019 LIPID PANEL Q1 3/10/2019 BREAST CANCER SCRN MAMMOGRAM 6/27/2019 GLAUCOMA SCREENING Q2Y 9/6/2019 COLONOSCOPY 10/25/2022 DTaP/Tdap/Td series (2 - Td) 7/24/2027 Allergies as of 3/21/2018  Review Complete On: 3/21/2018 By: Yanci Moore MD  
  
 Severity Noted Reaction Type Reactions Statins-hmg-coa Reductase Inhibitors  03/21/2018    Other (comments) Felt sick Current Immunizations  Never Reviewed Name Date Influenza High Dose Vaccine PF 11/1/2017 Influenza Vaccine 10/1/2016 Not reviewed this visit You Were Diagnosed With   
  
 Codes Comments Well controlled diabetes mellitus ([facility]    -  Primary ICD-10-CM: E11.9 ICD-9-CM: 250.00 Elevated PTHrP level (HCC)     ICD-10-CM: E21.5 ICD-9-CM: 252.9 Hypertriglyceridemia     ICD-10-CM: E78.1 ICD-9-CM: 272.1 Essential hypertension     ICD-10-CM: I10 
ICD-9-CM: 401.9 Atrial fibrillation, unspecified type ([facility]     ICD-10-CM: I48.91 
ICD-9-CM: 427.31 post ablation. also prior DVT after knee replacement. per cardiology discussion with pt holding eliquis now and on aspirin. also coverage concern from insurnace Thyroid nodule     ICD-10-CM: E04.1 ICD-9-CM: 241.0 Hypothyroidism, unspecified type     ICD-10-CM: E03.9 ICD-9-CM: 226. 9 Vitals BP Pulse Temp Resp Height(growth percentile) Weight(growth percentile) 140/72 (BP 1 Location: Left arm, BP Patient Position: Sitting) 60 98.5 °F (36.9 °C) (Oral) 16 5' 6\" (1.676 m) 189 lb (85.7 kg) SpO2 BMI OB Status Smoking Status 94% 30.51 kg/m2 Hysterectomy Never Smoker BMI and BSA Data Body Mass Index Body Surface Area 30.51 kg/m 2 2 m 2 Preferred Pharmacy Pharmacy Name Phone [id number] [address] [facility] [phone] Your Updated Medication List  
  
   
This list is accurate as of 3/21/18 12:07 PM.  Always use your most recent med list.  
  
  
  
  
 aspirin 81 mg chewable tablet Take 81 mg by mouth daily. COQ10 (LIPOSOMAL UBIQUINOL) PO Take 200 mg by mouth daily. FISH OIL PO Take 100 mg by mouth daily. glucose blood VI test strips strip Commonly known as:  blood glucose test  
by Does Not Apply route See Admin Instructions. Check once a day  
  
 hydroCHLOROthiazide 25 mg tablet Commonly known as:  HYDRODIURIL  
TAKE 1 TABLET BY MOUTH ONCE DAILY. metFORMIN 500 mg tablet Commonly known as:  GLUCOPHAGE Take 1 Tab by mouth two (2) times daily (with meals). metoprolol succinate 50 mg XL tablet Commonly known as:  TOPROL-XL Take 1 Tab by mouth daily. Per patient she does not take if her BP is low  
  
 olmesartan 40 mg tablet Commonly known as:  BENICAR  
TAKE 1 TABLET BY MOUTH EVERY DAY  
  
 pravastatin 10 mg tablet Commonly known as:  PRAVACHOL Take 1 Tab by mouth nightly. Prescriptions Sent to Pharmacy Refills  
 metFORMIN (GLUCOPHAGE) 500 mg tablet 2 Sig: Take 1 Tab by mouth two (2) times daily (with meals). Class: Normal  
 Pharmacy: [facility]/pharmacy [address] [place] [address],[address] [place] [address] Ph #: [phone] Route: Oral  
 pravastatin (PRAVACHOL) 10 mg tablet 1 Sig: Take 1 Tab by mouth nightly. Class: Normal  
 Pharmacy: [facility]/pharmacy [address] [place] [address],[address] [place] [address] Ph #: [phone] Route: Oral  
  
Follow-up Instructions Return in about 1 month (around 4/21/2018). To-Do List   
 03/21/2018 Lab:  METABOLIC PANEL, BASIC Patient Instructions Learning About Meal Planning for Diabetes Why plan your meals? Meal planning can be a key part of managing diabetes. Planning meals and snacks with the right balance of carbohydrate, protein, and fat can help you keep your blood sugar at the target level you set with your doctor. You don't have to eat special foods. You can eat what your family eats, including sweets once in a while. But you do have to pay attention to how often you eat and how much you eat of certain foods. You may want to work with a dietitian or a certified diabetes educator. He or she can give you tips and meal ideas and can answer your questions about meal planning. This health professional can also help you reach a healthy weight if that is one of your goals. What plan is right for you? Your dietitian or diabetes educator may suggest that you start with the plate format or carbohydrate counting. The plate format The plate format is a simple way to help you manage how you eat.  You plan meals by learning how much space each food should take on a plate. Using the plate format helps you spread carbohydrate throughout the day. It can make it easier to keep your blood sugar level within your target range. It also helps you see if you're eating healthy portion sizes. To use the plate format, you put non-starchy vegetables on half your plate. Add meat or meat substitutes on one-quarter of the plate. Put a grain or starchy vegetable (such as brown rice or a potato) on the final quarter of the plate. You can add a small piece of fruit and some low-fat or fat-free milk or yogurt, depending on your carbohydrate goal for each meal. 
Here are some tips for using the plate format: · Make sure that you are not using an oversized plate. A 9-inch plate is best. Many restaurants use larger plates. · Get used to using the plate format at home. Then you can use it when you eat out. · Write down your questions about using the plate format. Talk to your doctor, a dietitian, or a diabetes educator about your concerns. Carbohydrate counting With carbohydrate counting, you plan meals based on the amount of carbohydrate in each food. Carbohydrate raises blood sugar higher and more quickly than any other nutrient. It is found in desserts, breads and cereals, and fruit. It's also found in starchy vegetables such as potatoes and corn, grains such as rice and pasta, and milk and yogurt. Spreading carbohydrate throughout the day helps keep your blood sugar levels within your target range. Your daily amount depends on several things, including your weight, how active you are, which diabetes medicines you take, and what your goals are for your blood sugar levels. A registered dietitian or diabetes educator can help you plan how much carbohydrate to include in each meal and snack. A guideline for your daily amount of carbohydrate is: · 45 to 60 grams at each meal. That's about the same as 3 to 4 carbohydrate servings. · 15 to 20 grams at each snack. That's about the same as 1 carbohydrate serving. The Nutrition Facts label on packaged foods tells you how much carbohydrate is in a serving of the food. First, look at the serving size on the food label. Is that the amount you eat in a serving? All of the nutrition information on a food label is based on that serving size. So if you eat more or less than that, you'll need to adjust the other numbers. Total carbohydrate is the next thing you need to look for on the label. If you count carbohydrate servings, one serving of carbohydrate is 15 grams. For foods that don't come with labels, such as fresh fruits and vegetables, you'll need a guide that lists carbohydrate in these foods. Ask your doctor, dietitian, or diabetes educator about books or other nutrition guides you can use. If you take insulin, you need to know how many grams of carbohydrate are in a meal. This lets you know how much rapid-acting insulin to take before you eat. If you use an insulin pump, you get a constant rate of insulin during the day. So the pump must be programmed at meals to give you extra insulin to cover the rise in blood sugar after meals. When you know how much carbohydrate you will eat, you can take the right amount of insulin. Or, if you always use the same amount of insulin, you need to make sure that you eat the same amount of carbohydrate at meals. If you need more help to understand carbohydrate counting and food labels, ask your doctor, dietitian, or diabetes educator. How do you get started with meal planning? Here are some tips to get started: 
· Plan your meals a week at a time. Don't forget to include snacks too. · Use cookbooks or online recipes to plan several main meals. Plan some quick meals for busy nights. You also can double some recipes that freeze well. Then you can save half for other busy nights when you don't have time to cook. · Make sure you have the ingredients you need for your recipes. If you're running low on basic items, put these items on your shopping list too. · List foods that you use to make breakfasts, lunches, and snacks. List plenty of fruits and vegetables. · Post this list on the refrigerator. Add to it as you think of more things you need. · Take the list to the store to do your weekly shopping. Follow-up care is a key part of your treatment and safety. Be sure to make and go to all appointments, and call your doctor if you are having problems. It's also a good idea to know your test results and keep a list of the medicines you take. Where can you learn more? Go to http://cecelia-vir[place].info/. Kip Bernstein in the search box to learn more about \"Learning About Meal Planning for Diabetes. \" Current as of: March 13, 2017 Content Version: 11.4 © 1905-2026 "[employer]. Care instructions adapted under license by [employer] (which disclaims liability or warranty for this information). If you have questions about a medical condition or this instruction, always ask your healthcare professional. [address] any warranty or liability for your use of this information. Learning About Low-Fat Eating What is low-fat eating? Most food has some fat in it. Your body needs some fat to be healthy. But some kinds of fats are healthier than others. In a low-fat eating plan, you try to choose healthier fats and eat fewer unhealthy fats. Healthy fats include olive and canola oil. Try to avoid eating too much saturated fat (such as in cheese and meats) and trans fat (a type of fat found in many packaged snack foods and other baked goods). You do not need to cut all fat from your diet. But you can make healthier choices about the types and amount of fat you eat.  
Even though it is a good idea to choose healthier fats, it is still important to be careful of how much fat you eat, because all fats are high in calories. What are the different types of fats? Unhealthy fats · Saturated fat. Saturated fats are mostly in animal foods, such as meat and dairy foods. Tropical oils, such as coconut oil, palm oil, and cocoa butter, are also saturated fats. · Trans fat. Trans fats include shortening, partially hydrogenated vegetable oils, and hydrogenated vegetable oils. Trans fats are made when a liquid fat is turned into a solid fat (for example, when corn oil is made into stick margarine). They are in many processed foods, such as cookies, crackers, and snack foods. Healthy fats · Monounsaturated fat. Monounsaturated fats are liquid at room temperature but get solid when refrigerated. Eating foods that are high in this fat may help lower your \"bad\" (LDL) cholesterol, keep your \"good\" (HDL) cholesterol level up, and lower your chances of getting heart disease. This fat is found in canola oil, olive oil, peanut oil, olives, avocados, nuts, and nut butters. · Polyunsaturated fat. Polyunsaturated fats are liquid at room temperature. They are in safflower, sunflower, and corn oils. They are also the main fat in seafood. Omega-3 fatty acids are types of polyunsaturated fat. Omega-3 fatty acids may lower your chances of getting heart disease. Fatty fish such as salmon and mackerel contain these healthy fatty acids. So do ground flaxseeds and flaxseed oil, soybeans, walnuts, and seeds. Why cut down on unhealthy fats? Eating foods that contain saturated fats can raise the LDL (\"bad\") cholesterol in your blood. Having a high level of LDL cholesterol increases your chance of hardening of the arteries (atherosclerosis), which can lead to heart disease, heart attack, and stroke. Trans fat raises the level of \"bad\" LDL cholesterol in your blood and may lower the \"good\" HDL cholesterol in your blood.  Trans fat can raise your risk of heart disease, heart attack, and stroke. In general: · No more than 10% of your daily calories should come from saturated fat. This is about 20 grams in a 2,000-calorie diet. · No more than 10% of your daily calories should come from polyunsaturated fat. This is about 20 grams in a 2,000-calorie diet. · Monounsaturated fats can be up to 15% of your daily calories. This is about 25 to 30 grams in a 2,000-calorie diet. If you're not sure how much fat you should be eating or how many calories you need each day to stay at a healthy weight, talk to a registered dietitian. He or she can help you create a plan that's right for you. What can you do to cut down on fat? Foods like cheese, butter, sausage, and desserts can have a lot of unhealthy fats. Try these tips for healthier meals at home and when you eat out. At home · Fill up on fruits, vegetables, and whole grains. · Think of meat as a side dish instead of as the main part of your meal. 
· When you do eat meat, make it extra-lean ground beef (97% lean), ground turkey breast (without skin added), meats with fat trimmed off before cooking, or skinless chicken. · Try main dishes that use whole wheat pasta, brown rice, dried beans, or vegetables. · Use cooking methods that use little or no fat, such as broiling, steaming, or grilling. Use cooking spray instead of oil. If you use oil, use a monounsaturated oil, such as canola or olive oil. · Read food labels on canned, bottled, or packaged foods. Choose those with little saturated fat and no trans fat. When eating out at a restaurant · Order foods that are broiled or poached instead of fried or breaded. · Cut back on the amount of butter or margarine that you use on bread. Use small amounts of olive oil instead. · Order sauces, gravies, and salad dressings on the side, and use only a little. · When you order pasta, choose tomato sauce instead of cream sauce. · Ask for salsa with your baked potato instead of sour cream, butter, cheese, or morales. Where can you learn more? Go to http://cecelia-[place].info/. Enter Q003 in the search box to learn more about \"Learning About Low-Fat Eating. \" Current as of: May 12, 2017 Content Version: 11.4 © 2349-7210 [employer]. Care instructions adapted under license by [employer] (which disclaims liability or warranty for this information). If you have questions about a medical condition or this instruction, always ask your healthcare professional. [address] any warranty or liability for your use of this information. Please provide this summary of care documentation to your next provider. Your primary care clinician is listed as Shellie Peabody. If you have any questions after today's visit, please call [phone].

## 2018-03-21 NOTE — PATIENT INSTRUCTIONS
Learning About Meal Planning for Diabetes  Why plan your meals? Meal planning can be a key part of managing diabetes. Planning meals and snacks with the right balance of carbohydrate, protein, and fat can help you keep your blood sugar at the target level you set with your doctor. You don't have to eat special foods. You can eat what your family eats, including sweets once in a while. But you do have to pay attention to how often you eat and how much you eat of certain foods. You may want to work with a dietitian or a certified diabetes educator. He or she can give you tips and meal ideas and can answer your questions about meal planning. This health professional can also help you reach a healthy weight if that is one of your goals. What plan is right for you? Your dietitian or diabetes educator may suggest that you start with the plate format or carbohydrate counting. The plate format  The plate format is a simple way to help you manage how you eat. You plan meals by learning how much space each food should take on a plate. Using the plate format helps you spread carbohydrate throughout the day. It can make it easier to keep your blood sugar level within your target range. It also helps you see if you're eating healthy portion sizes. To use the plate format, you put non-starchy vegetables on half your plate. Add meat or meat substitutes on one-quarter of the plate. Put a grain or starchy vegetable (such as brown rice or a potato) on the final quarter of the plate. You can add a small piece of fruit and some low-fat or fat-free milk or yogurt, depending on your carbohydrate goal for each meal.  Here are some tips for using the plate format:  · Make sure that you are not using an oversized plate. A 9-inch plate is best. Many restaurants use larger plates. · Get used to using the plate format at home. Then you can use it when you eat out. · Write down your questions about using the plate format.  Talk to your doctor, a dietitian, or a diabetes educator about your concerns. Carbohydrate counting  With carbohydrate counting, you plan meals based on the amount of carbohydrate in each food. Carbohydrate raises blood sugar higher and more quickly than any other nutrient. It is found in desserts, breads and cereals, and fruit. It's also found in starchy vegetables such as potatoes and corn, grains such as rice and pasta, and milk and yogurt. Spreading carbohydrate throughout the day helps keep your blood sugar levels within your target range. Your daily amount depends on several things, including your weight, how active you are, which diabetes medicines you take, and what your goals are for your blood sugar levels. A registered dietitian or diabetes educator can help you plan how much carbohydrate to include in each meal and snack. A guideline for your daily amount of carbohydrate is:  · 45 to 60 grams at each meal. That's about the same as 3 to 4 carbohydrate servings. · 15 to 20 grams at each snack. That's about the same as 1 carbohydrate serving. The Nutrition Facts label on packaged foods tells you how much carbohydrate is in a serving of the food. First, look at the serving size on the food label. Is that the amount you eat in a serving? All of the nutrition information on a food label is based on that serving size. So if you eat more or less than that, you'll need to adjust the other numbers. Total carbohydrate is the next thing you need to look for on the label. If you count carbohydrate servings, one serving of carbohydrate is 15 grams. For foods that don't come with labels, such as fresh fruits and vegetables, you'll need a guide that lists carbohydrate in these foods. Ask your doctor, dietitian, or diabetes educator about books or other nutrition guides you can use.   If you take insulin, you need to know how many grams of carbohydrate are in a meal. This lets you know how much rapid-acting insulin to take before you eat. If you use an insulin pump, you get a constant rate of insulin during the day. So the pump must be programmed at meals to give you extra insulin to cover the rise in blood sugar after meals. When you know how much carbohydrate you will eat, you can take the right amount of insulin. Or, if you always use the same amount of insulin, you need to make sure that you eat the same amount of carbohydrate at meals. If you need more help to understand carbohydrate counting and food labels, ask your doctor, dietitian, or diabetes educator. How do you get started with meal planning? Here are some tips to get started:  · Plan your meals a week at a time. Don't forget to include snacks too. · Use cookbooks or online recipes to plan several main meals. Plan some quick meals for busy nights. You also can double some recipes that freeze well. Then you can save half for other busy nights when you don't have time to cook. · Make sure you have the ingredients you need for your recipes. If you're running low on basic items, put these items on your shopping list too. · List foods that you use to make breakfasts, lunches, and snacks. List plenty of fruits and vegetables. · Post this list on the refrigerator. Add to it as you think of more things you need. · Take the list to the store to do your weekly shopping. Follow-up care is a key part of your treatment and safety. Be sure to make and go to all appointments, and call your doctor if you are having problems. It's also a good idea to know your test results and keep a list of the medicines you take. Where can you learn more? Go to http://[facility]celia-[place].info/. Margarita Backers in the search box to learn more about \"Learning About Meal Planning for Diabetes. \"  Current as of: March 13, 2017  Content Version: 11.4  © 8110-4610 [employer].  Care instructions adapted under license by [employer] (which disclaims liability or warranty for this information). If you have questions about a medical condition or this instruction, always ask your healthcare professional. [address] any warranty or liability for your use of this information. Learning About Low-Fat Eating  What is low-fat eating? Most food has some fat in it. Your body needs some fat to be healthy. But some kinds of fats are healthier than others. In a low-fat eating plan, you try to choose healthier fats and eat fewer unhealthy fats. Healthy fats include olive and canola oil. Try to avoid eating too much saturated fat (such as in cheese and meats) and trans fat (a type of fat found in many packaged snack foods and other baked goods). You do not need to cut all fat from your diet. But you can make healthier choices about the types and amount of fat you eat. Even though it is a good idea to choose healthier fats, it is still important to be careful of how much fat you eat, because all fats are high in calories. What are the different types of fats? Unhealthy fats  · Saturated fat. Saturated fats are mostly in animal foods, such as meat and dairy foods. Tropical oils, such as coconut oil, palm oil, and cocoa butter, are also saturated fats. · Trans fat. Trans fats include shortening, partially hydrogenated vegetable oils, and hydrogenated vegetable oils. Trans fats are made when a liquid fat is turned into a solid fat (for example, when corn oil is made into stick margarine). They are in many processed foods, such as cookies, crackers, and snack foods. Healthy fats  · Monounsaturated fat. Monounsaturated fats are liquid at room temperature but get solid when refrigerated. Eating foods that are high in this fat may help lower your \"bad\" (LDL) cholesterol, keep your \"good\" (HDL) cholesterol level up, and lower your chances of getting heart disease.  This fat is found in canola oil, olive oil, peanut oil, olives, avocados, nuts, and nut butters. · Polyunsaturated fat. Polyunsaturated fats are liquid at room temperature. They are in safflower, sunflower, and corn oils. They are also the main fat in seafood. Omega-3 fatty acids are types of polyunsaturated fat. Omega-3 fatty acids may lower your chances of getting heart disease. Fatty fish such as salmon and mackerel contain these healthy fatty acids. So do ground flaxseeds and flaxseed oil, soybeans, walnuts, and seeds. Why cut down on unhealthy fats? Eating foods that contain saturated fats can raise the LDL (\"bad\") cholesterol in your blood. Having a high level of LDL cholesterol increases your chance of hardening of the arteries (atherosclerosis), which can lead to heart disease, heart attack, and stroke. Trans fat raises the level of \"bad\" LDL cholesterol in your blood and may lower the \"good\" HDL cholesterol in your blood. Trans fat can raise your risk of heart disease, heart attack, and stroke. In general:  · No more than 10% of your daily calories should come from saturated fat. This is about 20 grams in a 2,000-calorie diet. · No more than 10% of your daily calories should come from polyunsaturated fat. This is about 20 grams in a 2,000-calorie diet. · Monounsaturated fats can be up to 15% of your daily calories. This is about 25 to 30 grams in a 2,000-calorie diet. If you're not sure how much fat you should be eating or how many calories you need each day to stay at a healthy weight, talk to a registered dietitian. He or she can help you create a plan that's right for you. What can you do to cut down on fat? Foods like cheese, butter, sausage, and desserts can have a lot of unhealthy fats. Try these tips for healthier meals at home and when you eat out. At home  · Fill up on fruits, vegetables, and whole grains.   · Think of meat as a side dish instead of as the main part of your meal.  · When you do eat meat, make it extra-lean ground beef (97% lean), ground turkey breast (without skin added), meats with fat trimmed off before cooking, or skinless chicken. · Try main dishes that use whole wheat pasta, brown rice, dried beans, or vegetables. · Use cooking methods that use little or no fat, such as broiling, steaming, or grilling. Use cooking spray instead of oil. If you use oil, use a monounsaturated oil, such as canola or olive oil. · Read food labels on canned, bottled, or packaged foods. Choose those with little saturated fat and no trans fat. When eating out at a restaurant  · Order foods that are broiled or poached instead of fried or breaded. · Cut back on the amount of butter or margarine that you use on bread. Use small amounts of olive oil instead. · Order sauces, gravies, and salad dressings on the side, and use only a little. · When you order pasta, choose tomato sauce instead of cream sauce. · Ask for salsa with your baked potato instead of sour cream, butter, cheese, or morales. Where can you learn more? Go to http://cecelia-[place].info/. Enter E363 in the search box to learn more about \"Learning About Low-Fat Eating. \"  Current as of: May 12, 2017  Content Version: 11.4  © 3577-1593 [employer]wise, [employer]. Care instructions adapted under license by [employer] (which disclaims liability or warranty for this information). If you have questions about a medical condition or this instruction, always ask your healthcare professional. [place] [address] any warranty or liability for your use of this information.

## 2018-03-22 NOTE — PROGRESS NOTES
Let pt know that bone density test showed osteopenia. For now continue calcium and vitamin D supplement. Further discussion on follow up visit. Thanks.

## 2018-03-22 NOTE — PROGRESS NOTES
Contacted patient and verified identity using name and date of birth (2- identifiers)  Spoke with patient and she verbalized understanding of osteopenia and need to continue with [facility] + D.

## 2018-04-14 ENCOUNTER — HOSPITAL ENCOUNTER (OUTPATIENT)
Dept: LAB | Age: 73
Discharge: HOME | End: 2018-04-14
Payer: MEDICARE

## 2018-04-14 DIAGNOSIS — E11.9 WELL CONTROLLED DIABETES MELLITUS (HCC): ICD-10-CM

## 2018-04-14 LAB
ANION GAP SERPL CALC-SCNC: 5 MMOL/L (ref 3–18)
BUN SERPL-MCNC: 25 MG/DL (ref 7–18)
BUN/CREAT SERPL: 21 (ref 12–20)
CALCIUM SERPL-MCNC: 10.8 MG/DL (ref 8.5–10.1)
CHLORIDE SERPL-SCNC: 105 MMOL/L (ref 100–108)
CO2 SERPL-SCNC: 30 MMOL/L (ref 21–32)
CREAT SERPL-MCNC: 1.21 MG/DL (ref 0.6–1.3)
GLUCOSE SERPL-MCNC: 104 MG/DL (ref 74–99)
POTASSIUM SERPL-SCNC: 4.3 MMOL/L (ref 3.5–5.5)
SODIUM SERPL-SCNC: 140 MMOL/L (ref 136–145)

## 2018-04-14 PROCEDURE — 36415 COLL VENOUS BLD VENIPUNCTURE: CPT | Performed by: FAMILY MEDICINE

## 2018-04-14 PROCEDURE — 80048 BASIC METABOLIC PNL TOTAL CA: CPT | Performed by: FAMILY MEDICINE

## 2018-04-17 NOTE — PROGRESS NOTES
Contacted patient and verified identity using name and date of birth (2- identifiers)  Spoke with patient and she verbalized understanding of need for Endo referral for elevated calcium- scheduled for Dr. Marco Antonio Pinzon on 4/24/18 at 11:15 a. m.

## 2018-04-18 ENCOUNTER — OFFICE VISIT (OUTPATIENT)
Dept: FAMILY MEDICINE CLINIC | Age: 73
End: 2018-04-18

## 2018-04-18 VITALS
OXYGEN SATURATION: 97 % | DIASTOLIC BLOOD PRESSURE: 72 MMHG | HEIGHT: 66 IN | TEMPERATURE: 97.8 F | SYSTOLIC BLOOD PRESSURE: 136 MMHG | WEIGHT: 188 LBS | BODY MASS INDEX: 30.22 KG/M2 | RESPIRATION RATE: 16 BRPM | HEART RATE: 61 BPM

## 2018-04-18 DIAGNOSIS — R79.89 ELEVATED PTHRP LEVEL: ICD-10-CM

## 2018-04-18 DIAGNOSIS — E78.1 HYPERTRIGLYCERIDEMIA: ICD-10-CM

## 2018-04-18 DIAGNOSIS — I10 ESSENTIAL HYPERTENSION: ICD-10-CM

## 2018-04-18 DIAGNOSIS — I48.91 ATRIAL FIBRILLATION, UNSPECIFIED TYPE (HCC): ICD-10-CM

## 2018-04-18 DIAGNOSIS — E11.9 WELL CONTROLLED DIABETES MELLITUS (HCC): ICD-10-CM

## 2018-04-18 DIAGNOSIS — Z00.00 MEDICARE ANNUAL WELLNESS VISIT, SUBSEQUENT: Primary | ICD-10-CM

## 2018-04-18 DIAGNOSIS — R45.89 FEELING SAD: ICD-10-CM

## 2018-04-18 DIAGNOSIS — E03.9 HYPOTHYROIDISM, UNSPECIFIED TYPE: ICD-10-CM

## 2018-04-18 RX ORDER — OLMESARTAN MEDOXOMIL 40 MG/1
40 TABLET ORAL DAILY
Qty: 90 TAB | Refills: 1 | Status: SHIPPED | OUTPATIENT
Start: 2018-04-18 | End: 2018-11-01

## 2018-04-18 RX ORDER — METOPROLOL SUCCINATE 50 MG/1
50 TABLET, EXTENDED RELEASE ORAL DAILY
Qty: 90 TAB | Refills: 3 | Status: CANCELLED | OUTPATIENT
Start: 2018-04-18

## 2018-04-18 RX ORDER — HYDROCHLOROTHIAZIDE 25 MG/1
25 TABLET ORAL DAILY
Qty: 90 TAB | Refills: 1 | Status: SHIPPED | OUTPATIENT
Start: 2018-04-18 | End: 2018-07-11 | Stop reason: ALTCHOICE

## 2018-04-18 RX ORDER — PRAVASTATIN SODIUM 10 MG/1
10 TABLET ORAL
Qty: 90 TAB | Refills: 1 | Status: SHIPPED | OUTPATIENT
Start: 2018-04-18 | End: 2018-11-06 | Stop reason: SDUPTHER

## 2018-04-18 NOTE — MR AVS SNAPSHOT
[address] 
[phone] Patient: Marlen Salter MRN: [id number] ICX:[id number]/06/3766 Visit Information Date & Time Provider Department Dept. Phone Encounter #  
 4/18/2018  9:30 AM Medardo King, 933 [facility] [id number] Follow-up Instructions Return in about 3 months (around 7/18/2018). Your Appointments 7/11/2018  9:30 AM  
PROCEDURE with Pacer Kristina [facility] Appt Note: 3 mo  medronic ck; pacemaker check beginning of July to check afib burden [facility] [place] [address] [address] [address] [phone] 1212 [facility] [address] 9/18/2018  9:00 AM  
Follow Up with Karen Hanley MD  
[facility] ([facility] Appt Note: 6 mo f/u  
 [address] [place] [address] [address] [address] [address] [phone] 1212 [facility] [address] Upcoming Health Maintenance Date Due  
 MEDICARE YEARLY EXAM 4/25/2018 FOOT EXAM Q1 7/24/2018 Pneumococcal 65+ Low/Medium Risk (2 of 2 - PPSV23) 7/31/2018 EYE EXAM RETINAL OR DILATED Q1 9/6/2018 HEMOGLOBIN A1C Q6M 9/10/2018 MICROALBUMIN Q1 3/10/2019 LIPID PANEL Q1 3/10/2019 BREAST CANCER SCRN MAMMOGRAM 6/27/2019 GLAUCOMA SCREENING Q2Y 9/6/2019 COLONOSCOPY 10/25/2022 DTaP/Tdap/Td series (2 - Td) 7/24/2027 Allergies as of 4/18/2018  Review Complete On: 4/18/2018 By: Medardo King MD  
  
 Severity Noted Reaction Type Reactions Statins-hmg-coa Reductase Inhibitors  03/21/2018    Other (comments) Felt sick Current Immunizations  Never Reviewed Name Date Influenza High Dose Vaccine PF 11/1/2017 Influenza Vaccine 10/1/2016 Not reviewed this visit You Were Diagnosed With   
  
 Codes Comments Medicare annual wellness visit, subsequent    -  Primary ICD-10-CM: Z00.00 ICD-9-CM: V70.0 Hypertriglyceridemia     ICD-10-CM: E78.1 ICD-9-CM: 272.1 Well controlled diabetes mellitus ([facility]     ICD-10-CM: E11.9 ICD-9-CM: 250.00 Essential hypertension     ICD-10-CM: I10 
ICD-9-CM: 401.9 Atrial fibrillation, unspecified type ([facility] 75.)     ICD-10-CM: I48.91 
ICD-9-CM: 427.31 Hypothyroidism, unspecified type     ICD-10-CM: E03.9 ICD-9-CM: 244.9 Elevated PTHrP level (HCC)     ICD-10-CM: E21.5 ICD-9-CM: 252.9 Feeling sad     ICD-10-CM: R45.89 ICD-9-CM: 300.4 Vitals BP Pulse Temp Resp Height(growth percentile) Weight(growth percentile) 136/72 (BP 1 Location: Right arm, BP Patient Position: Sitting) 61 97.8 °F (36.6 °C) (Oral) 16 5' 6\" (1.676 m) 188 lb (85.3 kg) SpO2 BMI OB Status Smoking Status 97% 30.34 kg/m2 Hysterectomy Never Smoker BMI and BSA Data Body Mass Index Body Surface Area  
 30.34 kg/m 2 1.99 m 2 Preferred Pharmacy Pharmacy Name Phone [facility] #[id number] [facility], [facility],[address] [facility] [phone] Your Updated Medication List  
  
   
This list is accurate as of 4/18/18 10:09 AM.  Always use your most recent med list.  
  
  
  
  
 aspirin 81 mg chewable tablet Take 81 mg by mouth daily. COQ10 (LIPOSOMAL UBIQUINOL) PO Take 200 mg by mouth daily. FISH OIL PO Take 100 mg by mouth daily. glucose blood VI test strips strip Commonly known as:  blood glucose test  
by Does Not Apply route See Admin Instructions. Check once a day  
  
 hydroCHLOROthiazide 25 mg tablet Commonly known as:  HYDRODIURIL Take 1 Tab by mouth daily. metFORMIN 500 mg tablet Commonly known as:  GLUCOPHAGE Take 1 Tab by mouth two (2) times daily (with meals). metoprolol succinate 50 mg XL tablet Commonly known as:  TOPROL-XL  
 Take 1 Tab by mouth daily. Per patient she does not take if her BP is low  
  
 olmesartan 40 mg tablet Commonly known as:  [employer] Take 1 Tab by mouth daily. pravastatin 10 mg tablet Commonly known as:  PRAVACHOL Take 1 Tab by mouth nightly. Prescriptions Sent to Pharmacy Refills  
 pravastatin (PRAVACHOL) 10 mg tablet 1 Sig: Take 1 Tab by mouth nightly. Class: Normal  
 Pharmacy: [facility]/pharmacy [address] [place] [address], [address] [place] [address],[address] [place] [address] Ph #: [phone] Route: Oral  
 olmesartan (BENICAR) 40 mg tablet 1 Sig: Take 1 Tab by mouth daily. Class: Normal  
 Pharmacy: [facility]/pharmacy [address] [place] [address], [address] [place] [address],[address] [place] [address] Ph #: [phone] Route: Oral  
 hydroCHLOROthiazide (HYDRODIURIL) 25 mg tablet 1 Sig: Take 1 Tab by mouth daily. Class: Normal  
 Pharmacy: [facility]/pharmacy [address] [place] [address], [address] [place] [address],[address] [place] [address] Ph #: [phone] Route: Oral  
  
Follow-up Instructions Return in about 3 months (around 7/18/2018). To-Do List   
 04/18/2018 Lab:  HEMOGLOBIN A1C WITH EAG   
  
 04/18/2018 Lab:  METABOLIC PANEL, COMPREHENSIVE Patient Instructions Medicare Part B Preventive Services Limitations Recommendation Scheduled Bone Mass Measurement 
(age 72 & older, biennial) Requires diagnosis related to osteoporosis or estrogen deficiency. Biennial benefit unless patient has history of long-term glucocorticoid tx or baseline is needed because initial test was by other method 03/19/2018 Cardiovascular Screening Blood Tests (every 5 years) Total cholesterol, HDL, Triglycerides Order as a panel if possible 03/10/2018 Colorectal Cancer Screening 
-Fecal occult blood test (annual) -Flexible sigmoidoscopy (5y) 
-Screening colonoscopy (10y) -Barium Enema  10/25/17 Q5 years Counseling to Prevent Tobacco Use (up to 8 sessions per year) - Counseling greater than 3 and up to 10 minutes - Counseling greater than 10 minutes Patients must be asymptomatic of tobacco-related conditions to receive as preventive service Not applicable Diabetes Screening Tests (at least every 3 years, Medicare covers annually or at 6-month intervals for prediabetic patients) Fasting blood sugar (FBS) or glucose tolerance test (GTT) Patient must be diagnosed with one of the following: 
-Hypertension, Dyslipidemia, obesity, previous impaired FBS or GTT 
Or any two of the following: overweight, FH of diabetes, age ? 72, history of gestational diabetes, birth of baby weighing more than 9 pounds 03/10/2018 Diabetes Self-Management Training (DSMT) (no USPSTF recommendation) Requires referral by treating physician for patient with diabetes or renal disease. 10 hours of initial DSMT session of no less than 30 minutes each in a continuous 12-month period. 2 hours of follow-up DSMT in subsequent years. UTD Glaucoma Screening (no USPSTF recommendation) Diabetes mellitus, family history, , age 48 or over,  American, age 72 or over 09/06/2017 Every 1-2 year Human Immunodeficiency Virus (HIV) Screening (annually for increased risk patients) HIV-1 and HIV-2 by EIA, MARCELA, rapid antibody test, or oral mucosa transudate Patient must be at increased risk for HIV infection per USPSTF guidelines or pregnant. Tests covered annually for patients at increased risk. Pregnant patients may receive up to 3 test during pregnancy. Not applicable Medical Nutrition Therapy (MNT) (for diabetes or renal disease not recommended schedule) Requires referral by treating physician for patient with diabetes or renal disease. Can be provided in same year as diabetes self-management training (DSMT), and CMS recommends medical nutrition therapy take place after DSMT. Up to 3 hours for initial year and 2 hours in subsequent years. UTD Shingles Vaccination A shingles vaccine is also recommended once in a lifetime after age 61 Declined Seasonal Influenza Vaccination (annually)  11/1/17 Pneumococcal Vaccination (once after 65)  Not indicated Hepatitis B Vaccinations (if medium/high risk) Medium/high risk factors:  End-stage renal disease, Hemophiliacs who received Factor VIII or IX concentrates, Clients of institutions for the mentally retarded, Persons who live in the same house as a HepB virus carrier, Homosexual men, Illicit injectable drug abusers. Not applicable Screening Mammography (biennial age 54-69) Annually (age 36 or over) 06/27/2017 Screening Pap Tests and Pelvic Examination (up to age 79 and after 79 if unknown history or abnormal study last 10 years) Every 24 months except high risk Not applicable Ultrasound Screening for Abdominal Aortic Aneurysm (AAA) (once) Patient must be referred through IPPE and not have had a screening for abdominal aortic aneurysm before under Medicare. Limited to patients who meet one of the following criteria: 
- Men who are 73-68 years old and have smoked more than 100 cigarettes in their lifetime. 
-Anyone with a FH of AAA 
-Anyone recommended for screening by USPSTF Not applicable Learning About Diabetes Food Guidelines Your Care Instructions Meal planning is important to manage diabetes. It helps keep your blood sugar at a target level (which you set with your doctor). You don't have to eat special foods. You can eat what your family eats, including sweets once in a while. But you do have to pay attention to how often you eat and how much you eat of certain foods. You may want to work with a dietitian or a certified diabetes educator (CDE) to help you plan meals and snacks. A dietitian or CDE can also help you lose weight if that is one of your goals. What should you know about eating carbs? Managing the amount of carbohydrate (carbs) you eat is an important part of healthy meals when you have diabetes.  Carbohydrate is found in many foods. 
· Learn which foods have carbs. And learn the amounts of carbs in different foods. ¨ Bread, cereal, pasta, and rice have about 15 grams of carbs in a serving. A serving is 1 slice of bread (1 ounce), ½ cup of cooked cereal, or 1/3 cup of cooked pasta or rice. ¨ Fruits have 15 grams of carbs in a serving. A serving is 1 small fresh fruit, such as an apple or orange; ½ of a banana; ½ cup of cooked or canned fruit; ½ cup of fruit juice; 1 cup of melon or raspberries; or 2 tablespoons of dried fruit. ¨ Milk and no-sugar-added yogurt have 15 grams of carbs in a serving. A serving is 1 cup of milk or 2/3 cup of no-sugar-added yogurt. ¨ Starchy vegetables have 15 grams of carbs in a serving. A serving is ½ cup of mashed potatoes or sweet potato; 1 cup winter squash; ½ of a small baked potato; ½ cup of cooked beans; or ½ cup cooked corn or green peas. · Learn how much carbs to eat each day and at each meal. A dietitian or CDE can teach you how to keep track of the amount of carbs you eat. This is called carbohydrate counting. · If you are not sure how to count carbohydrate grams, use the Plate Method to plan meals. It is a good, quick way to make sure that you have a balanced meal. It also helps you spread carbs throughout the day. ¨ Divide your plate by types of foods. Put non-starchy vegetables on half the plate, meat or other protein food on one-quarter of the plate, and a grain or starchy vegetable in the final quarter of the plate. To this you can add a small piece of fruit and 1 cup of milk or yogurt, depending on how many carbs you are supposed to eat at a meal. 
· Try to eat about the same amount of carbs at each meal. Do not \"save up\" your daily allowance of carbs to eat at one meal. 
· Proteins have very little or no carbs per serving.  Examples of proteins are beef, chicken, turkey, fish, eggs, tofu, cheese, cottage cheese, and peanut butter. A serving size of meat is 3 ounces, which is about the size of a deck of cards. Examples of meat substitute serving sizes (equal to 1 ounce of meat) are 1/4 cup of cottage cheese, 1 egg, 1 tablespoon of peanut butter, and ½ cup of tofu. How can you eat out and still eat healthy? · Learn to estimate the serving sizes of foods that have carbohydrate. If you measure food at home, it will be easier to estimate the amount in a serving of restaurant food. · If the meal you order has too much carbohydrate (such as potatoes, corn, or baked beans), ask to have a low-carbohydrate food instead. Ask for a salad or green vegetables. · If you use insulin, check your blood sugar before and after eating out to help you plan how much to eat in the future. · If you eat more carbohydrate at a meal than you had planned, take a walk or do other exercise. This will help lower your blood sugar. What else should you know? · Limit saturated fat, such as the fat from meat and dairy products. This is a healthy choice because people who have diabetes are at higher risk of heart disease. So choose lean cuts of meat and nonfat or low-fat dairy products. Use olive or canola oil instead of butter or shortening when cooking. · Don't skip meals. Your blood sugar may drop too low if you skip meals and take insulin or certain medicines for diabetes. · Check with your doctor before you drink alcohol. Alcohol can cause your blood sugar to drop too low. Alcohol can also cause a bad reaction if you take certain diabetes medicines. Follow-up care is a key part of your treatment and safety. Be sure to make and go to all appointments, and call your doctor if you are having problems. It's also a good idea to know your test results and keep a list of the medicines you take. Where can you learn more? Go to http://cecelia-vir[place]ia.info/.  
Enter E179 in the search box to learn more about \"Learning About Diabetes Food Guidelines. \" Current as of: March 13, 2017 Content Version: 11.4 © 4318-0596 [employer]. Care instructions adapted under license by [employer] (which disclaims liability or warranty for this information). If you have questions about a medical condition or this instruction, always ask your healthcare professional. [address] any warranty or liability for your use of this information. Hypothyroidism: Care Instructions Your Care Instructions You have hypothyroidism, which means that your body is not making enough thyroid hormone. This hormone helps your body use energy. If your thyroid level is low, you may feel tired, be constipated, have an increase in your blood pressure, or have dry skin or memory problems. You may also get cold easily, even when it is warm. Women with low thyroid levels may have heavy menstrual periods. A blood test to find your thyroid-stimulating hormone (TSH) level is used to check for hypothyroidism. A high TSH level may mean that you have low thyroid. When your body is not making enough thyroid hormone, TSH levels rise in an effort to make the body produce more. The treatment for hypothyroidism is to take thyroid hormone pills. You should start to feel better in 1 to 2 weeks. But it can take several months to see changes in the TSH level. You will need regular visits with your doctor to make sure you have the right dose of medicine. Most people need treatment for the rest of their lives. You will need to see your doctor regularly to have blood tests and to make sure you are doing well. Follow-up care is a key part of your treatment and safety. Be sure to make and go to all appointments, and call your doctor if you are having problems. It's also a good idea to know your test results and keep a list of the medicines you take. How can you care for yourself at home? · Take your thyroid hormone medicine exactly as prescribed. Call your doctor if you think you are having a problem with your medicine. Most people do not have side effects if they take the right amount of medicine regularly. ¨ Take the medicine 30 minutes before breakfast, and do not take it with calcium, vitamins, or iron. ¨ Do not take extra doses of your thyroid medicine. It will not help you get better any faster, and it may cause side effects. ¨ If you forget to take a dose, do NOT take a double dose of medicine. Take your usual dose the next day. · Tell your doctor about all prescription, herbal, or over-the-counter products you take. · Take care of yourself. Eat a healthy diet, get enough sleep, and get regular exercise. When should you call for help? Call 911 anytime you think you may need emergency care. For example, call if: 
? · You passed out (lost consciousness). ? · You have severe trouble breathing. ? · You have a very slow heartbeat (less than 60 beats a minute). ? · You have a low body temperature (95°F or below). ?Call your doctor now or seek immediate medical care if: 
? · You feel tired, sluggish, or weak. ? · You have trouble remembering things or concentrating. ? · You do not begin to feel better 2 weeks after starting your medicine. ? Watch closely for changes in your health, and be sure to contact your doctor if you have any problems. Where can you learn more? Go to http://cecelia-virginia.info/. Enter T499 in the search box to learn more about \"Hypothyroidism: Care Instructions. \" Current as of: May 12, 2017 Content Version: 11.4 © 1983-4863 [employer]. Care instructions adapted under license by [employer] (which disclaims liability or warranty for this information).  If you have questions about a medical condition or this instruction, always ask your healthcare professional. [employer] disclaims any warranty or liability for your use of this information. Hypercalcemia: Care Instructions Your Care Instructions Hypercalcemia is too much calcium in the blood. You need calcium to have strong bones. It also helps your muscles, heart, and nerves work as they should. But too much is dangerous. Several problems can cause too much calcium in the blood. It can happen because of medicines or certain health problems. Some diseases can make your intestines take in too much calcium. And some can take calcium from your bones. A noncancerous tumor can grow in the glands that control calcium levels. And some cancers can cause high calcium levels. These high levels may make you lose fluids (become dehydrated). You may get confused and very tired. Some people also have nausea, vomiting, and constipation. Your doctor will treat you based on how serious the problem is and what is causing it. Since too much calcium can be dangerous, it is important to treat it. You may get fluids to stop dehydration. You also may get medicine to help your body get rid of calcium through your urine or put it back into your bones. If a tumor is the cause, you may need surgery. Follow-up care is a key part of your treatment and safety. Be sure to make and go to all appointments, and call your doctor if you are having problems. It's also a good idea to know your test results and keep a list of the medicines you take. How can you care for yourself at home? · Take your medicines exactly as prescribed. Call your doctor if you think you are having a problem with your medicine. · Make sure your doctor knows about all the medicines (including over-the-counter or herbal products) you are taking. If a medicine is causing your high calcium levels, your doctor will have you stop taking it.  
· Drink plenty of fluids, enough so that your urine is light yellow or clear like water. If you have kidney, heart, or liver disease and have to limit fluids, talk with your doctor before you increase the amount of fluids you drink. · Get at least 30 minutes of exercise on most days of the week. Walking is a good choice. You also may want to do other activities, such as running, swimming, cycling, or playing tennis or team sports. Exercise helps the calcium go back into your bones. · Do not reduce how much calcium you eat. · Let your doctor know if you take vitamins or other supplements that have calcium or vitamin D. When should you call for help? Call your doctor now or seek immediate medical care if: 
? · You are confused or have trouble thinking clearly. ? Watch closely for changes in your health, and be sure to contact your doctor if: 
? · You are feeling so tired or weak that you cannot do your usual activities. ? · You do not get better as expected. Where can you learn more? Go to http://cecelia-virginia.info/. Enter Z269 in the search box to learn more about \"Hypercalcemia: Care Instructions. \" Current as of: May 12, 2017 Content Version: 11.4 © 5266-6496 [employer]. Care instructions adapted under license by [employer] (which disclaims liability or warranty for this information). If you have questions about a medical condition or this instruction, always ask your healthcare professional. [address] any warranty or liability for your use of this information. Please provide this summary of care documentation to your next provider. Your primary care clinician is listed as Sonal Chavarria. If you have any questions after today's visit, please call [phone].

## 2018-04-18 NOTE — PROGRESS NOTES
1. Have you been to the ER, urgent care clinic since your last visit? Hospitalized since your last visit? No    2. Have you seen or consulted any other health care providers outside of the [address] [place] [address] since your last visit? Include any pap smears or colon screening. No    Patient is currently using One Touch Ultra 2 meter; she requests a new glucometer as she has had current one for five or more years. Patient states she has an appointment with Dr. Kati Chopra next week; used to see Dr. Ramona Downing but was told she had to switch to Dr. Kati Chopra due to insurance.

## 2018-04-18 NOTE — PROGRESS NOTES
HISTORY OF PRESENT ILLNESS  Kevin Reynolds is a 67 y.o. female. HPI: here for routine follow up. H/o diabetes. Well controlled. Recently started her on metformin as before she was on diet modification. Checking blood sugar at home and it is between 100-130. No hypoglycemia. Complaint with medication and no side effects. Also h/o hypertension. Vitals stable today. Asymptomatic. Complaint with medication and no side effects. Denies any headache, dizziness, no chest pain or trouble breathing, no arm or leg weakness. No nausea or vomiting, no weight or appetite changes. No urine or bowel complains, no palpitation, no diaphoresis. Some feeling of sadness. It is improving gradually. She looks more comfortable then how she started her medical care to this office. She is trying to adjust to the area. Lived long time at [facility] and here it is hard for her to adjust. Lives with her son. Feel safe but feels it is not same as [place]. No major depression or anxiety. Sleep is fair. H/o elevated triglyceride. On statin. Tolerating it well at this time. Also discussed diet modification. Noted elevated calcium and elevated PTH. Now pending appt with endo. H/o hypothyroidism. Recent TSh wnl. Asymptomatic. No heat or cold intolerance. No trouble swallowing ir change in voice. Visit Vitals    /72 (BP 1 Location: Right arm, BP Patient Position: Sitting)    Pulse 61    Temp 97.8 °F (36.6 °C) (Oral)    Resp 16    Ht 5' 6\" (1.676 m)    Wt 188 lb (85.3 kg)    SpO2 97%    BMI 30.34 kg/m2     Review labs.    Lab Results   Component Value Date/Time    WBC 8.0 03/10/2018 08:00 AM    HGB 12.0 03/10/2018 08:00 AM    HCT 37.6 03/10/2018 08:00 AM    PLATELET 349 59/05/3155 08:00 AM    MCV 84.1 03/10/2018 08:00 AM     Lab Results   Component Value Date/Time    Sodium 140 04/14/2018 07:50 AM    Potassium 4.3 04/14/2018 07:50 AM    Chloride 105 04/14/2018 07:50 AM    CO2 30 04/14/2018 07:50 AM    Anion gap 5 04/14/2018 07:50 AM Glucose 104 (H) 04/14/2018 07:50 AM    BUN 25 (H) 04/14/2018 07:50 AM    Creatinine 1.21 04/14/2018 07:50 AM    BUN/Creatinine ratio 21 (H) 04/14/2018 07:50 AM    GFR est AA 53 (L) 04/14/2018 07:50 AM    GFR est non-AA 44 (L) 04/14/2018 07:50 AM    Calcium 10.8 (H) 04/14/2018 07:50 AM    Bilirubin, total 0.4 03/10/2018 08:00 AM    AST (SGOT) 20 03/10/2018 08:00 AM    Alk. phosphatase 73 03/10/2018 08:00 AM    Protein, total 7.9 03/10/2018 08:00 AM    Albumin 3.7 03/10/2018 08:00 AM    Globulin 4.2 (H) 03/10/2018 08:00 AM    A-G Ratio 0.9 03/10/2018 08:00 AM    ALT (SGPT) 21 03/10/2018 08:00 AM     Lab Results   Component Value Date/Time    Cholesterol, total 173 03/10/2018 08:00 AM    HDL Cholesterol 37 (L) 03/10/2018 08:00 AM    LDL, calculated 93.4 03/10/2018 08:00 AM    VLDL, calculated 42.6 03/10/2018 08:00 AM    Triglyceride 213 (H) 03/10/2018 08:00 AM    CHOL/HDL Ratio 4.7 03/10/2018 08:00 AM     Lab Results   Component Value Date/Time    TSH 1.31 03/10/2018 08:00 AM     Lab Results   Component Value Date/Time    Hemoglobin A1c 6.9 (H) 03/10/2018 08:00 AM     Lab Results   Component Value Date/Time    Microalbumin/Creat ratio (mg/g creat) 8 03/10/2018 08:00 AM    Microalbumin,urine random 0.84 03/10/2018 08:00 AM    Microalbumin urine (POC) 10 04/24/2017 09:02 AM         ROS: see HPI     Physical Exam   Constitutional: She is oriented to person, place, and time. No distress. Neck: No thyromegaly present. Cardiovascular: Normal rate, regular rhythm and normal heart sounds. Pulmonary/Chest:   CTA   Abdominal: Soft. Bowel sounds are normal. There is no tenderness. Musculoskeletal: She exhibits no edema. Lymphadenopathy:     She has no cervical adenopathy. Neurological: She is oriented to person, place, and time. Psychiatric: Her behavior is normal. Thought content normal.       ASSESSMENT and PLAN  1. Hypertriglyceridemia: on statin. Diet modification discussed.   E78.1 272.1 pravastatin (PRAVACHOL) 10 mg tablet   2. Well controlled diabetes mellitus: for now HBA1C stable. Started metformin as before she was on diet modification. Will observe and f/u next visit. On statin and ARB. Up to date with an eye exam and foot exam.  ([facility] [facility] E11.9 250.00 pravastatin (PRAVACHOL) 10 mg tablet      HEMOGLOBIN A1C WITH EAG      METABOLIC PANEL, COMPREHENSIVE   3. Essential hypertension: stable at this time. Low salt diet. Exercise as tolerated. Will continue current plan. I10 401.9 olmesartan (BENICAR) 40 mg tablet      hydroCHLOROthiazide (HYDRODIURIL) 25 mg tablet   4. Atrial fibrillation, unspecified type ([facility] [address]): stable. Asymptomatic. On aspirin now as review discussion with cardiologist last visit . No concern. I48.91 427.31    5. Hypothyroidism, unspecified type: asymptomatic. For now coming up appt with endo. Will follow their recommendations. E03.9 244.9    6. Elevated [facility] pending appt with endo. E21.5 252.9    7. Feeling sad: improving gradually. Not on any medication. More due to moved to the area and missing her prior friends and other daughter and son from [place]  R45.89 300.4    Pt understood and agree with the plan   Review    Follow-up Disposition:  Return in about 3 months (around 7/18/2018).

## 2018-04-18 NOTE — PROGRESS NOTES
This is the Subsequent Medicare Annual Wellness Exam, performed 12 months or more after the Initial AWV or the last Subsequent AWV    I have reviewed the patient's medical history in detail and updated the computerized patient record. History     Past Medical History:   Diagnosis Date    Bradycardia     Diabetes ([address]     borderline    DVT (deep venous thrombosis) ([address] 2010    right    History of ablation March 2016     History of atrial fibrillation     Hypercholesterolemia     Hypertension     Long term current use of anticoagulant therapy     Osteoporosis     Seizure ([address]     Seizures ([address]     on medication from 2006 to 2013 no seizure since 2006    [employer] pacemaker implant Set. 2013 Dr. Hermelinda Marks, [place]     Thyroid disease       Past Surgical History:   Procedure Laterality Date    CARDIAC SURG PROCEDURE UNLIST      ablation for a-fib    COLONOSCOPY N/A 10/25/2017    COLONOSCOPY performed by Manasa Lugo MD at [facility] ENDOSCOPY    HX CATARACT REMOVAL      HX HYSTERECTOMY      HX KNEE REPLACEMENT      left knee in 1/2010 and right knee 7/2016    HX KNEE REPLACEMENT      right knee    HX PACEMAKER       Current Outpatient Prescriptions   Medication Sig Dispense Refill    metFORMIN (GLUCOPHAGE) 500 mg tablet Take 1 Tab by mouth two (2) times daily (with meals). 60 Tab 2    pravastatin (PRAVACHOL) 10 mg tablet Take 1 Tab by mouth nightly. 30 Tab 1    olmesartan (BENICAR) 40 mg tablet TAKE 1 TABLET BY MOUTH EVERY DAY 90 Tab 0    glucose blood VI test strips (BLOOD GLUCOSE TEST) strip by Does Not Apply route See Admin Instructions. Check once a day 100 Strip 6    hydroCHLOROthiazide (HYDRODIURIL) 25 mg tablet TAKE 1 TABLET BY MOUTH ONCE DAILY. 90 Tab 1    metoprolol succinate (TOPROL-XL) 50 mg XL tablet Take 1 Tab by mouth daily. Per patient she does not take if her BP is low 90 Tab 3    aspirin 81 mg chewable tablet Take 81 mg by mouth daily.       DOCOSAHEXANOIC ACID/EPA (FISH OIL PO) Take 100 mg by mouth daily.  COQ10, LIPOSOMAL UBIQUINOL, PO Take 200 mg by mouth daily. Allergies   Allergen Reactions    Statins-Hmg-Coa Reductase Inhibitors Other (comments)     Felt sick      Family History   Problem Relation Age of Onset    Diabetes Father     Hypertension Sister      Social History   Substance Use Topics    Smoking status: Never Smoker    Smokeless tobacco: Never Used    Alcohol use No     Patient Active Problem List   Diagnosis Code    Type 2 diabetes mellitus with hyperglycemia, without long-term current use of insulin (HCC) E11.65    Essential hypertension I10    Bradycardia/ post pacemaker. s/p ablation R00.1    Atrial fibrillation (HCC)/ post ablation I48.91    h/o Deep vein thrombophlebitis of right leg (HCC)/ post knee replacement in july 2016 I80.201    Hyperlipidemia E78.5    Hypothyroidism E03.9    Seizure disorder (HCC)/ diagnosed in 2006. was taken off from medication in 2015. since then no seizure. G41.12    Advance directive discussed with patient/ living wheel and she will provide a copy / want to consider changes. will provide updates  Z71.89    Cardiac pacemaker in situ Z95.0    S/P colonoscopy Z98.890    Thyroid nodule E04.1       Depression Risk Factor Screening:     PHQ over the last two weeks 2/21/2018   Little interest or pleasure in doing things Not at all   Feeling down, depressed or hopeless Not at all   Total Score PHQ 2 0     Alcohol Risk Factor Screening: You do not drink alcohol or very rarely.- None per patient report    Functional Ability and Level of Safety:   Hearing Loss  Hearing is good. Activities of Daily Living  The home contains: no safety equipment. Patient needs help with:  transportation- states she does not drive    Fall Risk  Fall Risk Assessment, last 12 mths 2/21/2018   Able to walk? Yes   Fall in past 12 months?  No       Abuse Screen  Patient is not abused    Cognitive Screening   Evaluation of Cognitive Function:  Has your family/caregiver stated any concerns about your memory: no  Normal    Patient Care Team   Patient Care Team:  Addison Diego MD as PCP - General (Family Practice)  Sindy Santiago MD (Cardiology)  Papi Mason MD as Physician (Colon and Rectal Surgery)  Evelyn Byrne MD (Ophthalmology)  Yogesh Corona MD (Endocrinology)    Assessment/Plan   Education and counseling provided:  Are appropriate based on today's review and evaluation  Review nurses note and assessment. Agree with that. Discussed 5 years health plan and given copy in AVS.  Discussed advance directive. She has a living wheel and she is going to make some changes and then provide a copy. See ACP note. Diagnoses and all orders for this visit:    1. Medicare annual wellness visit, subsequent          There are no preventive care reminders to display for this patient.

## 2018-04-18 NOTE — PATIENT INSTRUCTIONS
Medicare Part B Preventive Services Limitations Recommendation Scheduled   Bone Mass Measurement  (age 72 & older, biennial) Requires diagnosis related to osteoporosis or estrogen deficiency. Biennial benefit unless patient has history of long-term glucocorticoid tx or baseline is needed because initial test was by other method 03/19/2018    Cardiovascular Screening Blood Tests (every 5 years)  Total cholesterol, HDL, Triglycerides Order as a panel if possible 03/10/2018    Colorectal Cancer Screening  -Fecal occult blood test (annual)  -Flexible sigmoidoscopy (5y)  -Screening colonoscopy (10y)  -Barium Enema  10/25/17 Q5 years   Counseling to Prevent Tobacco Use (up to 8 sessions per year)  - Counseling greater than 3 and up to 10 minutes  - Counseling greater than 10 minutes Patients must be asymptomatic of tobacco-related conditions to receive as preventive service Not applicable    Diabetes Screening Tests (at least every 3 years, Medicare covers annually or at 6-month intervals for prediabetic patients)    Fasting blood sugar (FBS) or glucose tolerance test (GTT) Patient must be diagnosed with one of the following:  -Hypertension, Dyslipidemia, obesity, previous impaired FBS or GTT  Or any two of the following: overweight, FH of diabetes, age ? 72, history of gestational diabetes, birth of baby weighing more than 9 pounds 03/10/2018    Diabetes Self-Management Training (DSMT) (no USPSTF recommendation) Requires referral by treating physician for patient with diabetes or renal disease. 10 hours of initial DSMT session of no less than 30 minutes each in a continuous 12-month period. 2 hours of follow-up DSMT in subsequent years.  UTD    Glaucoma Screening (no USPSTF recommendation) Diabetes mellitus, family history, , age 48 or over,  American, age 72 or over 09/06/2017 Every 1-2 year   Human Immunodeficiency Virus (HIV) Screening (annually for increased risk patients)  HIV-1 and HIV-2 by EIA, MARCELA, rapid antibody test, or oral mucosa transudate Patient must be at increased risk for HIV infection per USPSTF guidelines or pregnant. Tests covered annually for patients at increased risk. Pregnant patients may receive up to 3 test during pregnancy. Not applicable    Medical Nutrition Therapy (MNT) (for diabetes or renal disease not recommended schedule) Requires referral by treating physician for patient with diabetes or renal disease. Can be provided in same year as diabetes self-management training (DSMT), and CMS recommends medical nutrition therapy take place after DSMT. Up to 3 hours for initial year and 2 hours in subsequent years. UTD    Shingles Vaccination A shingles vaccine is also recommended once in a lifetime after age 61 [address] [facility] Vaccination (annually)  11/1/17    Pneumococcal Vaccination (once after 72)  Not indicated    Hepatitis B Vaccinations (if medium/high risk) Medium/high risk factors:  End-stage renal disease,  Hemophiliacs who received Factor VIII or IX concentrates, Clients of institutions for the mentally retarded, Persons who live in the same house as a HepB virus carrier, Homosexual men, Illicit injectable drug abusers. Not applicable    Screening Mammography (biennial age 54-69) Annually (age 36 or over) 06/27/2017    Screening Pap Tests and Pelvic Examination (up to age 79 and after 79 if unknown history or abnormal study last 10 years) Every 25 months except high risk Not applicable    Ultrasound Screening for Abdominal Aortic Aneurysm (AAA) (once) Patient must be referred through [facility] and not have had a screening for abdominal aortic aneurysm before under Medicare.   Limited to patients who meet one of the following criteria:  - Men who are 73-68 years old and have smoked more than 100 cigarettes in their lifetime.  -Anyone with a FH of AAA  -Anyone recommended for screening by USPSTF Not applicable           Learning About Diabetes Food Guidelines  Your Care Instructions    Meal planning is important to manage diabetes. It helps keep your blood sugar at a target level (which you set with your doctor). You don't have to eat special foods. You can eat what your family eats, including sweets once in a while. But you do have to pay attention to how often you eat and how much you eat of certain foods. You may want to work with a dietitian or a certified diabetes educator (CDE) to help you plan meals and snacks. A dietitian or CDE can also help you lose weight if that is one of your goals. What should you know about eating carbs? Managing the amount of carbohydrate (carbs) you eat is an important part of healthy meals when you have diabetes. Carbohydrate is found in many foods. · Learn which foods have carbs. And learn the amounts of carbs in different foods. ¨ Bread, cereal, pasta, and rice have about 15 grams of carbs in a serving. A serving is 1 slice of bread (1 ounce), ½ cup of cooked cereal, or 1/3 cup of cooked pasta or rice. ¨ Fruits have 15 grams of carbs in a serving. A serving is 1 small fresh fruit, such as an apple or orange; ½ of a banana; ½ cup of cooked or canned fruit; ½ cup of fruit juice; 1 cup of melon or raspberries; or 2 tablespoons of dried fruit. ¨ Milk and no-sugar-added yogurt have 15 grams of carbs in a serving. A serving is 1 cup of milk or 2/3 cup of no-sugar-added yogurt. ¨ Starchy vegetables have 15 grams of carbs in a serving. A serving is ½ cup of mashed potatoes or sweet potato; 1 cup winter squash; ½ of a small baked potato; ½ cup of cooked beans; or ½ cup cooked corn or green peas. · Learn how much carbs to eat each day and at each meal. A dietitian or CDE can teach you how to keep track of the amount of carbs you eat. This is called carbohydrate counting. · If you are not sure how to count carbohydrate grams, use the Plate Method to plan meals.  It is a good, quick way to make sure that you have a balanced meal. It also helps you spread carbs throughout the day. ¨ Divide your plate by types of foods. Put non-starchy vegetables on half the plate, meat or other protein food on one-quarter of the plate, and a grain or starchy vegetable in the final quarter of the plate. To this you can add a small piece of fruit and 1 cup of milk or yogurt, depending on how many carbs you are supposed to eat at a meal.  · Try to eat about the same amount of carbs at each meal. Do not \"save up\" your daily allowance of carbs to eat at one meal.  · Proteins have very little or no carbs per serving. Examples of proteins are beef, chicken, turkey, fish, eggs, tofu, cheese, cottage cheese, and peanut butter. A serving size of meat is 3 ounces, which is about the size of a deck of cards. Examples of meat substitute serving sizes (equal to 1 ounce of meat) are 1/4 cup of cottage cheese, 1 egg, 1 tablespoon of peanut butter, and ½ cup of tofu. How can you eat out and still eat healthy? · Learn to estimate the serving sizes of foods that have carbohydrate. If you measure food at home, it will be easier to estimate the amount in a serving of restaurant food. · If the meal you order has too much carbohydrate (such as potatoes, corn, or baked beans), ask to have a low-carbohydrate food instead. Ask for a salad or green vegetables. · If you use insulin, check your blood sugar before and after eating out to help you plan how much to eat in the future. · If you eat more carbohydrate at a meal than you had planned, take a walk or do other exercise. This will help lower your blood sugar. What else should you know? · Limit saturated fat, such as the fat from meat and dairy products. This is a healthy choice because people who have diabetes are at higher risk of heart disease. So choose lean cuts of meat and nonfat or low-fat dairy products. Use olive or canola oil instead of butter or shortening when cooking. · Don't skip meals.  Your blood sugar may drop too low if you skip meals and take insulin or certain medicines for diabetes. · Check with your doctor before you drink alcohol. Alcohol can cause your blood sugar to drop too low. Alcohol can also cause a bad reaction if you take certain diabetes medicines. Follow-up care is a key part of your treatment and safety. Be sure to make and go to all appointments, and call your doctor if you are having problems. It's also a good idea to know your test results and keep a list of the medicines you take. Where can you learn more? Go to http://cecelia-[place].info/. Enter O644 in the search box to learn more about \"Learning About Diabetes Food Guidelines. \"  Current as of: March 13, 2017  Content Version: 11.4  © 8217-6867 [employer]. Care instructions adapted under license by [employer] (which disclaims liability or warranty for this information). If you have questions about a medical condition or this instruction, always ask your healthcare professional. [place] [address] any warranty or liability for your use of this information. Hypothyroidism: Care Instructions  Your Care Instructions    You have hypothyroidism, which means that your body is not making enough thyroid hormone. This hormone helps your body use energy. If your thyroid level is low, you may feel tired, be constipated, have an increase in your blood pressure, or have dry skin or memory problems. You may also get cold easily, even when it is warm. Women with low thyroid levels may have heavy menstrual periods. A blood test to find your thyroid-stimulating hormone (TSH) level is used to check for hypothyroidism. A high TSH level may mean that you have low thyroid. When your body is not making enough thyroid hormone, TSH levels rise in an effort to make the body produce more. The treatment for hypothyroidism is to take thyroid hormone pills.  You should start to feel better in 1 to 2 weeks. But it can take several months to see changes in the TSH level. You will need regular visits with your doctor to make sure you have the right dose of medicine. Most people need treatment for the rest of their lives. You will need to see your doctor regularly to have blood tests and to make sure you are doing well. Follow-up care is a key part of your treatment and safety. Be sure to make and go to all appointments, and call your doctor if you are having problems. It's also a good idea to know your test results and keep a list of the medicines you take. How can you care for yourself at home? · Take your thyroid hormone medicine exactly as prescribed. Call your doctor if you think you are having a problem with your medicine. Most people do not have side effects if they take the right amount of medicine regularly. ¨ Take the medicine 30 minutes before breakfast, and do not take it with calcium, vitamins, or iron. ¨ Do not take extra doses of your thyroid medicine. It will not help you get better any faster, and it may cause side effects. ¨ If you forget to take a dose, do NOT take a double dose of medicine. Take your usual dose the next day. · Tell your doctor about all prescription, herbal, or over-the-counter products you take. · Take care of yourself. Eat a healthy diet, get enough sleep, and get regular exercise. When should you call for help? Call 911 anytime you think you may need emergency care. For example, call if:  ? · You passed out (lost consciousness). ? · You have severe trouble breathing. ? · You have a very slow heartbeat (less than 60 beats a minute). ? · You have a low body temperature (95°F or below). ?Call your doctor now or seek immediate medical care if:  ? · You feel tired, sluggish, or weak. ? · You have trouble remembering things or concentrating. ? · You do not begin to feel better 2 weeks after starting your medicine. ? Watch closely for changes in your health, and be sure to contact your doctor if you have any problems. Where can you learn more? Go to http://cecelia-[place].info/. Enter K637 in the search box to learn more about \"Hypothyroidism: Care Instructions. \"  Current as of: May 12, 2017  Content Version: 11.4  © 0071-7374 [employer]. Care instructions adapted under license by [employer] (which disclaims liability or warranty for this information). If you have questions about a medical condition or this instruction, always ask your healthcare professional. [address] any warranty or liability for your use of this information. Hypercalcemia: Care Instructions  Your Care Instructions    Hypercalcemia is too much calcium in the blood. You need calcium to have strong bones. It also helps your muscles, heart, and nerves work as they should. But too much is dangerous. Several problems can cause too much calcium in the blood. It can happen because of medicines or certain health problems. Some diseases can make your intestines take in too much calcium. And some can take calcium from your bones. A noncancerous tumor can grow in the glands that control calcium levels. And some cancers can cause high calcium levels. These high levels may make you lose fluids (become dehydrated). You may get confused and very tired. Some people also have nausea, vomiting, and constipation. Your doctor will treat you based on how serious the problem is and what is causing it. Since too much calcium can be dangerous, it is important to treat it. You may get fluids to stop dehydration. You also may get medicine to help your body get rid of calcium through your urine or put it back into your bones. If a tumor is the cause, you may need surgery. Follow-up care is a key part of your treatment and safety. Be sure to make and go to all appointments, and call your doctor if you are having problems.  It's also a good idea to know your test results and keep a list of the medicines you take. How can you care for yourself at home? · Take your medicines exactly as prescribed. Call your doctor if you think you are having a problem with your medicine. · Make sure your doctor knows about all the medicines (including over-the-counter or herbal products) you are taking. If a medicine is causing your high calcium levels, your doctor will have you stop taking it. · Drink plenty of fluids, enough so that your urine is light yellow or clear like water. If you have kidney, heart, or liver disease and have to limit fluids, talk with your doctor before you increase the amount of fluids you drink. · Get at least 30 minutes of exercise on most days of the week. Walking is a good choice. You also may want to do other activities, such as running, swimming, cycling, or playing tennis or team sports. Exercise helps the calcium go back into your bones. · Do not reduce how much calcium you eat. · Let your doctor know if you take vitamins or other supplements that have calcium or vitamin D. When should you call for help? Call your doctor now or seek immediate medical care if:  ? · You are confused or have trouble thinking clearly. ? Watch closely for changes in your health, and be sure to contact your doctor if:  ? · You are feeling so tired or weak that you cannot do your usual activities. ? · You do not get better as expected. Where can you learn more? Go to http://cecelia-[place].info/. Enter Q996 in the search box to learn more about \"Hypercalcemia: Care Instructions. \"  Current as of: May 12, 2017  Content Version: 11.4  © 7044-6313 [employer]. Care instructions adapted under license by [employer] (which disclaims liability or warranty for this information).  If you have questions about a medical condition or this instruction, always ask your healthcare professional. [place] [address] any warranty or liability for your use of this information.

## 2018-05-01 ENCOUNTER — HOSPITAL ENCOUNTER (OUTPATIENT)
Dept: NUCLEAR MEDICINE | Age: 73
Discharge: HOME | End: 2018-05-01
Attending: INTERNAL MEDICINE
Payer: MEDICARE

## 2018-05-01 DIAGNOSIS — E21.3 HYPERPARATHYROIDISM, UNSPECIFIED (HCC): ICD-10-CM

## 2018-05-01 DIAGNOSIS — E83.52 HYPERCALCEMIA: ICD-10-CM

## 2018-05-01 DIAGNOSIS — I10 ESSENTIAL (PRIMARY) HYPERTENSION: ICD-10-CM

## 2018-05-01 DIAGNOSIS — E78.2 MIXED HYPERLIPIDEMIA: ICD-10-CM

## 2018-05-01 DIAGNOSIS — E11.65 TYPE 2 DIABETES MELLITUS WITH HYPERGLYCEMIA (HCC): ICD-10-CM

## 2018-05-01 PROCEDURE — 78072 PARATHYRD PLANAR W/SPECT&CT: CPT

## 2018-07-11 ENCOUNTER — CLINICAL SUPPORT (OUTPATIENT)
Dept: CARDIOLOGY CLINIC | Age: 73
End: 2018-07-11

## 2018-07-11 ENCOUNTER — OFFICE VISIT (OUTPATIENT)
Dept: FAMILY MEDICINE CLINIC | Age: 73
End: 2018-07-11

## 2018-07-11 VITALS
SYSTOLIC BLOOD PRESSURE: 144 MMHG | RESPIRATION RATE: 16 BRPM | DIASTOLIC BLOOD PRESSURE: 86 MMHG | BODY MASS INDEX: 29.7 KG/M2 | OXYGEN SATURATION: 99 % | TEMPERATURE: 97.9 F | HEART RATE: 60 BPM | WEIGHT: 184.8 LBS | HEIGHT: 66 IN

## 2018-07-11 DIAGNOSIS — R00.1 BRADYCARDIA: Primary | ICD-10-CM

## 2018-07-11 DIAGNOSIS — R79.89 ELEVATED PTHRP LEVEL: ICD-10-CM

## 2018-07-11 DIAGNOSIS — Z95.0 CARDIAC PACEMAKER IN SITU: ICD-10-CM

## 2018-07-11 DIAGNOSIS — I10 ESSENTIAL HYPERTENSION: Primary | ICD-10-CM

## 2018-07-11 RX ORDER — SPIRONOLACTONE 25 MG/1
1 TABLET ORAL DAILY
Refills: 4 | COMMUNITY
Start: 2018-05-16

## 2018-07-11 RX ORDER — METOPROLOL SUCCINATE 50 MG/1
50 TABLET, EXTENDED RELEASE ORAL DAILY
Qty: 90 TAB | Refills: 3 | Status: SHIPPED | OUTPATIENT
Start: 2018-07-11 | End: 2018-08-20 | Stop reason: SDUPTHER

## 2018-07-11 NOTE — MR AVS SNAPSHOT
[address] 
[phone] Patient: Rena Cronin MRN: [id number]:[id number] Visit Information Date & Time Provider Department Dept. Phone Encounter #  
 7/11/2018 11:15 AM Billie Perez, [facility] [phone] [id number] Follow-up Instructions Return in about 2 months (around 9/11/2018). Your Appointments 9/18/2018  9:00 AM  
Follow Up with Alisha Landis MD  
[facility] [place] ([facility]) Appt Note: 6 mo f/u  
 [address] [facility] [address] [place] [id number] [phone] [address] Upcoming Health Maintenance Date Due  
 FOOT EXAM Q1 7/24/2018 Pneumococcal 65+ Low/Medium Risk (2 of 2 - PPSV23) 7/31/2018 Influenza Age 5 to Adult 8/1/2018 EYE EXAM RETINAL OR DILATED Q1 9/6/2018 HEMOGLOBIN A1C Q6M 9/10/2018 MICROALBUMIN Q1 3/10/2019 LIPID PANEL Q1 3/10/2019 MEDICARE YEARLY EXAM 4/19/2019 BREAST CANCER SCRN MAMMOGRAM 6/27/2019 GLAUCOMA SCREENING Q2Y 9/6/2019 COLONOSCOPY 10/25/2022 DTaP/Tdap/Td series (2 - Td) 7/24/2027 Allergies as of 7/11/2018  Review Complete On: 7/11/2018 By: Billie Perez MD  
  
 Severity Noted Reaction Type Reactions Statins-hmg-coa Reductase Inhibitors  03/21/2018    Other (comments) Felt sick Current Immunizations  Never Reviewed Name Date Influenza High Dose Vaccine PF 11/1/2017 Influenza Vaccine 10/1/2016 Not reviewed this visit You Were Diagnosed With   
  
 Codes Comments Essential hypertension    -  Primary ICD-10-CM: I10 
ICD-9-CM: 401.9 Elevated PTHrP level (HCC)     ICD-10-CM: E21.5 ICD-9-CM: 252.9 Vitals BP Pulse Temp Resp Height(growth percentile) Weight(growth percentile) 144/86 (BP 1 Location: Left arm, BP Patient Position: Sitting) 60 97.9 °F (36.6 °C) (Oral) 16 5' 6\" (1.676 m) 184 lb 12.8 oz (83.8 kg) SpO2 BMI OB Status Smoking Status 99% 29.83 kg/m2 Hysterectomy Never Smoker BMI and BSA Data Body Mass Index Body Surface Area  
 29.83 kg/m 2 1.98 m 2 Preferred Pharmacy Pharmacy Name Phone [facility] #[id number] [address] [facility] [phone] Your Updated Medication List  
  
   
This list is accurate as of 7/11/18 11:27 AM.  Always use your most recent med list.  
  
  
  
  
 aspirin 81 mg chewable tablet Take 81 mg by mouth daily. COQ10 (LIPOSOMAL UBIQUINOL) PO Take 200 mg by mouth daily. FISH OIL PO Take 100 mg by mouth daily. glucose blood VI test strips strip Commonly known as:  blood glucose test  
by Does Not Apply route See Admin Instructions. Check once a day  
  
 metFORMIN 500 mg tablet Commonly known as:  GLUCOPHAGE  
TAKE 1 TAB BY MOUTH TWO (2) TIMES DAILY (WITH MEALS). metoprolol succinate 50 mg XL tablet Commonly known as:  TOPROL-XL Take 1 Tab by mouth daily. Per patient she does not take if her BP is low  
  
 olmesartan 40 mg tablet Commonly known as:  [employer] Take 1 Tab by mouth daily. pravastatin 10 mg tablet Commonly known as:  PRAVACHOL Take 1 Tab by mouth nightly. spironolactone 25 mg tablet Commonly known as:  ALDACTONE Take 1 Tab by mouth daily. Prescriptions Sent to Pharmacy Refills  
 metoprolol succinate (TOPROL-XL) 50 mg XL tablet 3 Sig: Take 1 Tab by mouth daily. Per patient she does not take if her BP is low Class: Normal  
 Pharmacy: [facility]/pharmacy [address] [place] [address],[address] #: [phone] Route: Oral  
  
Follow-up Instructions Return in about 2 months (around 9/11/2018). Patient Instructions Low Sodium Diet (2,000 Milligram): Care Instructions Your Care Instructions Too much sodium causes your body to hold on to extra water. This can raise your blood pressure and force your heart and kidneys to work harder. In very serious cases, this could cause you to be put in the hospital. It might even be life-threatening. By limiting sodium, you will feel better and lower your risk of serious problems. The most common source of sodium is salt. People get most of the salt in their diet from canned, prepared, and packaged foods. Fast food and restaurant meals also are very high in sodium. Your doctor will probably limit your sodium to less than 2,000 milligrams (mg) a day. This limit counts all the sodium in prepared and packaged foods and any salt you add to your food. Follow-up care is a key part of your treatment and safety. Be sure to make and go to all appointments, and call your doctor if you are having problems. It's also a good idea to know your test results and keep a list of the medicines you take. How can you care for yourself at home? Read food labels · Read labels on cans and food packages. The labels tell you how much sodium is in each serving. Make sure that you look at the serving size. If you eat more than the serving size, you have eaten more sodium. · Food labels also tell you the Percent Daily Value for sodium. Choose products with low Percent Daily Values for sodium. · Be aware that sodium can come in forms other than salt, including monosodium glutamate (MSG), sodium citrate, and sodium bicarbonate (baking soda). MSG is often added to Asian food. When you eat out, you can sometimes ask for food without MSG or added salt. Buy low-sodium foods · Buy foods that are labeled \"unsalted\" (no salt added), \"sodium-free\" (less than 5 mg of sodium per serving), or \"low-sodium\" (less than 140 mg of sodium per serving). Foods labeled \"reduced-sodium\" and \"light sodium\" may still have too much sodium.  Be sure to read the label to see how much sodium you are getting. · Buy fresh vegetables, or frozen vegetables without added sauces. Buy low-sodium versions of canned vegetables, soups, and other canned goods. Prepare low-sodium meals · Cut back on the amount of salt you use in cooking. This will help you adjust to the taste. Do not add salt after cooking. One teaspoon of salt has about 2,300 mg of sodium. · Take the salt shaker off the table. · Flavor your food with garlic, lemon juice, onion, vinegar, herbs, and spices. Do not use soy sauce, lite soy sauce, steak sauce, onion salt, garlic salt, celery salt, mustard, or ketchup on your food. · Use low-sodium salad dressings, sauces, and ketchup. Or make your own salad dressings and sauces without adding salt. · Use less salt (or none) when recipes call for it. You can often use half the salt a recipe calls for without losing flavor. Other foods such as rice, pasta, and grains do not need added salt. · Rinse canned vegetables, and cook them in fresh water. This removes some-but not all-of the salt. · Avoid water that is naturally high in sodium or that has been treated with water softeners, which add sodium. Call your local water company to find out the sodium content of your water supply. If you buy bottled water, read the label and choose a sodium-free brand. Avoid high-sodium foods · Avoid eating: ¨ Smoked, cured, salted, and canned meat, fish, and poultry. ¨ Ham, morales, hot dogs, and luncheon meats. ¨ Regular, hard, and processed cheese and regular peanut butter. ¨ Crackers with salted tops, and other salted snack foods such as pretzels, chips, and salted popcorn. ¨ Frozen prepared meals, unless labeled low-sodium. ¨ Canned and dried soups, broths, and bouillon, unless labeled sodium-free or low-sodium. ¨ Canned vegetables, unless labeled sodium-free or low-sodium. ¨ [place] fries, pizza, tacos, and other fast foods. ¨ Pickles, olives, ketchup, and other condiments, especially soy sauce, unless labeled sodium-free or low-sodium. Where can you learn more? Go to http://cecelia-[place]ia.info/. Enter U950 in the search box to learn more about \"Low Sodium Diet (2,000 Milligram): Care Instructions. \" Current as of: May 12, 2017 Content Version: 11.7 © 6776-9973 [employer]. Care instructions adapted under license by [employer] (which disclaims liability or warranty for this information). If you have questions about a medical condition or this instruction, always ask your healthcare professional. [place] [address] any warranty or liability for your use of this information. Hyperparathyroidism: Care Instructions Your Care Instructions Hyperparathyroidism means that your parathyroid glands are too active. These are tiny glands in the neck. They sit behind the thyroid gland. They make a hormone that helps control how much calcium is in the blood. When these glands make too much hormone, the amount of calcium in the blood goes up. Most people with this problem have no symptoms. But it can cause constipation, nausea, vomiting, fatigue, and depression. It also can lead to high blood pressure, ulcers, kidney stones, and weak bones. This problem often is caused by a tumor on the parathyroid glands. The tumor usually is not cancer. You may need surgery to take out one or more of the glands. Follow-up care is a key part of your treatment and safety. Be sure to make and go to all appointments, and call your doctor if you are having problems. It's also a good idea to know your test results and keep a list of the medicines you take. How can you care for yourself at home? · Take your medicines exactly as prescribed. Call your doctor if you think you are having a problem with your medicine. You will get more details on the specific medicines your doctor prescribes. · You will need to see your doctor regularly to check your condition. You also will have tests to check the level of calcium in your blood and to make sure your kidneys are working well. · Drink plenty of fluids (enough so that your urine is light yellow or clear like water) to prevent dehydration. Choose water and other caffeine-free clear liquids. If you have kidney, heart, or liver disease and have to limit fluids, talk with your doctor before you increase the amount of fluids you drink. · Get at least 30 minutes of exercise on most days of the week. Walking is a good choice. You also may want to do other activities, such as running, swimming, cycling, or playing tennis or team sports. · If you are taking any diuretic medicines or calcium supplements, talk to your doctor about whether you should keep taking them. When should you call for help? Call 911 anytime you think you may need emergency care. For example, call if: 
  · You passed out (lost consciousness).  
 Call your doctor now or seek immediate medical care if: 
  · You are confused or have trouble thinking.  
  · Your vomiting and nausea do not go away with treatment.  
 Watch closely for changes in your health, and be sure to contact your doctor if: 
  · You get weaker and more tired even after treatment.  
  · You feel depressed or have aches and pains.  
  · You are constipated.  
  · You have increased thirst and urination.  
  · You do not feel hungry.  
  · You do not get better as expected. Where can you learn more? Go to http://cecelia-vir[place]ia.info/. Enter D624 in the search box to learn more about \"Hyperparathyroidism: Care Instructions. \" Current as of: May 12, 2017 Content Version: 11.7 © 4063-7285 [employer] Inc[employer]. Care instructions adapted under license by [employer] (which disclaims liability or warranty for this information).  If you have questions about a medical condition or this instruction, always ask your healthcare professional. [place] [address] any warranty or liability for your use of this information. Please provide this summary of care documentation to your next provider. Your primary care clinician is listed as Oanh Orozco. If you have any questions after today's visit, please call [phone].

## 2018-07-11 NOTE — PATIENT INSTRUCTIONS
Low Sodium Diet (2,000 Milligram): Care Instructions  Your Care Instructions    Too much sodium causes your body to hold on to extra water. This can raise your blood pressure and force your heart and kidneys to work harder. In very serious cases, this could cause you to be put in the hospital. It might even be life-threatening. By limiting sodium, you will feel better and lower your risk of serious problems. The most common source of sodium is salt. People get most of the salt in their diet from canned, prepared, and packaged foods. Fast food and restaurant meals also are very high in sodium. Your doctor will probably limit your sodium to less than 2,000 milligrams (mg) a day. This limit counts all the sodium in prepared and packaged foods and any salt you add to your food. Follow-up care is a key part of your treatment and safety. Be sure to make and go to all appointments, and call your doctor if you are having problems. It's also a good idea to know your test results and keep a list of the medicines you take. How can you care for yourself at home? Read food labels  · Read labels on cans and food packages. The labels tell you how much sodium is in each serving. Make sure that you look at the serving size. If you eat more than the serving size, you have eaten more sodium. · Food labels also tell you the Percent Daily Value for sodium. Choose products with low Percent Daily Values for sodium. · Be aware that sodium can come in forms other than salt, including monosodium glutamate (MSG), sodium citrate, and sodium bicarbonate (baking soda). MSG is often added to Asian food. When you eat out, you can sometimes ask for food without MSG or added salt. Buy low-sodium foods  · Buy foods that are labeled \"unsalted\" (no salt added), \"sodium-free\" (less than 5 mg of sodium per serving), or \"low-sodium\" (less than 140 mg of sodium per serving).  Foods labeled \"reduced-sodium\" and \"light sodium\" may still have too much sodium. Be sure to read the label to see how much sodium you are getting. · Buy fresh vegetables, or frozen vegetables without added sauces. Buy low-sodium versions of canned vegetables, soups, and other canned goods. Prepare low-sodium meals  · Cut back on the amount of salt you use in cooking. This will help you adjust to the taste. Do not add salt after cooking. One teaspoon of salt has about 2,300 mg of sodium. · Take the salt shaker off the table. · Flavor your food with garlic, lemon juice, onion, vinegar, herbs, and spices. Do not use soy sauce, lite soy sauce, steak sauce, onion salt, garlic salt, celery salt, mustard, or ketchup on your food. · Use low-sodium salad dressings, sauces, and ketchup. Or make your own salad dressings and sauces without adding salt. · Use less salt (or none) when recipes call for it. You can often use half the salt a recipe calls for without losing flavor. Other foods such as rice, pasta, and grains do not need added salt. · Rinse canned vegetables, and cook them in fresh water. This removes some-but not all-of the salt. · Avoid water that is naturally high in sodium or that has been treated with water softeners, which add sodium. Call your local water company to find out the sodium content of your water supply. If you buy bottled water, read the label and choose a sodium-free brand. Avoid high-sodium foods  · Avoid eating:  ¨ Smoked, cured, salted, and canned meat, fish, and poultry. ¨ Ham, morales, hot dogs, and luncheon meats. ¨ Regular, hard, and processed cheese and regular peanut butter. ¨ Crackers with salted tops, and other salted snack foods such as pretzels, chips, and salted popcorn. ¨ Frozen prepared meals, unless labeled low-sodium. ¨ Canned and dried soups, broths, and bouillon, unless labeled sodium-free or low-sodium. ¨ Canned vegetables, unless labeled sodium-free or low-sodium. ¨ [employer] [place] fries, pizza, tacos, and other fast foods.   Matt Luna olives, ketchup, and other condiments, especially soy sauce, unless labeled sodium-free or low-sodium. Where can you learn more? Go to http://cecelia-[place].info/. Enter M075 in the search box to learn more about \"Low Sodium Diet (2,000 Milligram): Care Instructions. \"  Current as of: May 12, 2017  Content Version: 11.7  © 8288-4519 [employer]. Care instructions adapted under license by [employer] (which disclaims liability or warranty for this information). If you have questions about a medical condition or this instruction, always ask your healthcare professional. [address] any warranty or liability for your use of this information. Hyperparathyroidism: Care Instructions  Your Care Instructions  Hyperparathyroidism means that your parathyroid glands are too active. These are tiny glands in the neck. They sit behind the thyroid gland. They make a hormone that helps control how much calcium is in the blood. When these glands make too much hormone, the amount of calcium in the blood goes up. Most people with this problem have no symptoms. But it can cause constipation, nausea, vomiting, fatigue, and depression. It also can lead to high blood pressure, ulcers, kidney stones, and weak bones. This problem often is caused by a tumor on the parathyroid glands. The tumor usually is not cancer. You may need surgery to take out one or more of the glands. Follow-up care is a key part of your treatment and safety. Be sure to make and go to all appointments, and call your doctor if you are having problems. It's also a good idea to know your test results and keep a list of the medicines you take. How can you care for yourself at home? · Take your medicines exactly as prescribed. Call your doctor if you think you are having a problem with your medicine. You will get more details on the specific medicines your doctor prescribes.   · You will need to see your doctor regularly to check your condition. You also will have tests to check the level of calcium in your blood and to make sure your kidneys are working well. · Drink plenty of fluids (enough so that your urine is light yellow or clear like water) to prevent dehydration. Choose water and other caffeine-free clear liquids. If you have kidney, heart, or liver disease and have to limit fluids, talk with your doctor before you increase the amount of fluids you drink. · Get at least 30 minutes of exercise on most days of the week. Walking is a good choice. You also may want to do other activities, such as running, swimming, cycling, or playing tennis or team sports. · If you are taking any diuretic medicines or calcium supplements, talk to your doctor about whether you should keep taking them. When should you call for help? Call 911 anytime you think you may need emergency care. For example, call if:    · You passed out (lost consciousness).    Call your doctor now or seek immediate medical care if:    · You are confused or have trouble thinking.     · Your vomiting and nausea do not go away with treatment.    Watch closely for changes in your health, and be sure to contact your doctor if:    · You get weaker and more tired even after treatment.     · You feel depressed or have aches and pains.     · You are constipated.     · You have increased thirst and urination.     · You do not feel hungry.     · You do not get better as expected. Where can you learn more? Go to http://cecelia-[place]ia.info/. Enter D624 in the search box to learn more about \"Hyperparathyroidism: Care Instructions. \"  Current as of: May 12, 2017  Content Version: 11.7  © 1624-8215 [employer]. Care instructions adapted under license by [facility] (which disclaims liability or warranty for this information).  If you have questions about a medical condition or this instruction, always ask your healthcare professional. [address] any warranty or liability for your use of this information.

## 2018-07-11 NOTE — PROGRESS NOTES
1. Have you been to the ER, urgent care clinic since your last visit? Hospitalized since your last visit? No    2. Have you seen or consulted any other health care providers outside of the [address] [place] [address] since your last visit? Include any pap smears or colon screening.  Dr. Sandra Figueroa: 5/2018    Patient has not had dm foot exam.

## 2018-07-11 NOTE — PROGRESS NOTES
HISTORY OF PRESENT ILLNESS  Nicolasa Spann is a 67 y.o. female. HPI: Here for follow up after endo appt. Had an elevated calcium and elevated PTH. Sent to endo and had parathyroid scan and showed no adenoma. Now has a follow up appt to discuss treatment option further. Review endo notes and parathyroid scan result which was available in the chart. Noted changed HCTZ to spironolactone. She has h./o hypertension. Today borderline blood pressure but said at home was wnl. Checks blood pressure at home and never comes high more than 130/90. Denies any concern. No leg swelling. Denies any headache, dizziness, no chest pain or trouble breathing, no arm or leg weakness. No nausea or vomiting, no weight or appetite changes, no mood changes. No urine or bowel complains, no palpitation, no diaphoresis. Denies any change in voice. No trouble swallowing. Has also h/o hypothyroidism. Asymptomatic. Visit Vitals    /86 (BP 1 Location: Left arm, BP Patient Position: Sitting)    Pulse 60    Temp 97.9 °F (36.6 °C) (Oral)    Resp 16    Ht 5' 6\" (1.676 m)    Wt 184 lb 12.8 oz (83.8 kg)    SpO2 99%    BMI 29.83 kg/m2     Review medication list, vitals, problem list,allergies. ROS: see HPI     Physical Exam   Constitutional: She is oriented to person, place, and time. No distress. Cardiovascular: Normal heart sounds. Pulmonary/Chest: No respiratory distress. She has no wheezes. Musculoskeletal: She exhibits no edema. Neurological: She is oriented to person, place, and time. Psychiatric: Her behavior is normal.       ASSESSMENT and PLAN    ICD-10-CM ICD-9-CM    1. Essential hypertension: stable at this time. Low salt diet. Exercise as tolerated. Will continue current plan. I10 401.9 metoprolol succinate (TOPROL-XL) 50 mg XL tablet   2. Elevated PTHrP level ([address]): seen endo and will follow their recommendations.  Discussed parathyroid scan result with her and also continue spironolactone per their recommendations and d/c hctz. Low salt diet. E21.5 252.9    Pt understood and agree with the plan   Review hM   Follow-up Disposition:  Return in about 2 months (around 9/11/2018).

## 2018-07-12 NOTE — PROGRESS NOTES
I have personally seen and evaluated the device findings. Interrogation reviewed and I agree with assessment.     Sammi Jones

## 2018-08-20 DIAGNOSIS — I10 ESSENTIAL HYPERTENSION: ICD-10-CM

## 2018-08-20 RX ORDER — METOPROLOL SUCCINATE 50 MG/1
50 TABLET, EXTENDED RELEASE ORAL DAILY
Qty: 90 TAB | Refills: 1 | Status: SHIPPED | OUTPATIENT
Start: 2018-08-20 | End: 2019-02-04 | Stop reason: SDUPTHER

## 2018-08-20 NOTE — TELEPHONE ENCOUNTER
This pharmacy faxed over request for the following prescriptions to be filled:    Medication requested :   Requested Prescriptions     Pending Prescriptions Disp Refills    metoprolol succinate (TOPROL-XL) 50 mg XL tablet 90 Tab 3     Sig: Take 1 Tab by mouth daily.  Per patient she does not take if her BP is low       PCP: Kerri Blackmon or Print: [facility]  Mail order or Local pharmacy [phone]    Scheduled appointment if not seen by current providers in office:LOV 07/11/18 Upcoming 09/18/18

## 2018-09-13 DIAGNOSIS — E11.9 WELL CONTROLLED DIABETES MELLITUS (HCC): ICD-10-CM

## 2018-09-14 RX ORDER — METFORMIN HYDROCHLORIDE 500 MG/1
TABLET ORAL
Qty: 180 TAB | Refills: 0 | Status: SHIPPED | OUTPATIENT
Start: 2018-09-14 | End: 2018-12-09 | Stop reason: SDUPTHER

## 2018-09-18 ENCOUNTER — CLINICAL SUPPORT (OUTPATIENT)
Dept: CARDIOLOGY CLINIC | Age: 73
End: 2018-09-18

## 2018-09-18 ENCOUNTER — HOSPITAL ENCOUNTER (OUTPATIENT)
Dept: MAMMOGRAPHY | Age: 73
Discharge: HOME | End: 2018-09-18
Attending: FAMILY MEDICINE
Payer: MEDICARE

## 2018-09-18 ENCOUNTER — OFFICE VISIT (OUTPATIENT)
Dept: CARDIOLOGY CLINIC | Age: 73
End: 2018-09-18

## 2018-09-18 ENCOUNTER — OFFICE VISIT (OUTPATIENT)
Dept: FAMILY MEDICINE CLINIC | Age: 73
End: 2018-09-18

## 2018-09-18 VITALS
TEMPERATURE: 98.2 F | WEIGHT: 184 LBS | SYSTOLIC BLOOD PRESSURE: 130 MMHG | BODY MASS INDEX: 29.57 KG/M2 | OXYGEN SATURATION: 99 % | HEART RATE: 60 BPM | DIASTOLIC BLOOD PRESSURE: 82 MMHG | HEIGHT: 66 IN | RESPIRATION RATE: 16 BRPM

## 2018-09-18 VITALS
HEIGHT: 66 IN | OXYGEN SATURATION: 98 % | BODY MASS INDEX: 29.73 KG/M2 | HEART RATE: 60 BPM | SYSTOLIC BLOOD PRESSURE: 110 MMHG | DIASTOLIC BLOOD PRESSURE: 70 MMHG | WEIGHT: 185 LBS

## 2018-09-18 DIAGNOSIS — I10 ESSENTIAL HYPERTENSION: ICD-10-CM

## 2018-09-18 DIAGNOSIS — Z95.0 CARDIAC PACEMAKER IN SITU: ICD-10-CM

## 2018-09-18 DIAGNOSIS — Z12.31 VISIT FOR SCREENING MAMMOGRAM: ICD-10-CM

## 2018-09-18 DIAGNOSIS — N28.9 RENAL INSUFFICIENCY: ICD-10-CM

## 2018-09-18 DIAGNOSIS — I48.91 ATRIAL FIBRILLATION, UNSPECIFIED TYPE (HCC): ICD-10-CM

## 2018-09-18 DIAGNOSIS — E11.65 TYPE 2 DIABETES MELLITUS WITH HYPERGLYCEMIA, WITHOUT LONG-TERM CURRENT USE OF INSULIN (HCC): Primary | ICD-10-CM

## 2018-09-18 DIAGNOSIS — E83.52 HYPERCALCEMIA: ICD-10-CM

## 2018-09-18 DIAGNOSIS — R00.1 BRADYCARDIA: ICD-10-CM

## 2018-09-18 DIAGNOSIS — E78.00 PURE HYPERCHOLESTEROLEMIA: ICD-10-CM

## 2018-09-18 DIAGNOSIS — I48.0 PAROXYSMAL ATRIAL FIBRILLATION (HCC): Primary | ICD-10-CM

## 2018-09-18 DIAGNOSIS — R00.1 BRADYCARDIA: Primary | ICD-10-CM

## 2018-09-18 DIAGNOSIS — E78.5 HYPERLIPIDEMIA, UNSPECIFIED HYPERLIPIDEMIA TYPE: ICD-10-CM

## 2018-09-18 DIAGNOSIS — E03.9 HYPOTHYROIDISM, UNSPECIFIED TYPE: ICD-10-CM

## 2018-09-18 DIAGNOSIS — R79.89 ELEVATED PTHRP LEVEL: ICD-10-CM

## 2018-09-18 PROBLEM — E11.21 TYPE 2 DIABETES WITH NEPHROPATHY (HCC): Status: ACTIVE | Noted: 2018-09-18

## 2018-09-18 PROCEDURE — 77063 BREAST TOMOSYNTHESIS BI: CPT

## 2018-09-18 RX ORDER — CHOLECALCIFEROL (VITAMIN D3) 125 MCG
CAPSULE ORAL DAILY
COMMUNITY

## 2018-09-18 NOTE — PATIENT INSTRUCTIONS
Learning About Physical Activity What is physical activity? Physical activity is any kind of activity that gets your body moving. The types of physical activity that can help you get fit and stay healthy include: · Aerobic or \"cardio\" activities that make your heart beat faster and make you breathe harder, such as brisk walking, riding a bike, or running. Aerobic activities strengthen your heart and lungs and build up your endurance. · Strength training activities that make your muscles work against, or \"resist,\" something, such as lifting weights or doing push-ups. These activities help tone and strengthen your muscles. · Stretches that allow you to move your joints and muscles through their full range of motion. Stretching helps you be more flexible and avoid injury. What are the benefits of physical activity? Being active is one of the best things you can do to get fit and stay healthy. It helps you to: · Feel stronger and have more energy to do all the things you like to do. · Focus better at school or work and perform better in sports. · Feel, think, and sleep better. · Reach and stay at a healthy weight. · Lose fat and build lean muscle. · Lower your risk for serious health problems. · Keep your bones, muscles, and joints strong. Being fit lets you do more physical activity. And it lets you work out harder without as much effort. How can you make physical activity part of your life? Get at least 30 minutes of exercise on most days of the week. Walking is a good choice. You also may want to do other activities, such as running, swimming, cycling, or playing tennis or team sports. Pick activities that you like-ones that make your heart beat faster, your muscles stronger, and your muscles and joints more flexible. If you find more than one thing you like doing, do them all. You don't have to do the same thing every day. Get your heart pumping every day. Any activity that makes your heart beat faster and keeps it at that rate for a while counts. Here are some great ways to get your heart beating faster: · Go for a brisk walk, run, or bike ride. · Go for a hike or swim. · Go in-line skating. · Play a game of touch football, basketball, or soccer. · Ride a bike. · Play tennis or racquetball. · Climb stairs. Even some household chores can be aerobic-just do them at a faster pace. Vacuuming, raking or mowing the lawn, sweeping the garage, and washing and waxing the car all can help get your heart rate up. Strengthen your muscles during the week. You don't have to lift heavy weights or grow big, bulky muscles to get stronger. Doing a few simple activities that make your muscles work against, or \"resist,\" something can help you get stronger. For example, you can: · Do push-ups or sit-ups, which use your own body weight as resistance. · Lift weights or dumbbells or use stretch bands at home or in a gym or community center. Stretch your muscles often. Stretching will help you as you become more active. It can help you stay flexible, loosen tight muscles, and avoid injury. It can also help improve your balance and posture and can be a great way to relax. Be sure to stretch the muscles you'll be using when you work out. It's best to warm your muscles slightly before you stretch them. Walk or do some other light aerobic activity for a few minutes, and then start stretching. When you stretch your muscles: · Do it slowly. Stretching is not about going fast or making sudden movements. · Don't push or bounce during a stretch. · Hold each stretch for at least 15 to 30 seconds, if you can. You should feel a stretch in the muscle, but not pain. · Breathe out as you do the stretch. Then breathe in as you hold the stretch. Don't hold your breath.  
If you're worried about how more activity might affect your health, have a checkup before you start. Follow any special advice your doctor gives you for getting a smart start. Where can you learn more? Go to http://cecelia-[place].info/. Enter F424 in the search box to learn more about \"Learning About Physical Activity. \" Current as of: December 7, 2017 Content Version: 11.7 © 7397-0425 [employer]. Care instructions adapted under license by [employer] (which disclaims liability or warranty for this information). If you have questions about a medical condition or this instruction, always ask your healthcare professional. [address] any warranty or liability for your use of this information. Nutrition Tips for Diabetes: After Your Visit Your Care Instructions A healthy diet is important to manage diabetes. It helps you lose weight (if you need to) and keep it off. It gives you the nutrition and energy your body needs and helps prevent heart disease. But a diet for diabetes does not mean that you have to eat special foods. You can eat what your family eats, including occasional sweets and other favorites. But you do have to pay attention to how often you eat and how much you eat of certain foods. The right plan for you will give you meals that help you keep your blood sugar at healthy levels. Try to eat a variety of foods and to spread carbohydrate throughout the day. Carbohydrate raises blood sugar higher and more quickly than any other nutrient does. Carbohydrate is found in sugar, breads and cereals, fruit, starchy vegetables such as potatoes and corn, and milk and yogurt. You may want to work with a dietitian or diabetes educator to help you plan meals and snacks. A dietitian or diabetes educator also can help you lose weight if that is one of your goals. The following tips can help you enjoy your meals and stay healthy. Follow-up care is a key part of your treatment and safety.  Be sure to make and go to all appointments, and call your doctor if you are having problems. Its also a good idea to know your test results and keep a list of the medicines you take. How can you care for yourself at home? · Learn which foods have carbohydrate and how much carbohydrate to eat. A dietitian or diabetes educator can help you learn to keep track of how much carbohydrate you eat. · Spread carbohydrate throughout the day. Eat some carbohydrate at all meals, but do not eat too much at any one time. · Plan meals to include food from all the food groups. These are the food groups and some example portion sizes: ¨ Grains: 1 slice of bread (1 ounce), ½ cup of cooked cereal, and 1/3 cup of cooked pasta or rice. These have about 15 grams of carbohydrate in a serving. Choose whole grains such as whole wheat bread or crackers, oatmeal, and brown rice more often than refined grains. ¨ Fruit: 1 small fresh fruit, such as an apple or orange; ½ of a banana; ½ cup of chopped, cooked, or canned fruit; ½ cup of fruit juice; 1 cup of melon or raspberries; and 2 tablespoons of dried fruit. These have about 15 grams of carbohydrate in a serving. ¨ Dairy: 1 cup of nonfat or low-fat milk and 2/3 cup of plain yogurt. These have about 15 grams of carbohydrate in a serving. ¨ Protein foods: Beef, chicken, turkey, fish, eggs, tofu, cheese, cottage cheese, and peanut butter. A serving size of meat is 3 ounces, which is about the size of a deck of cards. Examples of meat substitute serving sizes (equal to 1 ounce of meat) are 1/4 cup of cottage cheese, 1 egg, 1 tablespoon of peanut butter, and ½ cup of tofu. These have very little or no carbohydrate per serving. ¨ Vegetables: Starchy vegetables such as ½ cup of cooked dried beans, peas, potatoes, or corn have about 15 grams of carbohydrate.  Nonstarchy vegetables have very little carbohydrate, such as 1 cup of raw leafy vegetables (such as spinach), ½ cup of other vegetables (cooked or chopped), and 3/4 cup of vegetable juice. · Use the plate format to plan meals. It is a good, quick way to make sure that you have a balanced meal. It also helps you spread carbohydrate throughout the day. You divide your plate by types of foods. Put vegetables on half the plate, meat or meat substitutes on one-quarter of the plate, and a grain or starchy vegetable (such as brown rice or a potato) in the final quarter of the plate. To this you can add a small piece of fruit and 1 cup of milk or yogurt, depending on how much carbohydrate you are supposed to eat at a meal. 
· Talk to your dietitian or diabetes educator about ways to add limited amounts of sweets into your meal plan. You can eat these foods now and then, as long as you include the amount of carbohydrate they have in your daily carbohydrate allowance. · If you drink alcohol, limit it to no more than 1 drink a day for women and 2 drinks a day for men. If you are pregnant, no amount of alcohol is known to be safe. · Protein, fat, and fiber do not raise blood sugar as much as carbohydrate does. If you eat a lot of these nutrients in a meal, your blood sugar will rise more slowly than it would otherwise. · Limit saturated fats, such as those from meat and dairy products. Try to replace it with monounsaturated fat, such as olive oil. This is a healthier choice because people who have diabetes are at higher-than-average risk of heart disease. But use a modest amount of olive oil. A tablespoon of olive oil has 14 grams of fat and 120 calories. · Exercise lowers blood sugar. If you take insulin by shots or pump, you can use less than you would if you were not exercising. Keep in mind that timing matters. If you exercise within 1 hour after a meal, your body may need less insulin for that meal than it would if you exercised 3 hours after the meal. Test your blood sugar to find out how exercise affects your need for insulin. · Exercise on most days of the week. Aim for at least 30 minutes. Exercise helps you stay at a healthy weight and helps your body use insulin. Walking is an easy way to get exercise. Gradually increase the amount you walk every day. You also may want to swim, bike, or do other activities. When you eat out · Learn to estimate the serving sizes of foods that have carbohydrate. If you measure food at home, it will be easier to estimate the amount in a serving of restaurant food. · If the meal you order has too much carbohydrate (such as potatoes, corn, or baked beans), ask to have a low-carbohydrate food instead. Ask for a salad or green vegetables. · If you use insulin, check your blood sugar before and after eating out to help you plan how much to eat in the future. · If you eat more carbohydrate at a meal than you had planned, take a walk or do other exercise. This will help lower your blood sugar. Where can you learn more? Go to [employer] Enter O551 in the search box to learn more about \"Nutrition Tips for Diabetes: After Your Visit. \"  
© 5336-4831 [employer]. Care instructions adapted under license by [employer] (which disclaims liability or warranty for this information). This care instruction is for use with your licensed healthcare professional. If you have questions about a medical condition or this instruction, always ask your healthcare professional. [place] [address] any warranty or liability for your use of this information. Content Version: 75.4.596962; Current as of: June 4, 2014 Low Sodium Diet (2,000 Milligram): Care Instructions Your Care Instructions Too much sodium causes your body to hold on to extra water. This can raise your blood pressure and force your heart and kidneys to work harder.  In very serious cases, this could cause you to be put in the hospital. It might even be life-threatening. By limiting sodium, you will feel better and lower your risk of serious problems. The most common source of sodium is salt. People get most of the salt in their diet from canned, prepared, and packaged foods. Fast food and restaurant meals also are very high in sodium. Your doctor will probably limit your sodium to less than 2,000 milligrams (mg) a day. This limit counts all the sodium in prepared and packaged foods and any salt you add to your food. Follow-up care is a key part of your treatment and safety. Be sure to make and go to all appointments, and call your doctor if you are having problems. It's also a good idea to know your test results and keep a list of the medicines you take. How can you care for yourself at home? Read food labels · Read labels on cans and food packages. The labels tell you how much sodium is in each serving. Make sure that you look at the serving size. If you eat more than the serving size, you have eaten more sodium. · Food labels also tell you the Percent Daily Value for sodium. Choose products with low Percent Daily Values for sodium. · Be aware that sodium can come in forms other than salt, including monosodium glutamate (MSG), sodium citrate, and sodium bicarbonate (baking soda). MSG is often added to Asian food. When you eat out, you can sometimes ask for food without MSG or added salt. Buy low-sodium foods · Buy foods that are labeled \"unsalted\" (no salt added), \"sodium-free\" (less than 5 mg of sodium per serving), or \"low-sodium\" (less than 140 mg of sodium per serving). Foods labeled \"reduced-sodium\" and \"light sodium\" may still have too much sodium. Be sure to read the label to see how much sodium you are getting. · Buy fresh vegetables, or frozen vegetables without added sauces. Buy low-sodium versions of canned vegetables, soups, and other canned goods. Prepare low-sodium meals · Cut back on the amount of salt you use in cooking. This will help you adjust to the taste. Do not add salt after cooking. One teaspoon of salt has about 2,300 mg of sodium. · Take the salt shaker off the table. · Flavor your food with garlic, lemon juice, onion, vinegar, herbs, and spices. Do not use soy sauce, lite soy sauce, steak sauce, onion salt, garlic salt, celery salt, mustard, or ketchup on your food. · Use low-sodium salad dressings, sauces, and ketchup. Or make your own salad dressings and sauces without adding salt. · Use less salt (or none) when recipes call for it. You can often use half the salt a recipe calls for without losing flavor. Other foods such as rice, pasta, and grains do not need added salt. · Rinse canned vegetables, and cook them in fresh water. This removes some-but not all-of the salt. · Avoid water that is naturally high in sodium or that has been treated with water softeners, which add sodium. Call your local water company to find out the sodium content of your water supply. If you buy bottled water, read the label and choose a sodium-free brand. Avoid high-sodium foods · Avoid eating: ¨ Smoked, cured, salted, and canned meat, fish, and poultry. ¨ Ham, morales, hot dogs, and luncheon meats. ¨ Regular, hard, and processed cheese and regular peanut butter. ¨ Crackers with salted tops, and other salted snack foods such as pretzels, chips, and salted popcorn. ¨ Frozen prepared meals, unless labeled low-sodium. ¨ Canned and dried soups, broths, and bouillon, unless labeled sodium-free or low-sodium. ¨ Canned vegetables, unless labeled sodium-free or low-sodium. ¨ [employer] fries, pizza, tacos, and other fast foods. ¨ Pickles, olives, ketchup, and other condiments, especially soy sauce, unless labeled sodium-free or low-sodium. Where can you learn more? Go to http://cecelia-[place].info/. Enter D993 in the search box to learn more about \"Low Sodium Diet (2,000 Milligram): Care Instructions. \" Current as of: May 12, 2017 Content Version: 11.7 © 2261-1185 [employer]. Care instructions adapted under license by [employer] (which disclaims liability or warranty for this information). If you have questions about a medical condition or this instruction, always ask your healthcare professional. [address] any warranty or liability for your use of this information.

## 2018-09-18 NOTE — PROGRESS NOTES
Marianela Perez presents today for   Chief Complaint   Patient presents with    Follow-up     6 month - no cardiac complaints       Marianela Perez preferred language for health care discussion is english/other. Is someone accompanying this pt? No    Is the patient using any DME equipment during OV? NO    Depression Screening:  PHQ over the last two weeks 2/21/2018   Little interest or pleasure in doing things Not at all   Feeling down, depressed, irritable, or hopeless Not at all   Total Score PHQ 2 0       Learning Assessment:  Learning Assessment 3/19/2018   PRIMARY LEARNER Patient   HIGHEST LEVEL OF EDUCATION - PRIMARY LEARNER  -   BARRIERS PRIMARY LEARNER -   [facility]    NEED -   LEARNER PREFERENCE PRIMARY DEMONSTRATION   LEARNING SPECIAL TOPICS -   ANSWERED BY Patient   RELATIONSHIP SELF       Abuse Screening:  Abuse Screening Questionnaire 11/1/2017   Do you ever feel afraid of your partner? N   Are you in a relationship with someone who physically or mentally threatens you? N   Is it safe for you to go home? Y       Fall Risk  Fall Risk Assessment, last 12 mths 2/21/2018   Able to walk? Yes   Fall in past 12 months? No       Pt currently taking Antiplatelet therapy? Aspirin    Coordination of Care:  1. Have you been to the ER, urgent care clinic since your last visit? Hospitalized since your last visit? No    2. Have you seen or consulted any other health care providers outside of the [address] [place] [address] since your last visit? Include any pap smears or colon screening.  No

## 2018-09-18 NOTE — PROGRESS NOTES
HISTORY OF PRESENT ILLNESS  Cinthia Glaser is a 75 y.o. female. HPI  She has been doing quite well. She has no cardiac complaints. She denies chest pain, dyspnea, orthopnea, PND. Particularly, she has had no palpitation. She denies dizziness or syncope. She denies any symptoms of TIA or amaurosis fugax. The interrogation of her pacemaker demonstrated no recurrent atrial fibrillation. She has known history of cardiac problems.  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  She had atrial fibrillation and finally underwent the atrial fibrillation ablation successfully in March of 2016.  She was treated with flecainide briefly.  As the flecainide was discontinued, she has been anticoagulated with Eliquis.  She has previous history of DVT after knee surgery.  She has history of hypercholesterolemia, but has not been able to tolerate statins because of severe myalgia and other side effects. Hue Davis is known to have had type 2 diabetes. Hue Davis has history of seizure disorder; however, she has not had any recurrent seizures since 2006 off medication.        Review of Systems   Constitutional: Negative for malaise/fatigue and weight loss. HENT: Negative for hearing loss. Eyes: Negative for blurred vision and double vision. Respiratory: Negative for shortness of breath. Cardiovascular: Positive for palpitations. Negative for chest pain, orthopnea, claudication, leg swelling and PND. Gastrointestinal: Negative for blood in stool, heartburn and melena. Genitourinary: Negative for dysuria, frequency, hematuria and urgency. Musculoskeletal: Negative for back pain and joint pain. Skin: Negative for itching and rash. Neurological: Negative for dizziness, loss of consciousness and weakness. Psychiatric/Behavioral: Negative for depression and memory loss. Physical Exam   Constitutional: She is oriented to person, place, and time.  She appears well-developed and well-nourished. HENT:   Head: Normocephalic and atraumatic. Eyes: Conjunctivae are normal. Pupils are equal, round, and reactive to light. Neck: Normal range of motion. Neck supple. No JVD present. Cardiovascular: Normal rate, regular rhythm, S1 normal and S2 normal.   No extrasystoles are present. PMI is not displaced. Exam reveals no gallop and no friction rub. No murmur heard. Pulses:       Carotid pulses are 3+ on the right side, and 3+ on the left side. Pulmonary/Chest: Effort normal. She has no rales. Abdominal: Soft. There is no tenderness. Musculoskeletal: She exhibits no edema. Neurological: She is alert and oriented to person, place, and time. No cranial nerve deficit. Skin: Skin is warm and dry. Psychiatric: She has a normal mood and affect. Her behavior is normal.     Visit Vitals    /70    Pulse 60    Ht 5' 6\" (1.676 m)    Wt 83.9 kg (185 lb)    SpO2 98%    BMI 29.86 kg/m2       Past Medical History:   Diagnosis Date    Bradycardia     Diabetes (HCC)     borderline    DVT (deep venous thrombosis) ([address] 2010    right    History of ablation March 2016     History of atrial fibrillation     Hypercholesterolemia     Hypertension     Long term current use of anticoagulant therapy     Osteoporosis     Seizure ([address]     Seizures ([address]     on medication from 2006 to 2013 no seizure since 2006    [employer] pacemaker implant Set. 2013 Dr. Perez [place], [place]     Thyroid disease        Social History     Social History    Marital status:      Spouse name: N/A    Number of children: N/A    Years of education: N/A     Occupational History    Not on file.      Social History Main Topics    Smoking status: Never Smoker    Smokeless tobacco: Never Used    Alcohol use No    Drug use: No    Sexual activity: Not Currently     Partners: Male     Birth control/ protection: Surgical      Comment: hysterectomy     Other Topics Concern    Not on file     Social History Narrative       Family History   Problem Relation Age of Onset    Diabetes Father     Hypertension Sister        Past Surgical History:   Procedure Laterality Date    CARDIAC SURG PROCEDURE UNLIST      ablation for a-fib    COLONOSCOPY N/A 10/25/2017    COLONOSCOPY performed by Tabitha Goodell, MD at [facility]OSCOPY    HX CATARACT REMOVAL      HX HYSTERECTOMY      HX KNEE REPLACEMENT      left knee in 1/2010 and right knee 7/2016    HX KNEE REPLACEMENT      right knee    HX PACEMAKER         Current Outpatient Prescriptions   Medication Sig Dispense Refill    metFORMIN (GLUCOPHAGE) 500 mg tablet TAKE 1 TABLET BY MOUTH TWICE A DAY WITH FOOD 180 Tab 0    metoprolol succinate (TOPROL-XL) 50 mg XL tablet Take 1 Tab by mouth daily. Per patient she does not take if her BP is low 90 Tab 1    spironolactone (ALDACTONE) 25 mg tablet Take 1 Tab by mouth daily. 4    pravastatin (PRAVACHOL) 10 mg tablet Take 1 Tab by mouth nightly. 90 Tab 1    olmesartan (BENICAR) 40 mg tablet Take 1 Tab by mouth daily. 90 Tab 1    glucose blood VI test strips (BLOOD GLUCOSE TEST) strip by Does Not Apply route See Admin Instructions. Check once a day 100 Strip 6    aspirin 81 mg chewable tablet Take 81 mg by mouth daily.  DOCOSAHEXANOIC ACID/EPA (FISH OIL PO) Take 100 mg by mouth daily.  COQ10, LIPOSOMAL UBIQUINOL, PO Take 200 mg by mouth daily. EKG: unchanged from previous tracings, atrially paced rhythm  . ASSESSMENT and PLAN  Encounter Diagnoses   Name Primary?  Paroxysmal atrial fibrillation (HCC) Yes    Bradycardia/ post pacemaker. s/p ablation     Essential hypertension     Pure hypercholesterolemia    She has been doing well. She has had no symptoms to indicate angina or cardiac decompensation. She has had no recurrent atrial fibrillation documented on pacemaker interrogation. She has been off of Eliquis for the past six months or so.  I would keep her off Eliquis at this time permanently.

## 2018-09-18 NOTE — MR AVS SNAPSHOT
[address] 
[phone] Patient: Noemi Velez MRN: [id number] Visit Information Date & Time Provider Department Dept. Phone Encounter #  
 9/18/2018 10:30 AM Magdy Baure, [address] [place] [address] [id number] Follow-up Instructions Return in about 3 months (around 12/18/2018). Your Appointments 3/20/2019  9:00 AM  
Follow Up with Merle Ordaz MD  
[facility] [place] [facility]\"" ([facility]) Appt Note: 6 mo f/u  
 [address] [id number] [address] [address] [phone] [phone]  
  
    
 3/20/2019  9:00 AM  
PROCEDURE with Pacer Kristina Csi [facility] [place] [facility]\"" ([facility]) Appt Note: 6 mo [facility] antonio device ck same time as Dr. Rita Powell appt. [facility] [address] [address] [address] [phone] [address] [place] [address] [address] Upcoming Health Maintenance Date Due Pneumococcal 65+ Low/Medium Risk (2 of 2 - PPSV23) 7/31/2018 Influenza Age 5 to Adult 8/1/2018 EYE EXAM RETINAL OR DILATED Q1 9/6/2018 HEMOGLOBIN A1C Q6M 11/17/2018 MICROALBUMIN Q1 3/10/2019 LIPID PANEL Q1 3/10/2019 MEDICARE YEARLY EXAM 4/19/2019 BREAST CANCER SCRN MAMMOGRAM 6/27/2019 GLAUCOMA SCREENING Q2Y 9/6/2019 FOOT EXAM Q1 9/18/2019 COLONOSCOPY 10/25/2022 DTaP/Tdap/Td series (2 - Td) 7/24/2027 Allergies as of 9/18/2018  Review Complete On: 9/18/2018 By: Magdy Bauer MD  
  
 Severity Noted Reaction Type Reactions Statins-hmg-coa Reductase Inhibitors  03/21/2018    Other (comments) Felt sick Current Immunizations  Reviewed on 9/18/2018 Name Date Influenza High Dose Vaccine PF 11/1/2017 Influenza Vaccine 10/1/2016 Reviewed by Rafael Green MD on 9/18/2018 at 11:00 AM  
You Were Diagnosed With   
  
 Codes Comments Type 2 diabetes mellitus with hyperglycemia, without long-term current use of insulin (HCC)    -  Primary ICD-10-CM: E11.65 ICD-9-CM: 250.00, 790.29 Atrial fibrillation, unspecified type ([facility]     ICD-10-CM: I48.91 
ICD-9-CM: 427.31 Essential hypertension     ICD-10-CM: I10 
ICD-9-CM: 401.9 Hyperlipidemia, unspecified hyperlipidemia type     ICD-10-CM: E78.5 ICD-9-CM: 272.4 Hypothyroidism, unspecified type     ICD-10-CM: E03.9 ICD-9-CM: 244.9 Elevated PTHrP level (HCC)     ICD-10-CM: E21.5 ICD-9-CM: 252.9 Hypercalcemia     ICD-10-CM: I12.94 
ICD-9-CM: 275.42 Renal insufficiency     ICD-10-CM: N28.9 ICD-9-CM: 593.9 Lump     ICD-10-CM: R22.9 ICD-9-CM: 782.2 forehead BMI 29.0-29.9,adult     ICD-10-CM: S80.07 ICD-9-CM: V85.25 Vitals BP Pulse Temp Resp Height(growth percentile) Weight(growth percentile) 130/82 (BP 1 Location: Left arm, BP Patient Position: Sitting) 60 98.2 °F (36.8 °C) (Oral) 16 5' 6\" (1.676 m) 184 lb (83.5 kg) SpO2 BMI OB Status Smoking Status 99% 29.7 kg/m2 Hysterectomy Never Smoker BMI and BSA Data Body Mass Index Body Surface Area  
 29.7 kg/m 2 1.97 m 2 Preferred Pharmacy Pharmacy Name Phone [facility] #[id number] [address] [facility] [phone] Your Updated Medication List  
  
   
This list is accurate as of 9/18/18 11:04 AM.  Always use your most recent med list.  
  
  
  
  
 aspirin 81 mg chewable tablet Take 81 mg by mouth daily. COQ10 (LIPOSOMAL UBIQUINOL) PO Take 200 mg by mouth daily. FISH OIL PO Take 100 mg by mouth daily. glucose blood VI test strips strip Commonly known as:  blood glucose test  
by Does Not Apply route See Admin Instructions. Check once a day  
  
 metFORMIN 500 mg tablet Commonly known as:  GLUCOPHAGE  
 TAKE 1 TABLET BY MOUTH TWICE A DAY WITH FOOD  
  
 metoprolol succinate 50 mg XL tablet Commonly known as:  TOPROL-XL Take 1 Tab by mouth daily. Per patient she does not take if her BP is low  
  
 olmesartan 40 mg tablet Commonly known as:  [employer] Take 1 Tab by mouth daily. pravastatin 10 mg tablet Commonly known as:  PRAVACHOL Take 1 Tab by mouth nightly. spironolactone 25 mg tablet Commonly known as:  ALDACTONE Take 1 Tab by mouth daily. VITAMIN D3 2,000 unit Tab Generic drug:  cholecalciferol (vitamin D3) Take  by mouth. We Performed the Following REFERRAL TO GENERAL SURGERY [REF27 Custom] Follow-up Instructions Return in about 3 months (around 12/18/2018). To-Do List   
 09/18/2018 12:30 PM  
  Appointment with SHANTA BECERRA at [address] [place] [address] ([phone]) PAYMENT  For Non-Medicare patients - $15.00 will be collected from you at the time of your exam.  You will be billed $35.00 from the reading [facility]. OUTSIDE FILMS  - Any outside films related to the study being scheduled should be brought with you on the day of the exam.  If this cannot be done there may be a delay in the reading of the study. MEDICATIONS  - Patient must bring a complete list of all medications currently taking to include prescriptions, over-the-counter meds, herbals, vitamins & any dietary supplements  GENERAL INSTRUCTIONS  - On the day of your exam do not use any bath powder, deodorant or lotions on the armpit area. -Tenderness of breasts may cause an increase of discomfort during procedure. If you are experiencing breast tenderness on the day of your appointment and would like to reschedule, please call [phone]. Referral Information Referral ID Referred By Referred To  
  
 [id number] [facility], [address] [facility] [address] [place], [address] Phone: [phone] Fax: [phone] Visits Status Start Date End Date 1 New Request 9/18/18 9/18/19 If your referral has a status of pending review or denied, additional information will be sent to support the outcome of this decision. Patient Instructions Learning About Physical Activity What is physical activity? Physical activity is any kind of activity that gets your body moving. The types of physical activity that can help you get fit and stay healthy include: · Aerobic or \"cardio\" activities that make your heart beat faster and make you breathe harder, such as brisk walking, riding a bike, or running. Aerobic activities strengthen your heart and lungs and build up your endurance. · Strength training activities that make your muscles work against, or \"resist,\" something, such as lifting weights or doing push-ups. These activities help tone and strengthen your muscles. · Stretches that allow you to move your joints and muscles through their full range of motion. Stretching helps you be more flexible and avoid injury. What are the benefits of physical activity? Being active is one of the best things you can do to get fit and stay healthy. It helps you to: · Feel stronger and have more energy to do all the things you like to do. · Focus better at school or work and perform better in sports. · Feel, think, and sleep better. · Reach and stay at a healthy weight. · Lose fat and build lean muscle. · Lower your risk for serious health problems. · Keep your bones, muscles, and joints strong. Being fit lets you do more physical activity. And it lets you work out harder without as much effort. How can you make physical activity part of your life? Get at least 30 minutes of exercise on most days of the week. Walking is a good choice. You also may want to do other activities, such as running, swimming, cycling, or playing tennis or team sports. Pick activities that you like-ones that make your heart beat faster, your muscles stronger, and your muscles and joints more flexible. If you find more than one thing you like doing, do them all. You don't have to do the same thing every day. Get your heart pumping every day. Any activity that makes your heart beat faster and keeps it at that rate for a while counts. Here are some great ways to get your heart beating faster: · Go for a brisk walk, run, or bike ride. · Go for a hike or swim. · Go in-line skating. · Play a game of touch football, basketball, or soccer. · Ride a bike. · Play tennis or racquetball. · Climb stairs. Even some household chores can be aerobic-just do them at a faster pace. Vacuuming, raking or mowing the lawn, sweeping the garage, and washing and waxing the car all can help get your heart rate up. Strengthen your muscles during the week. You don't have to lift heavy weights or grow big, bulky muscles to get stronger. Doing a few simple activities that make your muscles work against, or \"resist,\" something can help you get stronger. For example, you can: · Do push-ups or sit-ups, which use your own body weight as resistance. · Lift weights or dumbbells or use stretch bands at home or in a gym or community center. Stretch your muscles often. Stretching will help you as you become more active. It can help you stay flexible, loosen tight muscles, and avoid injury. It can also help improve your balance and posture and can be a great way to relax. Be sure to stretch the muscles you'll be using when you work out. It's best to warm your muscles slightly before you stretch them. Walk or do some other light aerobic activity for a few minutes, and then start stretching. When you stretch your muscles: · Do it slowly. Stretching is not about going fast or making sudden movements. · Don't push or bounce during a stretch. · Hold each stretch for at least 15 to 30 seconds, if you can. You should feel a stretch in the muscle, but not pain. · Breathe out as you do the stretch. Then breathe in as you hold the stretch. Don't hold your breath. If you're worried about how more activity might affect your health, have a checkup before you start. Follow any special advice your doctor gives you for getting a smart start. Where can you learn more? Go to http://cecelia-virginia.info/. Enter W078 in the search box to learn more about \"Learning About Physical Activity. \" Current as of: December 7, 2017 Content Version: 11.7 © 5238-8193 [employer]. Care instructions adapted under license by [employer] (which disclaims liability or warranty for this information). If you have questions about a medical condition or this instruction, always ask your healthcare professional. [address] any warranty or liability for your use of this information. Nutrition Tips for Diabetes: After Your Visit Your Care Instructions A healthy diet is important to manage diabetes. It helps you lose weight (if you need to) and keep it off. It gives you the nutrition and energy your body needs and helps prevent heart disease. But a diet for diabetes does not mean that you have to eat special foods. You can eat what your family eats, including occasional sweets and other favorites. But you do have to pay attention to how often you eat and how much you eat of certain foods. The right plan for you will give you meals that help you keep your blood sugar at healthy levels. Try to eat a variety of foods and to spread carbohydrate throughout the day. Carbohydrate raises blood sugar higher and more quickly than any other nutrient does. Carbohydrate is found in sugar, breads and cereals, fruit, starchy vegetables such as potatoes and corn, and milk and yogurt. You may want to work with a dietitian or diabetes educator to help you plan meals and snacks. A dietitian or diabetes educator also can help you lose weight if that is one of your goals. The following tips can help you enjoy your meals and stay healthy. Follow-up care is a key part of your treatment and safety. Be sure to make and go to all appointments, and call your doctor if you are having problems. Its also a good idea to know your test results and keep a list of the medicines you take. How can you care for yourself at home? · Learn which foods have carbohydrate and how much carbohydrate to eat. A dietitian or diabetes educator can help you learn to keep track of how much carbohydrate you eat. · Spread carbohydrate throughout the day. Eat some carbohydrate at all meals, but do not eat too much at any one time. · Plan meals to include food from all the food groups. These are the food groups and some example portion sizes: ¨ Grains: 1 slice of bread (1 ounce), ½ cup of cooked cereal, and 1/3 cup of cooked pasta or rice. These have about 15 grams of carbohydrate in a serving. Choose whole grains such as whole wheat bread or crackers, oatmeal, and brown rice more often than refined grains. ¨ Fruit: 1 small fresh fruit, such as an apple or orange; ½ of a banana; ½ cup of chopped, cooked, or canned fruit; ½ cup of fruit juice; 1 cup of melon or raspberries; and 2 tablespoons of dried fruit. These have about 15 grams of carbohydrate in a serving. ¨ Dairy: 1 cup of nonfat or low-fat milk and 2/3 cup of plain yogurt. These have about 15 grams of carbohydrate in a serving. ¨ Protein foods: Beef, chicken, turkey, fish, eggs, tofu, cheese, cottage cheese, and peanut butter. A serving size of meat is 3 ounces, which is about the size of a deck of cards.  Examples of meat substitute serving sizes (equal to 1 ounce of meat) are 1/4 cup of cottage cheese, 1 egg, 1 tablespoon of peanut butter, and ½ cup of tofu. These have very little or no carbohydrate per serving. ¨ Vegetables: Starchy vegetables such as ½ cup of cooked dried beans, peas, potatoes, or corn have about 15 grams of carbohydrate. Nonstarchy vegetables have very little carbohydrate, such as 1 cup of raw leafy vegetables (such as spinach), ½ cup of other vegetables (cooked or chopped), and 3/4 cup of vegetable juice. · Use the plate format to plan meals. It is a good, quick way to make sure that you have a balanced meal. It also helps you spread carbohydrate throughout the day. You divide your plate by types of foods. Put vegetables on half the plate, meat or meat substitutes on one-quarter of the plate, and a grain or starchy vegetable (such as brown rice or a potato) in the final quarter of the plate. To this you can add a small piece of fruit and 1 cup of milk or yogurt, depending on how much carbohydrate you are supposed to eat at a meal. 
· Talk to your dietitian or diabetes educator about ways to add limited amounts of sweets into your meal plan. You can eat these foods now and then, as long as you include the amount of carbohydrate they have in your daily carbohydrate allowance. · If you drink alcohol, limit it to no more than 1 drink a day for women and 2 drinks a day for men. If you are pregnant, no amount of alcohol is known to be safe. · Protein, fat, and fiber do not raise blood sugar as much as carbohydrate does. If you eat a lot of these nutrients in a meal, your blood sugar will rise more slowly than it would otherwise. · Limit saturated fats, such as those from meat and dairy products. Try to replace it with monounsaturated fat, such as olive oil. This is a healthier choice because people who have diabetes are at higher-than-average risk of heart disease. But use a modest amount of olive oil. A tablespoon of olive oil has 14 grams of fat and 120 calories. · Exercise lowers blood sugar. If you take insulin by shots or pump, you can use less than you would if you were not exercising. Keep in mind that timing matters. If you exercise within 1 hour after a meal, your body may need less insulin for that meal than it would if you exercised 3 hours after the meal. Test your blood sugar to find out how exercise affects your need for insulin. · Exercise on most days of the week. Aim for at least 30 minutes. Exercise helps you stay at a healthy weight and helps your body use insulin. Walking is an easy way to get exercise. Gradually increase the amount you walk every day. You also may want to swim, bike, or do other activities. When you eat out · Learn to estimate the serving sizes of foods that have carbohydrate. If you measure food at home, it will be easier to estimate the amount in a serving of restaurant food. · If the meal you order has too much carbohydrate (such as potatoes, corn, or baked beans), ask to have a low-carbohydrate food instead. Ask for a salad or green vegetables. · If you use insulin, check your blood sugar before and after eating out to help you plan how much to eat in the future. · If you eat more carbohydrate at a meal than you had planned, take a walk or do other exercise. This will help lower your blood sugar. Where can you learn more? Go to SpazioDati.be Enter O236 in the search box to learn more about \"Nutrition Tips for Diabetes: After Your Visit. \"  
© 1009-1068 [employer]wise[employer]orporated. Care instructions adapted under license by [employer] (which disclaims liability or warranty for this information). This care instruction is for use with your licensed healthcare professional. If you have questions about a medical condition or this instruction, always ask your healthcare professional. [address] any warranty or liability for your use of this information. Content Version: 40.7.353837; Current as of: June 4, 2014 Low Sodium Diet (2,000 Milligram): Care Instructions Your Care Instructions Too much sodium causes your body to hold on to extra water. This can raise your blood pressure and force your heart and kidneys to work harder. In very serious cases, this could cause you to be put in the hospital. It might even be life-threatening. By limiting sodium, you will feel better and lower your risk of serious problems. The most common source of sodium is salt. People get most of the salt in their diet from canned, prepared, and packaged foods. Fast food and restaurant meals also are very high in sodium. Your doctor will probably limit your sodium to less than 2,000 milligrams (mg) a day. This limit counts all the sodium in prepared and packaged foods and any salt you add to your food. Follow-up care is a key part of your treatment and safety. Be sure to make and go to all appointments, and call your doctor if you are having problems. It's also a good idea to know your test results and keep a list of the medicines you take. How can you care for yourself at home? Read food labels · Read labels on cans and food packages. The labels tell you how much sodium is in each serving. Make sure that you look at the serving size. If you eat more than the serving size, you have eaten more sodium. · Food labels also tell you the Percent Daily Value for sodium. Choose products with low Percent Daily Values for sodium. · Be aware that sodium can come in forms other than salt, including monosodium glutamate (MSG), sodium citrate, and sodium bicarbonate (baking soda). MSG is often added to Asian food. When you eat out, you can sometimes ask for food without MSG or added salt. Buy low-sodium foods · Buy foods that are labeled \"unsalted\" (no salt added), \"sodium-free\" (less than 5 mg of sodium per serving), or \"low-sodium\" (less than 140 mg of sodium per serving). Foods labeled \"reduced-sodium\" and \"light sodium\" may still have too much sodium. Be sure to read the label to see how much sodium you are getting. · Buy fresh vegetables, or frozen vegetables without added sauces. Buy low-sodium versions of canned vegetables, soups, and other canned goods. Prepare low-sodium meals · Cut back on the amount of salt you use in cooking. This will help you adjust to the taste. Do not add salt after cooking. One teaspoon of salt has about 2,300 mg of sodium. · Take the salt shaker off the table. · Flavor your food with garlic, lemon juice, onion, vinegar, herbs, and spices. Do not use soy sauce, lite soy sauce, steak sauce, onion salt, garlic salt, celery salt, mustard, or ketchup on your food. · Use low-sodium salad dressings, sauces, and ketchup. Or make your own salad dressings and sauces without adding salt. · Use less salt (or none) when recipes call for it. You can often use half the salt a recipe calls for without losing flavor. Other foods such as rice, pasta, and grains do not need added salt. · Rinse canned vegetables, and cook them in fresh water. This removes some-but not all-of the salt. · Avoid water that is naturally high in sodium or that has been treated with water softeners, which add sodium. Call your local water company to find out the sodium content of your water supply. If you buy bottled water, read the label and choose a sodium-free brand. Avoid high-sodium foods · Avoid eating: ¨ Smoked, cured, salted, and canned meat, fish, and poultry. ¨ Ham, morales, hot dogs, and luncheon meats. ¨ Regular, hard, and processed cheese and regular peanut butter. ¨ Crackers with salted tops, and other salted snack foods such as pretzels, chips, and salted popcorn. ¨ Frozen prepared meals, unless labeled low-sodium. ¨ Canned and dried soups, broths, and bouillon, unless labeled sodium-free or low-sodium. ¨ Canned vegetables, unless labeled sodium-free or low-sodium. ¨ Western Consuelo fries, pizza, tacos, and other fast foods. ¨ Pickles, olives, ketchup, and other condiments, especially soy sauce, unless labeled sodium-free or low-sodium. Where can you learn more? Go to http://cecelia-virginia.info/. Enter W203 in the search box to learn more about \"Low Sodium Diet (2,000 Milligram): Care Instructions. \" Current as of: May 12, 2017 Content Version: 11.7 © 5656-9753 Tellwiki. Care instructions adapted under license by [employer] (which disclaims liability or warranty for this information). If you have questions about a medical condition or this instruction, always ask your healthcare professional. [address] any warranty or liability for your use of this information. Introducing [facility]! Nayeli Shrestha introduces EdCaliber patient portal. Now you can access parts of your medical record, email your doctor's office, and request medication refills online. 1. In your internet browser, go to [email or web address] Shareholder. [email or web address]/Mobile Shareholder 2. Click on the First Time User? Click Here link in the Sign In box. You will see the New Member Sign Up page. 3. Enter your EdCaliber Access Code exactly as it appears below. You will not need to use this code after youve completed the sign-up process. If you do not sign up before the expiration date, you must request a new code. · EdCaliber Access Code: [email or web address] Expires: 12/17/2018  8:52 AM 
 
4. Enter the last four digits of your Social Security Number (xxxx) and Date of Birth (mm/dd/yyyy) as indicated and click Submit. You will be taken to the next sign-up page. 5. Create a Visuut ID. This will be your EdCaliber login ID and cannot be changed, so think of one that is secure and easy to remember. 6. Create a Visuut password. You can change your password at any time. 7. Enter your Password Reset Question and Answer. This can be used at a later time if you forget your password. 8. Enter your e-mail address. You will receive e-mail notification when new information is available in [address] 9. Click Sign Up. You can now view and download portions of your medical record. 10. Click the Download Summary menu link to download a portable copy of your medical information. If you have questions, please visit the Frequently Asked Questions section of the Appstarter website. Remember, Appstarter is NOT to be used for urgent needs. For medical emergencies, dial 911. Now available from your iPhone and Android! Please provide this summary of care documentation to your next provider. Your primary care clinician is listed as James Garrison. If you have any questions after today's visit, please call [phone].

## 2018-09-18 NOTE — PROGRESS NOTES
I have personally seen and evaluated the device findings. Interrogation reviewed and I agree with assessment.     Joey Valentin

## 2018-09-18 NOTE — PROGRESS NOTES
Chief Complaint Patient presents with  Diabetes  Hypertension  Cholesterol Problem  Thyroid Problem  Results 1. Have you been to the ER, urgent care clinic since your last visit? Hospitalized since your last visit? No 
 
2. Have you seen or consulted any other health care providers outside of the [address] [place] [address] since your last visit? Include any pap smears or colon screening. Endocrine- Dr. Elvis Elaine

## 2018-09-18 NOTE — PROGRESS NOTES
HISTORY OF PRESENT ILLNESS Wendy Celis is a 75 y.o. female. HPI: here for routine follow up. H/o diabetes. Well controlled. Recent HBA1C from endo office noted was 6.2. Complaint with medication and no side effects. She is trying to stay active and also compliant with diet modification. discussed high BMI. Discussed diet modification, calorie count and exercise. Discussed importance of weight loss. agree to do exercise and life style modification. Diet and exercise hand out given in AVS. Also h/o a.fib. Post ablation. Following cardiology. On aspirin. No new recommendations from cardiology. Review their recent follow up notes. Prior h/o DVT. Now off anticoagulation and on aspirin. Vitals fairly stable. Had elevated calcium and elevated PTH. Seen endo and now on vitamin D supplement. Also changed her HCTZ to spironolactone and she is tolerating it well. Denies any headache, dizziness, no chest pain or trouble breathing, no arm or leg weakness. No nausea or vomiting, no weight or appetite changes, no mood changes . No urine or bowel complains, no palpitation, no diaphoresis. No abdominal pain. No sleep trouble. Visit Vitals  /82 (BP 1 Location: Left arm, BP Patient Position: Sitting)  Pulse 60  Temp 98.2 °F (36.8 °C) (Oral)  Resp 16  
 Ht 5' 6\" (1.676 m)  Wt 184 lb (83.5 kg)  SpO2 99%  BMI 29.7 kg/m2 Review medication list, vitals, problem list,allergies. Lab Results Component Value Date/Time WBC 8.0 03/10/2018 08:00 AM  
 HGB 12.0 03/10/2018 08:00 AM  
 HCT 37.6 03/10/2018 08:00 AM  
 PLATELET 528 84/13/8560 08:00 AM  
 MCV 84.1 03/10/2018 08:00 AM  
 
Lab Results Component Value Date/Time  Sodium 140 04/14/2018 07:50 AM  
 Potassium 4.3 04/14/2018 07:50 AM  
 Chloride 105 04/14/2018 07:50 AM  
 CO2 30 04/14/2018 07:50 AM  
 Anion gap 5 04/14/2018 07:50 AM  
 Glucose 104 (H) 04/14/2018 07:50 AM  
 BUN 25 (H) 04/14/2018 07:50 AM  
 Creatinine 1.21 04/14/2018 07:50 AM  
 BUN/Creatinine ratio 21 (H) 04/14/2018 07:50 AM  
 GFR est AA 53 (L) 04/14/2018 07:50 AM  
 GFR est non-AA 44 (L) 04/14/2018 07:50 AM  
 Calcium 10.8 (H) 04/14/2018 07:50 AM  
 Bilirubin, total 0.4 03/10/2018 08:00 AM  
 AST (SGOT) 20 03/10/2018 08:00 AM  
 Alk. phosphatase 73 03/10/2018 08:00 AM  
 Protein, total 7.9 03/10/2018 08:00 AM  
 Albumin 3.7 03/10/2018 08:00 AM  
 Globulin 4.2 (H) 03/10/2018 08:00 AM  
 A-G Ratio 0.9 03/10/2018 08:00 AM  
 ALT (SGPT) 21 03/10/2018 08:00 AM  
 
Lab Results Component Value Date/Time Cholesterol, total 173 03/10/2018 08:00 AM  
 HDL Cholesterol 37 (L) 03/10/2018 08:00 AM  
 LDL, calculated 93.4 03/10/2018 08:00 AM  
 VLDL, calculated 42.6 03/10/2018 08:00 AM  
 Triglyceride 213 (H) 03/10/2018 08:00 AM  
 CHOL/HDL Ratio 4.7 03/10/2018 08:00 AM  
 
Lab Results Component Value Date/Time TSH 1.31 03/10/2018 08:00 AM  
 
Lab Results Component Value Date/Time Hemoglobin A1c 6.9 (H) 03/10/2018 08:00 AM  
Hemoglobin A1C in may 2018 done at endo office 6.2. Lab Results Component Value Date/Time Microalbumin/Creat ratio (mg/g creat) 8 03/10/2018 08:00 AM  
 Microalbumin,urine random 0.84 03/10/2018 08:00 AM  
 Microalbumin urine (POC) 10 04/24/2017 09:02 AM  
 
Also c/o lump over forehead. Was smaller in the size but since last one year it has been going up on the size. No skin changes. No pain. Wanted to discuss. Also hypertriglyceridemia. On statin. Trying diet modification. No side effects. ROS: see HPI Physical Exam  
Constitutional: She is oriented to person, place, and time. No distress. Cardiovascular: Normal rate, regular rhythm and normal heart sounds. Pulmonary/Chest: CTA Abdominal: Soft. Bowel sounds are normal. There is no tenderness. Musculoskeletal: She exhibits no edema.   
Foot exam: no  open skin area, 
Monofilament test normal. 
 Peripheral pulsations of dorsalis pedis palpable both lower ext. Callus bilaterally over medial heal. No open sore. Generalize discomfort over affected area. Neurological: She is oriented to person, place, and time. Skin:  
1 cm size circular lump. Soft consistence. Regular border. No skin changes. ? Lipoma vs cyst . Non tender. Psychiatric: Her behavior is normal.  
 
 
ASSESSMENT and PLAN 
  ICD-10-CM ICD-9-CM 1. Type 2 diabetes mellitus with hyperglycemia, without long-term current use of insulin ([facility] [address]): well controlled. Continue current plan. Foot exam done today. E11.65 250.00   
  790.29 2. Atrial fibrillation, unspecified type [facility]): post ablation. Stable. Asymptomatic. On aspirin. Following cardiology. I48.91 427.31   
3. Essential hypertension: stable at this time. Low salt diet. Exercise as tolerated. Will continue current plan. I10 401.9 4. Hyperlipidemia, unspecified hyperlipidemia type: on statin. No side effects. Diet modification. E78.5 272.4 5. Hypothyroidism, unspecified type: asymptomatic. E03.9 244.9 6. Elevated PTHrP level ([facility] [address]): following endo. For now on vitamin D and will follow specialist recommendations. E21.5 252.9 7. Hypercalcemia: see above  E83.52 275.42   
8. Renal insufficiency: fairly stable. Will observe. N28.9 593.9 9. Lump: for now sending to general surgeon. R22.9 782.2 REFERRAL TO GENERAL SURGERY  
 forehead 10. BMI 29.0-29.9,adult: discussed high BMI. Discussed diet modification, calorie count and exercise. Discussed importance of weight loss. agree to do exercise and life style modification. Diet and exercise hand out given in AVS. Z68.29 V85.25 Pt understood and agree with the plan Review HM Follow-up Disposition: 
Return in about 3 months (around 12/18/2018).

## 2018-09-18 NOTE — MR AVS SNAPSHOT
[address] [address] [address] [address] [phone] Patient: Erik Hall MRN: [id number] OBM:[id number] Visit Information Date & Time Provider Department Dept. Phone Encounter #  
 9/18/2018  9:00 AM Dmitri Fernández MD [facility] [id number] Your Appointments 9/18/2018 10:30 AM  
FOLLOW UP EXAM with Taisha Reyna MD  
[facility] ([facility] Appt Note: routine f/u 2mo [address]  
[address] [facility] [address] Upcoming Health Maintenance Date Due  
 FOOT EXAM Q1 7/24/2018 Pneumococcal 65+ Low/Medium Risk (2 of 2 - PPSV23) 7/31/2018 Influenza Age 5 to Adult 8/1/2018 EYE EXAM RETINAL OR DILATED Q1 9/6/2018 HEMOGLOBIN A1C Q6M 11/17/2018 MICROALBUMIN Q1 3/10/2019 LIPID PANEL Q1 3/10/2019 MEDICARE YEARLY EXAM 4/19/2019 BREAST CANCER SCRN MAMMOGRAM 6/27/2019 GLAUCOMA SCREENING Q2Y 9/6/2019 COLONOSCOPY 10/25/2022 DTaP/Tdap/Td series (2 - Td) 7/24/2027 Allergies as of 9/18/2018  Review Complete On: 7/11/2018 By: Taisha Reyna MD  
  
 Severity Noted Reaction Type Reactions Statins-hmg-coa Reductase Inhibitors  03/21/2018    Other (comments) Felt sick Current Immunizations  Never Reviewed Name Date Influenza High Dose Vaccine PF 11/1/2017 Influenza Vaccine 10/1/2016 Not reviewed this visit You Were Diagnosed With   
  
 Codes Comments Paroxysmal atrial fibrillation (HCC)    -  Primary ICD-10-CM: I48.0 ICD-9-CM: 427.31 Bradycardia     ICD-10-CM: R00.1 ICD-9-CM: 427.89 Essential hypertension     ICD-10-CM: I10 
ICD-9-CM: 401.9 Pure hypercholesterolemia     ICD-10-CM: E78.00 ICD-9-CM: 272.0 Vitals BP Pulse Height(growth percentile) Weight(growth percentile) SpO2 BMI  
 110/70 60 5' 6\" (1.676 m) 185 lb (83.9 kg) 98% 29.86 kg/m2 OB Status Smoking Status Hysterectomy Never Smoker Vitals History BMI and BSA Data Body Mass Index Body Surface Area  
 29.86 kg/m 2 1.98 m 2 Preferred Pharmacy [facility] Name Phone [facility] [address] [facility] [phone] Your Updated Medication List  
  
   
This list is accurate as of 9/18/18  9:30 AM.  Always use your most recent med list.  
  
  
  
  
 aspirin 81 mg chewable tablet Take 81 mg by mouth daily. COQ10 (LIPOSOMAL UBIQUINOL) PO Take 200 mg by mouth daily. FISH OIL PO Take 100 mg by mouth daily. glucose blood VI test strips strip Commonly known as:  blood glucose test  
by Does Not Apply route See Admin Instructions. Check once a day  
  
 metFORMIN 500 mg tablet Commonly known as:  GLUCOPHAGE  
TAKE 1 TABLET BY MOUTH TWICE A DAY WITH FOOD  
  
 metoprolol succinate 50 mg XL tablet Commonly known as:  TOPROL-XL Take 1 Tab by mouth daily. Per patient she does not take if her BP is low  
  
 olmesartan 40 mg tablet Commonly known as:  [employer] Take 1 Tab by mouth daily. pravastatin 10 mg tablet Commonly known as:  PRAVACHOL Take 1 Tab by mouth nightly. spironolactone 25 mg tablet Commonly known as:  ALDACTONE Take 1 Tab by mouth daily. We Performed the Following AMB POC EKG ROUTINE W/ 12 LEADS, INTER & REP [45689 CPT(R)] To-Do List   
 09/18/2018 12:30 PM  
  Appointment with SHANTA BECERRA at [address] [place] [address] ([phone]) PAYMENT  For Non-Medicare patients - $15.00 will be collected from you at the time of your exam.  You will be billed $35.00 from the reading [facility].   OUTSIDE FILMS  - Any outside films related to the study being scheduled should be brought with you on the day of the exam.  If this cannot be done there may be a delay in the reading of the study. MEDICATIONS  - Patient must bring a complete list of all medications currently taking to include prescriptions, over-the-counter meds, herbals, vitamins & any dietary supplements  GENERAL INSTRUCTIONS  - On the day of your exam do not use any bath powder, deodorant or lotions on the armpit area. -Tenderness of breasts may cause an increase of discomfort during procedure. If you are experiencing breast tenderness on the day of your appointment and would like to reschedule, please call [phone]. Introducing [facility]! [facility] introduces [employer] patient portal. Now you can access parts of your medical record, email your doctor's office, and request medication refills online. 1. In your internet browser, go to [email or web address] 2. Click on the First Time User? Click Here link in the Sign In box. You will see the New Member Sign Up page. 3. Enter your [employer] Access Code exactly as it appears below. You will not need to use this code after youve completed the sign-up process. If you do not sign up before the expiration date, you must request a new code. · [employer] Access Code: [email or web address] Expires: 12/17/2018  8:52 AM 
 
4. Enter the last four digits of your Social Security Number (xxxx) and Date of Birth (mm/dd/yyyy) as indicated and click Submit. You will be taken to the next sign-up page. 5. Create a "[employer]t ID. This will be your "[employer]t login ID and cannot be changed, so think of one that is secure and easy to remember. 6. Create a "[employer]t password. You can change your password at any time. 7. Enter your Password Reset Question and Answer. This can be used at a later time if you forget your password. 8. Enter your e-mail address.  You will receive e-mail notification when new information is available in [employer]. 9. Click Sign Up. You can now view and download portions of your medical record. 10. Click the Download Summary menu link to download a portable copy of your medical information. If you have questions, please visit the Frequently Asked Questions section of the [employer] website. Remember, [employer] is NOT to be used for urgent needs. For medical emergencies, dial 911. Now available from your iPhone and Android! Please provide this summary of care documentation to your next provider. Your primary care clinician is listed as Hugo Florian. If you have any questions after today's visit, please call [phone].

## 2018-09-29 ENCOUNTER — HOSPITAL ENCOUNTER (OUTPATIENT)
Dept: LAB | Age: 73
Discharge: HOME | End: 2018-09-29
Payer: MEDICARE

## 2018-09-29 DIAGNOSIS — E78.5 HYPERLIPIDEMIA, UNSPECIFIED HYPERLIPIDEMIA TYPE: ICD-10-CM

## 2018-09-29 LAB
CHOLEST SERPL-MCNC: 133 MG/DL
HDLC SERPL-MCNC: 47 MG/DL (ref 40–60)
HDLC SERPL: 2.8 {RATIO} (ref 0–5)
LDLC SERPL CALC-MCNC: 56.6 MG/DL (ref 0–100)
LIPID PROFILE,FLP: NORMAL
TRIGL SERPL-MCNC: 147 MG/DL (ref ?–150)
VLDLC SERPL CALC-MCNC: 29.4 MG/DL

## 2018-09-29 PROCEDURE — 80061 LIPID PANEL: CPT | Performed by: FAMILY MEDICINE

## 2018-09-29 PROCEDURE — 36415 COLL VENOUS BLD VENIPUNCTURE: CPT | Performed by: FAMILY MEDICINE

## 2018-10-01 NOTE — PROGRESS NOTES
Let pt know that improvement in lipid panel. Continue current plan and further discussion on follow up visit. Thanks.

## 2018-10-08 NOTE — PROGRESS NOTES
Contacted patient and verified identity using name and date of birth (2- identifiers)  Spoke with patient and she verbalized understanding of lipid panel improvement.  Continue with current plan and follow-up as scheduled

## 2018-11-01 RX ORDER — LOSARTAN POTASSIUM 100 MG/1
100 TABLET ORAL DAILY
Qty: 90 TAB | Refills: 0 | Status: SHIPPED | OUTPATIENT
Start: 2018-11-01 | End: 2019-02-04 | Stop reason: SDUPTHER

## 2018-11-06 DIAGNOSIS — E11.9 WELL CONTROLLED DIABETES MELLITUS (HCC): ICD-10-CM

## 2018-11-06 DIAGNOSIS — E78.1 HYPERTRIGLYCERIDEMIA: ICD-10-CM

## 2018-11-06 RX ORDER — PRAVASTATIN SODIUM 10 MG/1
10 TABLET ORAL
Qty: 90 TAB | Refills: 1 | Status: SHIPPED | OUTPATIENT
Start: 2018-11-06 | End: 2019-05-11 | Stop reason: SDUPTHER

## 2018-12-09 DIAGNOSIS — E11.9 WELL CONTROLLED DIABETES MELLITUS (HCC): ICD-10-CM

## 2018-12-10 RX ORDER — METFORMIN HYDROCHLORIDE 500 MG/1
TABLET ORAL
Qty: 180 TAB | Refills: 0 | Status: SHIPPED | OUTPATIENT
Start: 2018-12-10 | End: 2019-02-04 | Stop reason: SDUPTHER

## 2019-02-04 ENCOUNTER — HOSPITAL ENCOUNTER (OUTPATIENT)
Dept: LAB | Age: 74
Discharge: HOME | End: 2019-02-04
Payer: MEDICARE

## 2019-02-04 ENCOUNTER — OFFICE VISIT (OUTPATIENT)
Dept: FAMILY MEDICINE CLINIC | Age: 74
End: 2019-02-04

## 2019-02-04 VITALS
WEIGHT: 183 LBS | RESPIRATION RATE: 16 BRPM | SYSTOLIC BLOOD PRESSURE: 122 MMHG | BODY MASS INDEX: 29.41 KG/M2 | DIASTOLIC BLOOD PRESSURE: 72 MMHG | TEMPERATURE: 98.7 F | OXYGEN SATURATION: 100 % | HEART RATE: 60 BPM | HEIGHT: 66 IN

## 2019-02-04 DIAGNOSIS — N28.9 RENAL INSUFFICIENCY: ICD-10-CM

## 2019-02-04 DIAGNOSIS — I10 ESSENTIAL HYPERTENSION: ICD-10-CM

## 2019-02-04 DIAGNOSIS — I48.91 ATRIAL FIBRILLATION, UNSPECIFIED TYPE (HCC): ICD-10-CM

## 2019-02-04 DIAGNOSIS — E78.5 HYPERLIPIDEMIA, UNSPECIFIED HYPERLIPIDEMIA TYPE: ICD-10-CM

## 2019-02-04 DIAGNOSIS — D17.0 LIPOMA OF HEAD: ICD-10-CM

## 2019-02-04 DIAGNOSIS — R79.89 ELEVATED PTHRP LEVEL: ICD-10-CM

## 2019-02-04 DIAGNOSIS — E11.9 WELL CONTROLLED DIABETES MELLITUS (HCC): Primary | ICD-10-CM

## 2019-02-04 DIAGNOSIS — E11.9 WELL CONTROLLED DIABETES MELLITUS (HCC): ICD-10-CM

## 2019-02-04 LAB
ALBUMIN SERPL-MCNC: 3.9 G/DL (ref 3.4–5)
ALBUMIN/GLOB SERPL: 1.2 {RATIO} (ref 0.8–1.7)
ALP SERPL-CCNC: 61 U/L (ref 45–117)
ALT SERPL-CCNC: 24 U/L (ref 13–56)
ANION GAP SERPL CALC-SCNC: 5 MMOL/L (ref 3–18)
AST SERPL-CCNC: 18 U/L (ref 15–37)
BILIRUB SERPL-MCNC: 0.5 MG/DL (ref 0.2–1)
BUN SERPL-MCNC: 22 MG/DL (ref 7–18)
BUN/CREAT SERPL: 20 (ref 12–20)
CALCIUM SERPL-MCNC: 10.3 MG/DL (ref 8.5–10.1)
CHLORIDE SERPL-SCNC: 106 MMOL/L (ref 100–108)
CO2 SERPL-SCNC: 28 MMOL/L (ref 21–32)
CREAT SERPL-MCNC: 1.08 MG/DL (ref 0.6–1.3)
EST. AVERAGE GLUCOSE BLD GHB EST-MCNC: 126 MG/DL
GLOBULIN SER CALC-MCNC: 3.2 G/DL (ref 2–4)
GLUCOSE SERPL-MCNC: 89 MG/DL (ref 74–99)
HBA1C MFR BLD: 6 % (ref 4.2–5.6)
POTASSIUM SERPL-SCNC: 5.3 MMOL/L (ref 3.5–5.5)
PROT SERPL-MCNC: 7.1 G/DL (ref 6.4–8.2)
SODIUM SERPL-SCNC: 139 MMOL/L (ref 136–145)

## 2019-02-04 PROCEDURE — 36415 COLL VENOUS BLD VENIPUNCTURE: CPT

## 2019-02-04 PROCEDURE — 83036 HEMOGLOBIN GLYCOSYLATED A1C: CPT

## 2019-02-04 PROCEDURE — 80053 COMPREHEN METABOLIC PANEL: CPT

## 2019-02-04 RX ORDER — METOPROLOL SUCCINATE 50 MG/1
50 TABLET, EXTENDED RELEASE ORAL DAILY
Qty: 90 TAB | Refills: 1 | Status: SHIPPED | OUTPATIENT
Start: 2019-02-04 | End: 2019-08-03 | Stop reason: SDUPTHER

## 2019-02-04 RX ORDER — METFORMIN HYDROCHLORIDE 500 MG/1
500 TABLET ORAL
Qty: 180 TAB | Refills: 1 | Status: SHIPPED | OUTPATIENT
Start: 2019-02-04 | End: 2020-01-23 | Stop reason: SDUPTHER

## 2019-02-04 RX ORDER — LOSARTAN POTASSIUM 100 MG/1
100 TABLET ORAL DAILY
Qty: 90 TAB | Refills: 1 | Status: SHIPPED | OUTPATIENT
Start: 2019-02-04 | End: 2019-08-03 | Stop reason: SDUPTHER

## 2019-02-04 NOTE — PROGRESS NOTES
1. Have you been to the ER, urgent care clinic since your last visit? Hospitalized since your last visit? No    2. Have you seen or consulted any other health care providers outside of the [facility] since your last visit? Include any pap smears or colon screening.  Dr. Brice Day: 11/208    Last pneumonia vaccine - not completed

## 2019-02-04 NOTE — PATIENT INSTRUCTIONS
Talk to the health department or pharmacist regarding your immunization on Tdap. Medicare does  not pay for Tdap . Also please ask pharmacist that your lot # for Losartan is been affected or not for drug recall. Nutrition Tips for Diabetes: After Your Visit  Your Care Instructions  A healthy diet is important to manage diabetes. It helps you lose weight (if you need to) and keep it off. It gives you the nutrition and energy your body needs and helps prevent heart disease. But a diet for diabetes does not mean that you have to eat special foods. You can eat what your family eats, including occasional sweets and other favorites. But you do have to pay attention to how often you eat and how much you eat of certain foods. The right plan for you will give you meals that help you keep your blood sugar at healthy levels. Try to eat a variety of foods and to spread carbohydrate throughout the day. Carbohydrate raises blood sugar higher and more quickly than any other nutrient does. Carbohydrate is found in sugar, breads and cereals, fruit, starchy vegetables such as potatoes and corn, and milk and yogurt. You may want to work with a dietitian or diabetes educator to help you plan meals and snacks. A dietitian or diabetes educator also can help you lose weight if that is one of your goals. The following tips can help you enjoy your meals and stay healthy. Follow-up care is a key part of your treatment and safety. Be sure to make and go to all appointments, and call your doctor if you are having problems. Its also a good idea to know your test results and keep a list of the medicines you take. How can you care for yourself at home? · Learn which foods have carbohydrate and how much carbohydrate to eat. A dietitian or diabetes educator can help you learn to keep track of how much carbohydrate you eat. · Spread carbohydrate throughout the day.  Eat some carbohydrate at all meals, but do not eat too much at any one time.  · Plan meals to include food from all the food groups. These are the food groups and some example portion sizes:  ¨ Grains: 1 slice of bread (1 ounce), ½ cup of cooked cereal, and 1/3 cup of cooked pasta or rice. These have about 15 grams of carbohydrate in a serving. Choose whole grains such as whole wheat bread or crackers, oatmeal, and brown rice more often than refined grains. ¨ Fruit: 1 small fresh fruit, such as an apple or orange; ½ of a banana; ½ cup of chopped, cooked, or canned fruit; ½ cup of fruit juice; 1 cup of melon or raspberries; and 2 tablespoons of dried fruit. These have about 15 grams of carbohydrate in a serving. ¨ Dairy: 1 cup of nonfat or low-fat milk and 2/3 cup of plain yogurt. These have about 15 grams of carbohydrate in a serving. ¨ Protein foods: Beef, chicken, turkey, fish, eggs, tofu, cheese, cottage cheese, and peanut butter. A serving size of meat is 3 ounces, which is about the size of a deck of cards. Examples of meat substitute serving sizes (equal to 1 ounce of meat) are 1/4 cup of cottage cheese, 1 egg, 1 tablespoon of peanut butter, and ½ cup of tofu. These have very little or no carbohydrate per serving. ¨ Vegetables: Starchy vegetables such as ½ cup of cooked dried beans, peas, potatoes, or corn have about 15 grams of carbohydrate. Nonstarchy vegetables have very little carbohydrate, such as 1 cup of raw leafy vegetables (such as spinach), ½ cup of other vegetables (cooked or chopped), and 3/4 cup of vegetable juice. · Use the plate format to plan meals. It is a good, quick way to make sure that you have a balanced meal. It also helps you spread carbohydrate throughout the day. You divide your plate by types of foods. Put vegetables on half the plate, meat or meat substitutes on one-quarter of the plate, and a grain or starchy vegetable (such as brown rice or a potato) in the final quarter of the plate.  To this you can add a small piece of fruit and 1 cup of milk or yogurt, depending on how much carbohydrate you are supposed to eat at a meal.  · Talk to your dietitian or diabetes educator about ways to add limited amounts of sweets into your meal plan. You can eat these foods now and then, as long as you include the amount of carbohydrate they have in your daily carbohydrate allowance. · If you drink alcohol, limit it to no more than 1 drink a day for women and 2 drinks a day for men. If you are pregnant, no amount of alcohol is known to be safe. · Protein, fat, and fiber do not raise blood sugar as much as carbohydrate does. If you eat a lot of these nutrients in a meal, your blood sugar will rise more slowly than it would otherwise. · Limit saturated fats, such as those from meat and dairy products. Try to replace it with monounsaturated fat, such as olive oil. This is a healthier choice because people who have diabetes are at higher-than-average risk of heart disease. But use a modest amount of olive oil. A tablespoon of olive oil has 14 grams of fat and 120 calories. · Exercise lowers blood sugar. If you take insulin by shots or pump, you can use less than you would if you were not exercising. Keep in mind that timing matters. If you exercise within 1 hour after a meal, your body may need less insulin for that meal than it would if you exercised 3 hours after the meal. Test your blood sugar to find out how exercise affects your need for insulin. · Exercise on most days of the week. Aim for at least 30 minutes. Exercise helps you stay at a healthy weight and helps your body use insulin. Walking is an easy way to get exercise. Gradually increase the amount you walk every day. You also may want to swim, bike, or do other activities. When you eat out  · Learn to estimate the serving sizes of foods that have carbohydrate. If you measure food at home, it will be easier to estimate the amount in a serving of restaurant food.   · If the meal you order has too much carbohydrate (such as potatoes, corn, or baked beans), ask to have a low-carbohydrate food instead. Ask for a salad or green vegetables. · If you use insulin, check your blood sugar before and after eating out to help you plan how much to eat in the future. · If you eat more carbohydrate at a meal than you had planned, take a walk or do other exercise. This will help lower your blood sugar. Where can you learn more? Go to [employer]  Enter O145 in the search box to learn more about \"Nutrition Tips for Diabetes: After Your Visit. \"   © 2364-6480 [employer]. Care instructions adapted under license by [facility] (which disclaims liability or warranty for this information). This care instruction is for use with your licensed healthcare professional. If you have questions about a medical condition or this instruction, always ask your healthcare professional. [address] any warranty or liability for your use of this information. Content Version: 72.1.329319; Current as of: June 4, 2014                 Learning About Low-Fat Eating  What is low-fat eating? Most food has some fat in it. Your body needs some fat to be healthy. But some kinds of fats are healthier than others. In a low-fat eating plan, you try to choose healthier fats and eat fewer unhealthy fats. Healthy fats include olive and canola oil. Try to avoid eating too much saturated fat (such as in cheese and meats) and trans fat (a type of fat found in many packaged snack foods and other baked goods). You do not need to cut all fat from your diet. But you can make healthier choices about the types and amount of fat you eat. Even though it is a good idea to choose healthier fats, it is still important to be careful of how much fat you eat, because all fats are high in calories. What are the different types of fats? Unhealthy fats  · Saturated fat.  Saturated fats are mostly in animal foods, such as meat and dairy foods. Tropical oils, such as coconut oil, palm oil, and cocoa butter, are also saturated fats. · Trans fat. Trans fats include shortening, partially hydrogenated vegetable oils, and hydrogenated vegetable oils. Trans fats are made when a liquid fat is turned into a solid fat (for example, when corn oil is made into stick margarine). They are in many processed foods, such as cookies, crackers, and snack foods. Healthy fats  · Monounsaturated fat. Monounsaturated fats are liquid at room temperature but get solid when refrigerated. Eating foods that are high in this fat may help lower your \"bad\" (LDL) cholesterol, keep your \"good\" (HDL) cholesterol level up, and lower your chances of getting coronary artery disease. This fat is found in canola oil, olive oil, peanut oil, olives, avocados, nuts, and nut butters. · Polyunsaturated fat. Polyunsaturated fats are liquid at room temperature. They are in safflower, sunflower, and corn oils. They are also the main fat in seafood. Omega-3 fatty acids are types of polyunsaturated fat. Eating fish may lower your chances of getting coronary artery disease. Fatty fish such as salmon and mackerel contain these healthy fatty acids. So do ground flaxseeds and flaxseed oil, soybeans, walnuts, and seeds. Why cut down on unhealthy fats? Eating foods that contain saturated fats can raise the LDL (\"bad\") cholesterol in your blood. Having a high level of LDL cholesterol increases your chance of hardening of the arteries (atherosclerosis), which can lead to heart disease, heart attack, and stroke. Trans fat raises the level of \"bad\" LDL cholesterol in your blood and may lower the \"good\" HDL cholesterol in your blood. Trans fat can raise your risk of heart disease, heart attack, and stroke. In general:  · No more than 10% of your daily calories should come from saturated fat. This is about 20 grams in a 2,000-calorie diet.   · No more than 10% of your daily calories should come from polyunsaturated fat. This is about 20 grams in a 2,000-calorie diet. · Monounsaturated fats can be up to 15% of your daily calories. This is about 25 to 30 grams in a 2,000-calorie diet. If you're not sure how much fat you should be eating or how many calories you need each day to stay at a healthy weight, talk to a registered dietitian. He or she can help you create a plan that's right for you. What can you do to cut down on fat? Foods like cheese, butter, sausage, and desserts can have a lot of unhealthy fats. Try these tips for healthier meals at home and when you eat out. At home  · Fill up on fruits, vegetables, and whole grains. · Think of meat as a side dish instead of as the main part of your meal.  · When you do eat meat, make it extra-lean ground beef (97% lean), ground turkey breast (without skin added), meats with fat trimmed off before cooking, or skinless chicken. · Try main dishes that use whole wheat pasta, brown rice, dried beans, or vegetables. · Use cooking methods that use little or no fat, such as broiling, steaming, or grilling. Use cooking spray instead of oil. If you use oil, use a monounsaturated oil, such as canola or olive oil. · Read food labels on canned, bottled, or packaged foods. Choose those with little saturated fat and no trans fat. When eating out at a restaurant  · Order foods that are broiled or poached instead of fried or breaded. · Cut back on the amount of butter or margarine that you use on bread. Use small amounts of olive oil instead. · Order sauces, gravies, and salad dressings on the side, and use only a little. · When you order pasta, choose tomato sauce instead of cream sauce. · Ask for salsa with your baked potato instead of sour cream, butter, cheese, or morales. Where can you learn more? Go to http://cecelia-[place].info/.   Enter R974 in the search box to learn more about \"Learning About Low-Fat Eating. \"  Current as of: March 28, 2018  Content Version: 11.9  © 1718-8515 [employer]. Care instructions adapted under license by [employer] (which disclaims liability or warranty for this information). If you have questions about a medical condition or this instruction, always ask your healthcare professional. [address] any warranty or liability for your use of this information. Low Sodium Diet (2,000 Milligram): Care Instructions  Your Care Instructions    Too much sodium causes your body to hold on to extra water. This can raise your blood pressure and force your heart and kidneys to work harder. In very serious cases, this could cause you to be put in the hospital. It might even be life-threatening. By limiting sodium, you will feel better and lower your risk of serious problems. The most common source of sodium is salt. People get most of the salt in their diet from canned, prepared, and packaged foods. Fast food and restaurant meals also are very high in sodium. Your doctor will probably limit your sodium to less than 2,000 milligrams (mg) a day. This limit counts all the sodium in prepared and packaged foods and any salt you add to your food. Follow-up care is a key part of your treatment and safety. Be sure to make and go to all appointments, and call your doctor if you are having problems. It's also a good idea to know your test results and keep a list of the medicines you take. How can you care for yourself at home? Read food labels  · Read labels on cans and food packages. The labels tell you how much sodium is in each serving. Make sure that you look at the serving size. If you eat more than the serving size, you have eaten more sodium. · Food labels also tell you the Percent Daily Value for sodium. Choose products with low Percent Daily Values for sodium.   · Be aware that sodium can come in forms other than salt, including monosodium glutamate (MSG), sodium citrate, and sodium bicarbonate (baking soda). MSG is often added to Asian food. When you eat out, you can sometimes ask for food without MSG or added salt. Buy low-sodium foods  · Buy foods that are labeled \"unsalted\" (no salt added), \"sodium-free\" (less than 5 mg of sodium per serving), or \"low-sodium\" (less than 140 mg of sodium per serving). Foods labeled \"reduced-sodium\" and \"light sodium\" may still have too much sodium. Be sure to read the label to see how much sodium you are getting. · Buy fresh vegetables, or frozen vegetables without added sauces. Buy low-sodium versions of canned vegetables, soups, and other canned goods. Prepare low-sodium meals  · Cut back on the amount of salt you use in cooking. This will help you adjust to the taste. Do not add salt after cooking. One teaspoon of salt has about 2,300 mg of sodium. · Take the salt shaker off the table. · Flavor your food with garlic, lemon juice, onion, vinegar, herbs, and spices. Do not use soy sauce, lite soy sauce, steak sauce, onion salt, garlic salt, celery salt, mustard, or ketchup on your food. · Use low-sodium salad dressings, sauces, and ketchup. Or make your own salad dressings and sauces without adding salt. · Use less salt (or none) when recipes call for it. You can often use half the salt a recipe calls for without losing flavor. Other foods such as rice, pasta, and grains do not need added salt. · Rinse canned vegetables, and cook them in fresh water. This removes some--but not all--of the salt. · Avoid water that is naturally high in sodium or that has been treated with water softeners, which add sodium. Call your local water company to find out the sodium content of your water supply. If you buy bottled water, read the label and choose a sodium-free brand. Avoid high-sodium foods  · Avoid eating:  ? Smoked, cured, salted, and canned meat, fish, and poultry.   ? Ham, morales, hot dogs, and luncheon meats.  ? Regular, hard, and processed cheese and regular peanut butter. ? Crackers with salted tops, and other salted snack foods such as pretzels, chips, and salted popcorn. ? Frozen prepared meals, unless labeled low-sodium. ? Canned and dried soups, broths, and bouillon, unless labeled sodium-free or low-sodium. ? Canned vegetables, unless labeled sodium-free or low-sodium. ? [employer]o fries, pizza, tacos, and other fast foods. ? Pickles, olives, ketchup, and other condiments, especially soy sauce, unless labeled sodium-free or low-sodium. Where can you learn more? Go to http://cecelia-[place].info/. Enter B970 in the search box to learn more about \"Low Sodium Diet (2,000 Milligram): Care Instructions. \"  Current as of: March 28, 2018  Content Version: 11.9  © 0620-0307 [employer]. Care instructions adapted under license by [employer] (which disclaims liability or warranty for this information). If you have questions about a medical condition or this instruction, always ask your healthcare professional. [place] [address] any warranty or liability for your use of this information. Lipoma: Care Instructions  Your Care Instructions  A lipoma is a growth of fat just below the skin. It may feel soft and rubbery. Lipomas can occur anywhere on the body. But they are most common on the torso, neck, upper thighs, upper arms, and armpits. A lipoma does not turn into cancer. Lipomas usually are not treated, because most of them don't hurt or cause problems. But your doctor may remove a lipoma if it is painful, gets infected, or bothers you. Follow-up care is a key part of your treatment and safety. Be sure to make and go to all appointments, and call your doctor if you are having problems. It's also a good idea to know your test results and keep a list of the medicines you take. How can you care for yourself at home?   · A lipoma usually needs no care at home unless your doctor made a cut (incision) to remove it. · If your doctor told you how to care for your incision, follow your doctor's instructions. If you did not get instructions, follow this general advice:  ? Wash around the incision with clean water 2 times a day. Don't use hydrogen peroxide or alcohol. These can slow healing. ? You may cover the incision with a thin layer of petroleum jelly, such as Vaseline, and a nonstick bandage. ? Apply more petroleum jelly and replace the bandage as needed. When should you call for help? Call your doctor now or seek immediate medical care if:    · You have signs of infection, such as:  ? Increased pain, swelling, warmth, or redness. ? Red streaks leading from the lipoma. ? Pus draining from the lipoma. ? A fever.    Watch closely for changes in your health, and be sure to contact your doctor if:    · The lipoma is growing or changing.     · You do not get better as expected. Where can you learn more? Go to http://cecelia-[place].info/. Enter Y433 in the search box to learn more about \"Lipoma: Care Instructions. \"  Current as of: April 17, 2018  Content Version: 11.9  © 9356-5241 [employer]. Care instructions adapted under license by [employer] (which disclaims liability or warranty for this information). If you have questions about a medical condition or this instruction, always ask your healthcare professional. [place] [address] any warranty or liability for your use of this information.

## 2019-02-04 NOTE — PROGRESS NOTES
HISTORY OF PRESENT ILLNESS  Any Campa is a 75 y.o. female. HPI: here for follow up  H/o diabetes. Trying to be more compliant with diet modification and it has been helping. tolerating medication well. Also working on weight loss. Denies any headache, dizziness,no chest pain or sob. No palpitation. No diaphoresis. No abdominal pain. No nausea or vomiting. No urinary or bowel complains. No unusual fatigue. No mood changes. No hypoglycemia. Still trying to get adjust to the area. No more feeling depressed . H/o hypertension. Vitals stable. Asymptomatic. No medication side effects. No vision changes. No ext weakness. She is asking to get Tdap as her grand daughter is pregnant. Discussed to get it at the health department as might possible insurance dose not cover and not able to say how much it would be if she gets it at the office. She will also inquire about shingle shot there. She had not take it yet due to cost of the immunization. Visit Vitals  /72 (BP 1 Location: Right arm, BP Patient Position: Sitting)   Pulse 60   Temp 98.7 °F (37.1 °C) (Oral)   Resp 16   Ht 5' 6\" (1.676 m)   Wt 183 lb (83 kg)   SpO2 100%   BMI 29.54 kg/m²     Review medication list, vitals, problem list,allergies. Review labs.    Lab Results   Component Value Date/Time    WBC 8.0 03/10/2018 08:00 AM    HGB 12.0 03/10/2018 08:00 AM    HCT 37.6 03/10/2018 08:00 AM    PLATELET 815 58/41/2881 08:00 AM    MCV 84.1 03/10/2018 08:00 AM     Lab Results   Component Value Date/Time    Sodium 140 04/14/2018 07:50 AM    Potassium 4.3 04/14/2018 07:50 AM    Chloride 105 04/14/2018 07:50 AM    CO2 30 04/14/2018 07:50 AM    Anion gap 5 04/14/2018 07:50 AM    Glucose 104 (H) 04/14/2018 07:50 AM    BUN 25 (H) 04/14/2018 07:50 AM    Creatinine 1.21 04/14/2018 07:50 AM    BUN/Creatinine ratio 21 (H) 04/14/2018 07:50 AM    GFR est AA 53 (L) 04/14/2018 07:50 AM    GFR est non-AA 44 (L) 04/14/2018 07:50 AM    Calcium 10.8 (H) 04/14/2018 07:50 AM    Bilirubin, total 0.4 03/10/2018 08:00 AM    AST (SGOT) 20 03/10/2018 08:00 AM    Alk. phosphatase 73 03/10/2018 08:00 AM    Protein, total 7.9 03/10/2018 08:00 AM    Albumin 3.7 03/10/2018 08:00 AM    Globulin 4.2 (H) 03/10/2018 08:00 AM    A-G Ratio 0.9 03/10/2018 08:00 AM    ALT (SGPT) 21 03/10/2018 08:00 AM     Lab Results   Component Value Date/Time    Cholesterol, total 133 09/29/2018 07:46 AM    HDL Cholesterol 47 09/29/2018 07:46 AM    LDL, calculated 56.6 09/29/2018 07:46 AM    VLDL, calculated 29.4 09/29/2018 07:46 AM    Triglyceride 147 09/29/2018 07:46 AM    CHOL/HDL Ratio 2.8 09/29/2018 07:46 AM     Lab Results   Component Value Date/Time    TSH 1.31 03/10/2018 08:00 AM     Lab Results   Component Value Date/Time    Hemoglobin A1c 6.9 (H) 03/10/2018 08:00 AM     Lab Results   Component Value Date/Time    Microalbumin/Creat ratio (mg/g creat) 8 03/10/2018 08:00 AM    Microalbumin,urine random 0.84 03/10/2018 08:00 AM    Microalbumin urine (POC) 10 04/24/2017 09:02 AM   she has lipoma over forehead. She had hard time scheduling appt with general surgeon as she is interested in removal.   Instructed nurse to help her with it. No pain or any skin changes over lump site. H.o elevated PTH, calcium, renal insufficiency. Seen endocrinology. Was started on vitamin D. Shows compliance with medication. Will follow their recommendations. She is on losartan. She will check with the pharmacist that her lot # is affected with drug recall or not. If needed will change the medication. H/o hyperlipidemia. On statin. No side effects. Working on life style modification. Also a.fib. On aspirin. Cost was the issue for anticoagulation. Does not wanted coumadin. Following cardiology. ROS: see HPI     Physical Exam   Constitutional: She is oriented to person, place, and time. No distress. Cardiovascular: Normal rate, regular rhythm and normal heart sounds. Pulmonary/Chest:   CTA   Abdominal: Soft. Bowel sounds are normal. There is no tenderness. Musculoskeletal: She exhibits no edema. Neurological: She is oriented to person, place, and time. Psychiatric: Her behavior is normal.       ASSESSMENT and PLAN    ICD-10-CM ICD-9-CM    1. Well controlled diabetes mellitus ([facility]): HBA1C at goal. Working on life style modification. Checking labs. E11.9 250.00 metFORMIN (GLUCOPHAGE) 500 mg tablet      HEMOGLOBIN A1C WITH EAG      METABOLIC PANEL, COMPREHENSIVE   2. Essential hypertension: stable at this time. Low salt diet. Exercise as tolerated. Will continue current plan. I10 401.9 losartan (COZAAR) 100 mg tablet      metoprolol succinate (TOPROL-XL) 50 mg XL tablet   3. Renal insufficiency: stable . E61.3 145.5 METABOLIC PANEL, COMPREHENSIVE   4. Elevated PTHrP level: seen endo,. On vitamin D supplement. R79.89 790.6    5. Atrial fibrillation, unspecified type ([facility]): stable. Asymptomatic. I48.91 427.31    6. Hyperlipidemia, unspecified hyperlipidemia type: on statin. Tolerating it well.  E78.5 272.4    7. Lipoma of head: see HPI. Referral done. Nurse to help with scheduling an appt. D17.0 214.0    Pt understood and agree with the plan   Review    Follow-up Disposition:  Return in about 3 months (around 5/4/2019).

## 2019-02-05 NOTE — PROGRESS NOTES
Improvement in diabetes test and now in prediabetes range. Some improvement in calcium as well. Per patient approval please send result to her endo. Thanks.

## 2019-02-07 NOTE — PROGRESS NOTES
Spoke with patient (all identifiers verified) to advise labs showed improvement in diabetes test and now in prediabetes range. Some improvement in calcium, as well. Patient verbalized understanding. She stated it was okay to forward test results to Dr. Devan Hendrix, as she has an appointment with him next month. I offered to schedule a follow-up visit with Dr. Adrian Jain during phone call, but patient stated she will call back to schedule since she was in a meeting.

## 2019-03-20 ENCOUNTER — OFFICE VISIT (OUTPATIENT)
Dept: CARDIOLOGY CLINIC | Age: 74
End: 2019-03-20

## 2019-03-20 ENCOUNTER — CLINICAL SUPPORT (OUTPATIENT)
Dept: CARDIOLOGY CLINIC | Age: 74
End: 2019-03-20

## 2019-03-20 VITALS
DIASTOLIC BLOOD PRESSURE: 84 MMHG | HEART RATE: 60 BPM | HEIGHT: 66 IN | WEIGHT: 187 LBS | BODY MASS INDEX: 30.05 KG/M2 | OXYGEN SATURATION: 98 % | SYSTOLIC BLOOD PRESSURE: 110 MMHG

## 2019-03-20 DIAGNOSIS — R00.1 BRADYCARDIA: ICD-10-CM

## 2019-03-20 DIAGNOSIS — I48.0 PAROXYSMAL ATRIAL FIBRILLATION (HCC): Primary | ICD-10-CM

## 2019-03-20 DIAGNOSIS — R00.1 BRADYCARDIA: Primary | ICD-10-CM

## 2019-03-20 DIAGNOSIS — Z95.0 CARDIAC PACEMAKER IN SITU: ICD-10-CM

## 2019-03-20 DIAGNOSIS — E78.00 PURE HYPERCHOLESTEROLEMIA: ICD-10-CM

## 2019-03-20 DIAGNOSIS — I10 ESSENTIAL HYPERTENSION: ICD-10-CM

## 2019-03-20 NOTE — PROGRESS NOTES
Todd Tatum presents today for   Chief Complaint   Patient presents with    Follow-up     6 month    Leg Swelling     both legs on/off        Todd Tatum preferred language for health care discussion is english/other. Is someone accompanying this pt? No     Is the patient using any DME equipment during OV? No     Depression Screening:  3 most recent PHQ Screens 9/18/2018   Little interest or pleasure in doing things Not at all   Feeling down, depressed, irritable, or hopeless Not at all   Total Score PHQ 2 0       Learning Assessment:  Learning Assessment 3/19/2018   PRIMARY LEARNER Patient   HIGHEST LEVEL OF EDUCATION - PRIMARY LEARNER  -   BARRIERS PRIMARY LEARNER -   [facility]    NEED -   LEARNER PREFERENCE PRIMARY DEMONSTRATION   LEARNING SPECIAL TOPICS -   ANSWERED BY Patient   RELATIONSHIP SELF       Abuse Screening:  Abuse Screening Questionnaire 9/18/2018   Do you ever feel afraid of your partner? N   Are you in a relationship with someone who physically or mentally threatens you? N   Is it safe for you to go home? Y       Fall Risk  Fall Risk Assessment, last 12 mths 9/18/2018   Able to walk? Yes   Fall in past 12 months? No       Pt currently taking Antiplatelet therapy? ASA    Coordination of Care:  1. Have you been to the ER, urgent care clinic since your last visit? Hospitalized since your last visit? No    2. Have you seen or consulted any other health care providers outside of the [address] [place] [address] since your last visit? Include any pap smears or colon screening.  No

## 2019-03-20 NOTE — PROGRESS NOTES
HISTORY OF PRESENT ILLNESS  Michael Peña is a 75 y.o. female. HPI  She has been feeling well. She denies any cardiac symptoms. She has had no palpitation whatsoever. She denies chest pain, dyspnea, orthopnea, PND. She has had no dizziness or syncope. She denies any symptoms of TIA or amaurosis fugax. Interrogation of pacemaker demonstrated no atrial fibrillation. She has known history of cardiac problems.  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  She had atrial fibrillation and finally underwent the atrial fibrillation ablation successfully in March of 2016.  She was treated with flecainide briefly.  As the flecainide was discontinued, she has been anticoagulated with Eliquis.  She has previous history of DVT after knee surgery.  She has history of hypercholesterolemia, but has not been able to tolerate statins because of severe myalgia and other side effects. Zackary Vinson is known to have had type 2 diabetes. Zackary Vinson has history of seizure disorder; however, she has not had any recurrent seizures since 2006 off medication.      Review of Systems   Constitutional: Negative for malaise/fatigue and weight loss. HENT: Negative for hearing loss. Eyes: Negative for blurred vision and double vision. Respiratory: Negative for shortness of breath. Cardiovascular: Negative for chest pain, palpitations, orthopnea, claudication, leg swelling and PND. Gastrointestinal: Negative for blood in stool, heartburn and melena. Genitourinary: Negative for dysuria, frequency, hematuria and urgency. Musculoskeletal: Negative for back pain and joint pain. Skin: Negative for itching and rash. Neurological: Negative for dizziness and loss of consciousness. Psychiatric/Behavioral: Negative for depression and memory loss. Physical Exam   Constitutional: She is oriented to person, place, and time. She appears well-developed and well-nourished.    HENT:   Head: Normocephalic and atraumatic. Eyes: Pupils are equal, round, and reactive to light. Conjunctivae are normal.   Neck: Normal range of motion. Neck supple. No JVD present. Cardiovascular: Normal rate, regular rhythm, S1 normal and S2 normal.  No extrasystoles are present. PMI is not displaced. Exam reveals no gallop and no friction rub. No murmur heard. Pulses:       Carotid pulses are 3+ on the right side, and 3+ on the left side. Pulmonary/Chest: Effort normal. She has no rales. Abdominal: Soft. There is no tenderness. Musculoskeletal: She exhibits no edema. Neurological: She is alert and oriented to person, place, and time. No cranial nerve deficit. Skin: Skin is warm and dry. Psychiatric: She has a normal mood and affect. Her behavior is normal.     Visit Vitals  /84   Pulse 60   Ht 5' 6\" (1.676 m)   Wt 84.8 kg (187 lb)   SpO2 98%   BMI 30.18 kg/m²       Past Medical History:   Diagnosis Date    Bradycardia     Diabetes (HCC)     borderline    DVT (deep venous thrombosis) ([address] 2010    right    History of ablation March 2016     History of atrial fibrillation     Hypercholesterolemia     Hypertension     Long term current use of anticoagulant therapy     Osteoporosis     Seizure ([address]     Seizures ([address]     on medication from 2006 to 2013 no seizure since 2006    [employer] pacemaker implant Set.  2013 Dr. Han Loja, [place]     Thyroid disease        Social History     Socioeconomic History    Marital status:      Spouse name: Not on file    Number of children: Not on file    Years of education: Not on file    Highest education level: Not on file   Occupational History    Not on file   Social Needs    Financial resource strain: Not on file    Food insecurity:     Worry: Not on file     Inability: Not on file    Transportation needs:     Medical: Not on file     Non-medical: Not on file   Tobacco Use    Smoking status: Never Smoker    Smokeless tobacco: Never Used   Substance and Sexual Activity    Alcohol use: No    Drug use: No    Sexual activity: Not Currently     Partners: Male     Birth control/protection: Surgical     Comment: hysterectomy   Lifestyle    Physical activity:     Days per week: Not on file     Minutes per session: Not on file    Stress: Not on file   Relationships    Social connections:     Talks on phone: Not on file     Gets together: Not on file     Attends [employer] service: Not on file     Active member of club or organization: Not on file     Attends meetings of clubs or organizations: Not on file     Relationship status: Not on file    Intimate partner violence:     Fear of current or ex partner: Not on file     Emotionally abused: Not on file     Physically abused: Not on file     Forced sexual activity: Not on file   Other Topics Concern    Not on file   Social History Narrative    Not on file       Family History   Problem Relation Age of Onset    Diabetes Father     Hypertension Sister        Past Surgical History:   Procedure Laterality Date    CARDIAC SURG PROCEDURE UNLIST      ablation for a-fib    COLONOSCOPY N/A 10/25/2017    COLONOSCOPY performed by Jayne Simmonds, [facility] at [facility] ENDOSCOPY    HX CATARACT REMOVAL      HX HYSTERECTOMY      HX KNEE REPLACEMENT      left knee in 1/2010 and right knee 7/2016    HX KNEE REPLACEMENT      right knee    HX PACEMAKER         Current Outpatient Medications   Medication Sig Dispense Refill    ONETOUCH ULTRA BLUE TEST STRIP strip USE TO TEST BLOOD SUGAR ONCE DIALY 100 Strip 6    losartan (COZAAR) 100 mg tablet Take 1 Tab by mouth daily. 90 Tab 1    metFORMIN (GLUCOPHAGE) 500 mg tablet Take 1 Tab by mouth daily (with breakfast). 180 Tab 1    metoprolol succinate (TOPROL-XL) 50 mg XL tablet Take 1 Tab by mouth daily.  Per patient she does not take if her BP is low 90 Tab 1    pravastatin (PRAVACHOL) 10 mg tablet TAKE 1 TAB BY MOUTH NIGHTLY. (Patient taking differently: Take 10 mg by mouth every other day.) 90 Tab 1    cholecalciferol, vitamin D3, (VITAMIN D3) 2,000 unit tab Take  by mouth.  spironolactone (ALDACTONE) 25 mg tablet Take 1 Tab by mouth daily. 4    aspirin 81 mg chewable tablet Take 81 mg by mouth daily.  DOCOSAHEXANOIC ACID/EPA (FISH OIL PO) Take 100 mg by mouth daily.  COQ10, LIPOSOMAL UBIQUINOL, PO Take 200 mg by mouth daily. EKG: normal EKG, normal sinus rhythm, unchanged from previous tracings, atrially paced rhythm  . ASSESSMENT and PLAN  Encounter Diagnoses   Name Primary?  Paroxysmal atrial fibrillation (HCC) Yes    Bradycardia/ post pacemaker. s/p ablation     Essential hypertension     Pure hypercholesterolemia    She has been doing very well. She has had no symptoms to indicate angina or cardiac decompensation. She has had no recurrent atrial fibrillation whatsoever. Her blood pressure has been under control and her cholesterol profile has been satisfactory. For now, continue on current medical regimen.

## 2019-03-20 NOTE — PROGRESS NOTES
I have personally seen and evaluated the device findings. Interrogation reviewed and I agree with assessment.     Kim Mehta

## 2019-05-06 ENCOUNTER — HOSPITAL ENCOUNTER (OUTPATIENT)
Dept: LAB | Age: 74
Discharge: HOME | End: 2019-05-06
Payer: MEDICARE

## 2019-05-06 ENCOUNTER — OFFICE VISIT (OUTPATIENT)
Dept: FAMILY MEDICINE CLINIC | Age: 74
End: 2019-05-06

## 2019-05-06 VITALS
BODY MASS INDEX: 30.05 KG/M2 | WEIGHT: 187 LBS | DIASTOLIC BLOOD PRESSURE: 84 MMHG | TEMPERATURE: 97.9 F | OXYGEN SATURATION: 98 % | HEIGHT: 66 IN | RESPIRATION RATE: 16 BRPM | HEART RATE: 60 BPM | SYSTOLIC BLOOD PRESSURE: 124 MMHG

## 2019-05-06 DIAGNOSIS — E78.5 HYPERLIPIDEMIA, UNSPECIFIED HYPERLIPIDEMIA TYPE: ICD-10-CM

## 2019-05-06 DIAGNOSIS — D17.0 LIPOMA OF HEAD: ICD-10-CM

## 2019-05-06 DIAGNOSIS — Z13.39 SCREENING FOR ALCOHOLISM: ICD-10-CM

## 2019-05-06 DIAGNOSIS — E03.9 HYPOTHYROIDISM, UNSPECIFIED TYPE: ICD-10-CM

## 2019-05-06 DIAGNOSIS — R21 RASH: ICD-10-CM

## 2019-05-06 DIAGNOSIS — Z13.31 SCREENING FOR DEPRESSION: ICD-10-CM

## 2019-05-06 DIAGNOSIS — R79.89 ELEVATED PTHRP LEVEL: ICD-10-CM

## 2019-05-06 DIAGNOSIS — E11.21 TYPE 2 DIABETES WITH NEPHROPATHY (HCC): ICD-10-CM

## 2019-05-06 DIAGNOSIS — Z00.00 MEDICARE ANNUAL WELLNESS VISIT, SUBSEQUENT: Primary | ICD-10-CM

## 2019-05-06 DIAGNOSIS — I48.91 ATRIAL FIBRILLATION, UNSPECIFIED TYPE (HCC): ICD-10-CM

## 2019-05-06 DIAGNOSIS — E83.52 HYPERCALCEMIA: ICD-10-CM

## 2019-05-06 DIAGNOSIS — N28.9 RENAL INSUFFICIENCY: ICD-10-CM

## 2019-05-06 LAB
CREAT UR-MCNC: 57.6 MG/DL (ref 30–125)
MICROALBUMIN UR-MCNC: <0.5 MG/DL (ref 0–3)
MICROALBUMIN/CREAT UR-RTO: NORMAL MG/G (ref 0–30)

## 2019-05-06 PROCEDURE — 82043 UR ALBUMIN QUANTITATIVE: CPT

## 2019-05-06 PROCEDURE — 36415 COLL VENOUS BLD VENIPUNCTURE: CPT

## 2019-05-06 NOTE — PROGRESS NOTES
Chief Complaint   Patient presents with    Diabetes    Hypertension    Irregular Heart Beat    Cholesterol Problem     1. Have you been to the ER, urgent care clinic since your last visit? Hospitalized since your last visit? No    2. Have you seen or consulted any other health care providers outside of the [address] [place] [address] since your last visit? Include any pap smears or colon screening.  Endocrine- unsure of name

## 2019-05-06 NOTE — PROGRESS NOTES
HISTORY OF PRESENT ILLNESS  Nicolasa Spann is a 75 y.o. female. HPI: Here for routine follow up. H/o hypertension, a.fib, diabetes, hypothyroidism, renal insufficiency, hypercalcemia, elevated PTH. Denies any headache, dizziness, no chest pain or trouble breathing, no arm or leg weakness. No nausea or vomiting, no weight or appetite changes, no mood changes . No urine or bowel complains, no palpitation, no diaphoresis. No abdominal pain. No cold or cough. No leg swelling. No fever. No sleep trouble. Visit Vitals  /84 (BP 1 Location: Left arm, BP Patient Position: Sitting)   Pulse 60   Temp 97.9 °F (36.6 °C) (Oral)   Resp 16   Ht 5' 6\" (1.676 m)   Wt 187 lb (84.8 kg)   SpO2 98%   BMI 30.18 kg/m²     Review medication list, vitals, problem list,allergies. Shows complaince with all medication. No side effects. Vitals stable today. She is asymptomatic. Following endocrinology for elevated pTH. On vitamin D supplement. Will obtain records. Review labs.    Lab Results   Component Value Date/Time    WBC 8.0 03/10/2018 08:00 AM    HGB 12.0 03/10/2018 08:00 AM    HCT 37.6 03/10/2018 08:00 AM    PLATELET 082 38/96/8792 08:00 AM    MCV 84.1 03/10/2018 08:00 AM     Lab Results   Component Value Date/Time    Cholesterol, total 133 09/29/2018 07:46 AM    HDL Cholesterol 47 09/29/2018 07:46 AM    LDL, calculated 56.6 09/29/2018 07:46 AM    VLDL, calculated 29.4 09/29/2018 07:46 AM    Triglyceride 147 09/29/2018 07:46 AM    CHOL/HDL Ratio 2.8 09/29/2018 07:46 AM     Lab Results   Component Value Date/Time    Sodium 139 02/04/2019 10:36 AM    Potassium 5.3 02/04/2019 10:36 AM    Chloride 106 02/04/2019 10:36 AM    CO2 28 02/04/2019 10:36 AM    Anion gap 5 02/04/2019 10:36 AM    Glucose 89 02/04/2019 10:36 AM    BUN 22 (H) 02/04/2019 10:36 AM    Creatinine 1.08 02/04/2019 10:36 AM    BUN/Creatinine ratio 20 02/04/2019 10:36 AM    GFR est AA >60 02/04/2019 10:36 AM    GFR est non-AA 50 (L) 02/04/2019 10:36 AM Calcium 10.3 (H) 02/04/2019 10:36 AM    Bilirubin, total 0.5 02/04/2019 10:36 AM    AST (SGOT) 18 02/04/2019 10:36 AM    Alk. phosphatase 61 02/04/2019 10:36 AM    Protein, total 7.1 02/04/2019 10:36 AM    Albumin 3.9 02/04/2019 10:36 AM    Globulin 3.2 02/04/2019 10:36 AM    A-G Ratio 1.2 02/04/2019 10:36 AM    ALT (SGPT) 24 02/04/2019 10:36 AM     Lab Results   Component Value Date/Time    TSH 1.31 03/10/2018 08:00 AM     Lab Results   Component Value Date/Time    Hemoglobin A1c 6.0 (H) 02/04/2019 10:36 AM     Lab Results   Component Value Date/Time    Microalbumin/Creat ratio (mg/g creat) 8 03/10/2018 08:00 AM    Microalbumin,urine random 0.84 03/10/2018 08:00 AM    Microalbumin urine (POC) 10 04/24/2017 09:02 AM     Lab Results   Component Value Date/Time    Calcium 10.3 (H) 02/04/2019 10:36 AM    PTH, Intact 132.0 (H) 03/19/2018 10:27 AM     Checking blood sugar at home. No signs and symptoms of hypoglycemia. Taking medication with compliance. Checking blood sugar at home. HBA1C at goal.   On anticoagulation for a.fib. No side effects. No easy bruise or bleeding. Following cardiology. ROS: see HPI     Physical Exam   Constitutional: She is oriented to person, place, and time. No distress. HENT:   Head:       Eyes:       Neck: No thyromegaly present. Cardiovascular: Normal rate, regular rhythm and normal heart sounds. Pulmonary/Chest:   CTA   Abdominal: Soft. Bowel sounds are normal. There is no tenderness. Musculoskeletal: She exhibits no edema. Lymphadenopathy:     She has no cervical adenopathy. Neurological: She is oriented to person, place, and time. Skin:   Dry skin patch over left upper eye lid medial corner, itching. No discharge or any open skin. Non tender. Also mild hyperpigmentation. Also dry erythematous skin over outer ear canal. No open skin. Mild itching. Non tender. Psychiatric: Her behavior is normal.       ASSESSMENT and PLAN  1.  Type 2 diabetes with nephropathy ([address]): well controlled. On statin and ARB . Up to date with an eye exam and foot exam. HBA1c at goal. Will continue current plan. E11.21 250.40 MICROALBUMIN, UR, RAND W/ MICROALB/CREAT RATIO     583.81    2. Atrial fibrillation, unspecified type [facility]): on anticoagulation. Asymptomatic. Following cardiology  I48.91 427.31    3. Elevated PTHrP level: following endo. On vitamin D.  R79.89 790.6    4. Hypercalcemia: will observe and follow endo recommendations. Obtain records. E83.52 275.42    5. Renal insufficiency: recheck labs. N28.9 593.9    6. Hyperlipidemia, unspecified hyperlipidemia type: on statin. No side effects. E78.5 272.4    7. Thyroid nodule. \" following endo. Will obtain records. E03.9 244.9    8. Lipoma of head: wants to remove. Will send her to dermatology  D17.0 214.0 REFERRAL TO DERMATOLOGY   9. Rash: on and off. Topical hydrocortisone. As needed. R21 782.1 REFERRAL TO DERMATOLOGY    over left upper eyelid, probably eczema. over left ear. Pt understood and agree with the plan   Review HM   Follow-up and Dispositions    · Return in about 4 months (around 9/6/2019).

## 2019-05-06 NOTE — ACP (ADVANCE CARE PLANNING)
Advance Care Planning (ACP) Provider Conversation Snapshot    Date of ACP Conversation: 05/06/19  Persons included in Conversation:  patient  Length of ACP Conversation in minutes:  <16 minutes (Non-Billable)    Authorized Decision Maker (if patient is incapable of making informed decisions): This person is:   all children             For Patients with Decision Making Capacity:   discussed advance directive. she has health living wheel. she has copy excessible to her children. wants all childrent to be involved in the decision.      Conversation Outcomes / Follow-Up Plan:   see above

## 2019-05-06 NOTE — PROGRESS NOTES
This is the Subsequent Medicare Annual Wellness Exam, performed 12 months or more after the Initial AWV or the last Subsequent AWV    I have reviewed the patient's medical history in detail and updated the computerized patient record. History     Past Medical History:   Diagnosis Date    Bradycardia     Diabetes ([address]     borderline    DVT (deep venous thrombosis) ([facility] 2010    right    History of ablation March 2016     History of atrial fibrillation     Hypercholesterolemia     Hypertension     Long term current use of anticoagulant therapy     Osteoporosis     Seizure ([address]     Seizures ([address]     on medication from 2006 to 2013 no seizure since 2006    [employer] pacemaker implant Set. 2013 Dr. Edward Brown, [place]     Thyroid disease       Past Surgical History:   Procedure Laterality Date    CARDIAC SURG PROCEDURE UNLIST      ablation for a-fib    COLONOSCOPY N/A 10/25/2017    COLONOSCOPY performed by Glynis Homans, [facility] at [facility] ENDOSCOPY    HX CATARACT REMOVAL      HX HYSTERECTOMY      HX KNEE REPLACEMENT      left knee in 1/2010 and right knee 7/2016    HX KNEE REPLACEMENT      right knee    HX PACEMAKER       Current Outpatient Medications   Medication Sig Dispense Refill    ONETOUCH ULTRA BLUE TEST STRIP strip USE TO TEST BLOOD SUGAR ONCE DIALY 100 Strip 6    losartan (COZAAR) 100 mg tablet Take 1 Tab by mouth daily. 90 Tab 1    metFORMIN (GLUCOPHAGE) 500 mg tablet Take 1 Tab by mouth daily (with breakfast). 180 Tab 1    metoprolol succinate (TOPROL-XL) 50 mg XL tablet Take 1 Tab by mouth daily. Per patient she does not take if her BP is low 90 Tab 1    pravastatin (PRAVACHOL) 10 mg tablet TAKE 1 TAB BY MOUTH NIGHTLY. (Patient taking differently: Take 10 mg by mouth every other day.) 90 Tab 1    cholecalciferol, vitamin D3, (VITAMIN D3) 2,000 unit tab Take  by mouth.  spironolactone (ALDACTONE) 25 mg tablet Take 1 Tab by mouth daily.   4    aspirin 81 mg chewable tablet Take 81 mg by mouth daily.  DOCOSAHEXANOIC ACID/EPA (FISH OIL PO) Take 100 mg by mouth daily.  COQ10, LIPOSOMAL UBIQUINOL, PO Take 200 mg by mouth daily. Allergies   Allergen Reactions    Statins-Hmg-Coa Reductase Inhibitors Other (comments)     Felt sick      Family History   Problem Relation Age of Onset    Diabetes Father     Hypertension Sister      Social History     Tobacco Use    Smoking status: Never Smoker    Smokeless tobacco: Never Used   Substance Use Topics    Alcohol use: No     Patient Active Problem List   Diagnosis Code    Type 2 diabetes mellitus with hyperglycemia, without long-term current use of insulin (HCC) E11.65    Essential hypertension I10    Bradycardia/ post pacemaker. s/p ablation R00.1    Atrial fibrillation (HCC)/ post ablation I48.91    h/o Deep vein thrombophlebitis of right leg (HCC)/ post knee replacement in july 2016 I80.201    Hyperlipidemia E78.5    Hypothyroidism E03.9    Seizure disorder (HCC)/ diagnosed in 2006. was taken off from medication in 2015. since then no seizure. G41.12    Advance directive discussed with patient/ living wheel and she will provide a copy / want to consider changes. will provide updates  Z71.89    Cardiac pacemaker in situ Z95.0    S/P colonoscopy Z98.890    Thyroid nodule E04.1    Type 2 diabetes with nephropathy ([facility] E11.21    Renal insufficiency N28.9    Hypercalcemia E83.52    Elevated PTHrP level R79.89       Depression Risk Factor Screening:     3 most recent PHQ Screens 9/18/2018   Little interest or pleasure in doing things Not at all   Feeling down, depressed, irritable, or hopeless Not at all   Total Score PHQ 2 0     Alcohol Risk Factor Screening: You do not drink alcohol or very rarely. Functional Ability and Level of Safety:   Hearing Loss  Hearing is good. Activities of Daily Living  The home contains: no safety equipment.   Patient does total self care    Fall Risk  Fall Risk Assessment, last 12 mths 9/18/2018   Able to walk? Yes   Fall in past 12 months? No       Abuse Screen  Patient is not abused    Cognitive Screening   Evaluation of Cognitive Function:  Has your family/caregiver stated any concerns about your memory: no  Normal    Patient Care Team   Patient Care Team:  Nasima Tate MD as PCP - General (Family Practice)  Rubin Barraza MD (Cardiology)  Osei Leung MD as Physician (Colon and Rectal Surgery)  Stevan Rivera MD (Ophthalmology)  Marylu Segal MD (Endocrinology)    Assessment/Plan   Education and counseling provided:  Are appropriate based on today's review and evaluation  Review nurses note and assessment. Agree with that. Discussed 5 years health plan and given copy in AVS.  Discussed advance directive. Pt has health living wheel. She wants all her children to be part of decision making. I have encouraged her to talk to her kids where she has put the living wheel     Diagnoses and all orders for this visit:    1. Medicare annual wellness visit, subsequent    2.  Screening for alcoholism  -     [place] ANNUAL ALCOHOL SCREEN 15 MIN    3. Screening for depression  -     [place] Maintenance Due   Topic Date Due    MICROALBUMIN Q1  03/10/2019    MEDICARE YEARLY EXAM  04/19/2019

## 2019-05-11 DIAGNOSIS — E11.9 WELL CONTROLLED DIABETES MELLITUS (HCC): ICD-10-CM

## 2019-05-11 DIAGNOSIS — E78.1 HYPERTRIGLYCERIDEMIA: ICD-10-CM

## 2019-05-13 RX ORDER — PRAVASTATIN SODIUM 10 MG/1
10 TABLET ORAL
Qty: 90 TAB | Refills: 1 | Status: SHIPPED | OUTPATIENT
Start: 2019-05-13 | End: 2020-07-08 | Stop reason: SINTOL

## 2019-05-24 NOTE — PROGRESS NOTES
Contacted patient and verified identity using name and date of birth (2- identifiers)  Spoke with patient and she verbalized understanding of normal microalbumin.

## 2019-08-03 DIAGNOSIS — I10 ESSENTIAL HYPERTENSION: ICD-10-CM

## 2019-08-05 RX ORDER — METOPROLOL SUCCINATE 50 MG/1
50 TABLET, EXTENDED RELEASE ORAL DAILY
Qty: 90 TAB | Refills: 1 | Status: SHIPPED | OUTPATIENT
Start: 2019-08-05 | End: 2020-01-23 | Stop reason: SDUPTHER

## 2019-08-05 RX ORDER — LOSARTAN POTASSIUM 100 MG/1
TABLET ORAL
Qty: 90 TAB | Refills: 1 | Status: SHIPPED | OUTPATIENT
Start: 2019-08-05 | End: 2020-01-23 | Stop reason: SDUPTHER

## 2019-09-16 ENCOUNTER — OFFICE VISIT (OUTPATIENT)
Dept: FAMILY MEDICINE CLINIC | Age: 74
End: 2019-09-16

## 2019-09-16 VITALS
SYSTOLIC BLOOD PRESSURE: 138 MMHG | TEMPERATURE: 98.6 F | DIASTOLIC BLOOD PRESSURE: 80 MMHG | HEIGHT: 66 IN | WEIGHT: 183 LBS | HEART RATE: 60 BPM | BODY MASS INDEX: 29.41 KG/M2 | RESPIRATION RATE: 16 BRPM | OXYGEN SATURATION: 98 %

## 2019-09-16 DIAGNOSIS — D17.0 LIPOMA OF HEAD: ICD-10-CM

## 2019-09-16 DIAGNOSIS — E21.3 HYPERPARATHYROIDISM, UNSPECIFIED (HCC): ICD-10-CM

## 2019-09-16 DIAGNOSIS — I10 ESSENTIAL HYPERTENSION: ICD-10-CM

## 2019-09-16 DIAGNOSIS — E78.5 HYPERLIPIDEMIA, UNSPECIFIED HYPERLIPIDEMIA TYPE: ICD-10-CM

## 2019-09-16 DIAGNOSIS — I48.91 ATRIAL FIBRILLATION, UNSPECIFIED TYPE (HCC): Primary | ICD-10-CM

## 2019-09-16 DIAGNOSIS — E03.9 HYPOTHYROIDISM, UNSPECIFIED TYPE: ICD-10-CM

## 2019-09-16 DIAGNOSIS — Z23 ENCOUNTER FOR IMMUNIZATION: ICD-10-CM

## 2019-09-16 DIAGNOSIS — N28.9 RENAL INSUFFICIENCY: ICD-10-CM

## 2019-09-16 DIAGNOSIS — E11.9 WELL CONTROLLED DIABETES MELLITUS (HCC): ICD-10-CM

## 2019-09-16 DIAGNOSIS — R79.89 ELEVATED PTHRP LEVEL: ICD-10-CM

## 2019-09-16 PROBLEM — I80.201 DEEP VEIN THROMBOPHLEBITIS OF RIGHT LEG (HCC): Status: RESOLVED | Noted: 2017-03-16 | Resolved: 2019-09-16

## 2019-09-16 PROBLEM — G40.909 SEIZURE DISORDER (HCC): Status: RESOLVED | Noted: 2017-03-16 | Resolved: 2019-09-16

## 2019-09-16 NOTE — PROGRESS NOTES
Chief Complaint   Patient presents with    Diabetes    Cholesterol Problem    Thyroid Problem    Irregular Heart Beat     1. Have you been to the ER, urgent care clinic since your last visit? Hospitalized since your last visit? No    2. Have you seen or consulted any other health care providers outside of the [address] [place] [address] since your last visit? Include any pap smears or colon screening. Dr. Candi Alcantara- Endocrine    Patient presents for High Dose flu vaccine. Consent obtained, Tolerated procedure well at left deltoid. Patient remained in office 10 minutes post vaccine.  No side effects noted    Lot# [id number]  Exp: 06/30/20  [address]a  [address]: [phone]

## 2019-09-16 NOTE — PROGRESS NOTES
HISTORY OF PRESENT ILLNESS  Les Saunders is a 75 y.o. female. HPI: Here for follow up. H/o a.fib. Post ablation. Now on aspirin. Asymptomatic. Blood pressure stable. Compliant with medication. Again asymptomatic. No side effects of medication. Denies any headache, dizziness, no chest pain or trouble breathing, no arm or leg weakness. No nausea or vomiting, no weight or appetite changes, no mood changes . No urine or bowel complains, no palpitation, no diaphoresis. No abdominal pain. No cold or cough. No leg swelling. No fever. No sleep trouble. Visit Vitals  /80 (BP 1 Location: Left arm, BP Patient Position: Sitting)   Pulse 60   Temp 98.6 °F (37 °C) (Oral)   Resp 16   Ht 5' 6\" (1.676 m)   Wt 183 lb (83 kg)   SpO2 98%   BMI 29.54 kg/m²     Well controlled diabetes. HBA1C in prediabetic range. Compliant with diet modification and compliant with medication. H/o renal insufficiency. Observe. Following nephrology. Also following endo for elevated PTH. Review labs. Lab Results   Component Value Date/Time    WBC 8.0 03/10/2018 08:00 AM    HGB 12.0 03/10/2018 08:00 AM    HCT 37.6 03/10/2018 08:00 AM    PLATELET 594 08/61/3406 08:00 AM    MCV 84.1 03/10/2018 08:00 AM     Lab Results   Component Value Date/Time    Sodium 139 02/04/2019 10:36 AM    Potassium 5.3 02/04/2019 10:36 AM    Chloride 106 02/04/2019 10:36 AM    CO2 28 02/04/2019 10:36 AM    Anion gap 5 02/04/2019 10:36 AM    Glucose 89 02/04/2019 10:36 AM    BUN 22 (H) 02/04/2019 10:36 AM    Creatinine 1.08 02/04/2019 10:36 AM    BUN/Creatinine ratio 20 02/04/2019 10:36 AM    GFR est AA >60 02/04/2019 10:36 AM    GFR est non-AA 50 (L) 02/04/2019 10:36 AM    Calcium 10.3 (H) 02/04/2019 10:36 AM    Bilirubin, total 0.5 02/04/2019 10:36 AM    AST (SGOT) 18 02/04/2019 10:36 AM    Alk.  phosphatase 61 02/04/2019 10:36 AM    Protein, total 7.1 02/04/2019 10:36 AM    Albumin 3.9 02/04/2019 10:36 AM    Globulin 3.2 02/04/2019 10:36 AM    A-G Ratio 1.2 02/04/2019 10:36 AM    ALT (SGPT) 24 02/04/2019 10:36 AM     Lab Results   Component Value Date/Time    Cholesterol, total 133 09/29/2018 07:46 AM    HDL Cholesterol 47 09/29/2018 07:46 AM    LDL, calculated 56.6 09/29/2018 07:46 AM    VLDL, calculated 29.4 09/29/2018 07:46 AM    Triglyceride 147 09/29/2018 07:46 AM    CHOL/HDL Ratio 2.8 09/29/2018 07:46 AM     Lab Results   Component Value Date/Time    TSH 1.31 03/10/2018 08:00 AM     Lab Results   Component Value Date/Time    Hemoglobin A1c 6.0 (H) 02/04/2019 10:36 AM   h/o hypothyroidism. Asymptomatic. On medication. Also following endo. No trouble swallowing. No unusual fatigue. No change in voice or bowel movement. ROS: Denies any headache, dizziness, no chest pain or trouble breathing, no arm or leg weakness. No nausea or vomiting, no weight or appetite changes, no mood changes . No urine or bowel complains, no palpitation, no diaphoresis. No abdominal pain. No cold or cough. No leg swelling. No fever. No sleep trouble. Physical Exam   Constitutional: She is oriented to person, place, and time. No distress. Neck: No thyromegaly present. Cardiovascular: Normal rate, regular rhythm and normal heart sounds. Pulmonary/Chest:   CTA   Abdominal: Soft. Bowel sounds are normal. There is no tenderness. Musculoskeletal: She exhibits no edema. Lymphadenopathy:     She has no cervical adenopathy. Neurological: She is oriented to person, place, and time. Psychiatric: Her behavior is normal.       ASSESSMENT and PLAN    ICD-10-CM ICD-9-CM    1. Atrial fibrillation, unspecified type [facility]): post ablation. On aspirin. Asymptomatic. Will continue current plan. I48.91 427.31    2. Encounter for immunization Z23 V03.89 INFLUENZA VACCINE INACTIVATED (IIV), SUBUNIT, ADJUVANTED, IM      ADMIN INFLUENZA VIRUS VAC   3. Essential hypertension: well controlled. Continue current dose of medication and low salt diet. Exercise as tolerated.    R71 077.5 METABOLIC PANEL, COMPREHENSIVE   4. Hypothyroidism, unspecified type: asymptomatic. On medication. Will follow endo recommendations. E03.9 244.9    5. Elevated PTHrP level: following endo. On vitamin D supplement. R79.89 790.6    6. Renal insufficiency; stable. N28.9 593.9    7. Hyperlipidemia, unspecified hyperlipidemia type; on statin. No side effects. E78.5 272.4 LIPID PANEL      METABOLIC PANEL, COMPREHENSIVE   8. Hyperparathyroidism, unspecified ([facility]): on vitamin D supplement. Following endo recommendations. R76.7 461.01 METABOLIC PANEL, COMPREHENSIVE   9. Lipoma of head: wants to remove. Sending to general surgery. D17.0 214.0 REFERRAL TO GENERAL SURGERY   10. Well controlled diabetes mellitus ([facility] [address]): on medication. Well controlled HBA1C. Trying to be compliant with diet. Will observe and continue current plan. E11.9 250.00 HEMOGLOBIN A1C WITH EAG      METABOLIC PANEL, COMPREHENSIVE   Pt understood and agree with the plan   Review hM   Follow-up and Dispositions    · Return in about 4 months (around 1/16/2020).

## 2019-09-16 NOTE — PATIENT INSTRUCTIONS
Vaccine Information Statement    Influenza (Flu) Vaccine (Inactivated or Recombinant): What You Need to Know    Many Vaccine Information Statements are available in Pashto and other languages. See www.immunize.org/vis  Hojas de información sobre vacunas están disponibles en español y en muchos otros idiomas. Visite www.immunize.org/vis    1. Why get vaccinated? Influenza vaccine can prevent influenza (flu). Flu is a contagious disease that spreads around the [facility] every year, usually between October and May. Anyone can get the flu, but it is more dangerous for some people. Infants and young children, people 72years of age and older, pregnant women, and people with certain health conditions or a weakened immune system are at greatest risk of flu complications. Pneumonia, bronchitis, sinus infections and ear infections are examples of flu-related complications. If you have a medical condition, such as heart disease, cancer or diabetes, flu can make it worse. Flu can cause fever and chills, sore throat, muscle aches, fatigue, cough, headache, and runny or stuffy nose. Some people may have vomiting and diarrhea, though this is more common in children than adults. Each year thousands of people in the [facility] die from flu, and many more are hospitalized. Flu vaccine prevents millions of illnesses and flu-related visits to the doctor each year. 2. Influenza vaccines     CDC recommends everyone 10months of age and older get vaccinated every flu season. Children 6 months through 6years of age may need 2 doses during a single flu season. Everyone else needs only 1 dose each flu season. It takes about 2 weeks for protection to develop after vaccination. There are many flu viruses, and they are always changing. Each year a new flu vaccine is made to protect against three or four viruses that are likely to cause disease in the upcoming flu season.  Even when the vaccine doesnt exactly match these viruses, it may still provide some protection. Influenza vaccine does not cause flu. Influenza vaccine may be given at the same time as other vaccines. 3. Talk with your health care provider    Tell your vaccine provider if the person getting the vaccine:   Has had an allergic reaction after a previous dose of influenza vaccine, or has any severe, life-threatening allergies.  Has ever had Guillain-Barré Syndrome (also called GBS). In some cases, your health care provider may decide to postpone influenza vaccination to a future visit. People with minor illnesses, such as a cold, may be vaccinated. People who are moderately or severely ill should usually wait until they recover before getting influenza vaccine. Your health care provider can give you more information. 4. Risks of a reaction     Soreness, redness, and swelling where shot is given, fever, muscle aches, and headache can happen after influenza vaccine.  There may be a very small increased risk of Guillain-Barré Syndrome (GBS) after inactivated influenza vaccine (the flu shot). Nancy Paz children who get the flu shot along with pneumococcal vaccine (PCV13), and/or DTaP vaccine at the same time might be slightly more likely to have a seizure caused by fever. Tell your health care provider if a child who is getting flu vaccine has ever had a seizure. People sometimes faint after medical procedures, including vaccination. Tell your provider if you feel dizzy or have vision changes or ringing in the ears. As with any medicine, there is a very remote chance of a vaccine causing a severe allergic reaction, other serious injury, or death. 5. What if there is a serious problem? An allergic reaction could occur after the vaccinated person leaves the clinic.  If you see signs of a severe allergic reaction (hives, swelling of the face and throat, difficulty breathing, a fast heartbeat, dizziness, or weakness), call 9-1-1 and get the person to the nearest hospital.    For other signs that concern you, call your health care provider. Adverse reactions should be reported to the Vaccine Adverse Event Reporting System (VAERS). Your health care provider will usually file this report, or you can do it yourself. Visit the VAERS website at www.vaers. [facility].gov or call 3-609.415.7122. VAERS is only for reporting reactions, and VAERS staff do not give medical advice. 6. The National Vaccine Injury Compensation Program    The [facility] Vaccine Injury Compensation Program (VICP) is a federal program that was created to compensate people who may have been injured by certain vaccines. Visit the VICP website at www.[facility].gov/vaccinecompensation or call 6-204.719.6891 to learn about the program and about filing a claim. There is a time limit to file a claim for compensation. 7. How can I learn more?  Ask your health care provider.  Call your local or state health department.  Contact the Centers for Disease Control and Prevention (CDC):  - Call 7-587.804.1727 (4-722-PPY-INFO) or  - Visit CDCs influenza website at www.cdc.gov/flu    Vaccine Information Statement (Interim)  Inactivated Influenza Vaccine   8/15/2019  41 SHABrian Humphrey Georgette [id number]   Department of Health and Human Services  Centers for Disease Control and Prevention    Office Use Only      Nutrition Tips for Diabetes: After Your Visit  Your Care Instructions  A healthy diet is important to manage diabetes. It helps you lose weight (if you need to) and keep it off. It gives you the nutrition and energy your body needs and helps prevent heart disease. But a diet for diabetes does not mean that you have to eat special foods. You can eat what your family eats, including occasional sweets and other favorites. But you do have to pay attention to how often you eat and how much you eat of certain foods.  The right plan for you will give you meals that help you keep your blood sugar at healthy levels. Try to eat a variety of foods and to spread carbohydrate throughout the day. Carbohydrate raises blood sugar higher and more quickly than any other nutrient does. Carbohydrate is found in sugar, breads and cereals, fruit, starchy vegetables such as potatoes and corn, and milk and yogurt. You may want to work with a dietitian or diabetes educator to help you plan meals and snacks. A dietitian or diabetes educator also can help you lose weight if that is one of your goals. The following tips can help you enjoy your meals and stay healthy. Follow-up care is a key part of your treatment and safety. Be sure to make and go to all appointments, and call your doctor if you are having problems. Its also a good idea to know your test results and keep a list of the medicines you take. How can you care for yourself at home? · Learn which foods have carbohydrate and how much carbohydrate to eat. A dietitian or diabetes educator can help you learn to keep track of how much carbohydrate you eat. · Spread carbohydrate throughout the day. Eat some carbohydrate at all meals, but do not eat too much at any one time. · Plan meals to include food from all the food groups. These are the food groups and some example portion sizes:  ¨ Grains: 1 slice of bread (1 ounce), ½ cup of cooked cereal, and 1/3 cup of cooked pasta or rice. These have about 15 grams of carbohydrate in a serving. Choose whole grains such as whole wheat bread or crackers, oatmeal, and brown rice more often than refined grains. ¨ Fruit: 1 small fresh fruit, such as an apple or orange; ½ of a banana; ½ cup of chopped, cooked, or canned fruit; ½ cup of fruit juice; 1 cup of melon or raspberries; and 2 tablespoons of dried fruit. These have about 15 grams of carbohydrate in a serving. ¨ Dairy: 1 cup of nonfat or low-fat milk and 2/3 cup of plain yogurt. These have about 15 grams of carbohydrate in a serving.   ¨ Protein foods: Beef, chicken, turkey, fish, eggs, tofu, cheese, cottage cheese, and peanut butter. A serving size of meat is 3 ounces, which is about the size of a deck of cards. Examples of meat substitute serving sizes (equal to 1 ounce of meat) are 1/4 cup of cottage cheese, 1 egg, 1 tablespoon of peanut butter, and ½ cup of tofu. These have very little or no carbohydrate per serving. ¨ Vegetables: Starchy vegetables such as ½ cup of cooked dried beans, peas, potatoes, or corn have about 15 grams of carbohydrate. Nonstarchy vegetables have very little carbohydrate, such as 1 cup of raw leafy vegetables (such as spinach), ½ cup of other vegetables (cooked or chopped), and 3/4 cup of vegetable juice. · Use the plate format to plan meals. It is a good, quick way to make sure that you have a balanced meal. It also helps you spread carbohydrate throughout the day. You divide your plate by types of foods. Put vegetables on half the plate, meat or meat substitutes on one-quarter of the plate, and a grain or starchy vegetable (such as brown rice or a potato) in the final quarter of the plate. To this you can add a small piece of fruit and 1 cup of milk or yogurt, depending on how much carbohydrate you are supposed to eat at a meal.  · Talk to your dietitian or diabetes educator about ways to add limited amounts of sweets into your meal plan. You can eat these foods now and then, as long as you include the amount of carbohydrate they have in your daily carbohydrate allowance. · If you drink alcohol, limit it to no more than 1 drink a day for women and 2 drinks a day for men. If you are pregnant, no amount of alcohol is known to be safe. · Protein, fat, and fiber do not raise blood sugar as much as carbohydrate does. If you eat a lot of these nutrients in a meal, your blood sugar will rise more slowly than it would otherwise. · Limit saturated fats, such as those from meat and dairy products.  Try to replace it with monounsaturated fat, such as olive oil. This is a healthier choice because people who have diabetes are at higher-than-average risk of heart disease. But use a modest amount of olive oil. A tablespoon of olive oil has 14 grams of fat and 120 calories. · Exercise lowers blood sugar. If you take insulin by shots or pump, you can use less than you would if you were not exercising. Keep in mind that timing matters. If you exercise within 1 hour after a meal, your body may need less insulin for that meal than it would if you exercised 3 hours after the meal. Test your blood sugar to find out how exercise affects your need for insulin. · Exercise on most days of the week. Aim for at least 30 minutes. Exercise helps you stay at a healthy weight and helps your body use insulin. Walking is an easy way to get exercise. Gradually increase the amount you walk every day. You also may want to swim, bike, or do other activities. When you eat out  · Learn to estimate the serving sizes of foods that have carbohydrate. If you measure food at home, it will be easier to estimate the amount in a serving of restaurant food. · If the meal you order has too much carbohydrate (such as potatoes, corn, or baked beans), ask to have a low-carbohydrate food instead. Ask for a salad or green vegetables. · If you use insulin, check your blood sugar before and after eating out to help you plan how much to eat in the future. · If you eat more carbohydrate at a meal than you had planned, take a walk or do other exercise. This will help lower your blood sugar. Where can you learn more? Go to [employer]be  Enter D999 in the search box to learn more about \"Nutrition Tips for Diabetes: After Your Visit. \"   © 8442-2447 Healthwise, Incorporated. Care instructions adapted under license by [facility] (which disclaims liability or warranty for this information).  This care instruction is for use with your licensed healthcare professional. If you have questions about a medical condition or this instruction, always ask your healthcare professional. [address] any warranty or liability for your use of this information. Content Version: 60.1.479502; Current as of: June 4, 2014                 Low Sodium Diet (2,000 Milligram): Care Instructions  Your Care Instructions    Too much sodium causes your body to hold on to extra water. This can raise your blood pressure and force your heart and kidneys to work harder. In very serious cases, this could cause you to be put in the hospital. It might even be life-threatening. By limiting sodium, you will feel better and lower your risk of serious problems. The most common source of sodium is salt. People get most of the salt in their diet from canned, prepared, and packaged foods. Fast food and restaurant meals also are very high in sodium. Your doctor will probably limit your sodium to less than 2,000 milligrams (mg) a day. This limit counts all the sodium in prepared and packaged foods and any salt you add to your food. Follow-up care is a key part of your treatment and safety. Be sure to make and go to all appointments, and call your doctor if you are having problems. It's also a good idea to know your test results and keep a list of the medicines you take. How can you care for yourself at home? Read food labels  · Read labels on cans and food packages. The labels tell you how much sodium is in each serving. Make sure that you look at the serving size. If you eat more than the serving size, you have eaten more sodium. · Food labels also tell you the Percent Daily Value for sodium. Choose products with low Percent Daily Values for sodium. · Be aware that sodium can come in forms other than salt, including monosodium glutamate (MSG), sodium citrate, and sodium bicarbonate (baking soda). MSG is often added to Asian food.  When you eat out, you can sometimes ask for food without MSG or added salt.  Buy low-sodium foods  · Buy foods that are labeled \"unsalted\" (no salt added), \"sodium-free\" (less than 5 mg of sodium per serving), or \"low-sodium\" (less than 140 mg of sodium per serving). Foods labeled \"reduced-sodium\" and \"light sodium\" may still have too much sodium. Be sure to read the label to see how much sodium you are getting. · Buy fresh vegetables, or frozen vegetables without added sauces. Buy low-sodium versions of canned vegetables, soups, and other canned goods. Prepare low-sodium meals  · Cut back on the amount of salt you use in cooking. This will help you adjust to the taste. Do not add salt after cooking. One teaspoon of salt has about 2,300 mg of sodium. · Take the salt shaker off the table. · Flavor your food with garlic, lemon juice, onion, vinegar, herbs, and spices. Do not use soy sauce, lite soy sauce, steak sauce, onion salt, garlic salt, celery salt, mustard, or ketchup on your food. · Use low-sodium salad dressings, sauces, and ketchup. Or make your own salad dressings and sauces without adding salt. · Use less salt (or none) when recipes call for it. You can often use half the salt a recipe calls for without losing flavor. Other foods such as rice, pasta, and grains do not need added salt. · Rinse canned vegetables, and cook them in fresh water. This removes somebut not allof the salt. · Avoid water that is naturally high in sodium or that has been treated with water softeners, which add sodium. Call your local water company to find out the sodium content of your water supply. If you buy bottled water, read the label and choose a sodium-free brand. Avoid high-sodium foods  · Avoid eating:  ? Smoked, cured, salted, and canned meat, fish, and poultry. ? Ham, morales, hot dogs, and luncheon meats. ? Regular, hard, and processed cheese and regular peanut butter.   ? Crackers with salted tops, and other salted snack foods such as pretzels, chips, and salted popcorn. ? Frozen prepared meals, unless labeled low-sodium. ? Canned and dried soups, broths, and bouillon, unless labeled sodium-free or low-sodium. ? Canned vegetables, unless labeled sodium-free or low-sodium. ? Loras Dienes fries, pizza, tacos, and other fast foods. ? Pickles, olives, ketchup, and other condiments, especially soy sauce, unless labeled sodium-free or low-sodium. Where can you learn more? Go to http://[facility]-[place].info/. Enter H460 in the search box to learn more about \"Low Sodium Diet (2,000 Milligram): Care Instructions. \"  Current as of: November 7, 2018  Content Version: 12.1  © 3002-0935 [employer]. Care instructions adapted under license by [employer] (which disclaims liability or warranty for this information). If you have questions about a medical condition or this instruction, always ask your healthcare professional. [place] [address] any warranty or liability for your use of this information. Lipoma: Care Instructions  Your Care Instructions  A lipoma is a growth of fat just below the skin. It may feel soft and rubbery. Lipomas can occur anywhere on the body. But they are most common on the torso, neck, upper thighs, upper arms, and armpits. A lipoma does not turn into cancer. Lipomas usually are not treated, because most of them don't hurt or cause problems. But your doctor may remove a lipoma if it is painful, gets infected, or bothers you. Follow-up care is a key part of your treatment and safety. Be sure to make and go to all appointments, and call your doctor if you are having problems. It's also a good idea to know your test results and keep a list of the medicines you take. How can you care for yourself at home? · A lipoma usually needs no care at home unless your doctor made a cut (incision) to remove it. · If your doctor told you how to care for your incision, follow your doctor's instructions.  If you did not get instructions, follow this general advice:  ? Wash around the incision with clean water 2 times a day. Don't use hydrogen peroxide or alcohol. These can slow healing. ? You may cover the incision with a thin layer of petroleum jelly, such as Vaseline, and a nonstick bandage. ? Apply more petroleum jelly and replace the bandage as needed. When should you call for help? Call your doctor now or seek immediate medical care if:    · You have signs of infection, such as:  ? Increased pain, swelling, warmth, or redness. ? Red streaks leading from the lipoma. ? Pus draining from the lipoma. ? A fever.    Watch closely for changes in your health, and be sure to contact your doctor if:    · The lipoma is growing or changing.     · You do not get better as expected. Where can you learn more? Go to http://cecelia-[place].info/. Enter G723 in the search box to learn more about \"Lipoma: Care Instructions. \"  Current as of: April 1, 2019  Content Version: 12.1  © 7377-1396 Healthwise, Incorporated. Care instructions adapted under license by [employer] (which disclaims liability or warranty for this information). If you have questions about a medical condition or this instruction, always ask your healthcare professional. [address] any warranty or liability for your use of this information.

## 2019-09-21 ENCOUNTER — HOSPITAL ENCOUNTER (OUTPATIENT)
Dept: LAB | Age: 74
Discharge: HOME | End: 2019-09-21
Payer: MEDICARE

## 2019-09-21 DIAGNOSIS — E78.5 HYPERLIPIDEMIA, UNSPECIFIED HYPERLIPIDEMIA TYPE: ICD-10-CM

## 2019-09-21 DIAGNOSIS — E11.9 WELL CONTROLLED DIABETES MELLITUS (HCC): ICD-10-CM

## 2019-09-21 DIAGNOSIS — E21.3 HYPERPARATHYROIDISM, UNSPECIFIED (HCC): ICD-10-CM

## 2019-09-21 DIAGNOSIS — I10 ESSENTIAL HYPERTENSION: ICD-10-CM

## 2019-09-21 LAB
ALBUMIN SERPL-MCNC: 3.6 G/DL (ref 3.4–5)
ALBUMIN/GLOB SERPL: 1 {RATIO} (ref 0.8–1.7)
ALP SERPL-CCNC: 75 U/L (ref 45–117)
ALT SERPL-CCNC: 16 U/L (ref 13–56)
ANION GAP SERPL CALC-SCNC: 6 MMOL/L (ref 3–18)
AST SERPL-CCNC: 15 U/L (ref 10–38)
BILIRUB SERPL-MCNC: 0.5 MG/DL (ref 0.2–1)
BUN SERPL-MCNC: 13 MG/DL (ref 7–18)
BUN/CREAT SERPL: 12 (ref 12–20)
CALCIUM SERPL-MCNC: 10.1 MG/DL (ref 8.5–10.1)
CHLORIDE SERPL-SCNC: 107 MMOL/L (ref 100–111)
CHOLEST SERPL-MCNC: 161 MG/DL
CO2 SERPL-SCNC: 28 MMOL/L (ref 21–32)
CREAT SERPL-MCNC: 1.06 MG/DL (ref 0.6–1.3)
EST. AVERAGE GLUCOSE BLD GHB EST-MCNC: 134 MG/DL
GLOBULIN SER CALC-MCNC: 3.5 G/DL (ref 2–4)
GLUCOSE SERPL-MCNC: 99 MG/DL (ref 74–99)
HBA1C MFR BLD: 6.3 % (ref 4.2–5.6)
HDLC SERPL-MCNC: 47 MG/DL (ref 40–60)
HDLC SERPL: 3.4 {RATIO} (ref 0–5)
LDLC SERPL CALC-MCNC: 92 MG/DL (ref 0–100)
LIPID PROFILE,FLP: NORMAL
POTASSIUM SERPL-SCNC: 5.2 MMOL/L (ref 3.5–5.5)
PROT SERPL-MCNC: 7.1 G/DL (ref 6.4–8.2)
SODIUM SERPL-SCNC: 141 MMOL/L (ref 136–145)
TRIGL SERPL-MCNC: 110 MG/DL (ref ?–150)
VLDLC SERPL CALC-MCNC: 22 MG/DL

## 2019-09-21 PROCEDURE — 80053 COMPREHEN METABOLIC PANEL: CPT

## 2019-09-21 PROCEDURE — 80061 LIPID PANEL: CPT

## 2019-09-21 PROCEDURE — 83036 HEMOGLOBIN GLYCOSYLATED A1C: CPT

## 2019-09-21 PROCEDURE — 36415 COLL VENOUS BLD VENIPUNCTURE: CPT

## 2019-09-21 NOTE — PATIENT INSTRUCTIONS
Low Sodium Diet (2,000 Milligram): Care Instructions  Your Care Instructions    Too much sodium causes your body to hold on to extra water. This can raise your blood pressure and force your heart and kidneys to work harder. In very serious cases, this could cause you to be put in the hospital. It might even be life-threatening. By limiting sodium, you will feel better and lower your risk of serious problems. The most common source of sodium is salt. People get most of the salt in their diet from canned, prepared, and packaged foods. Fast food and restaurant meals also are very high in sodium. Your doctor will probably limit your sodium to less than 2,000 milligrams (mg) a day. This limit counts all the sodium in prepared and packaged foods and any salt you add to your food. Follow-up care is a key part of your treatment and safety. Be sure to make and go to all appointments, and call your doctor if you are having problems. It's also a good idea to know your test results and keep a list of the medicines you take. How can you care for yourself at home? Read food labels  · Read labels on cans and food packages. The labels tell you how much sodium is in each serving. Make sure that you look at the serving size. If you eat more than the serving size, you have eaten more sodium. · Food labels also tell you the Percent Daily Value for sodium. Choose products with low Percent Daily Values for sodium. · Be aware that sodium can come in forms other than salt, including monosodium glutamate (MSG), sodium citrate, and sodium bicarbonate (baking soda). MSG is often added to Asian food. When you eat out, you can sometimes ask for food without MSG or added salt. Buy low-sodium foods  · Buy foods that are labeled \"unsalted\" (no salt added), \"sodium-free\" (less than 5 mg of sodium per serving), or \"low-sodium\" (less than 140 mg of sodium per serving).  Foods labeled \"reduced-sodium\" and \"light sodium\" may still have too much sodium. Be sure to read the label to see how much sodium you are getting. · Buy fresh vegetables, or frozen vegetables without added sauces. Buy low-sodium versions of canned vegetables, soups, and other canned goods. Prepare low-sodium meals  · Cut back on the amount of salt you use in cooking. This will help you adjust to the taste. Do not add salt after cooking. One teaspoon of salt has about 2,300 mg of sodium. · Take the salt shaker off the table. · Flavor your food with garlic, lemon juice, onion, vinegar, herbs, and spices. Do not use soy sauce, lite soy sauce, steak sauce, onion salt, garlic salt, celery salt, mustard, or ketchup on your food. · Use low-sodium salad dressings, sauces, and ketchup. Or make your own salad dressings and sauces without adding salt. · Use less salt (or none) when recipes call for it. You can often use half the salt a recipe calls for without losing flavor. Other foods such as rice, pasta, and grains do not need added salt. · Rinse canned vegetables, and cook them in fresh water. This removes some--but not all--of the salt. · Avoid water that is naturally high in sodium or that has been treated with water softeners, which add sodium. Call your local water company to find out the sodium content of your water supply. If you buy bottled water, read the label and choose a sodium-free brand. Avoid high-sodium foods  · Avoid eating:  ? Smoked, cured, salted, and canned meat, fish, and poultry. ? Ham, morales, hot dogs, and luncheon meats. ? Regular, hard, and processed cheese and regular peanut butter. ? Crackers with salted tops, and other salted snack foods such as pretzels, chips, and salted popcorn. ? Frozen prepared meals, unless labeled low-sodium. ? Canned and dried soups, broths, and bouillon, unless labeled sodium-free or low-sodium. ? Canned vegetables, unless labeled sodium-free or low-sodium. ? [place] [employer]o fries, pizza, tacos, and other fast foods.   ? Andrew Rivera, olives, ketchup, and other condiments, especially soy sauce, unless labeled sodium-free or low-sodium. Where can you learn more? Go to http://cecelia-[place].info/. Enter R251 in the search box to learn more about \"Low Sodium Diet (2,000 Milligram): Care Instructions. \"  Current as of: March 28, 2018  Content Version: 11.9  © 0719-9039 [email or web address]. Care instructions adapted under license by [email or web address] (which disclaims liability or warranty for this information). If you have questions about a medical condition or this instruction, always ask your healthcare professional. [address] any warranty or liability for your use of this information. Nutrition Tips for Diabetes: After Your Visit  Your Care Instructions  A healthy diet is important to manage diabetes. It helps you lose weight (if you need to) and keep it off. It gives you the nutrition and energy your body needs and helps prevent heart disease. But a diet for diabetes does not mean that you have to eat special foods. You can eat what your family eats, including occasional sweets and other favorites. But you do have to pay attention to how often you eat and how much you eat of certain foods. The right plan for you will give you meals that help you keep your blood sugar at healthy levels. Try to eat a variety of foods and to spread carbohydrate throughout the day. Carbohydrate raises blood sugar higher and more quickly than any other nutrient does. Carbohydrate is found in sugar, breads and cereals, fruit, starchy vegetables such as potatoes and corn, and milk and yogurt. You may want to work with a dietitian or diabetes educator to help you plan meals and snacks. A dietitian or diabetes educator also can help you lose weight if that is one of your goals. The following tips can help you enjoy your meals and stay healthy. Follow-up care is a key part of your treatment and safety.  Be sure to make and go to all appointments, and call your doctor if you are having problems. Its also a good idea to know your test results and keep a list of the medicines you take. How can you care for yourself at home? · Learn which foods have carbohydrate and how much carbohydrate to eat. A dietitian or diabetes educator can help you learn to keep track of how much carbohydrate you eat. · Spread carbohydrate throughout the day. Eat some carbohydrate at all meals, but do not eat too much at any one time. · Plan meals to include food from all the food groups. These are the food groups and some example portion sizes:  ¨ Grains: 1 slice of bread (1 ounce), ½ cup of cooked cereal, and 1/3 cup of cooked pasta or rice. These have about 15 grams of carbohydrate in a serving. Choose whole grains such as whole wheat bread or crackers, oatmeal, and brown rice more often than refined grains. ¨ Fruit: 1 small fresh fruit, such as an apple or orange; ½ of a banana; ½ cup of chopped, cooked, or canned fruit; ½ cup of fruit juice; 1 cup of melon or raspberries; and 2 tablespoons of dried fruit. These have about 15 grams of carbohydrate in a serving. ¨ Dairy: 1 cup of nonfat or low-fat milk and 2/3 cup of plain yogurt. These have about 15 grams of carbohydrate in a serving. ¨ Protein foods: Beef, chicken, turkey, fish, eggs, tofu, cheese, cottage cheese, and peanut butter. A serving size of meat is 3 ounces, which is about the size of a deck of cards. Examples of meat substitute serving sizes (equal to 1 ounce of meat) are 1/4 cup of cottage cheese, 1 egg, 1 tablespoon of peanut butter, and ½ cup of tofu. These have very little or no carbohydrate per serving. ¨ Vegetables: Starchy vegetables such as ½ cup of cooked dried beans, peas, potatoes, or corn have about 15 grams of carbohydrate.  Nonstarchy vegetables have very little carbohydrate, such as 1 cup of raw leafy vegetables (such as spinach), ½ cup of other vegetables (cooked stroke or chopped), and 3/4 cup of vegetable juice. · Use the plate format to plan meals. It is a good, quick way to make sure that you have a balanced meal. It also helps you spread carbohydrate throughout the day. You divide your plate by types of foods. Put vegetables on half the plate, meat or meat substitutes on one-quarter of the plate, and a grain or starchy vegetable (such as brown rice or a potato) in the final quarter of the plate. To this you can add a small piece of fruit and 1 cup of milk or yogurt, depending on how much carbohydrate you are supposed to eat at a meal.  · Talk to your dietitian or diabetes educator about ways to add limited amounts of sweets into your meal plan. You can eat these foods now and then, as long as you include the amount of carbohydrate they have in your daily carbohydrate allowance. · If you drink alcohol, limit it to no more than 1 drink a day for women and 2 drinks a day for men. If you are pregnant, no amount of alcohol is known to be safe. · Protein, fat, and fiber do not raise blood sugar as much as carbohydrate does. If you eat a lot of these nutrients in a meal, your blood sugar will rise more slowly than it would otherwise. · Limit saturated fats, such as those from meat and dairy products. Try to replace it with monounsaturated fat, such as olive oil. This is a healthier choice because people who have diabetes are at higher-than-average risk of heart disease. But use a modest amount of olive oil. A tablespoon of olive oil has 14 grams of fat and 120 calories. · Exercise lowers blood sugar. If you take insulin by shots or pump, you can use less than you would if you were not exercising. Keep in mind that timing matters. If you exercise within 1 hour after a meal, your body may need less insulin for that meal than it would if you exercised 3 hours after the meal. Test your blood sugar to find out how exercise affects your need for insulin.   · Exercise on most days of the week. Aim for at least 30 minutes. Exercise helps you stay at a healthy weight and helps your body use insulin. Walking is an easy way to get exercise. Gradually increase the amount you walk every day. You also may want to swim, bike, or do other activities. When you eat out  · Learn to estimate the serving sizes of foods that have carbohydrate. If you measure food at home, it will be easier to estimate the amount in a serving of restaurant food. · If the meal you order has too much carbohydrate (such as potatoes, corn, or baked beans), ask to have a low-carbohydrate food instead. Ask for a salad or green vegetables. · If you use insulin, check your blood sugar before and after eating out to help you plan how much to eat in the future. · If you eat more carbohydrate at a meal than you had planned, take a walk or do other exercise. This will help lower your blood sugar. Where can you learn more? Go to [email or web address]  Enter E334 in the search box to learn more about \"Nutrition Tips for Diabetes: After Your Visit. \"   © 7018-1487 [employer]. Care instructions adapted under license by [facility] (which disclaims liability or warranty for this information). This care instruction is for use with your licensed healthcare professional. If you have questions about a medical condition or this instruction, always ask your healthcare professional. [address] any warranty or liability for your use of this information. Content Version: 86.0.463969; Current as of: June 4, 2014                 Learning About Physical Activity  What is physical activity? Physical activity is any kind of activity that gets your body moving. The types of physical activity that can help you get fit and stay healthy include:  · Aerobic or \"cardio\" activities that make your heart beat faster and make you breathe harder, such as brisk walking, riding a bike, or running.  Aerobic activities strengthen your heart and lungs and build up your endurance. · Strength training activities that make your muscles work against, or \"resist,\" something, such as lifting weights or doing push-ups. These activities help tone and strengthen your muscles. · Stretches that allow you to move your joints and muscles through their full range of motion. Stretching helps you be more flexible and avoid injury. What are the benefits of physical activity? Being active is one of the best things you can do to get fit and stay healthy. It helps you to:  · Feel stronger and have more energy to do all the things you like to do. · Focus better at school or work and perform better in sports. · Feel, think, and sleep better. · Reach and stay at a healthy weight. · Lose fat and build lean muscle. · Lower your risk for serious health problems. · Keep your bones, muscles, and joints strong. Being fit lets you do more physical activity. And it lets you work out harder without as much effort. How can you make physical activity part of your life? Get at least 30 minutes of exercise on most days of the week. Walking is a good choice. You also may want to do other activities, such as running, swimming, cycling, or playing tennis or team sports. Pick activities that you like--ones that make your heart beat faster, your muscles stronger, and your muscles and joints more flexible. If you find more than one thing you like doing, do them all. You don't have to do the same thing every day. Get your heart pumping every day. Any activity that makes your heart beat faster and keeps it at that rate for a while counts. Here are some great ways to get your heart beating faster:  · Go for a brisk walk, run, or bike ride. · Go for a hike or swim. · Go in-line skating. · Play a game of touch football, basketball, or soccer. · Ride a bike. · Play tennis or racquetball. · Climb stairs.   Even some household chores can be aerobic--just do them at a faster pace. Vacuuming, raking or mowing the lawn, sweeping the garage, and washing and waxing the car all can help get your heart rate up. Strengthen your muscles during the week. You don't have to lift heavy weights or grow big, bulky muscles to get stronger. Doing a few simple activities that make your muscles work against, or \"resist,\" something can help you get stronger. For example, you can:  · Do push-ups or sit-ups, which use your own body weight as resistance. · Lift weights or dumbbells or use stretch bands at home or in a gym or community center. Stretch your muscles often. Stretching will help you as you become more active. It can help you stay flexible, loosen tight muscles, and avoid injury. It can also help improve your balance and posture and can be a great way to relax. Be sure to stretch the muscles you'll be using when you work out. It's best to warm your muscles slightly before you stretch them. Walk or do some other light aerobic activity for a few minutes, and then start stretching. When you stretch your muscles:  · Do it slowly. Stretching is not about going fast or making sudden movements. · Don't push or bounce during a stretch. · Hold each stretch for at least 15 to 30 seconds, if you can. You should feel a stretch in the muscle, but not pain. · Breathe out as you do the stretch. Then breathe in as you hold the stretch. Don't hold your breath. If you're worried about how more activity might affect your health, have a checkup before you start. Follow any special advice your doctor gives you for getting a smart start. Where can you learn more? Go to http://cecelia-[place].info/. Enter O456 in the search box to learn more about \"Learning About Physical Activity. \"  Current as of: August 19, 2018  Content Version: 11.9  © 0566-2984 [employer].  Care instructions adapted under license by [employer] (which disclaims liability or warranty for this information). If you have questions about a medical condition or this instruction, always ask your healthcare professional. [place] [address] any warranty or liability for your use of this information. Medicare Part B Preventive Services Limitations Recommendation Scheduled   Bone Mass Measurement  (age 72 & older, biennial) A bone mass density test is recommended when a woman turns 65 to screen for osteoporosis. This test is only recommended one time, as a screening. Some providers will use this same test as a disease monitoring tool if you already have osteoporosis. 2018  vitamin D supplement. Cardiovascular Screening Blood Tests (every 5 years)  Total cholesterol, HDL, Triglycerides and ECG Order blood work  as a panel if possible and adults with routine risk  an electrocardiogram (ECG) at intervals determined by your doctor. Up to date     Colorectal Cancer Screening  -Fecal occult blood test (annual)  -Flexible sigmoidoscopy (5y)  -Screening colonoscopy (10y)  -Barium Enema Colorectal cancer screenings should be done for adults age 54-65 with no increased risk factors for colorectal cancer. There are a number of acceptable methods of screening for this type of cancer. Each test has its own benefits and drawbacks. Discuss with your doctor what is most appropriate for you during your annual wellness visit. The different tests include: colonoscopy (considered the best screening method), a fecal occult blood test, a fecal DNA test, and sigmoidoscopy.  2017 , repeat in 5 years Due in 2022   Counseling to Prevent Tobacco Use (up to 8 sessions per year)  - Counseling greater than 3 and up to 10 minutes  - Counseling greater than 10 minutes Patients must be asymptomatic of tobacco-related conditions to receive as preventive service NI     Diabetes Screening Tests (at least every 3 years, Medicare covers annually or at 6-month intervals for prediabetic patients)    Fasting blood sugar (FBS) or glucose tolerance test (GTT) -All adults age 38-68 who are overweight should have a diabetes screening test once every three years.   -Other screening tests and preventive services for persons with diabetes include: an eye exam to screen for diabetic retinopathy, a kidney function test, a foot exam, and stricter control over your cholesterol. Up to date     Diabetes Self-Management Training (DSMT) (no USPSTF recommendation) Requires referral by treating physician for patient with diabetes or renal disease. 10 hours of initial DSMT session of no less than 30 minutes each in a continuous 12-month period. 2 hours of follow-up DSMT in subsequent years. NI     Glaucoma Screening (no USPSTF recommendation) Diabetes mellitus, family history, , age 48 or over,  American, age 72 or over Yearly     Human Immunodeficiency Virus (HIV) Screening (annually for increased risk patients)  HIV-1 and HIV-2 by EIA, MARCELA, rapid antibody test, or oral mucosa transudate Patient must be at increased risk for HIV infection per USPSTF guidelines or pregnant. Tests covered annually for patients at increased risk. Pregnant patients may receive up to 3 test during pregnancy. NI     Medical Nutrition Therapy (MNT) (for diabetes or renal disease not recommended schedule) Requires referral by treating physician for patient with diabetes or renal disease. Can be provided in same year as diabetes self-management training (DSMT), and CMS recommends medical nutrition therapy take place after DSMT. Up to 3 hours for initial year and 2 hours in subsequent years.  NI     Shingles Vaccination A shingles vaccine is also recommended once in a lifetime after age 61 Due  In waiting list at the pharmacy    Seasonal Influenza Vaccination (annually) All adults should have a flu vaccine yearly  Yearly     Pneumococcal Vaccination (once after 72) All adults over the age of 72 should receive the recommended pneumonia vaccines. Current USPSTF guidelines recommend a series of two vaccines for the best pneumonia protection. Completed in the past with prior PCP     Hepatitis B Vaccinations (if medium/high risk) Medium/high risk factors:  End-stage renal disease,  Hemophiliacs who received Factor VIII or IX concentrates, Clients of institutions for the mentally retarded, Persons who live in the same house as a HepB virus carrier, Homosexual men, Illicit injectable drug abusers. NI     Screening Mammography (biennial age 54-69) Breast cancer screenings are recommended every other year for women of normal risk, age 54-69. 9/2018 Discussion about stop screening done.  Pt will think about it     Screening Pap Tests and Pelvic Examination (up to age 79 and after 79 if unknown history or abnormal study last 10 years) Cervical cancer screenings for women over age 72 are only recommended with certain risk factors NI    Hepatitis C All adults born between [birth year] and 1965 should be screened once  NI       Tetanus  All adults should have a tetanus vaccine every 10 years Feb 2019

## 2019-09-23 ENCOUNTER — CLINICAL SUPPORT (OUTPATIENT)
Dept: FAMILY MEDICINE CLINIC | Age: 74
End: 2019-09-23

## 2019-09-23 ENCOUNTER — CLINICAL SUPPORT (OUTPATIENT)
Dept: CARDIOLOGY CLINIC | Age: 74
End: 2019-09-23

## 2019-09-23 ENCOUNTER — OFFICE VISIT (OUTPATIENT)
Dept: CARDIOLOGY CLINIC | Age: 74
End: 2019-09-23

## 2019-09-23 VITALS
WEIGHT: 185 LBS | HEART RATE: 60 BPM | SYSTOLIC BLOOD PRESSURE: 110 MMHG | DIASTOLIC BLOOD PRESSURE: 78 MMHG | HEIGHT: 66 IN | OXYGEN SATURATION: 99 % | BODY MASS INDEX: 29.73 KG/M2

## 2019-09-23 DIAGNOSIS — E78.00 PURE HYPERCHOLESTEROLEMIA: ICD-10-CM

## 2019-09-23 DIAGNOSIS — R00.1 BRADYCARDIA: ICD-10-CM

## 2019-09-23 DIAGNOSIS — Z23 ENCOUNTER FOR IMMUNIZATION: ICD-10-CM

## 2019-09-23 DIAGNOSIS — I48.0 PAROXYSMAL ATRIAL FIBRILLATION (HCC): Primary | ICD-10-CM

## 2019-09-23 DIAGNOSIS — I10 ESSENTIAL HYPERTENSION: ICD-10-CM

## 2019-09-23 NOTE — PROGRESS NOTES
Mild elevated HBA1C compare to prior result but still well controlled. In prediabetic range. Diet modification. Other labs fairly ok. Further discussion on follow up visit.

## 2019-09-23 NOTE — PATIENT INSTRUCTIONS
Vaccine Information Statement     Pneumococcal Conjugate Vaccine (PCV13): What You Need to Know    Many Vaccine Information Statements are available in Kiswahili and other languages. See www.immunize.org/vis. Hojas de información Sobre Vacunas están disponibles en español y en muchos otros idiomas. Visite www.immunize.org/vis. 1. Why get vaccinated? Vaccination can protect both children and adults from pneumococcal disease. Pneumococcal disease is caused by bacteria that can spread from person to person through close contact. It can cause ear infections, and it can also lead to more serious infections of the:   Lungs (pneumonia),   Blood (bacteremia), and   Covering of the brain and spinal cord (meningitis). Pneumococcal pneumonia is most common among adults. Pneumococcal meningitis can cause deafness and brain damage, and it kills about 1 child in 10 who get it. Anyone can get pneumococcal disease, but children under 3years of age and adults 72 years and older, people with certain medical conditions, and cigarette smokers are at the highest risk. Before there was a vaccine, the [facility] saw:   more than 700 cases of meningitis,   about 13,000 blood infections,   about 5 million ear infections, and   about 200 deaths  in children under 5 each year from pneumococcal disease. Since vaccine became available, severe pneumococcal disease in these children has fallen by 88%. About 18,000 older adults die of pneumococcal disease each year in the [place]. Treatment of pneumococcal infections with penicillin and other drugs is not as effective as it used to be, because some strains of the disease have become resistant to these drugs. This makes prevention of the disease, through vaccination, even more important. 2. PCV13 vaccine    Pneumococcal conjugate vaccine (called PCV13) protects against 13 types of pneumococcal bacteria.       PCV13 is routinely given to children at 2, 4, 6, and 1515 months of age. It is also recommended for children and adults 3to 59years of age with certain health conditions, and for all adults 72years of age and older. Your doctor can give you details. 3. Some people should not get this vaccine    Anyone who has ever had a life-threatening allergic reaction to a dose of this vaccine, to an earlier pneumococcal vaccine called PCV7, or to any vaccine containing diphtheria toxoid (for example, DTaP), should not get PCV13. Anyone with a severe allergy to any component of PCV13 should not get the vaccine. Tell your doctor if the person being vaccinated has any severe allergies. If the person scheduled for vaccination is not feeling well, your healthcare provider might decide to reschedule the shot on another day. 4. Risks of a vaccine reaction    With any medicine, including vaccines, there is a chance of reactions. These are usually mild and go away on their own, but serious reactions are also possible. Problems reported following PCV13 varied by age and dose in the series. The most common problems reported among children were:    About half became drowsy after the shot, had a temporary loss of appetite, or had redness or tenderness where the shot was given.  About 1 out of 3 had swelling where the shot was given.  About 1 out of 3 had a mild fever, and about 1 in 20 had a fever over 102.2°F.   Up to about 8 out of 10 became fussy or irritable. Adults have reported pain, redness, and swelling where the shot was given; also mild fever, fatigue, headache, chills, or muscle pain. [place] children who get PCV13 along with inactivated flu vaccine at the same time may be at increased risk for seizures caused by fever. Ask your doctor for more information. Problems that could happen after any vaccine:     People sometimes faint after a medical procedure, including vaccination.  Sitting or lying down for about 15 minutes can help prevent fainting, and injuries caused by a fall. Tell your doctor if you feel dizzy, or have vision changes or ringing in the ears.  Some older children and adults get severe pain in the shoulder and have difficulty moving the arm where a shot was given. This happens very rarely.  Any medication can cause a severe allergic reaction. Such reactions from a vaccine are very rare, estimated at about 1 in a million doses, and would happen within a few minutes to a few hours after the vaccination. As with any medicine, there is a very small chance of a vaccine causing a serious injury or death. The safety of vaccines is always being monitored. For more information, visit: www.cdc.gov/vaccinesafety/     5. What if there is a serious reaction? What should I look for?  Look for anything that concerns you, such as signs of a severe allergic reaction, very high fever, or unusual behavior. Signs of a severe allergic reaction can include hives, swelling of the face and throat, difficulty breathing, a fast heartbeat, dizziness, and weakness - usually within a few minutes to a few hours after the vaccination. What should I do?  If you think it is a severe allergic reaction or other emergency that cant wait, call 9-1-1 or get the person to the nearest hospital. Otherwise, call your doctor. Reactions should be reported to the Vaccine Adverse Event Reporting System (VAERS). Your doctor should file this report, or you can do it yourself through the VAERS web site at www.vaers. hhs.gov, or by calling 0-228.322.9107. VAERS does not give medical advice. 6. The National Vaccine Injury Compensation Program    The [facility] Vaccine Injury Compensation Program (VICP) is a federal program that was created to compensate people who may have been injured by certain vaccines.     Persons who believe they may have been injured by a vaccine can learn about the program and about filing a claim by calling 1-618.991.2868 or visiting the [facility] [address] website at www.[facility].gov/vaccinecompensation. There is a time limit to file a claim for compensation. 7. How can I learn more?  Ask your healthcare provider. He or she can give you the vaccine package insert or suggest other sources of information.  Call your local or state health department.  Contact the Centers for Disease Control and Prevention (CDC):  - Call 3-779.621.8136 (3-181-IQW-INFO) or  - Visit CDCs website at www.cdc.gov/vaccines    Vaccine Information Statement   PCV13 Vaccine   11/5/2015   [address] [id number]    Department of Health and Human Services  Centers for Disease Control and Prevention    Office Use Only

## 2019-09-23 NOTE — PROGRESS NOTES
Chief Complaint   Patient presents with    Immunization/Injection     Patient presents for the following vaccines: Pneumonia (Prevnar 15). Patient consent obtained by patient. Patient tolerated procedure well at left deltoid. Patient advised to remain in lobby for period of 10 minutes post injection to ensure no reaction.        Lot# [id number]  Exp: 02/28/21  MFG:Jeffrey  NDC: 5915-4537-65

## 2019-09-23 NOTE — PROGRESS NOTES
Eriberto Kincaid presents today for   Chief Complaint   Patient presents with    Irregular Heart Beat     6 month - no cardiac complaints    Pacemaker Check    Leg Swelling     both ankles        Eriberto Kincaid preferred language for health care discussion is english/other. Is someone accompanying this pt? no    Is the patient using any DME equipment during [address]? no    Depression Screening:  3 most recent PHQ Screens 9/16/2019   Little interest or pleasure in doing things Not at all   Feeling down, depressed, irritable, or hopeless Not at all   Total Score PHQ 2 0       Learning Assessment:  Learning Assessment 3/19/2018   PRIMARY LEARNER Patient   HIGHEST LEVEL OF EDUCATION - PRIMARY LEARNER  -   BARRIERS PRIMARY LEARNER -   [facility]    NEED -   LEARNER PREFERENCE PRIMARY DEMONSTRATION   LEARNING SPECIAL TOPICS -   ANSWERED BY Patient   RELATIONSHIP SELF       Abuse Screening:  Abuse Screening Questionnaire 9/18/2018   Do you ever feel afraid of your partner? N   Are you in a relationship with someone who physically or mentally threatens you? N   Is it safe for you to go home? Y       Fall Risk  Fall Risk Assessment, last 12 mths 9/16/2019   Able to walk? Yes   Fall in past 12 months? No       Pt currently taking Anticoagulant therapy? no    Coordination of Care:  1. Have you been to the ER, urgent care clinic since your last visit? Hospitalized since your last visit? no    2. Have you seen or consulted any other health care providers outside of the [address] [place] [address] since your last visit? Include any pap smears or colon screening.  no

## 2019-09-23 NOTE — PROGRESS NOTES
HISTORY OF PRESENT ILLNESS  Raissa Pineda is a 75 y.o. female. HPI  She has been doing very well. She has had no chest pain, dyspnea, orthopnea, PND. She denies palpitation whatsoever. She has had no dizziness or syncope. She has had no symptoms to indicate TIA or amaurosis fugax. The interrogation of the pacemaker demonstrated no atrial fibrillation or any other cardiac arrhythmia. Her blood pressure has been under control. She has known history of cardiac problems.  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  She had atrial fibrillation and finally underwent the atrial fibrillation ablation successfully in March of 2016.  She was treated with flecainide briefly.  As the flecainide was discontinued, she has been anticoagulated with Eliquis.  She has previous history of DVT after knee surgery.  She has history of hypercholesterolemia, but has not been able to tolerate statins because of severe myalgia and other side effects. Jaleel Ca is known to have had type 2 diabetes. Jaleel Ca has history of seizure disorder; however, she has not had any recurrent seizures since 2006 off medication.      Review of Systems   Constitutional: Negative for malaise/fatigue and weight loss. HENT: Negative for hearing loss. Eyes: Negative for blurred vision and double vision. Respiratory: Negative for shortness of breath. Cardiovascular: Negative for chest pain, palpitations, orthopnea, claudication, leg swelling and PND. Gastrointestinal: Negative for blood in stool, heartburn and melena. Genitourinary: Negative for dysuria, frequency, hematuria and urgency. Musculoskeletal: Negative for back pain and joint pain. Skin: Negative for rash. Neurological: Negative for dizziness and loss of consciousness. Psychiatric/Behavioral: Negative for depression and memory loss. Physical Exam   Constitutional: She is oriented to person, place, and time.  She appears well-developed and well-nourished. HENT:   Head: Normocephalic and atraumatic. Eyes: Pupils are equal, round, and reactive to light. Conjunctivae are normal.   Neck: Normal range of motion. Neck supple. No JVD present. Cardiovascular: Normal rate, regular rhythm, S1 normal and S2 normal.  No extrasystoles are present. PMI is not displaced. Exam reveals no gallop and no friction rub. No murmur heard. Pulses:       Carotid pulses are 3+ on the right side, and 3+ on the left side. Pulmonary/Chest: Effort normal. She has no rales. Abdominal: Soft. There is no tenderness. Musculoskeletal: She exhibits no edema. Neurological: She is alert and oriented to person, place, and time. No cranial nerve deficit. Skin: Skin is warm and dry. Psychiatric: She has a normal mood and affect. Her behavior is normal.     Visit Vitals  /78   Pulse 60   Ht 5' 6\" (1.676 m)   Wt 83.9 kg (185 lb)   SpO2 99%   BMI 29.86 kg/m²       Past Medical History:   Diagnosis Date    Bradycardia     Diabetes (HCC)     borderline    DVT (deep venous thrombosis) ([address] 2010    right    History of ablation March 2016     History of atrial fibrillation     Hypercholesterolemia     Hypertension     Long term current use of anticoagulant therapy     Osteoporosis     Seizure ([address]     Seizures ([address]     on medication from 2006 to 2013 no seizure since 2006    [employer] pacemaker implant Set.  2013 Dr. Randal Zam[place], [place]     Thyroid disease        Social History     Socioeconomic History    Marital status:      Spouse name: Not on file    Number of children: Not on file    Years of education: Not on file    Highest education level: Not on file   Occupational History    Not on file   Social Needs    Financial resource strain: Not on file    Food insecurity:     Worry: Not on file     Inability: Not on file    Transportation needs:     Medical: Not on file     Non-medical: Not on file   Tobacco Use    Smoking status: Never Smoker  Smokeless tobacco: Never Used   Substance and Sexual Activity    Alcohol use: No    Drug use: No    Sexual activity: Not Currently     Partners: Male     Birth control/protection: Surgical     Comment: hysterectomy   Lifestyle    Physical activity:     Days per week: Not on file     Minutes per session: Not on file    Stress: Not on file   Relationships    Social connections:     Talks on phone: Not on file     Gets together: Not on file     Attends [employer] service: Not on file     Active member of club or organization: Not on file     Attends meetings of clubs or organizations: Not on file     Relationship status: Not on file    Intimate partner violence:     Fear of current or ex partner: Not on file     Emotionally abused: Not on file     Physically abused: Not on file     Forced sexual activity: Not on file   Other Topics Concern    Not on file   Social History Narrative    Not on file       Family History   Problem Relation Age of Onset    Diabetes Father     Hypertension Sister        Past Surgical History:   Procedure Laterality Date    CARDIAC SURG PROCEDURE UNLIST      ablation for a-fib    COLONOSCOPY N/A 10/25/2017    COLONOSCOPY performed by Sara Montenegro [facility] at [facility] ENDOSCOPY    HX CATARACT REMOVAL      HX HYSTERECTOMY      HX KNEE REPLACEMENT      left knee in 1/2010 and right knee 7/2016    HX KNEE REPLACEMENT      right knee    HX PACEMAKER         Current Outpatient Medications   Medication Sig Dispense Refill    metoprolol succinate (TOPROL-XL) 50 mg XL tablet TAKE 1 TAB BY MOUTH DAILY. PER PATIENT SHE DOES NOT TAKE IF HER BP IS LOW 90 Tab 1    losartan (COZAAR) 100 mg tablet TAKE 1 TABLET BY MOUTH EVERY DAY 90 Tab 1    pravastatin (PRAVACHOL) 10 mg tablet TAKE 1 TAB BY MOUTH NIGHTLY. 90 Tab 1    ONETOUCH ULTRA BLUE TEST STRIP strip USE TO TEST BLOOD SUGAR ONCE DIALY 100 Strip 6    metFORMIN (GLUCOPHAGE) 500 mg tablet Take 1 Tab by mouth daily (with breakfast).  [address] [place] [address] Tab 1    cholecalciferol, vitamin D3, (VITAMIN D3) 2,000 unit tab Take  by mouth.  spironolactone (ALDACTONE) 25 mg tablet Take 1 Tab by mouth daily. 4    aspirin 81 mg chewable tablet Take 81 mg by mouth daily.  DOCOSAHEXANOIC ACID/EPA (FISH OIL PO) Take 100 mg by mouth daily.  COQ10, LIPOSOMAL UBIQUINOL, PO Take 200 mg by mouth daily. EKG: normal EKG, normal sinus rhythm, unchanged from previous tracings, probably atrially paced rhythm. ASSESSMENT and PLAN  Encounter Diagnoses   Name Primary?  Paroxysmal atrial fibrillation (HCC) Yes    Bradycardia/ post pacemaker. s/p ablation     Essential hypertension     Pure hypercholesterolemia    She has been doing very well. She has had no symptoms to indicate angina or cardiac decompensation. She has had no recurrent atrial fibrillation. Her blood pressure has been under control. For now, continue on current medical regimen.

## 2019-10-01 ENCOUNTER — HOSPITAL ENCOUNTER (OUTPATIENT)
Dept: MAMMOGRAPHY | Age: 74
Discharge: HOME | End: 2019-10-01
Attending: FAMILY MEDICINE
Payer: MEDICARE

## 2019-10-01 DIAGNOSIS — Z12.39 SCREENING FOR BREAST CANCER: ICD-10-CM

## 2019-10-01 PROCEDURE — 77063 BREAST TOMOSYNTHESIS BI: CPT

## 2020-01-08 ENCOUNTER — OFFICE VISIT (OUTPATIENT)
Dept: CARDIOLOGY CLINIC | Age: 75
End: 2020-01-08

## 2020-01-08 DIAGNOSIS — I48.0 PAROXYSMAL ATRIAL FIBRILLATION (HCC): ICD-10-CM

## 2020-01-08 DIAGNOSIS — Z95.0 CARDIAC PACEMAKER IN SITU: Primary | ICD-10-CM

## 2020-01-23 ENCOUNTER — HOSPITAL ENCOUNTER (OUTPATIENT)
Dept: LAB | Age: 75
Discharge: HOME | End: 2020-01-23
Payer: MEDICARE

## 2020-01-23 ENCOUNTER — OFFICE VISIT (OUTPATIENT)
Dept: FAMILY MEDICINE CLINIC | Age: 75
End: 2020-01-23

## 2020-01-23 VITALS
BODY MASS INDEX: 29.89 KG/M2 | TEMPERATURE: 98.1 F | RESPIRATION RATE: 16 BRPM | DIASTOLIC BLOOD PRESSURE: 76 MMHG | OXYGEN SATURATION: 99 % | HEART RATE: 59 BPM | HEIGHT: 66 IN | WEIGHT: 186 LBS | SYSTOLIC BLOOD PRESSURE: 150 MMHG

## 2020-01-23 DIAGNOSIS — R79.89 ELEVATED PTHRP LEVEL: ICD-10-CM

## 2020-01-23 DIAGNOSIS — E11.9 WELL CONTROLLED DIABETES MELLITUS (HCC): ICD-10-CM

## 2020-01-23 DIAGNOSIS — M79.89 LEG SWELLING: ICD-10-CM

## 2020-01-23 DIAGNOSIS — E78.5 HYPERLIPIDEMIA, UNSPECIFIED HYPERLIPIDEMIA TYPE: ICD-10-CM

## 2020-01-23 DIAGNOSIS — N28.9 RENAL INSUFFICIENCY: ICD-10-CM

## 2020-01-23 DIAGNOSIS — I48.91 ATRIAL FIBRILLATION, UNSPECIFIED TYPE (HCC): ICD-10-CM

## 2020-01-23 DIAGNOSIS — I10 ESSENTIAL HYPERTENSION: Primary | ICD-10-CM

## 2020-01-23 DIAGNOSIS — E04.1 THYROID NODULE: ICD-10-CM

## 2020-01-23 DIAGNOSIS — E83.52 HYPERCALCEMIA: ICD-10-CM

## 2020-01-23 PROBLEM — E03.9 HYPOTHYROIDISM: Status: RESOLVED | Noted: 2017-03-16 | Resolved: 2020-01-23

## 2020-01-23 LAB
ANION GAP SERPL CALC-SCNC: 3 MMOL/L (ref 3–18)
BUN SERPL-MCNC: 19 MG/DL (ref 7–18)
BUN/CREAT SERPL: 18 (ref 12–20)
CALCIUM SERPL-MCNC: 11.2 MG/DL (ref 8.5–10.1)
CHLORIDE SERPL-SCNC: 109 MMOL/L (ref 100–111)
CO2 SERPL-SCNC: 28 MMOL/L (ref 21–32)
CREAT SERPL-MCNC: 1.06 MG/DL (ref 0.6–1.3)
EST. AVERAGE GLUCOSE BLD GHB EST-MCNC: 134 MG/DL
GLUCOSE SERPL-MCNC: 93 MG/DL (ref 74–99)
HBA1C MFR BLD: 6.3 % (ref 4.2–5.6)
POTASSIUM SERPL-SCNC: 5.1 MMOL/L (ref 3.5–5.5)
SODIUM SERPL-SCNC: 140 MMOL/L (ref 136–145)

## 2020-01-23 PROCEDURE — 80048 BASIC METABOLIC PNL TOTAL CA: CPT

## 2020-01-23 PROCEDURE — 83036 HEMOGLOBIN GLYCOSYLATED A1C: CPT

## 2020-01-23 PROCEDURE — 36415 COLL VENOUS BLD VENIPUNCTURE: CPT

## 2020-01-23 RX ORDER — METOPROLOL SUCCINATE 50 MG/1
50 TABLET, EXTENDED RELEASE ORAL DAILY
Qty: 90 TAB | Refills: 1 | Status: SHIPPED | OUTPATIENT
Start: 2020-01-23 | End: 2020-11-02

## 2020-01-23 RX ORDER — METFORMIN HYDROCHLORIDE 500 MG/1
500 TABLET ORAL
Qty: 180 TAB | Refills: 1 | Status: SHIPPED | OUTPATIENT
Start: 2020-01-23 | End: 2021-01-27

## 2020-01-23 RX ORDER — LOSARTAN POTASSIUM 100 MG/1
100 TABLET ORAL DAILY
Qty: 90 TAB | Refills: 1 | Status: SHIPPED | OUTPATIENT
Start: 2020-01-23 | End: 2020-08-07

## 2020-01-23 NOTE — PROGRESS NOTES
Elevated calcium. Follow up with endo as discussed during visit. Diabetes test is stable in prediabetic range. Renal function stable.

## 2020-01-23 NOTE — PROGRESS NOTES
1. Have you been to the ER, urgent care clinic since your last visit? Hospitalized since your last visit? No    2. Have you seen or consulted any other health care providers outside of the [address] [place] [address] since your last visit? Include any pap smears or colon screening. Dr. Héctor Michele - 9/2019.      Chief Complaint   Patient presents with    Irregular Heart Beat    Hypertension    Thyroid Problem    Diabetes    Other     lipoma of head, elevated PTHrp    Cough     dry cough x 2 weeks      last dm eye exam - not completed  Last dm foot exam - not completed

## 2020-01-23 NOTE — PATIENT INSTRUCTIONS
Leg and Ankle Edema: Care Instructions Your Care Instructions Swelling in the legs, ankles, and feet is called edema. It is common after you sit or stand for a while. Long plane flights or car rides often cause swelling in the legs and feet. You may also have swelling if you have to stand for long periods of time at your job. Problems with the veins in the legs (varicose veins) and changes in hormones can also cause swelling. Sometimes the swelling in the ankles and feet is caused by a more serious problem, such as heart failure, infection, blood clots, or liver or kidney disease. Follow-up care is a key part of your treatment and safety. Be sure to make and go to all appointments, and call your doctor if you are having problems. It's also a good idea to know your test results and keep a list of the medicines you take. How can you care for yourself at home? · If your doctor gave you medicine, take it as prescribed. Call your doctor if you think you are having a problem with your medicine. · Whenever you are resting, raise your legs up. Try to keep the swollen area higher than the level of your heart. · Take breaks from standing or sitting in one position. ? Walk around to increase the blood flow in your lower legs. ? Move your feet and ankles often while you stand, or tighten and relax your leg muscles. · Wear support stockings. Put them on in the morning, before swelling gets worse. · Eat a balanced diet. Lose weight if you need to. · Limit the amount of salt (sodium) in your diet. Salt holds fluid in the body and may increase swelling. When should you call for help? Call 911 anytime you think you may need emergency care. For example, call if: 
  · You have symptoms of a blood clot in your lung (called a pulmonary embolism). These may include: 
? Sudden chest pain. ? Trouble breathing. ? Coughing up blood.  
 Call your doctor now or seek immediate medical care if:   · You have signs of a blood clot, such as: 
? Pain in your calf, back of the knee, thigh, or groin. ? Redness and swelling in your leg or groin.  
  · You have symptoms of infection, such as: 
? Increased pain, swelling, warmth, or redness. ? Red streaks or pus. ? A fever.  
 Watch closely for changes in your health, and be sure to contact your doctor if: 
  · Your swelling is getting worse.  
  · You have new or worsening pain in your legs.  
  · You do not get better as expected. Where can you learn more? Go to http://cecelia-[place].info/. Enter F626 in the search box to learn more about \"Leg and Ankle Edema: Care Instructions. \" Current as of: June 26, 2019 Content Version: 12.2 © 5605-9928 [employer]. Care instructions adapted under license by [employer] (which disclaims liability or warranty for this information). If you have questions about a medical condition or this instruction, always ask your healthcare professional. [place] [address] any warranty or liability for your use of this information. Leg and Ankle Edema: Care Instructions Your Care Instructions Swelling in the legs, ankles, and feet is called edema. It is common after you sit or stand for a while. Long plane flights or car rides often cause swelling in the legs and feet. You may also have swelling if you have to stand for long periods of time at your job. Problems with the veins in the legs (varicose veins) and changes in hormones can also cause swelling. Sometimes the swelling in the ankles and feet is caused by a more serious problem, such as heart failure, infection, blood clots, or liver or kidney disease. Follow-up care is a key part of your treatment and safety. Be sure to make and go to all appointments, and call your doctor if you are having problems. It's also a good idea to know your test results and keep a list of the medicines you take. How can you care for yourself at home? · If your doctor gave you medicine, take it as prescribed. Call your doctor if you think you are having a problem with your medicine. · Whenever you are resting, raise your legs up. Try to keep the swollen area higher than the level of your heart. · Take breaks from standing or sitting in one position. ? Walk around to increase the blood flow in your lower legs. ? Move your feet and ankles often while you stand, or tighten and relax your leg muscles. · Wear support stockings. Put them on in the morning, before swelling gets worse. · Eat a balanced diet. Lose weight if you need to. · Limit the amount of salt (sodium) in your diet. Salt holds fluid in the body and may increase swelling. When should you call for help? Call 911 anytime you think you may need emergency care. For example, call if: 
  · You have symptoms of a blood clot in your lung (called a pulmonary embolism). These may include: 
? Sudden chest pain. ? Trouble breathing. ? Coughing up blood.  
 Call your doctor now or seek immediate medical care if: 
  · You have signs of a blood clot, such as: 
? Pain in your calf, back of the knee, thigh, or groin. ? Redness and swelling in your leg or groin.  
  · You have symptoms of infection, such as: 
? Increased pain, swelling, warmth, or redness. ? Red streaks or pus. ? A fever.  
 Watch closely for changes in your health, and be sure to contact your doctor if: 
  · Your swelling is getting worse.  
  · You have new or worsening pain in your legs.  
  · You do not get better as expected. Where can you learn more? Go to http://cecelia-[place].info/. Enter A437 in the search box to learn more about \"Leg and Ankle Edema: Care Instructions. \" Current as of: June 26, 2019 Content Version: 12.2 © 5656-3201 [employer].  Care instructions adapted under license by [employer] (which disclaims liability or warranty for this information). If you have questions about a medical condition or this instruction, always ask your healthcare professional. [address] any warranty or liability for your use of this information. Low Sodium Diet (2,000 Milligram): Care Instructions Your Care Instructions Too much sodium causes your body to hold on to extra water. This can raise your blood pressure and force your heart and kidneys to work harder. In very serious cases, this could cause you to be put in the hospital. It might even be life-threatening. By limiting sodium, you will feel better and lower your risk of serious problems. The most common source of sodium is salt. People get most of the salt in their diet from canned, prepared, and packaged foods. Fast food and restaurant meals also are very high in sodium. Your doctor will probably limit your sodium to less than 2,000 milligrams (mg) a day. This limit counts all the sodium in prepared and packaged foods and any salt you add to your food. Follow-up care is a key part of your treatment and safety. Be sure to make and go to all appointments, and call your doctor if you are having problems. It's also a good idea to know your test results and keep a list of the medicines you take. How can you care for yourself at home? Read food labels · Read labels on cans and food packages. The labels tell you how much sodium is in each serving. Make sure that you look at the serving size. If you eat more than the serving size, you have eaten more sodium. · Food labels also tell you the Percent Daily Value for sodium. Choose products with low Percent Daily Values for sodium. · Be aware that sodium can come in forms other than salt, including monosodium glutamate (MSG), sodium citrate, and sodium bicarbonate (baking soda). MSG is often added to Asian food. When you eat out, you can sometimes ask for food without MSG or added salt. Buy low-sodium foods · Buy foods that are labeled \"unsalted\" (no salt added), \"sodium-free\" (less than 5 mg of sodium per serving), or \"low-sodium\" (less than 140 mg of sodium per serving). Foods labeled \"reduced-sodium\" and \"light sodium\" may still have too much sodium. Be sure to read the label to see how much sodium you are getting. · Buy fresh vegetables, or frozen vegetables without added sauces. Buy low-sodium versions of canned vegetables, soups, and other canned goods. Prepare low-sodium meals · Cut back on the amount of salt you use in cooking. This will help you adjust to the taste. Do not add salt after cooking. One teaspoon of salt has about 2,300 mg of sodium. · Take the salt shaker off the table. · Flavor your food with garlic, lemon juice, onion, vinegar, herbs, and spices. Do not use soy sauce, lite soy sauce, steak sauce, onion salt, garlic salt, celery salt, mustard, or ketchup on your food. · Use low-sodium salad dressings, sauces, and ketchup. Or make your own salad dressings and sauces without adding salt. · Use less salt (or none) when recipes call for it. You can often use half the salt a recipe calls for without losing flavor. Other foods such as rice, pasta, and grains do not need added salt. · Rinse canned vegetables, and cook them in fresh water. This removes somebut not allof the salt. · Avoid water that is naturally high in sodium or that has been treated with water softeners, which add sodium. Call your local water company to find out the sodium content of your water supply. If you buy bottled water, read the label and choose a sodium-free brand. Avoid high-sodium foods · Avoid eating: 
? Smoked, cured, salted, and canned meat, fish, and poultry. ? Ham, morales, hot dogs, and luncheon meats. ? Regular, hard, and processed cheese and regular peanut butter. ? Crackers with salted tops, and other salted snack foods such as pretzels, chips, and salted popcorn. ? Frozen prepared meals, unless labeled low-sodium. ? Canned and dried soups, broths, and bouillon, unless labeled sodium-free or low-sodium. ? Canned vegetables, unless labeled sodium-free or low-sodium. ? Western [employer]uelo fries, pizza, tacos, and other fast foods. ? Pickles, olives, ketchup, and other condiments, especially soy sauce, unless labeled sodium-free or low-sodium. Where can you learn more? Go to http://cecelia-[place].info/. Enter W948 in the search box to learn more about \"Low Sodium Diet (2,000 Milligram): Care Instructions. \" Current as of: November 7, 2018 Content Version: 12.2 © 0287-1549 [employer] Care instructions adapted under license by [employer] (which disclaims liability or warranty for this information). If you have questions about a medical condition or this instruction, always ask your healthcare professional. [place] [address] any warranty or liability for your use of this information. Hypothyroidism: Care Instructions Your Care Instructions You have hypothyroidism, which means that your body is not making enough thyroid hormone. This hormone helps your body use energy. If your thyroid level is low, you may feel tired, be constipated, have an increase in your blood pressure, or have dry skin or memory problems. You may also get cold easily, even when it is warm. Women with low thyroid levels may have heavy menstrual periods. A blood test to find your thyroid-stimulating hormone (TSH) level is used to check for hypothyroidism. A high TSH level may mean that you have low thyroid. When your body is not making enough thyroid hormone, TSH levels rise in an effort to make the body produce more. The treatment for hypothyroidism is to take thyroid hormone pills. You should start to feel better in 1 to 2 weeks. But it can take several months to see changes in the TSH level.  You will need regular visits with your doctor to make sure you have the right dose of medicine. Most people need treatment for the rest of their lives. You will need to see your doctor regularly to have blood tests and to make sure you are doing well. Follow-up care is a key part of your treatment and safety. Be sure to make and go to all appointments, and call your doctor if you are having problems. It's also a good idea to know your test results and keep a list of the medicines you take. How can you care for yourself at home? · Take your thyroid hormone medicine exactly as prescribed. Call your doctor if you think you are having a problem with your medicine. Most people do not have side effects if they take the right amount of medicine regularly. ? Take the medicine 30 minutes before breakfast, and do not take it with calcium, vitamins, or iron. ? Do not take extra doses of your thyroid medicine. It will not help you get better any faster, and it may cause side effects. ? If you forget to take a dose, do NOT take a double dose of medicine. Take your usual dose the next day. · Tell your doctor about all prescription, herbal, or over-the-counter products you take. · Take care of yourself. Eat a healthy diet, get enough sleep, and get regular exercise. When should you call for help? Call 911 anytime you think you may need emergency care. For example, call if: 
  · You passed out (lost consciousness).  
  · You have severe trouble breathing.  
  · You have a very slow heartbeat (less than 60 beats a minute).  
  · You have a low body temperature (95°F or below).  
 Call your doctor now or seek immediate medical care if: 
  · You feel tired, sluggish, or weak.  
  · You have trouble remembering things or concentrating.  
  · You do not begin to feel better 2 weeks after starting your medicine.  
 Watch closely for changes in your health, and be sure to contact your doctor if you have any problems. Where can you learn more? Go to http://cecelia-[place].info/. Enter F999 in the search box to learn more about \"Hypothyroidism: Care Instructions. \" Current as of: November 6, 2018 Content Version: 12.2 © 1498-3592 [employer]. Care instructions adapted under license by [employer] (which disclaims liability or warranty for this information). If you have questions about a medical condition or this instruction, always ask your healthcare professional. [address] any warranty or liability for your use of this information. Hypercalcemia: Care Instructions Your Care Instructions Hypercalcemia is too much calcium in the blood. You need calcium to have strong bones. It also helps your muscles, heart, and nerves work as they should. But too much is dangerous. Several problems can cause too much calcium in the blood. It can happen because of medicines or certain health problems. Some diseases can make your intestines take in too much calcium. And some can take calcium from your bones. A noncancerous tumor can grow in the glands that control calcium levels. And some cancers can cause high calcium levels. These high levels may make you lose fluids (become dehydrated). You may get confused and very tired. Some people also have nausea, vomiting, and constipation. Your doctor will treat you based on how serious the problem is and what is causing it. Since too much calcium can be dangerous, it is important to treat it. You may get fluids to stop dehydration. You also may get medicine to help your body get rid of calcium through your urine or put it back into your bones. If a tumor is the cause, you may need surgery. Follow-up care is a key part of your treatment and safety. Be sure to make and go to all appointments, and call your doctor if you are having problems. It's also a good idea to know your test results and keep a list of the medicines you take. How can you care for yourself at home? · Take your medicines exactly as prescribed. Call your doctor if you think you are having a problem with your medicine. · Make sure your doctor knows about all the medicines (including over-the-counter or herbal products) you are taking. If a medicine is causing your high calcium levels, your doctor will have you stop taking it. · Drink plenty of fluids, enough so that your urine is light yellow or clear like water. If you have kidney, heart, or liver disease and have to limit fluids, talk with your doctor before you increase the amount of fluids you drink. · Get at least 30 minutes of exercise on most days of the week. Walking is a good choice. You also may want to do other activities, such as running, swimming, cycling, or playing tennis or team sports. Exercise helps the calcium go back into your bones. · Do not reduce how much calcium you eat. · Let your doctor know if you take vitamins or other supplements that have calcium or vitamin D. When should you call for help? Call your doctor now or seek immediate medical care if: 
  · You are confused or have trouble thinking clearly.  
 Watch closely for changes in your health, and be sure to contact your doctor if: 
  · You are feeling so tired or weak that you cannot do your usual activities.  
  · You do not get better as expected. Where can you learn more? Go to http://cecelia-[place].info/. Enter L661 in the search box to learn more about \"Hypercalcemia: Care Instructions. \" Current as of: December 19, 2018 Content Version: 12.2 © 9464-9971 [employer]. Care instructions adapted under license by [employer] (which disclaims liability or warranty for this information). If you have questions about a medical condition or this instruction, always ask your healthcare professional. [address] any warranty or liability for your use of this information.

## 2020-01-23 NOTE — PROGRESS NOTES
HISTORY OF PRESENT ILLNESS  Rena Cronin is a 75 y.o. female. HPI:\" here for follow up . Said recent cold and now left behind dry cough since 2 wks. No sputum. No wheezing or chest congestion. No fever. No sore throat. Sitting comfortable and not in an acute distress. For now advised her to take halls. Also h/o a.fib. on aspirin. Following cardiology. Also h/o thyroid nodule. Hypothyroidism. Following endo. Had ultrasound and labs done by them. Will obtain records. Also noted elevated serum calcium . Had elevated PTH. She was given calcitonin but she could not afford the medication so not taking it. For now she has coming up appt next week Monday , advised to discuss with specialist further and mean time take vitamin D 5000 units otc. She is not on any calcium supplement. Denies any trouble swallowing or any change in voice. H/o diabetes. Well controlled. Working on diet modification and walking with compliance. HBA1C in prediabetic range. Complaint with medication. No signs and symptoms of hypoglycemia. Visit Vitals  /76 (BP 1 Location: Left arm, BP Patient Position: Sitting)   Pulse (!) 59   Temp 98.1 °F (36.7 °C) (Oral)   Resp 16   Ht 5' 6\" (1.676 m)   Wt 186 lb (84.4 kg)   SpO2 99%   BMI 30.02 kg/m²     Review medication list, vitals, problem list,allergies. Denies any headache, dizziness, no chest pain or trouble breathing, no arm or leg weakness. No nausea or vomiting, no weight or appetite changes, no mood changes . No urine or bowel complains, no palpitation, no diaphoresis. No abdominal pain. No cold or cough. No leg swelling. No fever. No sleep trouble. H/o hypertension. Vitals stable. Asymptomatic. And complaint with medication.    Review labs   Lab Results   Component Value Date/Time    WBC 8.0 03/10/2018 08:00 AM    HGB 12.0 03/10/2018 08:00 AM    HCT 37.6 03/10/2018 08:00 AM    PLATELET 544 96/16/4196 08:00 AM    MCV 84.1 03/10/2018 08:00 AM     Lab Results   Component Value Date/Time    Sodium 141 09/21/2019 08:12 AM    Potassium 5.2 09/21/2019 08:12 AM    Chloride 107 09/21/2019 08:12 AM    CO2 28 09/21/2019 08:12 AM    Anion gap 6 09/21/2019 08:12 AM    Glucose 99 09/21/2019 08:12 AM    BUN 13 09/21/2019 08:12 AM    Creatinine 1.06 09/21/2019 08:12 AM    BUN/Creatinine ratio 12 09/21/2019 08:12 AM    GFR est AA >60 09/21/2019 08:12 AM    GFR est non-AA 51 (L) 09/21/2019 08:12 AM    Calcium 10.1 09/21/2019 08:12 AM    Bilirubin, total 0.5 09/21/2019 08:12 AM    AST (SGOT) 15 09/21/2019 08:12 AM    Alk. phosphatase 75 09/21/2019 08:12 AM    Protein, total 7.1 09/21/2019 08:12 AM    Albumin 3.6 09/21/2019 08:12 AM    Globulin 3.5 09/21/2019 08:12 AM    A-G Ratio 1.0 09/21/2019 08:12 AM    ALT (SGPT) 16 09/21/2019 08:12 AM     Lab Results   Component Value Date/Time    Cholesterol, total 161 09/21/2019 08:12 AM    HDL Cholesterol 47 09/21/2019 08:12 AM    LDL, calculated 92 09/21/2019 08:12 AM    VLDL, calculated 22 09/21/2019 08:12 AM    Triglyceride 110 09/21/2019 08:12 AM    CHOL/HDL Ratio 3.4 09/21/2019 08:12 AM     Lab Results   Component Value Date/Time    TSH 1.31 03/10/2018 08:00 AM     Lab Results   Component Value Date/Time    Hemoglobin A1c 6.3 (H) 09/21/2019 08:12 AM     Lab Results   Component Value Date/Time    Microalbumin/Creat ratio (mg/g creat)  05/06/2019 10:25 AM     Cannot calculate ratio due to microalbumin result outside reportable range. Microalbumin,urine random <0.50 05/06/2019 10:25 AM    Microalbumin urine (POC) 10 04/24/2017 09:02 AM           ROS: see HPI     Physical Exam  Constitutional:       General: She is not in acute distress. Cardiovascular:      Rate and Rhythm: Normal rate and regular rhythm. Heart sounds: Normal heart sounds. Abdominal:      General: Bowel sounds are normal.      Palpations: Abdomen is soft. Tenderness: There is no tenderness.    Musculoskeletal:      Comments: Foot exam: no callus or open skin area,  Monofilament test normal.  Peripheral pulsations of dorsalis pedis palpable both lower ext. Lymphadenopathy:      Cervical: No cervical adenopathy. Neurological:      Mental Status: She is oriented to person, place, and time. Psychiatric:         Behavior: Behavior normal.         ASSESSMENT and PLAN    ICD-10-CM ICD-9-CM    1. Essential hypertension: mild elevated. Repeat was almost same. Will advise low salt diet and will continue current plan. Advised to come for nurse visit in a month and bring blood pressure log  I10 401.9 losartan (COZAAR) 100 mg tablet      metoprolol succinate (TOPROL-XL) 50 mg XL tablet   2. Well controlled diabetes mellitus ([facility] [address]): well controlled. HBA1C in prediabetic range. On statin and ARB/ will observe. E11.9 250.00 metFORMIN (GLUCOPHAGE) 500 mg tablet      METABOLIC PANEL, BASIC      HEMOGLOBIN A1C WITH EAG   3. Renal insufficiency: observe. M87.1 493.6 METABOLIC PANEL, BASIC   4. Hypercalcemia: vitamin D supplement. Also not able to afford calcitonin . Will discuss further with endo alternative treatment option. E83.52 275.42    5. Elevated PTHrP level: see above  R79.89 790.6    6. Thyroid nodule: been following endo. Had an ultrasound. Will obtain records and follow specialist recommendations. E04.1 241.0    7. Hyperlipidemia, unspecified hyperlipidemia type: on statin. E78.5 272.4    9. Leg swelling: stable. M79.89 729.81    10. Atrial fibrillation, unspecified type ([facility] [address]): on aspirin. Asymptomatic. I48.91 427.31    Pt understood and agree with the plan   Review    Follow-up and Dispositions    · Return in about 3 months (around 4/23/2020).

## 2020-01-23 NOTE — PROGRESS NOTES
Also send copy of lab result to pt or she can  to take with her during Monday visit. Fax it to Dr. Fabiola Lopez office.

## 2020-01-24 NOTE — PROGRESS NOTES
Contacted patient and verified identity using name and date of birth (2- identifiers)  Spoke with patient and she verbalized understanding of elevated calcium, stable DM test and stable renal function. Faxing results to Dr. Kwadwo Law office for [facility] appointment.

## 2020-01-27 NOTE — PROGRESS NOTES
I have personally seen and evaluated the device findings. Interrogation reviewed and I agree with assessment.     Kar Nickerson

## 2020-01-28 ENCOUNTER — TELEPHONE (OUTPATIENT)
Dept: FAMILY MEDICINE CLINIC | Age: 75
End: 2020-01-28

## 2020-01-28 NOTE — TELEPHONE ENCOUNTER
Pt's daughter states that she has some questions about pt's appt. She can't get any straight answers out of her mother and she is concerned about her BP since pt is on a pace maker.  Please call her back at [phone]

## 2020-01-30 NOTE — TELEPHONE ENCOUNTER
Spoke with patient's daughter and she had indicated that the \"story\" she was getting from her mom just didn't seem to add up and she wanted to make sure everything was good. She had indicated that her blood pressure was HIGH and I advised it was mildly elevated and was rechecked and there was no change. Daughter just said she wanted to check.

## 2020-02-25 ENCOUNTER — TELEPHONE (OUTPATIENT)
Dept: FAMILY MEDICINE CLINIC | Age: 75
End: 2020-02-25

## 2020-02-26 ENCOUNTER — CLINICAL SUPPORT (OUTPATIENT)
Dept: FAMILY MEDICINE CLINIC | Age: 75
End: 2020-02-26

## 2020-02-26 VITALS — SYSTOLIC BLOOD PRESSURE: 170 MMHG | DIASTOLIC BLOOD PRESSURE: 86 MMHG

## 2020-02-26 DIAGNOSIS — Z01.30 BP CHECK: Primary | ICD-10-CM

## 2020-05-02 DIAGNOSIS — E11.9 WELL CONTROLLED DIABETES MELLITUS (HCC): ICD-10-CM

## 2020-06-12 ENCOUNTER — OFFICE VISIT (OUTPATIENT)
Dept: SURGERY | Age: 75
End: 2020-06-12

## 2020-06-12 VITALS
DIASTOLIC BLOOD PRESSURE: 3 MMHG | WEIGHT: 182 LBS | TEMPERATURE: 97.3 F | OXYGEN SATURATION: 100 % | HEIGHT: 66 IN | RESPIRATION RATE: 16 BRPM | BODY MASS INDEX: 29.25 KG/M2 | SYSTOLIC BLOOD PRESSURE: 154 MMHG | HEART RATE: 61 BPM

## 2020-06-12 DIAGNOSIS — R22.0 MASS OF FACE: Primary | ICD-10-CM

## 2020-06-12 NOTE — PATIENT INSTRUCTIONS
Learning About Benign Bone Tumors What is a benign bone tumor? A bone tumor is a growth of abnormal cells in the bones. When a tumor is benign (say \"sunshine-NYN\"), that means it's not cancer. Benign bone tumors don't usually spread to other tissues and organs. They usually aren't life-threatening. But they can cause problems if they grow too much or damage healthy bone. Most bone tumors are benign. Many benign bone tumors may not need to be treated. But if the tumor causes pain, weakens the bone, or keeps you from moving a part of your body, it may need to be removed. What are some common types of benign bone tumors? Benign bone tumors grow inside the bones. As they grow, these tumors may involve tissues near the bones, such as tendons and ligaments that attach to the bone. Osteochondroma. These tumors appear most often in the bones of the legs. They may feel like a bony spur on the knee or upper shoulder. Giant cell tumor of bone. These are fast-growing tumors that need to be treated as soon as possible. They are more common in young adults. They often appear near the knee. Osteoblastoma. These are often found in the spine or pelvis. Aneurysmal bone cyst.   
These may be found in the arms, legs, or spine. They are blood-filled growths that can swell inside the bone. Fibrous dysplasia. This can affect one or more bones. It may be found in the long bones of the arms and legs. Enchondroma. This is another type of bone tumor that can start in the cartilage. It often appears on the hands and feet. It can also appear on the long bones of the arms and legs. What are the symptoms? Sometimes the bone tumor can be felt as a bump on one of the bones. Or it might be inside the bone, and you won't feel it. You may also feel pain near the tumor. Tumors can weaken bone, which can then break, or fracture.  A tumor that grows near a joint, like your shoulder or knee, may keep you from being able to move your arm or leg freely. How are they diagnosed? Your doctor will ask you about your symptoms and past health and will examine you. If your doctor can feel a bone tumor, or if you have other symptoms, you will get some tests. The tests can help make sure the tumor isn't cancer. They can also help the doctor find the best treatment for the tumor. Your doctor may also find a tumor when taking X-rays or images for another problem. · You may have one or more imaging tests to get a better look at the tumor. These may include: ? X-rays. ? A CT scan. ? An MRI scan. ? A bone scan. · You may need a blood test and other lab tests. · You may need a biopsy so a sample of the tumor can be looked at under a microscope. · Doctors may also look at other parts of your body for other tumors. How are they treated? Some benign bone tumors that aren't causing problems can be managed with regular checkups and imaging tests. But if the tumor is causing problems, then treatment will likely be needed. A doctor may remove a tumor with surgery. If part of the bone is removed, it may be replaced with new bone or artificial bone. Ask the doctor or specialist about other types of treatments available for the tumor. After treatment, the doctor may want to check the area again to make sure the growth doesn't come back. Follow-up care is a key part of your treatment and safety. Be sure to make and go to all appointments, and call your doctor if you are having problems. It's also a good idea to know your test results and keep a list of the medicines you take. Where can you learn more? Go to http://cecelia-[place].info/ Enter [phone] in the search box to learn more about \"Learning About Benign Bone Tumors. \" Current as of: August 22, 2019               Content Version: 12.5 © 4687-0901 Healthwise, Incorporated.   
Care instructions adapted under license by [employer] (which disclaims liability or warranty for this information). If you have questions about a medical condition or this instruction, always ask your healthcare professional. [address] any warranty or liability for your use of this information. Learning About Benign Soft Tissue Tumors What is a benign soft tissue tumor? A soft tissue tumor is a growth of abnormal cells in the body's soft tissues. These tissues include the muscles, lymph and blood vessels, nerves, and fat. They can also include cartilage and other connective tissues. When a tumor is benign (say \"bih-NYN\"), that means it's not cancer. Most soft tissue tumors are benign. Benign tumors don't spread to other tissues and organs. They usually aren't life-threatening. But they can cause problems if they grow too much, press on nerves, or cause pain. What are some common types of these tumors? Some common types of benign soft tissue tumors include: 
Lipomas. These tumors form from fat cells. Angiolipomas are a type of lipoma made up of fat and blood vessels. Nerve sheath tumors. Tumors on a nerve may include schwannomas and neurofibromas. They might need to be removed. Benign synovial tumors. These appear around the tendons, near the knee, hip, elbow, or shoulder. Examples include giant cell tumor of the tendon sheath and synovial chondromatosis. Hemangiomas. These are tumors of the blood vessels. Desmoid tumors. These tumors commonly appear on the shoulder, chest, back, and thighs. Nodular fasciitis. These are most common in the arms. They can grow quickly. Other types of tumors may appear on the skin, belly, arms and legs, organs, and nerves. What are the symptoms? Sometimes a tumor can be felt as a bump under the skin. Or if the tumor is deep enough below the skin, you may not be able to feel it. You may also feel pain near the tumor if it's large or pressing on something. How are these tumors diagnosed? Your doctor will ask you about your symptoms and past health and will examine you. A physical exam can help your doctor diagnose some soft tissue tumors. Your doctor may find a tumor when taking X-rays or other imaging tests for another problem. If your doctor isn't sure what the growth is and your symptoms could be signs of a tumor, you will get some tests. The tests can help make sure it's not cancer. They can also help your doctor figure out the best treatment for the tumor. · You may have one or more imaging tests to get a better look at the tumor. These may include: ? X-rays. ? Ultrasound tests. ? CT scan. 
? MRI scan. · You may need blood tests and lab work. · You may need a biopsy so a sample of the tumor can be looked at under a microscope. This sample may also be used to test for biomarkers. They will help with planning treatment. Doctors may also look at other parts of your body for other tumors. How are they treated? Some benign soft tissue tumors that aren't causing problems can be watched over time. Some may remain stable or go away on their own. But if the tumor causes pain, is growing larger, or affects your movement, it may need to be removed. You may also choose to have these tumors removed if they bother you or if you don't like how they look. Doctors may remove some tumors with surgery. In some cases, other treatments, including medicines, may be used. Talk with your doctor or specialist about other types of treatments for the tumor. After your treatment, your doctor may want to check the area again to make sure that the growth doesn't come back. Follow-up care is a key part of your treatment and safety. Be sure to make and go to all appointments, and call your doctor if you are having problems. It's also a good idea to know your test results and keep a list of the medicines you take. Where can you learn more? Go to http://cecelia-[place].info/ Enter S210 in the search box to learn more about \"Learning About Benign Soft Tissue Tumors. \" Current as of: August 22, 2019               Content Version: 12.5 © 0687-1930 Healthwise, Incorporated. Care instructions adapted under license by [employer] (which disclaims liability or warranty for this information). If you have questions about a medical condition or this instruction, always ask your healthcare professional. [address] any warranty or liability for your use of this information.

## 2020-06-12 NOTE — PROGRESS NOTES
[facility]  General Surgery    Subjective:      HPI: Patient is a very pleasant 44-year-old female with an extensive past medical history which is remarkable for hypertension, hypercholesterolemia, type 2 diabetes mellitus, atrial fibrillation status post pacemaker placement 7 years ago and history of seizures. She is referred by Dr. Darin Amezcua for evaluation and management of a mass on the forehead. Patient states that the masses been there for probably 10 years. Initially she can only palpated. Over the past 5 years he has become visible. It has increased in size. She denies any unintentional weight loss or pain at the site. She denies any injury to the site. Patient Active Problem List    Diagnosis Date Noted    Type 2 diabetes with nephropathy ([address] 09/18/2018    Renal insufficiency 09/18/2018    Hypercalcemia 09/18/2018    Elevated PTHrP level 09/18/2018    Thyroid nodule 03/21/2018    S/P colonoscopy 01/12/2018    Cardiac pacemaker in situ 10/19/2017    Advance directive discussed with patient/ living wheel and she will provide a copy / want to consider changes. will provide updates  04/24/2017    Type 2 diabetes mellitus with hyperglycemia, without long-term current use of insulin ([address] 03/16/2017    Essential hypertension 03/16/2017    Bradycardia/ post pacemaker. s/p ablation 03/16/2017    Atrial fibrillation (HCC)/ post ablation 03/16/2017    Hyperlipidemia 03/16/2017     Past Medical History:   Diagnosis Date    Bradycardia     Diabetes ([address]     borderline    DVT (deep venous thrombosis) ([address] 2010    right    History of ablation March 2016     History of atrial fibrillation     Hypercholesterolemia     Hypertension     Long term current use of anticoagulant therapy     Osteoporosis     Seizure ([address]     Seizures ([address]     on medication from 2006 to 2013 no seizure since 2006    [employer] pacemaker implant Set.  2013 Dr. Bala Valle, [place]     Thyroid disease     nodules      Past Surgical History:   Procedure Laterality Date    CARDIAC SURG PROCEDURE UNLIST      ablation for a-fib    COLONOSCOPY N/A 10/25/2017    [facility]    HX CATARACT REMOVAL      HX HYSTERECTOMY      HX KNEE REPLACEMENT      left knee in 1/2010 and right knee 7/2016    HX KNEE REPLACEMENT      right knee    HX PACEMAKER        Family History   Problem Relation Age of Onset    Diabetes Father     Hypertension Sister       Social History     Tobacco Use    Smoking status: Never Smoker    Smokeless tobacco: Never Used   Substance Use Topics    Alcohol use: No      Allergies   Allergen Reactions    Statins-Hmg-Coa Reductase Inhibitors Other (comments)     Felt sick        Prior to Admission medications    Medication Sig Start Date End Date Taking? Authorizing Provider   OneTouch Ultra Blue Test Strip strip USE TO TEST BLOOD SUGAR ONCE DAILY 5/4/20  Yes Jagjit Quintanilla MD   losartan (COZAAR) 100 mg tablet Take 1 Tab by mouth daily. 1/23/20  Yes Jagjit Quintanilla MD   metoprolol succinate (TOPROL-XL) 50 mg XL tablet Take 1 Tab by mouth daily. Per patient she does not take if her BP is low 1/23/20  Yes Jagjit Quintanilla MD   metFORMIN (GLUCOPHAGE) 500 mg tablet Take 1 Tab by mouth daily (with breakfast). 1/23/20  Yes Jagjit Quintanilla MD   cholecalciferol, vitamin D3, (VITAMIN D3) 2,000 unit tab Take  by mouth. Yes Provider, Historical   spironolactone (ALDACTONE) 25 mg tablet Take 1 Tab by mouth daily. 5/16/18  Yes Provider, Historical   aspirin 81 mg chewable tablet Take 81 mg by mouth daily. Yes Provider, Historical   DOCOSAHEXANOIC ACID/EPA (FISH OIL PO) Take 100 mg by mouth daily. Yes Provider, Historical   COQ10, LIPOSOMAL UBIQUINOL, PO Take 200 mg by mouth daily. Yes Provider, Historical   pravastatin (PRAVACHOL) 10 mg tablet TAKE 1 TAB BY MOUTH NIGHTLY.  5/13/19   Jagjit Quintanilla MD       Review of Systems:    65 systems were reviewed. The results are as above in the HPI and otherwise negative. Objective:     Vitals:    06/12/20 0939   BP: (!) 154/3   Pulse: 61   Resp: 16   Temp: 97.3 °F (36.3 °C)   TempSrc: Oral   SpO2: 100%   Weight: 82.6 kg (182 lb)   Height: 5' 6\" (1.676 m)       Physical Exam:  GENERAL: alert, cooperative, no distress, appears stated age,   EYE: conjunctivae/corneas clear. PERRL, EOM's intact. THROAT & NECK: normal and no erythema or exudates noted. ,    LYMPHATIC: Cervical, supraclavicular, and axillary nodes normal. ,   LUNG: clear to auscultation bilaterally,   HEART: regular rate and rhythm, S1, S2 normal, no murmur, click, rub or gallop,   ABDOMEN: soft, non-tender. Bowel sounds normal. No masses,  no organomegaly,   EXTREMITIES:  extremities normal, atraumatic, no cyanosis or edema,   SKIN: Hard mass in the right side of the forehead approximately 2 cm above the medial portion of the right eyebrow which is fixed to the underlying periosteum or skull. The skin overlying the mass is mobile. The mass measures 1/2 x 1/2 cm and is raised from the skull. It is visible on the general assessment of the patient as well as palpable. NEUROLOGIC: AOx3. Cranial nerves 2-12 and sensation grossly intact. ,     Data Review:  to be done    Ms. Santos Sin has a reminder for a \"due or due soon\" health maintenance. I have asked that she contact her primary care provider for follow-up on this health maintenance. Impression:     Patient with a hard fixed mass to the periosteum of the skull of the right forehead. Plan:     · CT head to evaluate mass  · The patient's cardiologist is leaving the practice. She needs to get established with a new cardiologist for risk assessment.   · Follow-up in 1 month    Signed By: Isabella Navarrete MD     June 12, 2020

## 2020-06-12 NOTE — PROGRESS NOTES
Review of Systems   Constitutional: Negative. HENT: Negative. Eyes: Negative. Respiratory: Negative. Cardiovascular: Negative. Gastrointestinal: Negative. Genitourinary: Negative. Musculoskeletal: Positive for joint pain and neck pain. Negative for back pain, falls and myalgias. Skin: Negative. Neurological: Negative. Endo/Heme/Allergies: Negative. Psychiatric/Behavioral: Negative for depression, hallucinations, memory loss, substance abuse and suicidal ideas. The patient is nervous/anxious. The patient does not have insomnia.

## 2020-06-18 ENCOUNTER — CLINICAL SUPPORT (OUTPATIENT)
Dept: CARDIOLOGY CLINIC | Age: 75
End: 2020-06-18

## 2020-06-18 DIAGNOSIS — Z95.0 CARDIAC PACEMAKER IN SITU: Primary | ICD-10-CM

## 2020-06-18 DIAGNOSIS — R00.1 BRADYCARDIA: ICD-10-CM

## 2020-06-19 NOTE — PROGRESS NOTES
I have personally seen and evaluated the device findings. Interrogation reviewed and I agree with assessment.     Pierre Lozano

## 2020-07-07 ENCOUNTER — TELEPHONE (OUTPATIENT)
Dept: FAMILY MEDICINE CLINIC | Age: 75
End: 2020-07-07

## 2020-07-08 ENCOUNTER — OFFICE VISIT (OUTPATIENT)
Dept: FAMILY MEDICINE CLINIC | Age: 75
End: 2020-07-08

## 2020-07-08 VITALS
TEMPERATURE: 97.8 F | RESPIRATION RATE: 16 BRPM | HEART RATE: 60 BPM | SYSTOLIC BLOOD PRESSURE: 132 MMHG | OXYGEN SATURATION: 99 % | BODY MASS INDEX: 29.57 KG/M2 | WEIGHT: 184 LBS | HEIGHT: 66 IN | DIASTOLIC BLOOD PRESSURE: 82 MMHG

## 2020-07-08 DIAGNOSIS — E03.9 HYPOTHYROIDISM, UNSPECIFIED TYPE: ICD-10-CM

## 2020-07-08 DIAGNOSIS — N28.9 RENAL INSUFFICIENCY: ICD-10-CM

## 2020-07-08 DIAGNOSIS — R79.89 ELEVATED PTHRP LEVEL: ICD-10-CM

## 2020-07-08 DIAGNOSIS — M79.89 LEG SWELLING: ICD-10-CM

## 2020-07-08 DIAGNOSIS — Z98.890 H/O COLONOSCOPY: ICD-10-CM

## 2020-07-08 DIAGNOSIS — E11.9 WELL CONTROLLED DIABETES MELLITUS (HCC): ICD-10-CM

## 2020-07-08 DIAGNOSIS — D17.0 LIPOMA OF HEAD: ICD-10-CM

## 2020-07-08 DIAGNOSIS — E04.1 THYROID NODULE: ICD-10-CM

## 2020-07-08 DIAGNOSIS — E83.52 HYPERCALCEMIA: ICD-10-CM

## 2020-07-08 DIAGNOSIS — Z00.00 MEDICARE ANNUAL WELLNESS VISIT, SUBSEQUENT: Primary | ICD-10-CM

## 2020-07-08 DIAGNOSIS — I10 ESSENTIAL HYPERTENSION: ICD-10-CM

## 2020-07-08 RX ORDER — LANCETS
EACH MISCELLANEOUS
Qty: 100 EACH | Refills: 11 | Status: SHIPPED | OUTPATIENT
Start: 2020-07-08

## 2020-07-08 NOTE — PROGRESS NOTES
1. Have you been to the ER, urgent care clinic since your last visit? Hospitalized since your last visit? No    2. Have you seen or consulted any other health care providers outside of the [address] [place] [address] since your last visit? Include any pap smears or colon screening. No       Have you been diagnosed with, tested for, or told that you are suspected of having COVID-19 (coronovirus)? No  Have you had a fever or taken medication to treat a fever in the past 72 hours? No  Have you had a cough, SOB, or flu-like symptoms within the past 3 days? No  Have you had direct contact with someone who tested positive for COVID-19 within the past 14 days? No  Do you have a household member with flu-like symptoms, including fever, cough, or SOB? No  Do you currently have flu-like symptoms including fever, cough, or SOB?  No    Annual eye exam: 2017  Pneumococcal vaccine: 2019  Flu vaccine: 2019  Patient instructed to remove shoes: 01-

## 2020-07-08 NOTE — PROGRESS NOTES
This is the Subsequent Medicare Annual Wellness Exam, performed 12 months or more after the Initial AWV or the last Subsequent AWV    I have reviewed the patient's medical history in detail and updated the computerized patient record. History     Patient Active Problem List   Diagnosis Code    Type 2 diabetes mellitus with hyperglycemia, without long-term current use of insulin ([address] E11.65    Essential hypertension I10    Bradycardia/ post pacemaker. s/p ablation R00.1    Atrial fibrillation (HCC)/ post ablation I48.91    Hyperlipidemia E78.5    Advance directive discussed with patient/ living wheel and she will provide a copy / want to consider changes. will provide updates  Z71.89    Cardiac pacemaker in situ Z95.0    S/P colonoscopy Z98.890    Thyroid nodule E04.1    Type 2 diabetes with nephropathy ([address] E11.21    Renal insufficiency N28.9    Hypercalcemia E83.52    Elevated PTHrP level R79.89     Past Medical History:   Diagnosis Date    Bradycardia     Diabetes ([address]     borderline    DVT (deep venous thrombosis) ([address] 2010    right    History of ablation March 2016     History of atrial fibrillation     Hypercholesterolemia     Hypertension     Long term current use of anticoagulant therapy     Osteoporosis     Seizure ([address]     Seizures ([address]     on medication from 2006 to 2013 no seizure since 2006    [employer] pacemaker implant Set.  2013 Dr. Iesha Milian, [place]     Thyroid disease     nodules      Past Surgical History:   Procedure Laterality Date    CARDIAC SURG PROCEDURE UNLIST      ablation for a-fib    COLONOSCOPY N/A 10/25/2017    [facility] Maurice [facility]    HX CATARACT REMOVAL      HX HYSTERECTOMY      HX KNEE REPLACEMENT      left knee in 1/2010 and right knee 7/2016    HX KNEE REPLACEMENT      right knee    HX PACEMAKER       Current Outpatient Medications   Medication Sig Dispense Refill    OneTouch Ultra Blue Test Strip strip USE TO TEST BLOOD SUGAR ONCE DAILY 100 Strip 6    losartan (COZAAR) 100 mg tablet Take 1 Tab by mouth daily. 90 Tab 1    metoprolol succinate (TOPROL-XL) 50 mg XL tablet Take 1 Tab by mouth daily. Per patient she does not take if her BP is low 90 Tab 1    metFORMIN (GLUCOPHAGE) 500 mg tablet Take 1 Tab by mouth daily (with breakfast). 180 Tab 1    cholecalciferol, vitamin D3, (VITAMIN D3) 2,000 unit tab Take  by mouth.  spironolactone (ALDACTONE) 25 mg tablet Take 1 Tab by mouth daily. 4    aspirin 81 mg chewable tablet Take 81 mg by mouth daily.  DOCOSAHEXANOIC ACID/EPA (FISH OIL PO) Take 100 mg by mouth daily.  COQ10, LIPOSOMAL UBIQUINOL, PO Take 200 mg by mouth daily. Allergies   Allergen Reactions    Statins-Hmg-Coa Reductase Inhibitors Other (comments)     [place] sick        Family History   Problem Relation Age of Onset    Diabetes Father     Hypertension Sister      Social History     Tobacco Use    Smoking status: Never Smoker    Smokeless tobacco: Never Used   Substance Use Topics    Alcohol use: No       Depression Risk Factor Screening:     3 most recent PHQ Screens 7/8/2020   Little interest or pleasure in doing things Not at all   Feeling down, depressed, irritable, or hopeless Not at all   Total Score PHQ 2 0       Alcohol Risk Factor Screening:   Do you average 1 drink per night or more than 7 drinks a week:  No    On any one occasion in the past three months have you have had more than 3 drinks containing alcohol:  No      Functional Ability and Level of Safety:   Hearing: Hearing is good. Activities of Daily Living: The home contains: no safety equipment. Patient does total self care     Ambulation: with no difficulty     Fall Risk:  Fall Risk Assessment, last 12 mths 7/8/2020   Able to walk? Yes   Fall in past 12 months?  No     Abuse Screen:  Patient is not abused       Cognitive Screening   Has your family/caregiver stated any concerns about your memory: no       Patient Care Team   Patient Care Team:  Sybil Mason MD as PCP - General (Family Practice)  Sybil Mason MD as PCP - [address] [place] [address] Provider  Lynda Jack MD (Cardiology)  Kendrick Huerta MD as Physician (Colon and Rectal Surgery)  Ole Deng MD (Ophthalmology)  Jarocho Moreno MD (Endocrinology)    Assessment/Plan   Education and counseling provided:  Are appropriate based on today's review and evaluation  Discussed 5-year health plan. Discussed advance care directive. See ACP note from today  Diagnoses and all orders for this visit:    1. Medicare annual wellness visit, subsequent    Other orders  -     lancets misc; One Touch Ultra soft testing fasting blood sugar every morning prior to breakfast DX E11.9        Health Maintenance Due   Topic Date Due    GLAUCOMA SCREENING Q2Y  09/06/2019    Eye Exam Retinal or Dilated  09/06/2019    Medicare Yearly Exam  05/06/2020    MICROALBUMIN Q1  05/06/2020     She had to reschedule eye exam twice due to pandemic situation. She will make a follow-up appointment again. Order the labs today  She is up to date with the mammogram.  Her colonoscopy was in 2015. Repeat in 5 years. Bone density in 2018. Osteopenia.   Recommended calcium and vitamin D

## 2020-07-08 NOTE — PROGRESS NOTES
HISTORY OF PRESENT ILLNESS  Deryl Nyhan is a 75 y.o. female. HPI: Here for follow-up. History of diabetes. Denies any hyper or hypoglycemic symptoms. Taking medication with compliance. No side effects. Checking blood sugar at home. Fairly stable under 120. No hypoglycemia. Also history of hypertension. Asymptomatic. Taking medication with compliance. Brought home blood pressure log. Average systolic blood pressure between 1 30-1 50. Said since her [place] care has been changed to losartan she has labile blood pressure at home otherwise it used to be a stable. Today vitals stable. She is asymptomatic. Denies any headache, dizziness, no chest pain or trouble breathing, no arm or leg weakness. No nausea or vomiting, no weight or appetite changes, no mood changes . No urine or bowel complains, no palpitation, no diaphoresis. No abdominal pain. No cold or cough. No leg swelling. No fever. No sleep trouble. Hyperlipidemia. On statin. Fairly stable. No side effects. History of thyroid nodule, elevated serum calcium and a PTH. She is following Dr. Lisa Stallings. Will obtain the recent notes. Denies any trouble swallowing or change in voice. Per patient she had a parathyroid scan done which was okay. Again she had a repeated study. Will obtain the notes and follow-up next visit. Denies any unusual fatigue. She does feel on and off sad is that she moved to the area from [place] where her most of the friends and other relatives. She felt lonely for some time here. Adjusted gradually. Since pandemic situation she feels little bit more bored and sometimes said as more homebound. Discussed that with the patient that it is a pandemic situation and most of the people going through it. She did not appear in any acute distress. No thoughts of hurting herself or someone else. Sleep is fair.   Visit Vitals  /82 (BP 1 Location: Right arm, BP Patient Position: Sitting)   Pulse 60   Temp 97.8 °F (36.6 °C) (Oral)   Resp 16   Ht 5' 6\" (1.676 m)   Wt 184 lb (83.5 kg)   SpO2 99%   BMI 29.70 kg/m²     Review medication list, vitals, problem list,allergies. Lab Results   Component Value Date/Time    WBC 8.0 03/10/2018 08:00 AM    HGB 12.0 03/10/2018 08:00 AM    HCT 37.6 03/10/2018 08:00 AM    PLATELET 479 16/44/1560 08:00 AM    MCV 84.1 03/10/2018 08:00 AM     Lab Results   Component Value Date/Time    Sodium 140 01/23/2020 10:57 AM    Potassium 5.1 01/23/2020 10:57 AM    Chloride 109 01/23/2020 10:57 AM    CO2 28 01/23/2020 10:57 AM    Anion gap 3 01/23/2020 10:57 AM    Glucose 93 01/23/2020 10:57 AM    BUN 19 (H) 01/23/2020 10:57 AM    Creatinine 1.06 01/23/2020 10:57 AM    BUN/Creatinine ratio 18 01/23/2020 10:57 AM    GFR est AA >60 01/23/2020 10:57 AM    GFR est non-AA 51 (L) 01/23/2020 10:57 AM    Calcium 11.2 (H) 01/23/2020 10:57 AM    Bilirubin, total 0.5 09/21/2019 08:12 AM    Alk. phosphatase 75 09/21/2019 08:12 AM    Protein, total 7.1 09/21/2019 08:12 AM    Albumin 3.6 09/21/2019 08:12 AM    Globulin 3.5 09/21/2019 08:12 AM    A-G Ratio 1.0 09/21/2019 08:12 AM    ALT (SGPT) 16 09/21/2019 08:12 AM    AST (SGOT) 15 09/21/2019 08:12 AM     Lab Results   Component Value Date/Time    Cholesterol, total 161 09/21/2019 08:12 AM    HDL Cholesterol 47 09/21/2019 08:12 AM    LDL, calculated 92 09/21/2019 08:12 AM    VLDL, calculated 22 09/21/2019 08:12 AM    Triglyceride 110 09/21/2019 08:12 AM    CHOL/HDL Ratio 3.4 09/21/2019 08:12 AM     Lab Results   Component Value Date/Time    TSH 1.31 03/10/2018 08:00 AM     Lab Results   Component Value Date/Time    Hemoglobin A1c 6.3 (H) 01/23/2020 10:57 AM     Lab Results   Component Value Date/Time    Microalbumin/Creat ratio (mg/g creat)  05/06/2019 10:25 AM     Cannot calculate ratio due to microalbumin result outside reportable range.     Microalbumin,urine random <0.50 05/06/2019 10:25 AM    Microalbumin urine (POC) 10 04/24/2017 09:02 AM       ROS: See HPI    Physical Exam  Constitutional:       General: She is not in acute distress. Neck:      Musculoskeletal: Neck supple. Cardiovascular:      Rate and Rhythm: Normal rate and regular rhythm. Heart sounds: Normal heart sounds. Abdominal:      General: Bowel sounds are normal.      Palpations: Abdomen is soft. Tenderness: There is no abdominal tenderness. Musculoskeletal:      Comments: Mild swelling around the ankle. No pitting. No calf tenderness. Lymphadenopathy:      Cervical: No cervical adenopathy. Skin:     Comments: Lump almost 2 to 3 cm, circular margin, smooth, soft in consistency probably lipoma over the center of the forehead. Nontender. No skin changes   Neurological:      Mental Status: She is oriented to person, place, and time. Psychiatric:         Behavior: Behavior normal.         ASSESSMENT and PLAN    ICD-10-CM ICD-9-CM    1. Medicare annual wellness visit, subsequent Z00.00 V70.0    2. Well controlled diabetes mellitus ([facility] [address]): We will repeat the labs. No hypo-or hyperglycemic symptoms. Checking blood sugar at home. Advised her to keep a log. Diet modification. Follow-up after labs. She is due for eye exam but due to pandemic situation she had to reschedule an appointment. She will keep a follow-up with them E11.9 250.00 HEMOGLOBIN A1C WITH EAG      METABOLIC PANEL, COMPREHENSIVE      MICROALBUMIN, UR, RAND W/ MICROALB/CREAT RATIO   3. Essential hypertension: well controlled. Continue current dose of medication and low salt diet. Exercise as tolerated. I10 401.9    4. Renal insufficiency: Stable labs. We will repeat the labs N28.9 593.9    5. Thyroid nodule: Following Endo. Asymptomatic. Will follow the recommendation E04.1 241.0    6. Elevated PTHrP level: Following Endo. Will follow the recommendation R79.89 790.6    7. Hypercalcemia: Following Endo. Probably secondary hyperparathyroidism. E83.52 275.42    8. Leg swelling: Advised low-salt diet.   Keep leg elevated and compression stocking. Will continue current plan M79.89 729.81    9. Hypothyroidism, unspecified type E03.9 244.9    10. Lipoma of head: Seen surgeon. Ordered the CT scan. She will have a follow-up with their office as that she has not heard about appointment. Advised her to call office and talk to the nurse if she does not get hold of the surgeon's office D17.0 214.0    11. H/O colonoscopy Z98.890 V45.89     due in 2022    Patient understood and agree with above plan. Review health maintenance. Follow-up and Dispositions    · Return in about 4 months (around 11/8/2020).

## 2020-07-08 NOTE — ACP (ADVANCE CARE PLANNING)
Advance Care Planning       Advance Care Planning (ACP) Physician/NP/PA (Provider) Tish Fatima        Date of ACP Conversation: 7/8/2020    Conversation Conducted with:   Patient with Decision Making [address] Maker:    Current Designated Health Care Decision Maker:   Primary Decision Maker: Daisy - Child - [phone]    Primary Decision Maker: belle Booth - Daughter - [phone]  (      Care Preferences:    Hospitalization: \"If your health worsens and it becomes clear that your chance of recovery is unlikely, what would your preference be regarding hospitalization? \"    Pt has a health living wheel. She has a copy. Wants to follow that. Ventilation: \"If you were in your present state of health and suddenly became very ill and were unable to breathe on your own, what would your preference be about the use of a ventilator (breathing machine) if it was available to you? \"      Pt has a health living wheel. She has a copy. Wants to follow that. \"If your health worsens and it becomes clear that your chance of recovery is unlikely, what would your preference be about the use of a ventilator (breathing machine) if it was available to you? \"     Pt has a health living wheel. She has a copy. Wants to follow that. Resuscitation:  \"CPR works best to restart the heart when there is a sudden event, like a heart attack, in someone who is otherwise healthy. Unfortunately, CPR does not typically restart the heart for people who have serious health conditions or who are very sick. \"    \"In the event your heart stopped as a result of an underlying serious health condition, would you want attempts to be made to restart your heart (answer \"yes\" for attempt to resuscitate) or would you prefer a natural death (answer \"no\" for do not attempt to resuscitate)? \"     Pt has a health living wheel. She has a copy. Wants to follow that.      NOTE: If the patient has a valid advance directive AND provides care preference(s) that are inconsistent with that prior directive, advise the patient to consider either: creating a new advance directive that complies with state-specific requirements; or, if that is not possible, orally revoking that prior directive in accordance with state-specific requirements, which must be documented in the EHR. Conversation Outcomes / Follow-Up Plan:   Pt has a living wheel / has health directive. She has not shared a copy or information about that with her kids. Advised to discuss it with her kids who are the primary decision maker regarding existence of a living wheel and if she is comfortable give a copy to scan in the chart.        Length of Voluntary ACP Conversation in minutes:  <16 minutes (Non-Billable)      Alma Rosa Gee MD

## 2020-07-08 NOTE — PATIENT INSTRUCTIONS
Medicare Wellness Visit, Female     The best way to live healthy is to have a lifestyle where you eat a well-balanced diet, exercise regularly, limit alcohol use, and quit all forms of tobacco/nicotine, if applicable. Regular preventive services are another way to keep healthy. Preventive services (vaccines, screening tests, monitoring & exams) can help personalize your care plan, which helps you manage your own care. Screening tests can find health problems at the earliest stages, when they are easiest to treat. [facility] follows the current, evidence-based guidelines published by the [facility]ipson ([facility]) when recommending preventive services for our patients. Because we follow these guidelines, sometimes recommendations change over time as research supports it. (For example, mammograms used to be recommended annually. Even though Medicare will still pay for an annual mammogram, the newer guidelines recommend a mammogram every two years for women of average risk). Of course, you and your doctor may decide to screen more often for some diseases, based on your risk and your co-morbidities (chronic disease you are already diagnosed with). Preventive services for you include:  - Medicare offers their members a free annual wellness visit, which is time for you and your primary care provider to discuss and plan for your preventive service needs. Take advantage of this benefit every year!  -All adults over the age of 72 should receive the recommended pneumonia vaccines. Current USPSTF guidelines recommend a series of two vaccines for the best pneumonia protection.   -All adults should have a flu vaccine yearly and a tetanus vaccine every 10 years.   -All adults age 48 and older should receive the shingles vaccines (series of two vaccines).       -All adults age 38-68 who are overweight should have a diabetes screening test once every three years.   -All adults born between [birth year] and 1966 should be screened once for Hepatitis C.  -Other screening tests and preventive services for persons with diabetes include: an eye exam to screen for diabetic retinopathy, a kidney function test, a foot exam, and stricter control over your cholesterol.   -Cardiovascular screening for adults with routine risk involves an electrocardiogram (ECG) at intervals determined by your doctor.   -Colorectal cancer screenings should be done for adults age 54-65 with no increased risk factors for colorectal cancer. There are a number of acceptable methods of screening for this type of cancer. Each test has its own benefits and drawbacks. Discuss with your doctor what is most appropriate for you during your annual wellness visit. The different tests include: colonoscopy (considered the best screening method), a fecal occult blood test, a fecal DNA test, and sigmoidoscopy.    -A bone mass density test is recommended when a woman turns 65 to screen for osteoporosis. This test is only recommended one time, as a screening. Some providers will use this same test as a disease monitoring tool if you already have osteoporosis. -Breast cancer screenings are recommended every other year for women of normal risk, age 54-69.  -Cervical cancer screenings for women over age 72 are only recommended with certain risk factors. Here is a list of your current Health Maintenance items (your personalized list of preventive services) with a due date:  Health Maintenance Due   Topic Date Due    Glaucoma Screening   09/06/2019    Eye Exam  09/06/2019    Annual Well Visit  05/06/2020    Albumin Urine Test  05/06/2020         Nutrition Tips for Diabetes: After Your Visit  Your Care Instructions  A healthy diet is important to manage diabetes. It helps you lose weight (if you need to) and keep it off. It gives you the nutrition and energy your body needs and helps prevent heart disease.  But a diet for diabetes does not mean that you have to eat special foods. You can eat what your family eats, including occasional sweets and other favorites. But you do have to pay attention to how often you eat and how much you eat of certain foods. The right plan for you will give you meals that help you keep your blood sugar at healthy levels. Try to eat a variety of foods and to spread carbohydrate throughout the day. Carbohydrate raises blood sugar higher and more quickly than any other nutrient does. Carbohydrate is found in sugar, breads and cereals, fruit, starchy vegetables such as potatoes and corn, and milk and yogurt. You may want to work with a dietitian or diabetes educator to help you plan meals and snacks. A dietitian or diabetes educator also can help you lose weight if that is one of your goals. The following tips can help you enjoy your meals and stay healthy. Follow-up care is a key part of your treatment and safety. Be sure to make and go to all appointments, and call your doctor if you are having problems. Its also a good idea to know your test results and keep a list of the medicines you take. How can you care for yourself at home? · Learn which foods have carbohydrate and how much carbohydrate to eat. A dietitian or diabetes educator can help you learn to keep track of how much carbohydrate you eat. · Spread carbohydrate throughout the day. Eat some carbohydrate at all meals, but do not eat too much at any one time. · Plan meals to include food from all the food groups. These are the food groups and some example portion sizes:  ¨ Grains: 1 slice of bread (1 ounce), ½ cup of cooked cereal, and 1/3 cup of cooked pasta or rice. These have about 15 grams of carbohydrate in a serving. Choose whole grains such as whole wheat bread or crackers, oatmeal, and brown rice more often than refined grains.   ¨ Fruit: 1 small fresh fruit, such as an apple or orange; ½ of a banana; ½ cup of chopped, cooked, or canned fruit; ½ cup of fruit juice; 1 cup of melon or raspberries; and 2 tablespoons of dried fruit. These have about 15 grams of carbohydrate in a serving. ¨ Dairy: 1 cup of nonfat or low-fat milk and 2/3 cup of plain yogurt. These have about 15 grams of carbohydrate in a serving. ¨ Protein foods: Beef, chicken, turkey, fish, eggs, tofu, cheese, cottage cheese, and peanut butter. A serving size of meat is 3 ounces, which is about the size of a deck of cards. Examples of meat substitute serving sizes (equal to 1 ounce of meat) are 1/4 cup of cottage cheese, 1 egg, 1 tablespoon of peanut butter, and ½ cup of tofu. These have very little or no carbohydrate per serving. ¨ Vegetables: Starchy vegetables such as ½ cup of cooked dried beans, peas, potatoes, or corn have about 15 grams of carbohydrate. Nonstarchy vegetables have very little carbohydrate, such as 1 cup of raw leafy vegetables (such as spinach), ½ cup of other vegetables (cooked or chopped), and 3/4 cup of vegetable juice. · Use the plate format to plan meals. It is a good, quick way to make sure that you have a balanced meal. It also helps you spread carbohydrate throughout the day. You divide your plate by types of foods. Put vegetables on half the plate, meat or meat substitutes on one-quarter of the plate, and a grain or starchy vegetable (such as brown rice or a potato) in the final quarter of the plate. To this you can add a small piece of fruit and 1 cup of milk or yogurt, depending on how much carbohydrate you are supposed to eat at a meal.  · Talk to your dietitian or diabetes educator about ways to add limited amounts of sweets into your meal plan. You can eat these foods now and then, as long as you include the amount of carbohydrate they have in your daily carbohydrate allowance. · If you drink alcohol, limit it to no more than 1 drink a day for women and 2 drinks a day for men. If you are pregnant, no amount of alcohol is known to be safe.   · Protein, fat, and fiber do not raise blood sugar as much as carbohydrate does. If you eat a lot of these nutrients in a meal, your blood sugar will rise more slowly than it would otherwise. · Limit saturated fats, such as those from meat and dairy products. Try to replace it with monounsaturated fat, such as olive oil. This is a healthier choice because people who have diabetes are at higher-than-average risk of heart disease. But use a modest amount of olive oil. A tablespoon of olive oil has 14 grams of fat and 120 calories. · Exercise lowers blood sugar. If you take insulin by shots or pump, you can use less than you would if you were not exercising. Keep in mind that timing matters. If you exercise within 1 hour after a meal, your body may need less insulin for that meal than it would if you exercised 3 hours after the meal. Test your blood sugar to find out how exercise affects your need for insulin. · Exercise on most days of the week. Aim for at least 30 minutes. Exercise helps you stay at a healthy weight and helps your body use insulin. Walking is an easy way to get exercise. Gradually increase the amount you walk every day. You also may want to swim, bike, or do other activities. When you eat out  · Learn to estimate the serving sizes of foods that have carbohydrate. If you measure food at home, it will be easier to estimate the amount in a serving of restaurant food. · If the meal you order has too much carbohydrate (such as potatoes, corn, or baked beans), ask to have a low-carbohydrate food instead. Ask for a salad or green vegetables. · If you use insulin, check your blood sugar before and after eating out to help you plan how much to eat in the future. · If you eat more carbohydrate at a meal than you had planned, take a walk or do other exercise. This will help lower your blood sugar. Where can you learn more?    Go to [employer]  Enter M882 in the search box to learn more about \"Nutrition Tips for Diabetes: After Your Visit. \"   © 2721-0264 Healthwise, Incorporated. Care instructions adapted under license by [facility] [employer] [facility] (which disclaims liability or warranty for this information). This care instruction is for use with your licensed healthcare professional. If you have questions about a medical condition or this instruction, always ask your healthcare professional. [address] any warranty or liability for your use of this information. Content Version: 18.6.202827; Current as of: June 4, 2014                 Advance Directives: Care Instructions  Overview  An advance directive is a legal way to state your wishes at the end of your life. It tells your family and your doctor what to do if you can't say what you want. There are two main types of advance directives. You can change them any time your wishes change. Living will. This form tells your family and your doctor your wishes about life support and other treatment. The form is also called a declaration. Medical power of . This form lets you name a person to make treatment decisions for you when you can't speak for yourself. This person is called a health care agent (health care proxy, health care surrogate). The form is also called a durable power of  for health care. If you do not have an advance directive, decisions about your medical care may be made by a family member, or by a doctor or a  who doesn't know you. It may help to think of an advance directive as a gift to the people who care for you. If you have one, they won't have to make tough decisions by themselves. Follow-up care is a key part of your treatment and safety. Be sure to make and go to all appointments, and call your doctor if you are having problems. It's also a good idea to know your test results and keep a list of the medicines you take. What should you include in an advance directive?   Many states have a unique advance directive form. (It may ask you to address specific issues.) Or you might use a universal form that's approved by many states. If your form doesn't tell you what to address, it may be hard to know what to include in your advance directive. Use the questions below to help you get started. · Who do you want to make decisions about your medical care if you are not able to? · What life-support measures do you want if you have a serious illness that gets worse over time or can't be cured? · What are you most afraid of that might happen? (Maybe you're afraid of having pain, losing your independence, or being kept alive by machines.)  · Where would you prefer to die? (Your home? A hospital? A nursing home?)  · Do you want to donate your organs when you die? · Do you want certain Scientology practices performed before you die? When should you call for help? Be sure to contact your doctor if you have any questions. Where can you learn more? Go to http://cecelia-[place]ia.info/  Enter R264 in the search box to learn more about \"Advance Directives: Care Instructions. \"  Current as of: December 9, 2019               Content Version: 12.5  © 2903-2990 Healthwise, Incorporated. Care instructions adapted under license by [employer] (which disclaims liability or warranty for this information). If you have questions about a medical condition or this instruction, always ask your healthcare professional. [place] [address] any warranty or liability for your use of this information. Low Sodium Diet (2,000 Milligram): Care Instructions  Your Care Instructions     Too much sodium causes your body to hold on to extra water. This can raise your blood pressure and force your heart and kidneys to work harder. In very serious cases, this could cause you to be put in the hospital. It might even be life-threatening.  By limiting sodium, you will feel better and lower your risk of serious problems. The most common source of sodium is salt. People get most of the salt in their diet from canned, prepared, and packaged foods. Fast food and restaurant meals also are very high in sodium. Your doctor will probably limit your sodium to less than 2,000 milligrams (mg) a day. This limit counts all the sodium in prepared and packaged foods and any salt you add to your food. Follow-up care is a key part of your treatment and safety. Be sure to make and go to all appointments, and call your doctor if you are having problems. It's also a good idea to know your test results and keep a list of the medicines you take. How can you care for yourself at home? Read food labels  · Read labels on cans and food packages. The labels tell you how much sodium is in each serving. Make sure that you look at the serving size. If you eat more than the serving size, you have eaten more sodium. · Food labels also tell you the Percent Daily Value for sodium. Choose products with low Percent Daily Values for sodium. · Be aware that sodium can come in forms other than salt, including monosodium glutamate (MSG), sodium citrate, and sodium bicarbonate (baking soda). MSG is often added to Asian food. When you eat out, you can sometimes ask for food without MSG or added salt. Buy low-sodium foods  · Buy foods that are labeled \"unsalted\" (no salt added), \"sodium-free\" (less than 5 mg of sodium per serving), or \"low-sodium\" (less than 140 mg of sodium per serving). Foods labeled \"reduced-sodium\" and \"light sodium\" may still have too much sodium. Be sure to read the label to see how much sodium you are getting. · Buy fresh vegetables, or frozen vegetables without added sauces. Buy low-sodium versions of canned vegetables, soups, and other canned goods. Prepare low-sodium meals  · Cut back on the amount of salt you use in cooking. This will help you adjust to the taste. Do not add salt after cooking.  One teaspoon of salt has about 2,300 mg of sodium. · Take the salt shaker off the table. · Flavor your food with garlic, lemon juice, onion, vinegar, herbs, and spices. Do not use soy sauce, lite soy sauce, steak sauce, onion salt, garlic salt, celery salt, mustard, or ketchup on your food. · Use low-sodium salad dressings, sauces, and ketchup. Or make your own salad dressings and sauces without adding salt. · Use less salt (or none) when recipes call for it. You can often use half the salt a recipe calls for without losing flavor. Other foods such as rice, pasta, and grains do not need added salt. · Rinse canned vegetables, and cook them in fresh water. This removes some--but not all--of the salt. · Avoid water that is naturally high in sodium or that has been treated with water softeners, which add sodium. Call your local water company to find out the sodium content of your water supply. If you buy bottled water, read the label and choose a sodium-free brand. Avoid high-sodium foods  · Avoid eating:  ? Smoked, cured, salted, and canned meat, fish, and poultry. ? Ham, morales, hot dogs, and luncheon meats. ? Regular, hard, and processed cheese and regular peanut butter. ? Crackers with salted tops, and other salted snack foods such as pretzels, chips, and salted popcorn. ? Frozen prepared meals, unless labeled low-sodium. ? Canned and dried soups, broths, and bouillon, unless labeled sodium-free or low-sodium. ? Canned vegetables, unless labeled sodium-free or low-sodium. ? Albino Brule fries, pizza, tacos, and other fast foods. ? Pickles, olives, ketchup, and other condiments, especially soy sauce, unless labeled sodium-free or low-sodium. Where can you learn more? Go to http://cecelia-[place].info/  Enter V843 in the search box to learn more about \"Low Sodium Diet (2,000 Milligram): Care Instructions. \"  Current as of: August 22, 2019               Content Version: 12.5  © 0696-4488 Healthwise, Incorporated. Care instructions adapted under license by [employer] (which disclaims liability or warranty for this information). If you have questions about a medical condition or this instruction, always ask your healthcare professional. [address] any warranty or liability for your use of this information. Leg and Ankle Edema: Care Instructions  Your Care Instructions  Swelling in the legs, ankles, and feet is called edema. It is common after you sit or stand for a while. Long plane flights or car rides often cause swelling in the legs and feet. You may also have swelling if you have to stand for long periods of time at your job. Problems with the veins in the legs (varicose veins) and changes in hormones can also cause swelling. Sometimes the swelling in the ankles and feet is caused by a more serious problem, such as heart failure, infection, blood clots, or liver or kidney disease. Follow-up care is a key part of your treatment and safety. Be sure to make and go to all appointments, and call your doctor if you are having problems. It's also a good idea to know your test results and keep a list of the medicines you take. How can you care for yourself at home? · If your doctor gave you medicine, take it as prescribed. Call your doctor if you think you are having a problem with your medicine. · Whenever you are resting, raise your legs up. Try to keep the swollen area higher than the level of your heart. · Take breaks from standing or sitting in one position. ? Walk around to increase the blood flow in your lower legs. ? Move your feet and ankles often while you stand, or tighten and relax your leg muscles. · Wear support stockings. Put them on in the morning, before swelling gets worse. · Eat a balanced diet. Lose weight if you need to. · Limit the amount of salt (sodium) in your diet. Salt holds fluid in the body and may increase swelling.   When should you call for help? [id number] anytime you think you may need emergency care. For example, call if:  · You have symptoms of a blood clot in your lung (called a pulmonary embolism). These may include:  ? Sudden chest pain. ? Trouble breathing. ? Coughing up blood. Call your doctor now or seek immediate medical care if:  · You have signs of a blood clot, such as:  ? Pain in your calf, back of the knee, thigh, or groin. ? Redness and swelling in your leg or groin. · You have symptoms of infection, such as:  ? Increased pain, swelling, warmth, or redness. ? Red streaks or pus. ? A fever. Watch closely for changes in your health, and be sure to contact your doctor if:  · Your swelling is getting worse. · You have new or worsening pain in your legs. · You do not get better as expected. Where can you learn more? Go to http://www.gray.com/  Enter V835 in the search box to learn more about \"Leg and Ankle Edema: Care Instructions. \"  Current as of: June 26, 2019               Content Version: 12.5  © 4111-3998 Healthwise, Incorporated. Care instructions adapted under license by [employer] (which disclaims liability or warranty for this information). If you have questions about a medical condition or this instruction, always ask your healthcare professional. [place] [address] any warranty or liability for your use of this information.

## 2020-07-11 ENCOUNTER — HOSPITAL ENCOUNTER (OUTPATIENT)
Dept: CT IMAGING | Age: 75
Discharge: HOME | End: 2020-07-11
Attending: SURGERY
Payer: MEDICARE

## 2020-07-11 DIAGNOSIS — R22.0 MASS OF FACE: ICD-10-CM

## 2020-07-11 PROCEDURE — 70450 CT HEAD/BRAIN W/O DYE: CPT

## 2020-07-14 ENCOUNTER — OFFICE VISIT (OUTPATIENT)
Dept: SURGERY | Age: 75
End: 2020-07-14

## 2020-07-14 ENCOUNTER — HOSPITAL ENCOUNTER (OUTPATIENT)
Dept: LAB | Age: 75
Discharge: HOME | End: 2020-07-14
Payer: MEDICARE

## 2020-07-14 VITALS
SYSTOLIC BLOOD PRESSURE: 160 MMHG | RESPIRATION RATE: 18 BRPM | HEART RATE: 60 BPM | OXYGEN SATURATION: 99 % | DIASTOLIC BLOOD PRESSURE: 74 MMHG | HEIGHT: 66 IN | BODY MASS INDEX: 29.09 KG/M2 | WEIGHT: 181 LBS | TEMPERATURE: 97.5 F

## 2020-07-14 DIAGNOSIS — E11.9 WELL CONTROLLED DIABETES MELLITUS (HCC): ICD-10-CM

## 2020-07-14 DIAGNOSIS — N28.9 RENAL INSUFFICIENCY: Primary | ICD-10-CM

## 2020-07-14 DIAGNOSIS — M79.89 SOFT TISSUE MASS: Primary | ICD-10-CM

## 2020-07-14 LAB
ALBUMIN SERPL-MCNC: 3.8 G/DL (ref 3.4–5)
ALBUMIN/GLOB SERPL: 1.1 {RATIO} (ref 0.8–1.7)
ALP SERPL-CCNC: 73 U/L (ref 45–117)
ALT SERPL-CCNC: 21 U/L (ref 13–56)
ANION GAP SERPL CALC-SCNC: 6 MMOL/L (ref 3–18)
AST SERPL-CCNC: 17 U/L (ref 10–38)
BILIRUB SERPL-MCNC: 0.6 MG/DL (ref 0.2–1)
BUN SERPL-MCNC: 25 MG/DL (ref 7–18)
BUN/CREAT SERPL: 22 (ref 12–20)
CALCIUM SERPL-MCNC: 10.8 MG/DL (ref 8.5–10.1)
CHLORIDE SERPL-SCNC: 107 MMOL/L (ref 100–111)
CO2 SERPL-SCNC: 27 MMOL/L (ref 21–32)
CREAT SERPL-MCNC: 1.13 MG/DL (ref 0.6–1.3)
CREAT UR-MCNC: 98 MG/DL (ref 30–125)
EST. AVERAGE GLUCOSE BLD GHB EST-MCNC: 123 MG/DL
GLOBULIN SER CALC-MCNC: 3.5 G/DL (ref 2–4)
GLUCOSE SERPL-MCNC: 108 MG/DL (ref 74–99)
HBA1C MFR BLD: 5.9 % (ref 4.2–5.6)
MICROALBUMIN UR-MCNC: 0.5 MG/DL (ref 0–3)
MICROALBUMIN/CREAT UR-RTO: 5 MG/G (ref 0–30)
POTASSIUM SERPL-SCNC: 4.8 MMOL/L (ref 3.5–5.5)
PROT SERPL-MCNC: 7.3 G/DL (ref 6.4–8.2)
SODIUM SERPL-SCNC: 140 MMOL/L (ref 136–145)

## 2020-07-14 PROCEDURE — 82043 UR ALBUMIN QUANTITATIVE: CPT

## 2020-07-14 PROCEDURE — 36415 COLL VENOUS BLD VENIPUNCTURE: CPT

## 2020-07-14 PROCEDURE — 80053 COMPREHEN METABOLIC PANEL: CPT

## 2020-07-14 PROCEDURE — 83036 HEMOGLOBIN GLYCOSYLATED A1C: CPT

## 2020-07-14 NOTE — PROGRESS NOTES
A1C showed improvement . In prediabetic range. Renal function elevated. Advise to drink more fluid and will recheck BMP in 2 wks. If still elevated will consider further work up. Also elevated calcium but improved compare to prior result. Keep f/u with Dr. Deandra Vale.

## 2020-07-14 NOTE — PROGRESS NOTES
Gerardo Ramirez M.D. FACS  PROGRESS NOTE        Subjective:  Patient presents today to discuss the CT of the head. 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 I told the patient that I had not taken care of a lot of these in my career. They wanted to that I had taken care of did slowly come back. She states it is not hurting her. If it starts to cause pain she will come back. Objective:  Vitals:    07/14/20 0846 07/14/20 0852   BP: 150/74 160/74   Pulse: 60    Resp: 18    Temp: 97.5 °F (36.4 °C)    SpO2: 99%    Weight: 82.1 kg (181 lb)    Height: 5' 6\" (1.676 m)        Physical Exam:    General: in no apparent distress    Skin: Raised well-circumscribed mass fixed to the skull in the center of the forehead which is nontender. Current Medications:  Current Outpatient Medications   Medication Sig Dispense Refill    lancets misc One Touch Ultra soft testing fasting blood sugar every morning prior to breakfast DX E11.9 100 Each 11    OneTouch Ultra Blue Test Strip strip USE TO TEST BLOOD SUGAR ONCE DAILY 100 Strip 6    losartan (COZAAR) 100 mg tablet Take 1 Tab by mouth daily. 90 Tab 1    metoprolol succinate (TOPROL-XL) 50 mg XL tablet Take 1 Tab by mouth daily. Per patient she does not take if her BP is low 90 Tab 1    metFORMIN (GLUCOPHAGE) 500 mg tablet Take 1 Tab by mouth daily (with breakfast). 180 Tab 1    cholecalciferol, vitamin D3, (VITAMIN D3) 2,000 unit tab Take  by mouth.  spironolactone (ALDACTONE) 25 mg tablet Take 1 Tab by mouth daily. 4    aspirin 81 mg chewable tablet Take 81 mg by mouth daily.  DOCOSAHEXANOIC ACID/EPA (FISH OIL PO) Take 100 mg by mouth daily.  COQ10, LIPOSOMAL UBIQUINOL, PO Take 200 mg by mouth daily. Chart and notes reviewed. Data reviewed. I have evaluated and examined the patient.         Impression and Plan:  Patient with a well-circumscribed soft tissue mass abutting the frontal bone.  Follow-up if the mass becomes symptomatic.      Arash Worthy MD

## 2020-07-14 NOTE — PROGRESS NOTES
Chief Complaint   Patient presents with    Follow-up     mass on the forehead here for ct results   1. Have you been to the ER, urgent care clinic since your last visit? Hospitalized since your last visit? No    2. Have you seen or consulted any other health care providers outside of the [address] [place] [address] since your last visit? Include any pap smears or colon screening. Yes pcp  Ms. Hernández  has been given the following recommendations today due to her elevated BP reading: has not taken her medication yet had to do blood work then appt here will take asap

## 2020-07-16 NOTE — PROGRESS NOTES
Patient identified with 2 identifiers (name and ). Patient aware of A1C showed improvement . In prediabetic range. Renal function elevated. Advise to drink more fluid and will recheck BMP in 2 wks. If still elevated will consider further work up. Also elevated calcium but improved compare to prior result. Keep f/u with Dr. Viri Lew.

## 2020-07-29 ENCOUNTER — CLINICAL SUPPORT (OUTPATIENT)
Dept: CARDIOLOGY CLINIC | Age: 75
End: 2020-07-29

## 2020-07-29 ENCOUNTER — HOSPITAL ENCOUNTER (OUTPATIENT)
Dept: LAB | Age: 75
Discharge: HOME | End: 2020-07-29
Payer: MEDICARE

## 2020-07-29 ENCOUNTER — OFFICE VISIT (OUTPATIENT)
Dept: CARDIOLOGY CLINIC | Age: 75
End: 2020-07-29

## 2020-07-29 VITALS
DIASTOLIC BLOOD PRESSURE: 80 MMHG | SYSTOLIC BLOOD PRESSURE: 130 MMHG | WEIGHT: 182 LBS | HEIGHT: 66 IN | BODY MASS INDEX: 29.25 KG/M2 | HEART RATE: 60 BPM | OXYGEN SATURATION: 98 %

## 2020-07-29 DIAGNOSIS — Z95.0 CARDIAC PACEMAKER IN SITU: Primary | ICD-10-CM

## 2020-07-29 DIAGNOSIS — E87.5 HYPERKALEMIA: Primary | ICD-10-CM

## 2020-07-29 DIAGNOSIS — R00.1 BRADYCARDIA: Primary | ICD-10-CM

## 2020-07-29 DIAGNOSIS — N28.9 RENAL INSUFFICIENCY: ICD-10-CM

## 2020-07-29 DIAGNOSIS — Z95.0 CARDIAC PACEMAKER IN SITU: ICD-10-CM

## 2020-07-29 LAB
ANION GAP SERPL CALC-SCNC: 2 MMOL/L (ref 3–18)
BUN SERPL-MCNC: 21 MG/DL (ref 7–18)
BUN/CREAT SERPL: 19 (ref 12–20)
CALCIUM SERPL-MCNC: 11.2 MG/DL (ref 8.5–10.1)
CHLORIDE SERPL-SCNC: 107 MMOL/L (ref 100–111)
CO2 SERPL-SCNC: 30 MMOL/L (ref 21–32)
CREAT SERPL-MCNC: 1.12 MG/DL (ref 0.6–1.3)
GLUCOSE SERPL-MCNC: 106 MG/DL (ref 74–99)
POTASSIUM SERPL-SCNC: 5.9 MMOL/L (ref 3.5–5.5)
SODIUM SERPL-SCNC: 139 MMOL/L (ref 136–145)

## 2020-07-29 PROCEDURE — 36415 COLL VENOUS BLD VENIPUNCTURE: CPT

## 2020-07-29 PROCEDURE — 80048 BASIC METABOLIC PNL TOTAL CA: CPT

## 2020-07-29 NOTE — PATIENT INSTRUCTIONS
Chest x ray Follow up with Dr. Madie Avendaño next available Start Eliquis 5mg twice daily Follow up Dr Laci Quiñonez 6 months

## 2020-07-29 NOTE — PROGRESS NOTES
Elevated potassium. For now we will recheck labs. Elevated calcium which she is following endocrinology.   Renal function but stable

## 2020-07-29 NOTE — PROGRESS NOTES
Fidencio Maldonado presents today for   Chief Complaint   Patient presents with    Follow-up     6 month follow up     Leg Swelling     mild       Fidencio Maldonado preferred language for health care discussion is english/other. Is someone accompanying this pt? no    Is the patient using any DME equipment during [address]? no    Depression Screening:  3 most recent PHQ Screens 7/29/2020   Little interest or pleasure in doing things Not at all   Feeling down, depressed, irritable, or hopeless Not at all   Total Score PHQ 2 0       Learning Assessment:  Learning Assessment 1/23/2020   PRIMARY LEARNER Patient   HIGHEST LEVEL OF EDUCATION - PRIMARY LEARNER  GRADUATED HIGH SCHOOL OR GED   BARRIERS PRIMARY LEARNER NONE   CO-LEARNER CAREGIVER No   PRIMARY LANGUAGE ENGLISH    NEED No   LEARNER PREFERENCE PRIMARY VIDEOS   LEARNING SPECIAL TOPICS No   ANSWERED BY patient   RELATIONSHIP SELF       Abuse Screening:  Abuse Screening Questionnaire 7/29/2020   Do you ever feel afraid of your partner? N   Are you in a relationship with someone who physically or mentally threatens you? N   Is it safe for you to go home? Y       Fall Risk  Fall Risk Assessment, last 12 mths 7/29/2020   Able to walk? Yes   Fall in past 12 months? No       Pt currently taking Anticoagulant therapy? ASA 81mg every day     Coordination of Care:  1. Have you been to the ER, urgent care clinic since your last visit? Hospitalized since your last visit? no    2. Have you seen or consulted any other health care providers outside of the [address] [place] [address] since your last visit? Include any pap smears or colon screening.  no

## 2020-07-29 NOTE — PROGRESS NOTES
Joelle Quezada    Chief Complaint   Patient presents with    Follow-up     6 month follow up     Leg Swelling     mild       HPI    Joelle Quezada is a 75 y.o. AAF with pAF, SSS s/p PPM here for routine follow up care. As you know she moved here from [place] a few yrs ago and established with my partner, Dr. Janna Fuentes. She was on Eliquis in [place] and says after she moved here it was >$200/ month and that was why it was stopped. She has no complaints today but there is concern for lead fracture by device check today, noise noted.     She has known history of cardiac problems.  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  She had atrial fibrillation and finally underwent the atrial fibrillation ablation successfully in March of 2016.  She was treated with flecainide briefly.  As the flecainide was discontinued, she has been anticoagulated with Eliquis.  She has previous history of DVT after knee surgery.  She has history of hypercholesterolemia, but has not been able to tolerate statins because of severe myalgia and other side effects. Dylan Mendoza is known to have had type 2 diabetes. Dylan Mendoza has history of seizure disorder; however, she has not had any recurrent seizures since 2006 off medication.      Past Medical History:   Diagnosis Date    Arrhythmia     history atrial fib    Bradycardia     Diabetes ([address]     borderline    DVT (deep venous thrombosis) ([address] 2010    right    History of ablation March 2016     History of atrial fibrillation     Hypercholesterolemia     Hypertension     Long term current use of anticoagulant therapy     Osteoporosis     Seizure ([address]     Seizures ([address]     on medication from 2006 to 2013 no seizure since 2006    [employer] pacemaker implant Set.  2013 Dr. Eleuterio Felix, [place]     Thyroid disease     nodules       Past Surgical History:   Procedure Laterality Date    CARDIAC SURG PROCEDURE UNLIST      ablation for a-fib    COLONOSCOPY N/A 10/25/2017 COLONOSCOPY performed [facility]id [facility]    HX CATARACT REMOVAL      HX HYSTERECTOMY      HX KNEE REPLACEMENT      left knee in 1/2010 and right knee 7/2016    HX KNEE REPLACEMENT      right knee    HX PACEMAKER         Current Outpatient Medications   Medication Sig Dispense Refill    lancets misc One Touch Ultra soft testing fasting blood sugar every morning prior to breakfast DX E11.9 100 Each 11    OneTouch Ultra Blue Test Strip strip USE TO TEST BLOOD SUGAR ONCE DAILY 100 Strip 6    losartan (COZAAR) 100 mg tablet Take 1 Tab by mouth daily. 90 Tab 1    metoprolol succinate (TOPROL-XL) 50 mg XL tablet Take 1 Tab by mouth daily. Per patient she does not take if her BP is low 90 Tab 1    metFORMIN (GLUCOPHAGE) 500 mg tablet Take 1 Tab by mouth daily (with breakfast). 180 Tab 1    cholecalciferol, vitamin D3, (VITAMIN D3) 2,000 unit tab Take  by mouth.  spironolactone (ALDACTONE) 25 mg tablet Take 1 Tab by mouth daily. 4    aspirin 81 mg chewable tablet Take 81 mg by mouth daily.  DOCOSAHEXANOIC ACID/EPA (FISH OIL PO) Take 100 mg by mouth daily.  COQ10, LIPOSOMAL UBIQUINOL, PO Take 200 mg by mouth daily.          Allergies   Allergen Reactions    Statins-Hmg-Coa Reductase Inhibitors Other (comments)     Felt sick        Social History     Socioeconomic History    Marital status:      Spouse name: Not on file    Number of children: Not on file    Years of education: Not on file    Highest education level: Not on file   Occupational History    Not on file   Social Needs    Financial resource strain: Not on file    Food insecurity     Worry: Not on file     Inability: Not on file    Transportation needs     Medical: Not on file     Non-medical: Not on file   Tobacco Use    Smoking status: Never Smoker    Smokeless tobacco: Never Used   Substance and Sexual Activity    Alcohol use: No    Drug use: No    Sexual activity: Not Currently     Partners: Male     Birth control/protection: Surgical     Comment: hysterectomy   Lifestyle    Physical activity     Days per week: Not on file     Minutes per session: Not on file    Stress: Not on file   Relationships    Social connections     Talks on phone: Not on file     Gets together: Not on file     Attends [employer] service: Not on file     Active member of club or organization: Not on file     Attends meetings of clubs or organizations: Not on file     Relationship status: Not on file    Intimate partner violence     Fear of current or ex partner: Not on file     Emotionally abused: Not on file     Physically abused: Not on file     Forced sexual activity: Not on file   Other Topics Concern    Not on file   Social History Narrative    Not on file        FH: n/a    Review of Systems    14 pt Review of Systems is negative unless otherwise mentioned in the HPI. Wt Readings from Last 3 Encounters:   07/29/20 82.6 kg (182 lb)   07/14/20 82.1 kg (181 lb)   07/08/20 83.5 kg (184 lb)     Temp Readings from Last 3 Encounters:   07/14/20 97.5 °F (36.4 °C)   07/08/20 97.8 °F (36.6 °C) (Oral)   06/12/20 97.3 °F (36.3 °C) (Oral)     BP Readings from Last 3 Encounters:   07/29/20 130/80   07/14/20 160/74   07/08/20 132/82     Pulse Readings from Last 3 Encounters:   07/29/20 60   07/14/20 60   07/08/20 60     Physical Exam:    Visit Vitals  /80 (BP 1 Location: Left arm, BP Patient Position: Sitting)   Pulse 60   Ht 5' 6\" (1.676 m)   Wt 82.6 kg (182 lb)   SpO2 98%   BMI 29.38 kg/m²      Physical Exam  HENT:      Head: Normocephalic and atraumatic. Eyes:      Pupils: Pupils are equal, round, and reactive to light. Cardiovascular:      Rate and Rhythm: Normal rate and regular rhythm. Heart sounds: Normal heart sounds. No murmur. No friction rub. No gallop. Pulmonary:      Effort: Pulmonary effort is normal. No respiratory distress. Breath sounds: Normal breath sounds. No wheezing or rales.    Chest: Chest wall: No tenderness. Abdominal:      General: Bowel sounds are normal.      Palpations: Abdomen is soft. Musculoskeletal:         General: No tenderness. Skin:     General: Skin is warm and dry. Neurological:      Mental Status: She is alert and oriented to person, place, and time. Lab Results   Component Value Date/Time    Sodium 140 07/14/2020 07:46 AM    Potassium 4.8 07/14/2020 07:46 AM    Chloride 107 07/14/2020 07:46 AM    CO2 27 07/14/2020 07:46 AM    Anion gap 6 07/14/2020 07:46 AM    Glucose 108 (H) 07/14/2020 07:46 AM    BUN 25 (H) 07/14/2020 07:46 AM    Creatinine 1.13 07/14/2020 07:46 AM    BUN/Creatinine ratio 22 (H) 07/14/2020 07:46 AM    GFR est AA 57 (L) 07/14/2020 07:46 AM    GFR est non-AA 47 (L) 07/14/2020 07:46 AM    Calcium 10.8 (H) 07/14/2020 07:46 AM    Bilirubin, total 0.6 07/14/2020 07:46 AM    Alk.  phosphatase 73 07/14/2020 07:46 AM    Protein, total 7.3 07/14/2020 07:46 AM    Albumin 3.8 07/14/2020 07:46 AM    Globulin 3.5 07/14/2020 07:46 AM    A-G Ratio 1.1 07/14/2020 07:46 AM    ALT (SGPT) 21 07/14/2020 07:46 AM    AST (SGOT) 17 07/14/2020 07:46 AM     Lab Results   Component Value Date/Time    Hemoglobin A1c 5.9 (H) 07/14/2020 07:46 AM     Lab Results   Component Value Date/Time    TSH 1.31 03/10/2018 08:00 AM     Lab Results   Component Value Date/Time    WBC 8.0 03/10/2018 08:00 AM    HGB 12.0 03/10/2018 08:00 AM    HCT 37.6 03/10/2018 08:00 AM    PLATELET 043 96/40/3693 08:00 AM    MCV 84.1 03/10/2018 08:00 AM         Impression and Plan:  Santos Whitfield is a 75 y.o. with:    1.) pAF, s/p ablation, elevated [id number] ~3  2.) SSS s/p PPM, with concern for lead fracture on device interrogation tody  3.) HTN, well controlled  4.) HL  5.) Normal LV function/ neg nuc ~2016    1.) CXR and follow up with Deborah for issue of device issue  2.) Should be on Eliquis 5 BID, your risk for stroke did not go away bc of ablation, will try to make this affordable for her and look into assistance etc  3.) Otherwise RTC with me 6 months    Risks benefits of blood thinner dw pt,  Last CBC and SCr WNL  Will try to get Eliquis for her, have asked nurse to help coordinate    Thank you for allowing me to participate in the care of your patient, please do not hesitate to call with questions or concerns.     [facility],    [facility], DO

## 2020-07-30 NOTE — PROGRESS NOTES
Contacted patient and verified identity using name and date of birth (2- identifiers)  Spoke with patient and advised of elevated potassium and need to recheck. Lab order has been mailed. Elevated Calcium following Endo. Renal function is stable.

## 2020-07-31 ENCOUNTER — HOSPITAL ENCOUNTER (OUTPATIENT)
Dept: GENERAL RADIOLOGY | Age: 75
Discharge: HOME | End: 2020-07-31
Payer: MEDICARE

## 2020-07-31 DIAGNOSIS — Z95.0 CARDIAC PACEMAKER IN SITU: ICD-10-CM

## 2020-07-31 PROCEDURE — 71046 X-RAY EXAM CHEST 2 VIEWS: CPT

## 2020-08-03 NOTE — PROGRESS NOTES
Per your last note\" King Bustamante is a 75 y.o. with:     1.) pAF, s/p ablation, elevated [id number] ~3  2.) SSS s/p PPM, with concern for lead fracture on device interrogation tody  3.) HTN, well controlled  4.) HL  5.) Normal LV function/ neg nuc ~2016     1.) CXR and follow up with Deborah for issue of device issue  2.) Should be on Eliquis 5 BID, your risk for stroke did not go away bc of ablation, will try to make this affordable for her and look into assistance etc  3.) Otherwise RTC with me 6 months     Risks benefits of blood thinner dw pt,  Last CBC and SCr WNL  Will try to get Eliquis for her, have asked nurse to help coordinate

## 2020-08-07 DIAGNOSIS — I10 ESSENTIAL HYPERTENSION: ICD-10-CM

## 2020-08-07 RX ORDER — LOSARTAN POTASSIUM 100 MG/1
TABLET ORAL
Qty: 90 TAB | Refills: 1 | Status: SHIPPED | OUTPATIENT
Start: 2020-08-07 | End: 2021-01-27

## 2020-08-26 ENCOUNTER — OFFICE VISIT (OUTPATIENT)
Dept: CARDIOLOGY CLINIC | Age: 75
End: 2020-08-26

## 2020-08-26 VITALS
WEIGHT: 183 LBS | DIASTOLIC BLOOD PRESSURE: 88 MMHG | OXYGEN SATURATION: 98 % | HEIGHT: 66 IN | BODY MASS INDEX: 29.41 KG/M2 | HEART RATE: 60 BPM | SYSTOLIC BLOOD PRESSURE: 140 MMHG

## 2020-08-26 DIAGNOSIS — I10 ESSENTIAL HYPERTENSION: ICD-10-CM

## 2020-08-26 DIAGNOSIS — T82.110A PACEMAKER LEAD FAILURE, INITIAL ENCOUNTER: Primary | ICD-10-CM

## 2020-08-26 DIAGNOSIS — I48.0 PAROXYSMAL ATRIAL FIBRILLATION (HCC): ICD-10-CM

## 2020-08-26 DIAGNOSIS — Z95.0 CARDIAC PACEMAKER IN SITU: ICD-10-CM

## 2020-08-26 NOTE — PROGRESS NOTES
I have personally seen and evaluated the device findings. Interrogation reviewed and I agree with assessment.     Mikie Marinelli

## 2020-08-26 NOTE — PROGRESS NOTES
Kari Tellez presents today for   Chief Complaint   Patient presents with    Follow-up     follow up per Dr. Zbigniew Warren for [address] Pacemaker in [facility] preferred language for health care discussion is english/other. Is someone accompanying this pt? yes    Is the patient using any DME equipment during [address]? no    Depression Screening:  3 most recent PHQ Screens 8/26/2020   Little interest or pleasure in doing things Not at all   Feeling down, depressed, irritable, or hopeless Not at all   Total Score PHQ 2 0       Learning Assessment:  Learning Assessment 1/23/2020   PRIMARY LEARNER Patient   HIGHEST LEVEL OF EDUCATION - PRIMARY LEARNER  GRADUATED HIGH SCHOOL OR GED   BARRIERS PRIMARY LEARNER NONE   CO-LEARNER CAREGIVER No   PRIMARY LANGUAGE ENGLISH    NEED No   LEARNER PREFERENCE PRIMARY VIDEOS   LEARNING SPECIAL TOPICS No   ANSWERED BY patient   RELATIONSHIP SELF       Abuse Screening:  Abuse Screening Questionnaire 8/26/2020   Do you ever feel afraid of your partner? N   Are you in a relationship with someone who physically or mentally threatens you? N   Is it safe for you to go home? Y       Fall Risk  Fall Risk Assessment, last 12 mths 8/26/2020   Able to walk? Yes   Fall in past 12 months? No       Pt currently taking Anticoagulant therapy? No but is taking ASA 81 mg once a day    Coordination of Care:  1. Have you been to the ER, urgent care clinic since your last visit? Hospitalized since your last visit? no    2. Have you seen or consulted any other health care providers outside of the [address] [place] [address] since your last visit? Include any pap smears or colon screening.  no

## 2020-08-26 NOTE — PROGRESS NOTES
HPI: A 59-year-old female referred by Dr. Teodoro Guillen for evaluation for [facility] SPECIALTY [facility] - JOSE. Rodney right ventricular lead abnormality. She moved from [place] a few years ago, saw Dr. Shanon Litten and then Dr. Teodoro Guillen. At the end of June, it was noted to be some noise on the right ventricular lead. Overall, she has been doing well. She has had no chest pain, dyspnea, orthopnea, PND or edema. She is accompanied by her son to discuss options and the [employer] rep is here to evaluate the device. Impression/Plan:  1. Dual chamber [employer] pacemaker placed 2013 for sick sinus syndrome. She is paced about 92% from the atrium, < 1% from the ventricle. Not dependent. 2. She has a [employer]ney 2088 right ventricular lead with noise. Sensitivity previously at 2 mV increased to 7 mV with R waves running 9-10 mV, seems to be improved. Concern for possible early lead fracture. The noise started appearing at the end of June. 3. History of remote atrial fibrillation ablation 2016 with recommendations for Eliquis although there had been some cost issues. 4. Dyslipidemia intolerant to statins. 5. Remote deep venous thrombosis at the time of hip surgery. 6. Diabetes. 7. Remote seizure disorder. 8. Hypertension. 9. Last nuclear stress test 2016. I reviewed her chest x-ray and the interrogation and talked with both the patient and her son. I gave options of possible complete explant vs close monitoring or placement of a new lead. She is very worried about traveling long term and having an issue. I did reassure her that at this point the atrial lead is working well and can pace her and she is paced > 1% in the ventricle. There is no evidence of high-grade AV block. At this point we came to the conclusion that we will recheck her device the first week in October and if she is still having issues with the lead or there is a significant change, we would proceed to try to implant a new right ventricular lead.  If this is unsuccessful, she would have to be referred out for a complete system explant. All questions answered. Risks, benefits and alternatives discussed. Thank you kindly for allowing me to participate in this patient's care     Past Medical History:   Diagnosis Date    Arrhythmia     history atrial fib    Bradycardia     Diabetes ([address]     borderline    DVT (deep venous thrombosis) ([facility] 2010    right    History of ablation March 2016     History of atrial fibrillation     Hypercholesterolemia     Hypertension     Long term current use of anticoagulant therapy     Osteoporosis     Seizure ([facility] [address]     Seizures ([facility] [address]     on medication from 2006 to 2013 no seizure since 2006    [employer] pacemaker implant Set. 2013 Dr. Elodia Cortes, [facility]     Thyroid disease     nodules       Current Outpatient Medications   Medication Sig Dispense Refill    losartan (COZAAR) 100 mg tablet TAKE 1 TABLET BY MOUTH EVERY DAY 90 Tab 1    lancets misc One Touch Ultra soft testing fasting blood sugar every morning prior to breakfast DX E11.9 100 Each 11    OneTouch Ultra Blue Test Strip strip USE TO TEST BLOOD SUGAR ONCE DAILY 100 Strip 6    metoprolol succinate (TOPROL-XL) 50 mg XL tablet Take 1 Tab by mouth daily. Per patient she does not take if her BP is low 90 Tab 1    metFORMIN (GLUCOPHAGE) 500 mg tablet Take 1 Tab by mouth daily (with breakfast). 180 Tab 1    cholecalciferol, vitamin D3, (VITAMIN D3) 2,000 unit tab Take  by mouth.  spironolactone (ALDACTONE) 25 mg tablet Take 1 Tab by mouth daily. 4    aspirin 81 mg chewable tablet Take 81 mg by mouth daily.  DOCOSAHEXANOIC ACID/EPA (FISH OIL PO) Take 100 mg by mouth daily.  COQ10, LIPOSOMAL UBIQUINOL, PO Take 200 mg by mouth daily. Social History   reports that she has never smoked. She has never used smokeless tobacco.   reports no history of alcohol use. Family History  family history includes Diabetes in her father; Hypertension in her sister.     Review of Systems  Except as stated above include:  Constitutional: Negative for fever, chills and malaise/fatigue. HEENT: No congestion or recent URI. Gastrointestinal: No nausea, vomiting, abdominal pain, bloody stools. Pulmonary:  Negative except as stated above. Cardiac:  Negative except as stated above. Musculoskeletal: Negative except as stated above. Neurological:  No localized symptoms. Skin:  Negative except as stated above. Psych:  Negative except as stated above. Endocrine:  Negative except as stated above. PHYSICAL EXAM  BP Readings from Last 3 Encounters:   08/26/20 140/88   07/29/20 130/80   07/14/20 160/74     Pulse Readings from Last 3 Encounters:   08/26/20 60   07/29/20 60   07/14/20 60     Wt Readings from Last 3 Encounters:   08/26/20 83 kg (183 lb)   07/29/20 82.6 kg (182 lb)   07/14/20 82.1 kg (181 lb)     General:   Well developed, well groomed. Head/Neck:   No jugular venous distention     No carotid bruits. No evidence of xanthelasma. Lungs:   No respiratory distress. Clear bilaterally. Heart:    Regular rate and rhythm. Normal S1/S2. Palpation of heart with normal point of maximum impulse. No significant murmurs, rubs or gallops. Pacer pocket intact. Abdomen:   Soft and nontender. No palpable abdominal mass or bruits. Extremities:   Intact peripheral pulses. No significant edema. Neurological:   Alert and oriented to person, place, time. No focal neurological deficit visually. Skin:   No obvious rash    Blood Pressure Metric:  Monitor recommended and adjustments stated if needed.

## 2020-09-28 ENCOUNTER — TRANSCRIBE ORDER (OUTPATIENT)
Dept: SCHEDULING | Age: 75
End: 2020-09-28

## 2020-09-28 DIAGNOSIS — Z12.31 VISIT FOR SCREENING MAMMOGRAM: Primary | ICD-10-CM

## 2020-10-01 ENCOUNTER — CLINICAL SUPPORT (OUTPATIENT)
Dept: CARDIOLOGY CLINIC | Age: 75
End: 2020-10-01
Payer: MEDICARE

## 2020-10-01 DIAGNOSIS — Z95.0 CARDIAC PACEMAKER IN SITU: Primary | ICD-10-CM

## 2020-10-01 DIAGNOSIS — I48.0 PAROXYSMAL ATRIAL FIBRILLATION (HCC): ICD-10-CM

## 2020-10-01 PROCEDURE — 93280 PM DEVICE PROGR EVAL DUAL: CPT | Performed by: INTERNAL MEDICINE

## 2020-10-01 NOTE — PROGRESS NOTES
I have personally seen and evaluated the device findings. Interrogation reviewed and I agree with assessment.     Rose Cosme

## 2020-11-01 DIAGNOSIS — I10 ESSENTIAL HYPERTENSION: ICD-10-CM

## 2020-11-02 RX ORDER — METOPROLOL SUCCINATE 50 MG/1
TABLET, EXTENDED RELEASE ORAL
Qty: 90 TAB | Refills: 1 | Status: SHIPPED | OUTPATIENT
Start: 2020-11-02 | End: 2021-04-02 | Stop reason: SDUPTHER

## 2020-11-06 ENCOUNTER — TELEPHONE (OUTPATIENT)
Dept: FAMILY MEDICINE CLINIC | Age: 75
End: 2020-11-06

## 2020-11-09 ENCOUNTER — OFFICE VISIT (OUTPATIENT)
Dept: FAMILY MEDICINE CLINIC | Age: 75
End: 2020-11-09
Payer: MEDICARE

## 2020-11-09 VITALS
SYSTOLIC BLOOD PRESSURE: 138 MMHG | DIASTOLIC BLOOD PRESSURE: 84 MMHG | HEIGHT: 66 IN | HEART RATE: 61 BPM | OXYGEN SATURATION: 94 % | RESPIRATION RATE: 16 BRPM | TEMPERATURE: 98.6 F | WEIGHT: 186 LBS | BODY MASS INDEX: 29.89 KG/M2

## 2020-11-09 DIAGNOSIS — E11.21 TYPE 2 DIABETES WITH NEPHROPATHY (HCC): ICD-10-CM

## 2020-11-09 DIAGNOSIS — Z95.0 CARDIAC PACEMAKER IN SITU: ICD-10-CM

## 2020-11-09 DIAGNOSIS — E04.1 THYROID NODULE: ICD-10-CM

## 2020-11-09 DIAGNOSIS — R79.89 ELEVATED PTHRP LEVEL: ICD-10-CM

## 2020-11-09 DIAGNOSIS — E83.52 HYPERCALCEMIA: ICD-10-CM

## 2020-11-09 DIAGNOSIS — Z23 ENCOUNTER FOR IMMUNIZATION: Primary | ICD-10-CM

## 2020-11-09 DIAGNOSIS — E78.5 HYPERLIPIDEMIA, UNSPECIFIED HYPERLIPIDEMIA TYPE: ICD-10-CM

## 2020-11-09 DIAGNOSIS — I10 ESSENTIAL HYPERTENSION: ICD-10-CM

## 2020-11-09 DIAGNOSIS — N28.9 RENAL INSUFFICIENCY: ICD-10-CM

## 2020-11-09 DIAGNOSIS — I48.91 ATRIAL FIBRILLATION, UNSPECIFIED TYPE (HCC): ICD-10-CM

## 2020-11-09 DIAGNOSIS — M65.30 TRIGGER FINGER OF RIGHT HAND, UNSPECIFIED FINGER: ICD-10-CM

## 2020-11-09 PROCEDURE — G8536 NO DOC ELDER MAL SCRN: HCPCS | Performed by: FAMILY MEDICINE

## 2020-11-09 PROCEDURE — 1090F PRES/ABSN URINE INCON ASSESS: CPT | Performed by: FAMILY MEDICINE

## 2020-11-09 PROCEDURE — 1101F PT FALLS ASSESS-DOCD LE1/YR: CPT | Performed by: FAMILY MEDICINE

## 2020-11-09 PROCEDURE — G8427 DOCREV CUR MEDS BY ELIG CLIN: HCPCS | Performed by: FAMILY MEDICINE

## 2020-11-09 PROCEDURE — G8752 SYS BP LESS 140: HCPCS | Performed by: FAMILY MEDICINE

## 2020-11-09 PROCEDURE — G8399 PT W/DXA RESULTS DOCUMENT: HCPCS | Performed by: FAMILY MEDICINE

## 2020-11-09 PROCEDURE — 99214 OFFICE O/P EST MOD 30 MIN: CPT | Performed by: FAMILY MEDICINE

## 2020-11-09 PROCEDURE — 90732 PPSV23 VACC 2 YRS+ SUBQ/IM: CPT

## 2020-11-09 PROCEDURE — G8754 DIAS BP LESS 90: HCPCS | Performed by: FAMILY MEDICINE

## 2020-11-09 PROCEDURE — G8510 SCR DEP NEG, NO PLAN REQD: HCPCS | Performed by: FAMILY MEDICINE

## 2020-11-09 PROCEDURE — G0463 HOSPITAL OUTPT CLINIC VISIT: HCPCS | Performed by: FAMILY MEDICINE

## 2020-11-09 PROCEDURE — 90662 IIV NO PRSV INCREASED AG IM: CPT

## 2020-11-09 PROCEDURE — G8417 CALC BMI ABV UP PARAM F/U: HCPCS | Performed by: FAMILY MEDICINE

## 2020-11-09 NOTE — PATIENT INSTRUCTIONS
Medicare Wellness Visit, Female The best way to live healthy is to have a lifestyle where you eat a well-balanced diet, exercise regularly, limit alcohol use, and quit all forms of tobacco/nicotine, if applicable. Regular preventive services are another way to keep healthy. Preventive services (vaccines, screening tests, monitoring & exams) can help personalize your care plan, which helps you manage your own care. Screening tests can find health problems at the earliest stages, when they are easiest to treat. [facility] follows the current, evidence-based guidelines published by the [facility] Luis Gipson ([facility] [[facility]]STF) when recommending preventive services for our patients. Because we follow these guidelines, sometimes recommendations change over time as research supports it. (For example, mammograms used to be recommended annually. Even though Medicare will still pay for an annual mammogram, the newer guidelines recommend a mammogram every two years for women of average risk). Of course, you and your doctor may decide to screen more often for some diseases, based on your risk and your co-morbidities (chronic disease you are already diagnosed with). Preventive services for you include: - Medicare offers their members a free annual wellness visit, which is time for you and your primary care provider to discuss and plan for your preventive service needs. Take advantage of this benefit every year! 
-All adults over the age of 72 should receive the recommended pneumonia vaccines. Current USPSTF guidelines recommend a series of two vaccines for the best pneumonia protection.  
-All adults should have a flu vaccine yearly and a tetanus vaccine every 10 years.  
-All adults age 48 and older should receive the shingles vaccines (series of two vaccines). -All adults age 38-68 who are overweight should have a diabetes screening test once every three years. -All adults born between 80 and 1965 should be screened once for Hepatitis C. 
-Other screening tests and preventive services for persons with diabetes include: an eye exam to screen for diabetic retinopathy, a kidney function test, a foot exam, and stricter control over your cholesterol.  
-Cardiovascular screening for adults with routine risk involves an electrocardiogram (ECG) at intervals determined by your doctor.  
-Colorectal cancer screenings should be done for adults age 54-65 with no increased risk factors for colorectal cancer. There are a number of acceptable methods of screening for this type of cancer. Each test has its own benefits and drawbacks. Discuss with your doctor what is most appropriate for you during your annual wellness visit. The different tests include: colonoscopy (considered the best screening method), a fecal occult blood test, a fecal DNA test, and sigmoidoscopy. 
 
-A bone mass density test is recommended when a woman turns 65 to screen for osteoporosis. This test is only recommended one time, as a screening. Some providers will use this same test as a disease monitoring tool if you already have osteoporosis. -Breast cancer screenings are recommended every other year for women of normal risk, age 54-69. 
-Cervical cancer screenings for women over age 72 are only recommended with certain risk factors. Here is a list of your current Health Maintenance items (your personalized list of preventive services) with a due date: 
Health Maintenance Due Topic Date Due  Glaucoma Screening   09/06/2019 [address] [place] [address] Eye Exam  09/06/2019  Yearly Flu Vaccine (1) 09/01/2020  Pneumococcal Vaccine (2 of 2 - PPSV23) 09/23/2020 Vaccine Information Statement Influenza (Flu) Vaccine (Inactivated or Recombinant): What You Need to Know Many Vaccine Information Statements are available in [place] and other languages. See www.immunize.org/vis Hojas de información sobre vacunas están disponibles en español y en muchos otros idiomas. Visite www.immunize.org/vis 1. Why get vaccinated? Influenza vaccine can prevent influenza (flu). Flu is a contagious disease that spreads around the [place] every year, usually between October and May. Anyone can get the flu, but it is more dangerous for some people. Infants and young children, people 72years of age and older, pregnant women, and people with certain health conditions or a weakened immune system are at greatest risk of flu complications. Pneumonia, bronchitis, sinus infections and ear infections are examples of flu-related complications. If you have a medical condition, such as heart disease, cancer or diabetes, flu can make it worse. Flu can cause fever and chills, sore throat, muscle aches, fatigue, cough, headache, and runny or stuffy nose. Some people may have vomiting and diarrhea, though this is more common in children than adults. Each year thousands of people in the [facility] die from flu, and many more are hospitalized. Flu vaccine prevents millions of illnesses and flu-related visits to the doctor each year. 2. Influenza vaccines CDC recommends everyone 10months of age and older get vaccinated every flu season. Children 6 months through 6years of age may need 2 doses during a single flu season. Everyone else needs only 1 dose each flu season. It takes about 2 weeks for protection to develop after vaccination. There are many flu viruses, and they are always changing. Each year a new flu vaccine is made to protect against three or four viruses that are likely to cause disease in the upcoming flu season. Even when the vaccine doesnt exactly match these viruses, it may still provide some protection. Influenza vaccine does not cause flu. Influenza vaccine may be given at the same time as other vaccines. 3. Talk with your health care provider Tell your vaccine provider if the person getting the vaccine: 
 Has had an allergic reaction after a previous dose of influenza vaccine, or has any severe, life-threatening allergies.  Has ever had Guillain-Barré Syndrome (also called GBS). In some cases, your health care provider may decide to postpone influenza vaccination to a future visit. People with minor illnesses, such as a cold, may be vaccinated. People who are moderately or severely ill should usually wait until they recover before getting influenza vaccine. Your health care provider can give you more information. 4. Risks of a reaction  Soreness, redness, and swelling where shot is given, fever, muscle aches, and headache can happen after influenza vaccine.  There may be a very small increased risk of Guillain-Barré Syndrome (GBS) after inactivated influenza vaccine (the flu shot). Leif Buitrago children who get the flu shot along with pneumococcal vaccine (PCV13), and/or DTaP vaccine at the same time might be slightly more likely to have a seizure caused by fever. Tell your health care provider if a child who is getting flu vaccine has ever had a seizure. People sometimes faint after medical procedures, including vaccination. Tell your provider if you feel dizzy or have vision changes or ringing in the ears. As with any medicine, there is a very remote chance of a vaccine causing a severe allergic reaction, other serious injury, or death. 5. What if there is a serious problem? An allergic reaction could occur after the vaccinated person leaves the clinic. If you see signs of a severe allergic reaction (hives, swelling of the face and throat, difficulty breathing, a fast heartbeat, dizziness, or weakness), call 9-1-1 and get the person to the nearest hospital. 
 
For other signs that concern you, call your health care provider.  
 
Adverse reactions should be reported to the Vaccine Adverse Event Reporting System (VAERS). Your health care provider will usually file this report, or you can do it yourself. Visit the VAERS website at www.vaers. hhs.gov or call 0-457.993.9647. VAERS is only for reporting reactions, and VAERS staff do not give medical advice. 6. The National Vaccine Injury Compensation Program 
 
The [facility] Vaccine Injury Compensation Program (VICP) is a federal program that was created to compensate people who may have been injured by certain vaccines. Visit the VICP website at www.[facility].gov/vaccinecompensation or call 4-111.826.5135 to learn about the program and about filing a claim. There is a time limit to file a claim for compensation. 7. How can I learn more?  Ask your health care provider.  Call your local or state health department.  Contact the Centers for Disease Control and Prevention (CDC): 
- Call 0-323.159.7316 (8-459-SID-INFO) or 
- Visit CDCs influenza website at www.cdc.gov/flu Vaccine Information Statement (Interim) Inactivated Influenza Vaccine 8/15/2019 
42 JC Boyd [id number] Department of [facility] and [employer] Centers for Disease Control and Prevention Office Use Only Medicare Wellness Visit, Female The best way to live healthy is to have a lifestyle where you eat a well-balanced diet, exercise regularly, limit alcohol use, and quit all forms of tobacco/nicotine, if applicable. Regular preventive services are another way to keep healthy. Preventive services (vaccines, screening tests, monitoring & exams) can help personalize your care plan, which helps you manage your own care. Screening tests can find health problems at the earliest stages, when they are easiest to treat. Yariel follows the current, evidence-based guidelines published by the [place] Luis Brandan (USPSTF) when recommending preventive services for our patients.  Because we follow these guidelines, sometimes recommendations change over time as research supports it. (For example, mammograms used to be recommended annually. Even though Medicare will still pay for an annual mammogram, the newer guidelines recommend a mammogram every two years for women of average risk). Of course, you and your doctor may decide to screen more often for some diseases, based on your risk and your co-morbidities (chronic disease you are already diagnosed with). Preventive services for you include: - Medicare offers their members a free annual wellness visit, which is time for you and your primary care provider to discuss and plan for your preventive service needs. Take advantage of this benefit every year! 
-All adults over the age of 72 should receive the recommended pneumonia vaccines. Current USPSTF guidelines recommend a series of two vaccines for the best pneumonia protection.  
-All adults should have a flu vaccine yearly and a tetanus vaccine every 10 years.  
-All adults age 48 and older should receive the shingles vaccines (series of two vaccines). -All adults age 38-68 who are overweight should have a diabetes screening test once every three years.  
-All adults born between 80 and 1965 should be screened once for Hepatitis C. 
-Other screening tests and preventive services for persons with diabetes include: an eye exam to screen for diabetic retinopathy, a kidney function test, a foot exam, and stricter control over your cholesterol.  
-Cardiovascular screening for adults with routine risk involves an electrocardiogram (ECG) at intervals determined by your doctor.  
-Colorectal cancer screenings should be done for adults age 54-65 with no increased risk factors for colorectal cancer. There are a number of acceptable methods of screening for this type of cancer. Each test has its own benefits and drawbacks.  Discuss with your doctor what is most appropriate for you during your annual wellness visit. The different tests include: colonoscopy (considered the best screening method), a fecal occult blood test, a fecal DNA test, and sigmoidoscopy. 
 
-A bone mass density test is recommended when a woman turns 65 to screen for osteoporosis. This test is only recommended one time, as a screening. Some providers will use this same test as a disease monitoring tool if you already have osteoporosis. -Breast cancer screenings are recommended every other year for women of normal risk, age 54-69. 
-Cervical cancer screenings for women over age 72 are only recommended with certain risk factors. Here is a list of your current Health Maintenance items (your personalized list of preventive services) with a due date: 
Health Maintenance Due Topic Date Due  Glaucoma Screening   09/06/2019 [address] [place] [address] Eye Exam  09/06/2019  Yearly Flu Vaccine (1) 09/01/2020  Pneumococcal Vaccine (2 of 2 - PPSV23) 09/23/2020 Vaccine Information Statement Pneumococcal Polysaccharide Vaccine (PPSV23): What You Need to Know Many Vaccine Information Statements are available in [place] and other languages. See www.immunize.org/vis Hojas de información sobre vacunas están disponibles en español y en muchos otros idiomas. Visite www.immunize.org/vis 1. Why get vaccinated? Pneumococcal polysaccharide vaccine (PPSV23) can prevent pneumococcal disease. Pneumococcal disease refers to any illness caused by pneumococcal bacteria. These bacteria can cause many types of illnesses, including pneumonia, which is an infection of the lungs. Pneumococcal bacteria are one of the most common causes of pneumonia. Besides pneumonia, pneumococcal bacteria can also cause: 
 Ear infections  Sinus infections  Meningitis (infection of the tissue covering the brain and spinal cord)  Bacteremia (bloodstream infection) Anyone can get pneumococcal disease, but children under 3years of age, people with certain medical conditions, adults 72 years or older, and cigarette smokers are at the highest risk. Most pneumococcal infections are mild. However, some can result in long-term problems, such as brain damage or hearing loss. Meningitis, bacteremia, and pneumonia caused by pneumococcal disease can be fatal.  
 
2. PPSV23  
 
PPSV23 protects against 23 types of bacteria that cause pneumococcal disease. PPSV23 is recommended for:  All adults 72 years or older,  Anyone 2 years or older with certain medical conditions that can lead to an increased risk for pneumococcal disease. Most people need only one dose of PPSV23. A second dose of PPSV23, and another type of pneumococcal vaccine called PCV13, are recommended for certain high-risk groups. Your health care provider can give you more information. People 65 years or older should get a dose of PPSV23 even if they have already gotten one or more doses of the vaccine before they turned 72. 
 
3. Talk with your health care provider Tell your vaccine provider if the person getting the vaccine: 
 Has had an allergic reaction after a previous dose of PPSV23, or has any severe, life-threatening allergies. In some cases, your health care provider may decide to postpone PPSV23 vaccination to a future visit. People with minor illnesses, such as a cold, may be vaccinated. People who are moderately or severely ill should usually wait until they recover before getting PPSV23. Your health care provider can give you more information. 4. Risks of a vaccine reaction  Redness or pain where the shot is given, feeling tired, fever, or muscle aches can happen after PPSV23. People sometimes faint after medical procedures, including vaccination. Tell your provider if you feel dizzy or have vision changes or ringing in the ears. As with any medicine, there is a very remote chance of a vaccine causing a severe allergic reaction, other serious injury, or death. 5. What if there is a serious problem? An allergic reaction could occur after the vaccinated person leaves the clinic. If you see signs of a severe allergic reaction (hives, swelling of the face and throat, difficulty breathing, a fast heartbeat, dizziness, or weakness), call 9-1-1 and get the person to the nearest hospital. 
 
For other signs that concern you, call your health care provider. Adverse reactions should be reported to the Vaccine Adverse Event Reporting System (VAERS). Your health care provider will usually file this report, or you can do it yourself. Visit the VAERS website at www.vaers. [facility].gov or call 9-541.706.4913. VAERS is only for reporting reactions, and VAERS staff do not give medical advice. 6. How can I learn more?  Ask your health care provider.  Call your local or state health department.  Contact the Centers for Disease Control and Prevention (CDC): 
- Call 9-265.610.3488 (1-800-CDC-INFO) or 
- Visit CDCs website at www.cdc.gov/vaccines Vaccine Information Statement PPSV23  
10/30/2019 Department of Health and [employer] Centers for Disease Control and Prevention Office Use Only

## 2020-11-09 NOTE — PROGRESS NOTES
Chief Complaint   Patient presents with    Diabetes    Hypertension    Thyroid Problem    Other     Renal insufficiency     Patient reports that she has not had dilated or glaucoma screening done in the last year due to [facility]

## 2020-11-19 ENCOUNTER — HOSPITAL ENCOUNTER (OUTPATIENT)
Dept: MAMMOGRAPHY | Age: 75
Discharge: HOME | End: 2020-11-19
Attending: FAMILY MEDICINE
Payer: MEDICARE

## 2020-11-19 DIAGNOSIS — Z12.31 VISIT FOR SCREENING MAMMOGRAM: ICD-10-CM

## 2020-11-19 PROCEDURE — 77063 BREAST TOMOSYNTHESIS BI: CPT

## 2020-11-23 ENCOUNTER — HOSPITAL ENCOUNTER (OUTPATIENT)
Dept: LAB | Age: 75
Discharge: HOME | End: 2020-11-23
Payer: MEDICARE

## 2020-11-23 ENCOUNTER — OFFICE VISIT (OUTPATIENT)
Dept: ORTHOPEDIC SURGERY | Age: 75
End: 2020-11-23
Payer: MEDICARE

## 2020-11-23 VITALS
WEIGHT: 181.4 LBS | HEIGHT: 66 IN | DIASTOLIC BLOOD PRESSURE: 66 MMHG | TEMPERATURE: 97.3 F | BODY MASS INDEX: 29.15 KG/M2 | SYSTOLIC BLOOD PRESSURE: 144 MMHG | HEART RATE: 64 BPM

## 2020-11-23 DIAGNOSIS — E11.21 TYPE 2 DIABETES WITH NEPHROPATHY (HCC): ICD-10-CM

## 2020-11-23 DIAGNOSIS — E04.1 THYROID NODULE: ICD-10-CM

## 2020-11-23 DIAGNOSIS — M65.341 TRIGGER RING FINGER OF RIGHT HAND: Primary | ICD-10-CM

## 2020-11-23 LAB
ALBUMIN SERPL-MCNC: 3.9 G/DL (ref 3.4–5)
ALBUMIN/GLOB SERPL: 1.1 {RATIO} (ref 0.8–1.7)
ALP SERPL-CCNC: 74 U/L (ref 45–117)
ALT SERPL-CCNC: 24 U/L (ref 13–56)
ANION GAP SERPL CALC-SCNC: 5 MMOL/L (ref 3–18)
AST SERPL-CCNC: 17 U/L (ref 10–38)
BILIRUB SERPL-MCNC: 0.7 MG/DL (ref 0.2–1)
BUN SERPL-MCNC: 19 MG/DL (ref 7–18)
BUN/CREAT SERPL: 17 (ref 12–20)
CALCIUM SERPL-MCNC: 10.8 MG/DL (ref 8.5–10.1)
CHLORIDE SERPL-SCNC: 108 MMOL/L (ref 100–111)
CHOLEST SERPL-MCNC: 181 MG/DL
CO2 SERPL-SCNC: 28 MMOL/L (ref 21–32)
CREAT SERPL-MCNC: 1.11 MG/DL (ref 0.6–1.3)
CREAT UR-MCNC: 79 MG/DL (ref 30–125)
ERYTHROCYTE [DISTWIDTH] IN BLOOD BY AUTOMATED COUNT: 14.5 % (ref 11.6–14.5)
EST. AVERAGE GLUCOSE BLD GHB EST-MCNC: 128 MG/DL
GLOBULIN SER CALC-MCNC: 3.7 G/DL (ref 2–4)
GLUCOSE SERPL-MCNC: 114 MG/DL (ref 74–99)
HBA1C MFR BLD: 6.1 % (ref 4.2–5.6)
HCT VFR BLD AUTO: 38.7 % (ref 35–45)
HDLC SERPL-MCNC: 48 MG/DL (ref 40–60)
HDLC SERPL: 3.8 {RATIO} (ref 0–5)
HGB BLD-MCNC: 12.2 G/DL (ref 12–16)
LDLC SERPL CALC-MCNC: 103.4 MG/DL (ref 0–100)
LIPID PROFILE,FLP: ABNORMAL
MCH RBC QN AUTO: 26.8 PG (ref 24–34)
MCHC RBC AUTO-ENTMCNC: 31.5 G/DL (ref 31–37)
MCV RBC AUTO: 84.9 FL (ref 74–97)
MICROALBUMIN UR-MCNC: <0.5 MG/DL (ref 0–3)
MICROALBUMIN/CREAT UR-RTO: NORMAL MG/G (ref 0–30)
PLATELET # BLD AUTO: 214 K/UL (ref 135–420)
PMV BLD AUTO: 11.4 FL (ref 9.2–11.8)
POTASSIUM SERPL-SCNC: 4.5 MMOL/L (ref 3.5–5.5)
PROT SERPL-MCNC: 7.6 G/DL (ref 6.4–8.2)
RBC # BLD AUTO: 4.56 M/UL (ref 4.2–5.3)
SODIUM SERPL-SCNC: 141 MMOL/L (ref 136–145)
TRIGL SERPL-MCNC: 148 MG/DL (ref ?–150)
TSH SERPL DL<=0.05 MIU/L-ACNC: 1.75 UIU/ML (ref 0.36–3.74)
VLDLC SERPL CALC-MCNC: 29.6 MG/DL
WBC # BLD AUTO: 9 K/UL (ref 4.6–13.2)

## 2020-11-23 PROCEDURE — G8754 DIAS BP LESS 90: HCPCS | Performed by: ORTHOPAEDIC SURGERY

## 2020-11-23 PROCEDURE — 1090F PRES/ABSN URINE INCON ASSESS: CPT | Performed by: ORTHOPAEDIC SURGERY

## 2020-11-23 PROCEDURE — 20550 NJX 1 TENDON SHEATH/LIGAMENT: CPT | Performed by: ORTHOPAEDIC SURGERY

## 2020-11-23 PROCEDURE — G8427 DOCREV CUR MEDS BY ELIG CLIN: HCPCS | Performed by: ORTHOPAEDIC SURGERY

## 2020-11-23 PROCEDURE — 82043 UR ALBUMIN QUANTITATIVE: CPT

## 2020-11-23 PROCEDURE — 80061 LIPID PANEL: CPT

## 2020-11-23 PROCEDURE — 99203 OFFICE O/P NEW LOW 30 MIN: CPT | Performed by: ORTHOPAEDIC SURGERY

## 2020-11-23 PROCEDURE — G8753 SYS BP > OR = 140: HCPCS | Performed by: ORTHOPAEDIC SURGERY

## 2020-11-23 PROCEDURE — G8536 NO DOC ELDER MAL SCRN: HCPCS | Performed by: ORTHOPAEDIC SURGERY

## 2020-11-23 PROCEDURE — 83036 HEMOGLOBIN GLYCOSYLATED A1C: CPT

## 2020-11-23 PROCEDURE — 80053 COMPREHEN METABOLIC PANEL: CPT

## 2020-11-23 PROCEDURE — 1101F PT FALLS ASSESS-DOCD LE1/YR: CPT | Performed by: ORTHOPAEDIC SURGERY

## 2020-11-23 PROCEDURE — G8399 PT W/DXA RESULTS DOCUMENT: HCPCS | Performed by: ORTHOPAEDIC SURGERY

## 2020-11-23 PROCEDURE — G8432 DEP SCR NOT DOC, RNG: HCPCS | Performed by: ORTHOPAEDIC SURGERY

## 2020-11-23 PROCEDURE — 84443 ASSAY THYROID STIM HORMONE: CPT

## 2020-11-23 PROCEDURE — G8417 CALC BMI ABV UP PARAM F/U: HCPCS | Performed by: ORTHOPAEDIC SURGERY

## 2020-11-23 PROCEDURE — 36415 COLL VENOUS BLD VENIPUNCTURE: CPT

## 2020-11-23 PROCEDURE — 85027 COMPLETE CBC AUTOMATED: CPT

## 2020-11-23 NOTE — PROGRESS NOTES
Michelle Wong is a 67 y.o. female right handed retiree. Worker's Compensation and legal considerations: not known. Vitals:    11/23/20 0803   BP: (!) 144/66   Pulse: 64   Temp: 97.3 °F (36.3 °C)   Weight: 181 lb 6.4 oz (82.3 kg)   Height: 5' 6\" (1.676 m)   PainSc:   1   PainLoc: Hand           Chief Complaint   Patient presents with    Hand Pain     right         HPI: Patient comes in today with complaints of right ring finger pain and locking. Date of onset: September 2020    Injury: No    Prior Treatment:  No    Numbness/ Tingling: No      ROS: Review of Systems - General ROS: negative  Psychological ROS: negative  ENT ROS: negative  Allergy and Immunology ROS: negative  Hematological and Lymphatic ROS: negative  Respiratory ROS: no cough, shortness of breath, or wheezing  Cardiovascular ROS: no chest pain or dyspnea on exertion  Gastrointestinal ROS: no abdominal pain, change in bowel habits, or black or bloody stools  Musculoskeletal ROS: positive for - pain in finger - right  Neurological ROS: negative  Dermatological ROS: negative    Past Medical History:   Diagnosis Date    Arrhythmia     history atrial fib    Bradycardia     Diabetes (HCC)     borderline    DVT (deep venous thrombosis) ([address] 2010    right    History of ablation March 2016     History of atrial fibrillation     Hypercholesterolemia     Hypertension     Long term current use of anticoagulant therapy     Menopause     Osteoporosis     Seizure ([address]     Seizures ([address]     on medication from 2006 to 2013 no seizure since 2006    [facility] [employer] pacemaker implant Set.  2013 Dr. Mickie Blancas, [facility]     Thyroid disease     nodules       Past Surgical History:   Procedure Laterality Date    CARDIAC SURG PROCEDURE UNLIST      ablation for a-fib    COLONOSCOPY N/A 10/25/2017    COLONOSCOPY performed by Colletta Skelton, [facility] END[facility]    HX CATARACT REMOVAL      HX HYSTERECTOMY      HX KNEE REPLACEMENT      left knee in 1/2010 and right knee 7/2016    HX KNEE REPLACEMENT      right knee    HX PACEMAKER         Current Outpatient Medications   Medication Sig Dispense Refill    metoprolol succinate (TOPROL-XL) 50 mg XL tablet TAKE 1 TABLET BY MOUTH EVERY DAY 90 Tab 1    losartan (COZAAR) 100 mg tablet TAKE 1 TABLET BY MOUTH EVERY DAY 90 Tab 1    metFORMIN (GLUCOPHAGE) 500 mg tablet Take 1 Tab by mouth daily (with breakfast). 180 Tab 1    cholecalciferol, vitamin D3, (VITAMIN D3) 2,000 unit tab Take  by mouth.  aspirin 81 mg chewable tablet Take 81 mg by mouth daily.  DOCOSAHEXANOIC ACID/EPA (FISH OIL PO) Take 100 mg by mouth daily.  COQ10, LIPOSOMAL UBIQUINOL, PO Take 200 mg by mouth daily.  lancets misc One Touch Ultra soft testing fasting blood sugar every morning prior to breakfast DX E11.9 100 Each 11    OneTouch Ultra Blue Test Strip strip USE TO TEST BLOOD SUGAR ONCE DAILY 100 Strip 6    spironolactone (ALDACTONE) 25 mg tablet Take 1 Tab by mouth daily. 4     Current Facility-Administered Medications   Medication Dose Route Frequency Provider Last Rate Last Dose    triamcinolone acetonide (KENALOG) 10 mg/mL injection 5 mg  5 mg Other ONCE Tk Avery, DO           Allergies   Allergen Reactions    Statins-Hmg-Coa Reductase Inhibitors Other (comments)     Adrian sick            PE:     Physical Exam  Vitals signs and nursing note reviewed. Constitutional:       General: She is not in acute distress. Appearance: Normal appearance. She is not ill-appearing. Eyes:      Extraocular Movements: Extraocular movements intact. Pupils: Pupils are equal, round, and reactive to light. Neck:      Musculoskeletal: Normal range of motion and neck supple. Cardiovascular:      Pulses: Normal pulses. Pulmonary:      Effort: Pulmonary effort is normal. No respiratory distress. Abdominal:      General: Abdomen is flat. There is no distension. Musculoskeletal: Normal range of motion. General: Tenderness present. No swelling, deformity or signs of injury. Right lower leg: No edema. Left lower leg: No edema. Skin:     General: Skin is warm and dry. Capillary Refill: Capillary refill takes less than 2 seconds. Findings: No bruising or erythema. Neurological:      General: No focal deficit present. Mental Status: She is alert and oriented to person, place, and time. Cranial Nerves: No cranial nerve deficit. Sensory: No sensory deficit. Psychiatric:         Mood and Affect: Mood normal.         Behavior: Behavior normal.            Hand:    Examination L Digit(s) R Digit(s)   1st [facility] Tenderness -  -    1st [facility] Grind -  -    Ashley Nodes -  -    Heberden Nodes -  -    A1 Pulley Tenderness -  + RF   Triggering -  + RF   UCL Instability -  -    RCL Instability -  -    Lateral Stress Pain -  -    Palmar Cords -  -    Tabletop test -  -    Garrod's Pads -  -     Strength       Pinch Strength         ROM: Full        Imaging:     None indicated        ICD-10-CM ICD-9-CM    1. Trigger ring finger of right hand  M65.341 727.03 INJECT TENDON SHEATH/LIGAMENT      triamcinolone acetonide (KENALOG) 10 mg/mL injection 5 mg         Plan:     Right ring finger A1 pulley injection    Follow-up and Dispositions    · Return if symptoms worsen or fail to improve.           Plan was reviewed with patient, who verbalized agreement and understanding of the plan    [address] [facility] NOTE        Chart reviewed for the following:   Tk CHACON DO, have reviewed the History, Physical and updated the Allergic reactions for [address] [place] [address] performed immediately prior to start of procedure:   Tk CHACON DO, have performed the following reviews on Ama Susy prior to the start of the procedure:            * Patient was identified by name and date of birth   * Agreement on procedure being performed was verified  * Risks and Benefits explained to the patient  * Procedure site verified and marked as necessary  * Patient was positioned for comfort  * Consent was signed and verified     Time: 08:17 AM      Date of procedure: 11/23/2020    Procedure performed by: Mackenzie Maher DO    Provider assisted by: Danny Robbins LPN    Patient assisted by: self    How tolerated by patient: tolerated the procedure well with no complications    Post Procedural Pain Scale: 0 - No Hurt    Comments: none    Procedure:  After consent was obtained, using sterile technique the tendon was prepped. Local anesthetic used: 1% lidocaine. Kenalog 5 mg and was then injected and the needle withdrawn. The procedure was well tolerated. The patient is asked to continue to rest the area for a few more days before resuming regular activities. It may be more painful for the first 1-2 days. Watch for fever, or increased swelling or persistent pain in the joint. Call or return to clinic prn if such symptoms occur or there is failure to improve as anticipated.

## 2020-11-23 NOTE — PROGRESS NOTES
A1C went up but still in prediabetic range. Continue current plan. Other labs stable. Elevated calcium but improved compare to before. Keep f/u with endo.

## 2021-01-18 NOTE — PROGRESS NOTES
Spoke with patient (all identifiers verified) to advise A1C went up but still in prediabetic range. Continue current plan. Other labs stable. Elevated calcium but improved compare to before. Keep f/u with endo. Patient verbalized understanding. She stated she had to cancel endocrinology appointment with Dr. Jb Loya last week, but is rescheduled for a visit with him in February. Patient was reminded of follow-up appointment with Dr. Betsy Deras on 3/09/21 at 9:30AM. Also, patient was advised mammogram is ok and yearly mammogram is recommended. She verbalized understanding. independent

## 2021-01-20 ENCOUNTER — TELEPHONE (OUTPATIENT)
Dept: FAMILY MEDICINE CLINIC | Age: 76
End: 2021-01-20

## 2021-01-20 NOTE — TELEPHONE ENCOUNTER
Pt states that the dentist gave her a RX for her to take before her appt tomorrow. And she would like to know if it is ok for her to take the medication. Please advise. Today .

## 2021-01-20 NOTE — TELEPHONE ENCOUNTER
Contacted patient and verified identity using name and date of birth (2- identifiers)   Was given Amoxicillin from dentist 500 mg 4 tabs prior to procedure (cleaning). Wants to make sure it's safe to take. Spoke with Dr. Johanna Osborn and she advised it os ok to take and to take with food. Patient verbalized understanding.

## 2021-01-27 DIAGNOSIS — E11.9 WELL CONTROLLED DIABETES MELLITUS (HCC): ICD-10-CM

## 2021-01-27 DIAGNOSIS — I10 ESSENTIAL HYPERTENSION: ICD-10-CM

## 2021-01-27 RX ORDER — LOSARTAN POTASSIUM 100 MG/1
TABLET ORAL
Qty: 90 TAB | Refills: 1 | Status: SHIPPED | OUTPATIENT
Start: 2021-01-27 | End: 2021-05-03

## 2021-01-27 RX ORDER — METFORMIN HYDROCHLORIDE 500 MG/1
TABLET ORAL
Qty: 90 TAB | Refills: 3 | Status: SHIPPED | OUTPATIENT
Start: 2021-01-27 | End: 2022-01-31

## 2021-02-25 ENCOUNTER — OFFICE VISIT (OUTPATIENT)
Dept: CARDIOLOGY CLINIC | Age: 76
End: 2021-02-25
Payer: MEDICARE

## 2021-02-25 ENCOUNTER — CLINICAL SUPPORT (OUTPATIENT)
Dept: CARDIOLOGY CLINIC | Age: 76
End: 2021-02-25

## 2021-02-25 VITALS
SYSTOLIC BLOOD PRESSURE: 152 MMHG | DIASTOLIC BLOOD PRESSURE: 90 MMHG | WEIGHT: 183 LBS | HEART RATE: 60 BPM | HEIGHT: 66 IN | OXYGEN SATURATION: 97 % | BODY MASS INDEX: 29.41 KG/M2

## 2021-02-25 DIAGNOSIS — I48.0 PAROXYSMAL ATRIAL FIBRILLATION (HCC): Primary | ICD-10-CM

## 2021-02-25 DIAGNOSIS — Z95.0 CARDIAC PACEMAKER IN SITU: ICD-10-CM

## 2021-02-25 PROCEDURE — G8427 DOCREV CUR MEDS BY ELIG CLIN: HCPCS | Performed by: INTERNAL MEDICINE

## 2021-02-25 PROCEDURE — 93280 PM DEVICE PROGR EVAL DUAL: CPT | Performed by: INTERNAL MEDICINE

## 2021-02-25 PROCEDURE — 1090F PRES/ABSN URINE INCON ASSESS: CPT | Performed by: INTERNAL MEDICINE

## 2021-02-25 PROCEDURE — 1101F PT FALLS ASSESS-DOCD LE1/YR: CPT | Performed by: INTERNAL MEDICINE

## 2021-02-25 PROCEDURE — G8755 DIAS BP > OR = 90: HCPCS | Performed by: INTERNAL MEDICINE

## 2021-02-25 PROCEDURE — G8536 NO DOC ELDER MAL SCRN: HCPCS | Performed by: INTERNAL MEDICINE

## 2021-02-25 PROCEDURE — 99214 OFFICE O/P EST MOD 30 MIN: CPT | Performed by: INTERNAL MEDICINE

## 2021-02-25 PROCEDURE — G8399 PT W/DXA RESULTS DOCUMENT: HCPCS | Performed by: INTERNAL MEDICINE

## 2021-02-25 PROCEDURE — G8432 DEP SCR NOT DOC, RNG: HCPCS | Performed by: INTERNAL MEDICINE

## 2021-02-25 PROCEDURE — G8417 CALC BMI ABV UP PARAM F/U: HCPCS | Performed by: INTERNAL MEDICINE

## 2021-02-25 PROCEDURE — G8753 SYS BP > OR = 140: HCPCS | Performed by: INTERNAL MEDICINE

## 2021-02-25 NOTE — PROGRESS NOTES
Brock Brown    F/u pAF/ SSS    HPI    Brock Brown is a 67 y.o. AAF with pAF, SSS s/p PPM here for routine follow up care. As you know she moved here from [place] a few yrs ago and established with my partner, Dr. Devi Marc. She was on Eliquis in [place] and says after she moved here it was >$200/ month and that was why it was stopped. She has no complaints today but there is concern for lead fracture by device check today, noise noted.     She has known history of cardiac problems.  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  She had atrial fibrillation and finally underwent the atrial fibrillation ablation successfully in March of 2016.  She was treated with flecainide briefly.  As the flecainide was discontinued, she has been anticoagulated with Eliquis.  She has previous history of DVT after knee surgery.  She has history of hypercholesterolemia, but has not been able to tolerate statins because of severe myalgia and other side effects. [place] is known to have had type 2 diabetes. [place] has history of seizure disorder; however, she has not had any recurrent seizures since 2006 off medication.      She has no cardiac complaints. She says her feet get very swollen usually in the summers and now she just has been noticing some minimal puffiness even now in the winter. There was question about her device lead but this was checked again in October and determined to be sufficient. Otherwise denies shortness of breath no chest pain, her other questions were noncardiac such as her discolored toenails.     Past Medical History:   Diagnosis Date    Arrhythmia     history atrial fib    Bradycardia     Diabetes ([address]     borderline    D[facility] thrombosis[facility] 2010    right    History of ablation March 2016     History of atrial fibrillation     Hypercholesterolemia     Hypertension     Long term current use of anticoagulant therapy     Menopause     Osteoporosis     Seizure ([address]     Seizures ([address]     on medication from 2006 to 2013 no seizure since 2006    [employer] pacemaker implant Set. 2013 Dr. Keke Kumar, [place]     Thyroid disease     nodules       Past Surgical History:   Procedure Laterality Date    COLONOSCOPY N/A 10/25/2017    COLONOSCOPY performed by Camelia [facility] at [facility] ENDOSCOPY    HX CATARACT REMOVAL      HX HYSTERECTOMY      HX KNEE REPLACEMENT      left knee in 1/2010 and right knee 7/2016    HX KNEE REPLACEMENT      right knee    HX PACEMAKER      [place] CARDIAC SURG PROCEDURE UNLIST      ablation for a-fib       Current Outpatient Medications   Medication Sig Dispense Refill    apixaban (ELIQUIS) 5 mg tablet Take 1 Tab by mouth two (2) times a day. 60 Tab 5    losartan (COZAAR) 100 mg tablet TAKE 1 TABLET BY MOUTH EVERY DAY 90 Tab 1    metFORMIN (GLUCOPHAGE) 500 mg tablet TAKE 1 TABLET BY MOUTH EVERY DAY IN THE MORNING WITH BREAKFAST 90 Tab 3    metoprolol succinate (TOPROL-XL) 50 mg XL tablet TAKE 1 TABLET BY MOUTH EVERY DAY 90 Tab 1    lancets misc One Touch Ultra soft testing fasting blood sugar every morning prior to breakfast DX E11.9 100 Each 11    OneTouch Ultra Blue Test Strip strip USE TO TEST BLOOD SUGAR ONCE DAILY 100 Strip 6    cholecalciferol, vitamin D3, (VITAMIN D3) 2,000 unit tab Take  by mouth.  spironolactone (ALDACTONE) 25 mg tablet Take 1 Tab by mouth daily. 4    aspirin 81 mg chewable tablet Take 81 mg by mouth daily.  DOCOSAHEXANOIC ACID/EPA (FISH OIL PO) Take 100 mg by mouth daily.  COQ10, LIPOSOMAL UBIQUINOL, PO Take 200 mg by mouth daily.          Allergies   Allergen Reactions    Statins-Hmg-Coa Reductase Inhibitors Other (comments)     Felt sick        Social History     Socioeconomic History    Marital status:      Spouse name: Not on file    Number of children: Not on file    Years of education: Not on file    Highest education level: Not on file   Occupational History    Not on file Social Needs    Financial resource strain: Not on file    Food insecurity     Worry: Not on file     Inability: Not on file    Transportation needs     Medical: Not on file     Non-medical: Not on file   Tobacco Use    Smoking status: Never Smoker    Smokeless tobacco: Never Used   Substance and Sexual Activity    Alcohol use: No    Drug use: No    Sexual activity: Not Currently     Partners: Male     Birth control/protection: Surgical     Comment: hysterectomy   Lifestyle    Physical activity     Days per week: Not on file     Minutes per session: Not on file    Stress: Not on file   Relationships    Social connections     Talks on phone: Not on file     Gets together: Not on file     Attends Hindu service: Not on file     Active member of club or organization: Not on file     Attends meetings of clubs or organizations: Not on file     Relationship status: Not on file    Intimate partner violence     Fear of current or ex partner: Not on file     Emotionally abused: Not on file     Physically abused: Not on file     Forced sexual activity: Not on file   Other Topics Concern    Not on file   Social History Narrative    Not on file        FH: n/a    Review of Systems    14 pt Review of Systems is negative unless otherwise mentioned in the HPI.     Wt Readings from Last 3 Encounters:   02/25/21 83 kg (183 lb)   11/23/20 82.3 kg (181 lb 6.4 oz)   11/09/20 84.4 kg (186 lb)     Temp Readings from Last 3 Encounters:   11/23/20 97.3 °F (36.3 °C)   11/09/20 98.6 °F (37 °C) (Oral)   07/14/20 97.5 °F (36.4 °C)     BP Readings from Last 3 Encounters:   02/25/21 (!) 152/90   11/23/20 (!) 144/66   11/09/20 138/84     Pulse Readings from Last 3 Encounters:   02/25/21 60   11/23/20 64   11/09/20 61     Physical Exam:    Visit Vitals  BP (!) 152/90 (BP 1 Location: Left upper arm, BP Patient Position: Sitting, BP Cuff Size: Adult)   Pulse 60   Ht 5' 6\" (1.676 m)   Wt 83 kg (183 lb)   SpO2 97%   BMI 29.54 kg/m² Physical Exam  HENT:      Head: Normocephalic and atraumatic. Eyes:      Pupils: Pupils are equal, round, and reactive to light. Cardiovascular:      Rate and Rhythm: Normal rate and regular rhythm. Heart sounds: Normal heart sounds. No murmur. No friction rub. No gallop. Pulmonary:      Effort: Pulmonary effort is normal. No respiratory distress. Breath sounds: Normal breath sounds. No wheezing or rales. Chest:      Chest wall: No tenderness. Abdominal:      General: Bowel sounds are normal.      Palpations: Abdomen is soft. Musculoskeletal:         General: No tenderness. Skin:     General: Skin is warm and dry. Neurological:      Mental Status: She is alert and oriented to person, place, and time. Lab Results   Component Value Date/Time    Sodium 141 11/23/2020 07:27 AM    Potassium 4.5 11/23/2020 07:27 AM    Chloride 108 11/23/2020 07:27 AM    CO2 28 11/23/2020 07:27 AM    Anion gap 5 11/23/2020 07:27 AM    Glucose 114 (H) 11/23/2020 07:27 AM    BUN 19 (H) 11/23/2020 07:27 AM    Creatinine 1.11 11/23/2020 07:27 AM    BUN/Creatinine ratio 17 11/23/2020 07:27 AM    GFR est AA 58 (L) 11/23/2020 07:27 AM    GFR est non-AA 48 (L) 11/23/2020 07:27 AM    Calcium 10.8 (H) 11/23/2020 07:27 AM    Bilirubin, total 0.7 11/23/2020 07:27 AM    Alk.  phosphatase 74 11/23/2020 07:27 AM    Protein, total 7.6 11/23/2020 07:27 AM    Albumin 3.9 11/23/2020 07:27 AM    Globulin 3.7 11/23/2020 07:27 AM    A-G Ratio 1.1 11/23/2020 07:27 AM    ALT (SGPT) 24 11/23/2020 07:27 AM    AST (SGOT) 17 11/23/2020 07:27 AM     Lab Results   Component Value Date/Time    Hemoglobin A1c 6.1 (H) 11/23/2020 07:27 AM     Lab Results   Component Value Date/Time    TSH 1.75 11/23/2020 07:27 AM     Lab Results   Component Value Date/Time    WBC 9.0 11/23/2020 07:27 AM    HGB 12.2 11/23/2020 07:27 AM    HCT 38.7 11/23/2020 07:27 AM    PLATELET 757 60/11/6792 07:27 AM    MCV 84.9 11/23/2020 07:27 AM         Impression and Plan:  Papo Fishman is a 67 y.o. with:    1.) pAF, s/p ablation, elevated [id number] ~3  2.) SSS s/p PPM, with concern for lead fracture on device interrogation sunita  3.) HTN, well controlled  4.) HL  5.) HFpEF, stable/ neg nuc ~2016    1.) Should be on Eliquis 5 BID, your risk for stroke did not go away bc of ablation, will try to make this affordable for her, talked to her about it at length last time but she and Dr. Maggy Shaw had agreed to stop it since 2018 so its a hard sell now, will again discuss further next appt  2.) Continue med therapy, fluid status and SBP stable  3.) Otherwise RTC with me 6 months    Records obtained and reviewed,     Thank you for allowing me to participate in the care of your patient, please do not hesitate to call with questions or concerns. Follow-up and Dispositions    · Return in about 6 months (around 8/25/2021).      [address] [facility],    [facility], DO

## 2021-02-25 NOTE — PROGRESS NOTES
Michael Vale presents today for   Chief Complaint   Patient presents with    Follow-up     6 month follow up        Michael Vale preferred language for health care discussion is english/other. Is someone accompanying this pt? no    Is the patient using any DME equipment during [address]? no    Depression Screening:  3 most recent PHQ Screens 11/9/2020   Little interest or pleasure in doing things Not at all   Feeling down, depressed, irritable, or hopeless Not at all   Total Score PHQ 2 0       Learning Assessment:  Learning Assessment 1/23/2020   PRIMARY LEARNER Patient   HIGHEST LEVEL OF EDUCATION - PRIMARY LEARNER  GRADUATED HIGH SCHOOL OR GED   BARRIERS PRIMARY LEARNER NONE   CO-LEARNER CAREGIVER No   PRIMARY LANGUAGE ENGLISH    NEED No   LEARNER PREFERENCE PRIMARY VIDEOS   LEARNING SPECIAL TOPICS No   ANSWERED BY patient   RELATIONSHIP SELF       Abuse Screening:  Abuse Screening Questionnaire 8/26/2020   Do you ever feel afraid of your partner? N   Are you in a relationship with someone who physically or mentally threatens you? N   Is it safe for you to go home? Y       Fall Risk  Fall Risk Assessment, last 12 mths 11/9/2020   Able to walk? Yes   Fall in past 12 months? No       Pt currently taking Anticoagulant therapy? ASA 81mg every day     Coordination of Care:  1. Have you been to the ER, urgent care clinic since your last visit? Hospitalized since your last visit? no    2. Have you seen or consulted any other health care providers outside of the [address] [place] [address] since your last visit? Include any pap smears or colon screening.  no

## 2021-02-26 NOTE — PROGRESS NOTES
I have personally seen and evaluated the device findings. Interrogation reviewed and I agree with assessment.     Rory Lord

## 2021-03-09 ENCOUNTER — OFFICE VISIT (OUTPATIENT)
Dept: FAMILY MEDICINE CLINIC | Age: 76
End: 2021-03-09
Payer: MEDICARE

## 2021-03-09 VITALS
SYSTOLIC BLOOD PRESSURE: 130 MMHG | RESPIRATION RATE: 16 BRPM | HEIGHT: 66 IN | WEIGHT: 166 LBS | HEART RATE: 60 BPM | TEMPERATURE: 98.4 F | DIASTOLIC BLOOD PRESSURE: 82 MMHG | BODY MASS INDEX: 26.68 KG/M2 | OXYGEN SATURATION: 99 %

## 2021-03-09 DIAGNOSIS — E11.9 WELL CONTROLLED DIABETES MELLITUS (HCC): Primary | ICD-10-CM

## 2021-03-09 DIAGNOSIS — I48.91 ATRIAL FIBRILLATION, UNSPECIFIED TYPE (HCC): ICD-10-CM

## 2021-03-09 DIAGNOSIS — I10 ESSENTIAL HYPERTENSION: ICD-10-CM

## 2021-03-09 DIAGNOSIS — E21.3 HYPERPARATHYROIDISM, UNSPECIFIED (HCC): ICD-10-CM

## 2021-03-09 DIAGNOSIS — E04.1 THYROID NODULE: ICD-10-CM

## 2021-03-09 DIAGNOSIS — E11.21 TYPE 2 DIABETES WITH NEPHROPATHY (HCC): ICD-10-CM

## 2021-03-09 DIAGNOSIS — E83.52 HYPERCALCEMIA: ICD-10-CM

## 2021-03-09 DIAGNOSIS — E78.00 PURE HYPERCHOLESTEROLEMIA: ICD-10-CM

## 2021-03-09 DIAGNOSIS — N28.9 RENAL INSUFFICIENCY: ICD-10-CM

## 2021-03-09 DIAGNOSIS — G40.802 OTHER EPILEPSY WITHOUT STATUS EPILEPTICUS, NOT INTRACTABLE (HCC): ICD-10-CM

## 2021-03-09 LAB — HBA1C MFR BLD HPLC: 5.9 %

## 2021-03-09 PROCEDURE — G8754 DIAS BP LESS 90: HCPCS | Performed by: FAMILY MEDICINE

## 2021-03-09 PROCEDURE — G8536 NO DOC ELDER MAL SCRN: HCPCS | Performed by: FAMILY MEDICINE

## 2021-03-09 PROCEDURE — G8752 SYS BP LESS 140: HCPCS | Performed by: FAMILY MEDICINE

## 2021-03-09 PROCEDURE — G8510 SCR DEP NEG, NO PLAN REQD: HCPCS | Performed by: FAMILY MEDICINE

## 2021-03-09 PROCEDURE — G8427 DOCREV CUR MEDS BY ELIG CLIN: HCPCS | Performed by: FAMILY MEDICINE

## 2021-03-09 PROCEDURE — 1090F PRES/ABSN URINE INCON ASSESS: CPT | Performed by: FAMILY MEDICINE

## 2021-03-09 PROCEDURE — 99214 OFFICE O/P EST MOD 30 MIN: CPT | Performed by: FAMILY MEDICINE

## 2021-03-09 PROCEDURE — G8399 PT W/DXA RESULTS DOCUMENT: HCPCS | Performed by: FAMILY MEDICINE

## 2021-03-09 PROCEDURE — G8417 CALC BMI ABV UP PARAM F/U: HCPCS | Performed by: FAMILY MEDICINE

## 2021-03-09 PROCEDURE — G0463 HOSPITAL OUTPT CLINIC VISIT: HCPCS | Performed by: FAMILY MEDICINE

## 2021-03-09 PROCEDURE — 83036 HEMOGLOBIN GLYCOSYLATED A1C: CPT | Performed by: FAMILY MEDICINE

## 2021-03-09 PROCEDURE — 1101F PT FALLS ASSESS-DOCD LE1/YR: CPT | Performed by: FAMILY MEDICINE

## 2021-03-09 NOTE — PROGRESS NOTES
HISTORY OF PRESENT ILLNESS  Edward Keen is a 67 y.o. female. HPI: Here for follow-up  History of high blood pressure. On medication. Taking it with compliance and no side effects. Today vitals been stable and she is asymptomatic. History of A. fib. Asymptomatic. Following cardiology. Now restarting Eliquis. High cost.  We will try to get help from pharmacist if she can get any discount coupons. During the visit sitting comfortable and did not appear in any acute distress  Denies any headache, dizziness, no chest pain or trouble breathing, no arm or leg weakness. No nausea or vomiting, no weight or appetite changes, no mood changes . No urine or bowel complains, no palpitation, no diaphoresis. No abdominal pain. No cold or cough. No leg swelling. No fever. No sleep trouble. History of diabetes. Today A1c checked at the office is 5.9. She denies any hypo or hyperglycemic symptoms. Compliant with taking medications. No side effects. Working on lifestyle and diet modification. Elevated serum calcium. Elevated PTH. Hyperparathyroidism. Following Dr. Joel Cortes. On vitamin D. Will obtain their notes. History of epilepsy. Off medication since long time. Seen neurology few years back. No new recommendations. No recurrent episodes. Visit Vitals  /82 (BP 1 Location: Left arm, BP Patient Position: Sitting, BP Cuff Size: Adult)   Pulse 60   Temp 98.4 °F (36.9 °C) (Temporal)   Resp 16   Ht 5' 6\" (1.676 m)   Wt 166 lb (75.3 kg)   SpO2 99%   BMI 26.79 kg/m²     Review medication list, vitals, problem list,allergies. Reviewed prior labs.   Lab Results   Component Value Date/Time    WBC 9.0 11/23/2020 07:27 AM    HGB 12.2 11/23/2020 07:27 AM    HCT 38.7 11/23/2020 07:27 AM    PLATELET 899 59/60/1616 07:27 AM    MCV 84.9 11/23/2020 07:27 AM     Lab Results   Component Value Date/Time    Sodium 141 11/23/2020 07:27 AM    Potassium 4.5 11/23/2020 07:27 AM    Chloride 108 11/23/2020 07:27 AM    CO2 28 11/23/2020 07:27 AM    Anion gap 5 11/23/2020 07:27 AM    Glucose 114 (H) 11/23/2020 07:27 AM    BUN 19 (H) 11/23/2020 07:27 AM    Creatinine 1.11 11/23/2020 07:27 AM    BUN/Creatinine ratio 17 11/23/2020 07:27 AM    GFR est AA 58 (L) 11/23/2020 07:27 AM    GFR est non-AA 48 (L) 11/23/2020 07:27 AM    Calcium 10.8 (H) 11/23/2020 07:27 AM    Bilirubin, total 0.7 11/23/2020 07:27 AM    Alk. phosphatase 74 11/23/2020 07:27 AM    Protein, total 7.6 11/23/2020 07:27 AM    Albumin 3.9 11/23/2020 07:27 AM    Globulin 3.7 11/23/2020 07:27 AM    A-G Ratio 1.1 11/23/2020 07:27 AM    ALT (SGPT) 24 11/23/2020 07:27 AM    AST (SGOT) 17 11/23/2020 07:27 AM     Lab Results   Component Value Date/Time    Cholesterol, total 181 11/23/2020 07:27 AM    HDL Cholesterol 48 11/23/2020 07:27 AM    LDL, calculated 103.4 (H) 11/23/2020 07:27 AM    VLDL, calculated 29.6 11/23/2020 07:27 AM    Triglyceride 148 11/23/2020 07:27 AM    CHOL/HDL Ratio 3.8 11/23/2020 07:27 AM     Lab Results   Component Value Date/Time    TSH 1.75 11/23/2020 07:27 AM     Lab Results   Component Value Date/Time    Hemoglobin A1c 6.1 (H) 11/23/2020 07:27 AM     Lab Results   Component Value Date/Time    Microalbumin/Creat ratio (mg/g creat)  11/23/2020 07:27 AM     Cannot calculate ratio due to microalbumin result outside reportable range. Microalbumin,urine random <0.50 11/23/2020 07:27 AM    Microalbumin urine (POC) 10 04/24/2017 09:02 AM      ROS: See HPI    Physical Exam  Constitutional:       General: She is not in acute distress. Neck:      Musculoskeletal: Neck supple. Cardiovascular:      Rate and Rhythm: Normal rate and regular rhythm. Heart sounds: Normal heart sounds. Abdominal:      General: Bowel sounds are normal.      Palpations: Abdomen is soft. Tenderness: There is no abdominal tenderness. Musculoskeletal:         General: No swelling.       Comments: No callus, no open skin area, peripheral pulsations of dorsalis pedis palpable bilaterally. Monofilament test normal.   Peripheral sensations of dorsalis pedis palpable bilaterally. Noted skin discoloration near the nailbed and discoloration over the toe nails. Neurological:      Mental Status: She is oriented to person, place, and time. Psychiatric:         Behavior: Behavior normal.         ASSESSMENT and PLAN    ICD-10-CM ICD-9-CM    1. Well controlled diabetes mellitus ([facility]): A1c today 5.9. Continue current plan. Due to skin discoloration and the nail changes we will send her to podiatry E11.9 250.00 AMB POC HEMOGLOBIN A1C      HM DIABETES FOOT EXAM      REFERRAL TO PODIATRY   2. Type 2 diabetes with nephropathy [facility]): Been following nephrology. No new recommendation. Will observe E11.21 250.40 REFERRAL TO PODIATRY     583.81    3. Hyperparathyroidism, unspecified ([facility]): Been following Endo. At this time treated with vitamin D. Will obtain the note E21.3 252.00    4. Other epilepsy without status epilepticus, not intractable ([facility]): Been off of medication since long time and no recurrent episode. Will observe G40.802 345.80     came off medication long time ago. no recurrent episode. 5. Renal insufficiency: Following nephrology N28.9 593.9    6. Hypercalcemia  E83.52 275.42    7. Thyroid nodule: Asymptomatic. No palpable nodule during exam.  Will observe E04.1 241.0    8. Atrial fibrillation, unspecified type [facility]): On Eliquis again. Send the message to pharmacist if any support she gets to afford the medication. Will follow cardiology and the recommendation. She is asymptomatic I48.91 427.31     post ablasion    9. Essential hypertension :well controlled. Continue current dose of medication and low salt diet. Exercise as tolerated. I10 401.9    10. Pure hypercholesterolemia: On statin. Tolerating well.   No side effects E78.00 272.0    Patient understood and agreed with the plan  Review health maintenance  We will obtain the records for an eye exam from [facility]  Follow-up and Dispositions    · Return in about 4 months (around 7/9/2021). Please note that this dictation was completed with [employer], the computer voice recognition software. Quite often unanticipated grammatical, syntax, homophones, and other interpretive errors are inadvertently transcribed by the computer software. Please disregard these errors. Please excuse any errors that have escaped final proofreading.

## 2021-03-09 NOTE — PROGRESS NOTES
Chief Complaint   Patient presents with    Diabetes    Hypertension    Irregular Heart Beat    Cholesterol Problem

## 2021-03-09 NOTE — PATIENT INSTRUCTIONS
Hyperparathyroidism: Care Instructions Your Care Instructions Hyperparathyroidism means that your parathyroid glands are too active. These are tiny glands in the neck. They sit behind the thyroid gland. They make a hormone that helps control how much calcium is in the blood. When these glands make too much hormone, the amount of calcium in the blood goes up. Most people with this problem have no symptoms. But it can cause constipation, nausea, vomiting, fatigue, and depression. It also can lead to high blood pressure, ulcers, kidney stones, and weak bones. This problem often is caused by a tumor on the parathyroid glands. The tumor usually is not cancer. You may need surgery to take out one or more of the glands. Follow-up care is a key part of your treatment and safety. Be sure to make and go to all appointments, and call your doctor if you are having problems. It's also a good idea to know your test results and keep a list of the medicines you take. How can you care for yourself at home? · Take your medicines exactly as prescribed. Call your doctor if you think you are having a problem with your medicine. You will get more details on the specific medicines your doctor prescribes. · You will need to see your doctor regularly to check your condition. You also will have tests to check the level of calcium in your blood and to make sure your kidneys are working well. · Drink plenty of fluids (enough so that your urine is light yellow or clear like water) to prevent dehydration. Choose water and other caffeine-free clear liquids. If you have kidney, heart, or liver disease and have to limit fluids, talk with your doctor before you increase the amount of fluids you drink. · Get at least 30 minutes of exercise on most days of the week. Walking is a good choice. You also may want to do other activities, such as running, swimming, cycling, or playing tennis or team sports. · If you are taking any diuretic medicines or calcium supplements, talk to your doctor about whether you should keep taking them. When should you call for help? Call 911 anytime you think you may need emergency care. For example, call if: 
  · You passed out (lost consciousness). Call your doctor now or seek immediate medical care if: 
  · You are confused or have trouble thinking.  
  · Your vomiting and nausea do not go away with treatment. Watch closely for changes in your health, and be sure to contact your doctor if: 
  · You get weaker and more tired even after treatment.  
  · You feel depressed or have aches and pains.  
  · You are constipated.  
  · You have increased thirst and urination.  
  · You do not feel hungry.  
  · You do not get better as expected. Where can you learn more? Go to http://www.gray.com/ Enter D624 in the search box to learn more about \"Hyperparathyroidism: Care Instructions. \" Current as of: March 31, 2020               Content Version: 12.6 © 2006-2020 [employer]. Care instructions adapted under license by [employer] (which disclaims liability or warranty for this information). If you have questions about a medical condition or this instruction, always ask your healthcare professional. [place] [id number] any warranty or liability for your use of this information. Low Sodium Diet (2,000 Milligram): Care Instructions Your Care Instructions Too much sodium causes your body to hold on to extra water. This can raise your blood pressure and force your heart and kidneys to work harder. In very serious cases, this could cause you to be put in the hospital. It might even be life-threatening. By limiting sodium, you will feel better and lower your risk of serious problems. The most common source of sodium is salt. People get most of the salt in their diet from canned, prepared, and packaged foods. Fast food and restaurant meals also are very high in sodium. Your doctor will probably limit your sodium to less than 2,000 milligrams (mg) a day. This limit counts all the sodium in prepared and packaged foods and any salt you add to your food. Follow-up care is a key part of your treatment and safety. Be sure to make and go to all appointments, and call your doctor if you are having problems. It's also a good idea to know your test results and keep a list of the medicines you take. How can you care for yourself at home? Read food labels · Read labels on cans and food packages. The labels tell you how much sodium is in each serving. Make sure that you look at the serving size. If you eat more than the serving size, you have eaten more sodium. · Food labels also tell you the Percent Daily Value for sodium. Choose products with low Percent Daily Values for sodium. · Be aware that sodium can come in forms other than salt, including monosodium glutamate (MSG), sodium citrate, and sodium bicarbonate (baking soda). MSG is often added to Asian food. When you eat out, you can sometimes ask for food without MSG or added salt. Buy low-sodium foods · Buy foods that are labeled \"unsalted\" (no salt added), \"sodium-free\" (less than 5 mg of sodium per serving), or \"low-sodium\" (less than 140 mg of sodium per serving). Foods labeled \"reduced-sodium\" and \"light sodium\" may still have too much sodium. Be sure to read the label to see how much sodium you are getting. · Buy fresh vegetables, or frozen vegetables without added sauces. Buy low-sodium versions of canned vegetables, soups, and other canned goods. Prepare low-sodium meals · Cut back on the amount of salt you use in cooking. This will help you adjust to the taste. Do not add salt after cooking. One teaspoon of salt has about 2,300 mg of sodium. · Take the salt shaker off the table. · Flavor your food with garlic, lemon juice, onion, vinegar, herbs, and spices. Do not use soy sauce, lite soy sauce, steak sauce, onion salt, garlic salt, celery salt, mustard, or ketchup on your food. · Use low-sodium salad dressings, sauces, and ketchup. Or make your own salad dressings and sauces without adding salt. · Use less salt (or none) when recipes call for it. You can often use half the salt a recipe calls for without losing flavor. Other foods such as rice, pasta, and grains do not need added salt. · Rinse canned vegetables, and cook them in fresh water. This removes somebut not allof the salt. · Avoid water that is naturally high in sodium or that has been treated with water softeners, which add sodium. Call your local water company to find out the sodium content of your water supply. If you buy bottled water, read the label and choose a sodium-free brand. Avoid high-sodium foods · Avoid eating: 
? Smoked, cured, salted, and canned meat, fish, and poultry. ? Ham, morales, hot dogs, and luncheon meats. ? Regular, hard, and processed cheese and regular peanut butter. ? Crackers with salted tops, and other salted snack foods such as pretzels, chips, and salted popcorn. ? Frozen prepared meals, unless labeled low-sodium. ? Canned and dried soups, broths, and bouillon, unless labeled sodium-free or low-sodium. ? Canned vegetables, unless labeled sodium-free or low-sodium. ? [employer] fries, pizza, tacos, and other fast foods. ? Pickles, olives, ketchup, and other condiments, especially soy sauce, unless labeled sodium-free or low-sodium. Where can you learn more? Go to http://www.Loom.InnSania/ Enter O278 in the search box to learn more about \"Low Sodium Diet (2,000 Milligram): Care Instructions. \" Current as of: August 22, 2019               Content Version: 12.6 © 0233-6389 [employer]. Care instructions adapted under license by [employer] (which disclaims liability or warranty for this information). If you have questions about a medical condition or this instruction, always ask your healthcare professional. [place] [address] any warranty or liability for your use of this information. Learning About Low-Fat Eating What is low-fat eating? Most food has some fat in it. Your body needs some fat to be healthy. But some kinds of fats are healthier than others. In a low-fat eating plan, you try to choose healthier fats and eat fewer unhealthy fats. Healthy fats include olive and canola oil. Try to avoid eating too much saturated fat (such as in cheese and meats) and trans fat (a type of fat found in many packaged snack foods and other baked goods). You do not need to cut all fat from your diet. But you can make healthier choices about the types and amount of fat you eat. Even though it is a good idea to choose healthier fats, it is still important to be careful of how much fat you eat, because all fats are high in calories. What are the different types of fats? Unhealthy fat · Saturated fat. Saturated fats are mostly in animal foods, such as meat and dairy foods. Tropical oils, such as coconut oil, palm oil, and cocoa butter, are also saturated fats. Healthy fats · Monounsaturated fat. Monounsaturated fats are liquid at room temperature but get solid when refrigerated. Eating foods that are high in this fat may help lower your \"bad\" (LDL) cholesterol, keep your \"good\" (HDL) cholesterol level up, and lower your chances of getting coronary artery disease. This fat is found in canola oil, olive oil, peanut oil, olives, avocados, nuts, and nut butters. · Polyunsaturated fat. Polyunsaturated fats are liquid at room temperature. They are in safflower, sunflower, and corn oils. They are also the main fat in seafood. Omega-3 fatty acids are types of polyunsaturated fat. Eating fish may lower your chances of getting coronary artery disease. Fatty fish such as salmon and mackerel contain these healthy fatty acids. So do ground flaxseeds and flaxseed oil, soybeans, walnuts, and seeds. Why cut down on unhealthy fats? Eating foods that contain saturated fats can raise the LDL (\"bad\") cholesterol in your blood. Having a high level of LDL cholesterol increases your chance of hardening of the arteries (atherosclerosis), which can lead to heart disease, heart attack, and stroke. Trans fat raises the level of \"bad\" LDL cholesterol in your blood and may lower the \"good\" HDL cholesterol in your blood. Trans fat can raise your risk of heart disease, heart attack, and stroke. In general: · No more than 10% of your daily calories should come from saturated fat. This is about 20 grams in a 2,000-calorie diet. · No more than 10% of your daily calories should come from polyunsaturated fat. This is about 20 grams in a 2,000-calorie diet. · Monounsaturated fats can be up to 15% of your daily calories. This is about 25 to 30 grams in a 2,000-calorie diet. If you're not sure how much fat you should be eating or how many calories you need each day to stay at a healthy weight, talk to a registered dietitian. He or she can help you create a plan that's right for you. What can you do to cut down on fat? Foods like cheese, butter, sausage, and desserts can have a lot of unhealthy fats. Try these tips for healthier meals at home and when you eat out. At home · Fill up on fruits, vegetables, and whole grains. · Think of meat as a side dish instead of as the main part of your meal. 
· When you do eat meat, make it extra-lean ground beef (97% lean), ground turkey breast (without skin added), meats with fat trimmed off before cooking, or skinless chicken. · Try main dishes that use whole wheat pasta, brown rice, dried beans, or vegetables. · Use cooking methods that use little or no fat, such as broiling, steaming, or grilling. Use cooking spray instead of oil. If you use oil, use a monounsaturated oil, such as canola or olive oil. · Read food labels on canned, bottled, or packaged foods. Choose those with little saturated fat and no trans fat. When eating out at a restaurant · Order foods that are broiled or poached instead of fried or breaded. · Cut back on the amount of butter or margarine that you use on bread. Use small amounts of olive oil instead. · Order sauces, gravies, and salad dressings on the side, and use only a little. · When you order pasta, choose tomato sauce instead of cream sauce. · Ask for salsa with your baked potato instead of sour cream, butter, cheese, or morales. Where can you learn more? Go to http://www.gray.com/ Enter D041 in the search box to learn more about \"Learning About Low-Fat Eating. \" Current as of: August 22, 2019               Content Version: 12.6 © 1027-2157 [employer]. Care instructions adapted under license by [employer] (which disclaims liability or warranty for this information). If you have questions about a medical condition or this instruction, always ask your healthcare professional. [address] any warranty or liability for your use of this information. Nutrition Tips for Diabetes: After Your Visit Your Care Instructions A healthy diet is important to manage diabetes. It helps you lose weight (if you need to) and keep it off. It gives you the nutrition and energy your body needs and helps prevent heart disease. But a diet for diabetes does not mean that you have to eat special foods. You can eat what your family eats, including occasional sweets and other favorites. But you do have to pay attention to how often you eat and how much you eat of certain foods. The right plan for you will give you meals that help you keep your blood sugar at healthy levels. Try to eat a variety of foods and to spread carbohydrate throughout the day. Carbohydrate raises blood sugar higher and more quickly than any other nutrient does. Carbohydrate is found in sugar, breads and cereals, fruit, starchy vegetables such as potatoes and corn, and milk and yogurt. You may want to work with a dietitian or diabetes educator to help you plan meals and snacks. A dietitian or diabetes educator also can help you lose weight if that is one of your goals. The following tips can help you enjoy your meals and stay healthy. Follow-up care is a key part of your treatment and safety. Be sure to make and go to all appointments, and call your doctor if you are having problems. Its also a good idea to know your test results and keep a list of the medicines you take. How can you care for yourself at home? · Learn which foods have carbohydrate and how much carbohydrate to eat. A dietitian or diabetes educator can help you learn to keep track of how much carbohydrate you eat. · Spread carbohydrate throughout the day. Eat some carbohydrate at all meals, but do not eat too much at any one time. · Plan meals to include food from all the food groups. These are the food groups and some example portion sizes: ¨ Grains: 1 slice of bread (1 ounce), ½ cup of cooked cereal, and 1/3 cup of cooked pasta or rice. These have about 15 grams of carbohydrate in a serving. Choose whole grains such as whole wheat bread or crackers, oatmeal, and brown rice more often than refined grains. ¨ Fruit: 1 small fresh fruit, such as an apple or orange; ½ of a banana; ½ cup of chopped, cooked, or canned fruit; ½ cup of fruit juice; 1 cup of melon or raspberries; and 2 tablespoons of dried fruit. These have about 15 grams of carbohydrate in a serving. ¨ Dairy: 1 cup of nonfat or low-fat milk and 2/3 cup of plain yogurt. These have about 15 grams of carbohydrate in a serving. ¨ Protein foods: Beef, chicken, turkey, fish, eggs, tofu, cheese, cottage cheese, and peanut butter. A serving size of meat is 3 ounces, which is about the size of a deck of cards. Examples of meat substitute serving sizes (equal to 1 ounce of meat) are 1/4 cup of cottage cheese, 1 egg, 1 tablespoon of peanut butter, and ½ cup of tofu. These have very little or no carbohydrate per serving. ¨ Vegetables: Starchy vegetables such as ½ cup of cooked dried beans, peas, potatoes, or corn have about 15 grams of carbohydrate. Nonstarchy vegetables have very little carbohydrate, such as 1 cup of raw leafy vegetables (such as spinach), ½ cup of other vegetables (cooked or chopped), and 3/4 cup of vegetable juice. · Use the plate format to plan meals. It is a good, quick way to make sure that you have a balanced meal. It also helps you spread carbohydrate throughout the day. You divide your plate by types of foods. Put vegetables on half the plate, meat or meat substitutes on one-quarter of the plate, and a grain or starchy vegetable (such as brown rice or a potato) in the final quarter of the plate. To this you can add a small piece of fruit and 1 cup of milk or yogurt, depending on how much carbohydrate you are supposed to eat at a meal. 
· Talk to your dietitian or diabetes educator about ways to add limited amounts of sweets into your meal plan. You can eat these foods now and then, as long as you include the amount of carbohydrate they have in your daily carbohydrate allowance. · If you drink alcohol, limit it to no more than 1 drink a day for women and 2 drinks a day for men. If you are pregnant, no amount of alcohol is known to be safe. · Protein, fat, and fiber do not raise blood sugar as much as carbohydrate does. If you eat a lot of these nutrients in a meal, your blood sugar will rise more slowly than it would otherwise. · Limit saturated fats, such as those from meat and dairy products. Try to replace it with monounsaturated fat, such as olive oil. This is a healthier choice because people who have diabetes are at higher-than-average risk of heart disease. But use a modest amount of olive oil. A tablespoon of olive oil has 14 grams of fat and 120 calories. · Exercise lowers blood sugar. If you take insulin by shots or pump, you can use less than you would if you were not exercising. Keep in mind that timing matters. If you exercise within 1 hour after a meal, your body may need less insulin for that meal than it would if you exercised 3 hours after the meal. Test your blood sugar to find out how exercise affects your need for insulin. · Exercise on most days of the week. Aim for at least 30 minutes. Exercise helps you stay at a healthy weight and helps your body use insulin. Walking is an easy way to get exercise. Gradually increase the amount you walk every day. You also may want to swim, bike, or do other activities. When you eat out · Learn to estimate the serving sizes of foods that have carbohydrate. If you measure food at home, it will be easier to estimate the amount in a serving of restaurant food. · If the meal you order has too much carbohydrate (such as potatoes, corn, or baked beans), ask to have a low-carbohydrate food instead. Ask for a salad or green vegetables. · If you use insulin, check your blood sugar before and after eating out to help you plan how much to eat in the future. · If you eat more carbohydrate at a meal than you had planned, take a walk or do other exercise. This will help lower your blood sugar. Where can you learn more? Go to [employer] Enter V627 in the search box to learn more about \"Nutrition Tips for Diabetes: After Your Visit. \"  
© 6780-1223 [employer]. Care instructions adapted under license by [employer] (which disclaims liability or warranty for this information). This care instruction is for use with your licensed healthcare professional. If you have questions about a medical condition or this instruction, always ask your healthcare professional. [place] [address] any warranty or liability for your use of this information. Content Version: 39.6.431648; Current as of: June 4, 2014 Atrial Fibrillation: Care Instructions Your Care Instructions Atrial fibrillation is an irregular and often fast heartbeat. Treating this condition is important for several reasons. It can cause blood clots, which can travel from your heart to your brain and cause a stroke. If you have a fast heartbeat, you may feel lightheaded, dizzy, and weak. An irregular heartbeat can also increase your risk for heart failure. Atrial fibrillation is often the result of another heart condition, such as high blood pressure or coronary artery disease. Making changes to improve your heart condition will help you stay healthy and active. Follow-up care is a key part of your treatment and safety. Be sure to make and go to all appointments, and call your doctor if you are having problems. It's also a good idea to know your test results and keep a list of the medicines you take. How can you care for yourself at home? Medicines 
  · Take your medicines exactly as prescribed. Call your doctor if you think you are having a problem with your medicine. You will get more details on the specific medicines your doctor prescribes.  
  · If your doctor has given you a blood thinner to prevent a stroke, be sure you get instructions about how to take your medicine safely. Blood thinners can cause serious bleeding problems.  
  · Do not take any vitamins, over-the-counter drugs, or herbal products without talking to your doctor first.  
Lifestyle changes 
  · Do not smoke. Smoking can increase your chance of a stroke and heart attack. If you need help quitting, talk to your doctor about stop-smoking programs and medicines. These can increase your chances of quitting for good.  
  · Eat a heart-healthy diet.  
  · Stay at a healthy weight. Lose weight if you need to.  
  · Limit alcohol to 2 drinks a day for men and 1 drink a day for women. Too much alcohol can cause health problems.   · Avoid colds and flu. Get a pneumococcal vaccine shot. If you have had one before, ask your doctor whether you need another dose. Get a flu shot every year. If you must be around people with colds or flu, wash your hands often. Activity 
  · If your doctor recommends it, get more exercise. Walking is a good choice. Bit by bit, increase the amount you walk every day. Try for at least 30 minutes on most days of the week. You also may want to swim, bike, or do other activities. Your doctor may suggest that you join a cardiac rehabilitation program so that you can have help increasing your physical activity safely.  
  · Start light exercise if your doctor says it is okay. Even a small amount will help you get stronger, have more energy, and manage stress. Walking is an easy way to get exercise. Start out by walking a little more than you did in the hospital. Gradually increase the amount you walk.  
  · When you exercise, watch for signs that your heart is working too hard. You are pushing too hard if you cannot talk while you are exercising. If you become short of breath or dizzy or have chest pain, sit down and rest immediately.  
  · Check your pulse regularly. Place two fingers on the artery at the palm side of your wrist, in line with your thumb. If your heartbeat seems uneven or fast, talk to your doctor. When should you call for help? Call 911 anytime you think you may need emergency care. For example, call if: 
  · You have symptoms of a heart attack. These may include: 
? Chest pain or pressure, or a strange feeling in the chest. 
? Sweating. ? Shortness of breath. ? Nausea or vomiting. ? Pain, pressure, or a strange feeling in the back, neck, jaw, or upper belly or in one or both shoulders or arms. ? Lightheadedness or sudden weakness. ? A fast or irregular heartbeat. After you call 911, the  may tell you to chew 1 adult-strength or 2 to 4 low-dose aspirin. Wait for an ambulance. Do not try to drive yourself.  
  · You have symptoms of a stroke. These may include: 
? Sudden numbness, tingling, weakness, or loss of movement in your face, arm, or leg, especially on only one side of your body. ? Sudden vision changes. ? Sudden trouble speaking. ? Sudden confusion or trouble understanding simple statements. ? Sudden problems with walking or balance. ? A sudden, severe headache that is different from past headaches.  
  · You passed out (lost consciousness). Call your doctor now or seek immediate medical care if: 
  · You have new or increased shortness of breath.  
  · You feel dizzy or lightheaded, or you feel like you may faint.  
  · Your heart rate becomes irregular.  
  · You can feel your heart flutter in your chest or skip heartbeats. Tell your doctor if these symptoms are new or worse. Watch closely for changes in your health, and be sure to contact your doctor if you have any problems. Where can you learn more? Go to http://www.gray.com/ Enter U020 in the search box to learn more about \"Atrial Fibrillation: Care Instructions. \" Current as of: December 16, 2019               Content Version: 12.6 © 3022-0483 [employer] / [employer]. Care instructions adapted under license by [employer] (which disclaims liability or warranty for this information). If you have questions about a medical condition or this instruction, always ask your healthcare professional. [place] [address] any warranty or liability for your use of this information.

## 2021-03-11 ENCOUNTER — TELEPHONE (OUTPATIENT)
Dept: FAMILY MEDICINE CLINIC | Age: 76
End: 2021-03-11

## 2021-03-11 NOTE — TELEPHONE ENCOUNTER
Pharmacy Progress Note - Telephone Encounter    S/O: Ms. Divya Campa 67 y.o. female, referred by Dr. Cheryl Montano MD, was contacted via an outbound telephone call to discuss medication costs today. Verified patients identifiers (name & ) per HIPAA policy. Received referral to assist patient with coupons/patient assistance for Eliquis     - Patient states that she has been on Eliquis in the past, but her cardiologist has requested that she restart this medication   - She states that she was told her co-pay is $400 with her new insurance  - She states that she has Medicare Part D supplement plan [facility])       A/P:  - Will mail patient an assistance form to see if she will be eligible to obtain medication through the manifacturer  - We may be able to use $10 co-pay card; will mail that to patient as well just in case   - Patient endorses understanding to the provided information. All questions answered at this time. Thank you,  Agueda Alcantara.  Lorraine Robert, NILTOND, BCPS        CLINICAL PHARMACY CONSULT: MED RECONCILIATION/REVIEW ADDENDUM    For Pharmacy Admin Tracking Only    PHSO: PHSO Patient?: Yes  Total # of Interventions Recommended: Count: 1  - Medication access assistance   Total Interventions Accepted: 1  Time Spent (min): 15

## 2021-04-01 DIAGNOSIS — I10 ESSENTIAL HYPERTENSION: ICD-10-CM

## 2021-04-02 RX ORDER — METOPROLOL SUCCINATE 50 MG/1
TABLET, EXTENDED RELEASE ORAL
Qty: 90 TAB | Refills: 1 | Status: SHIPPED | OUTPATIENT
Start: 2021-04-02 | End: 2021-11-24 | Stop reason: SDUPTHER

## 2021-04-14 ENCOUNTER — TELEPHONE (OUTPATIENT)
Dept: INTERNAL MEDICINE CLINIC | Age: 76
End: 2021-04-14

## 2021-04-15 NOTE — TELEPHONE ENCOUNTER
Pharmacy Progress Note - Telephone Encounter    S/O: Ms. Sofia Davis 67 y.o. female, referred by Dr. Isaac Moore MD, was contacted via an outbound telephone call to discuss Eliquis cost today. Verified patients identifiers (name & ) per HIPAA policy.     - Followed up with patient to see if she has received/completed patient assistance application for Eliquis  - Patient states that she did not complete the application because she did not spend enough money out of pocket yet on medications to be eligible   - She states that she went ahead and paid the $400 co-pay for the Eliquis since her doctor wants her to be on the medication  - Discussed that her co-pay will likely be much lower the next time she receives the medication because her deductible should be met. Encouraged patient to confirm this with her insurance company   - Patient will also get her son to help her with the $10 co-pay assistance card for Eliquis. This usually does not work for [employer] Part D patients, but it is worth a try   - Patient endorses understanding to the provided information. All questions answered at this time. Instructed her to call PharmD/office if she has any new concerns regarding the Eliquis       Thank you,  Yi Lentz.  Leida Redd, [place], BCPS        CLINICAL PHARMACY CONSULT: MED RECONCILIATION/REVIEW ADDENDUM    For Pharmacy Admin Tracking Only    PHSO: PHSO Patient?: Yes  Time Spent (min): 15

## 2021-04-30 DIAGNOSIS — I10 ESSENTIAL HYPERTENSION: ICD-10-CM

## 2021-05-03 RX ORDER — LOSARTAN POTASSIUM 100 MG/1
TABLET ORAL
Qty: 90 TAB | Refills: 1 | Status: SHIPPED | OUTPATIENT
Start: 2021-05-03 | End: 2022-01-04

## 2021-06-17 DIAGNOSIS — E11.9 WELL CONTROLLED DIABETES MELLITUS (HCC): ICD-10-CM

## 2021-07-09 ENCOUNTER — HOSPITAL ENCOUNTER (OUTPATIENT)
Dept: GENERAL RADIOLOGY | Age: 76
Discharge: HOME | End: 2021-07-09
Payer: MEDICARE

## 2021-07-09 ENCOUNTER — OFFICE VISIT (OUTPATIENT)
Dept: FAMILY MEDICINE CLINIC | Age: 76
End: 2021-07-09
Payer: MEDICARE

## 2021-07-09 VITALS
TEMPERATURE: 98.3 F | RESPIRATION RATE: 16 BRPM | HEIGHT: 66 IN | OXYGEN SATURATION: 99 % | DIASTOLIC BLOOD PRESSURE: 80 MMHG | WEIGHT: 174 LBS | HEART RATE: 60 BPM | SYSTOLIC BLOOD PRESSURE: 124 MMHG | BODY MASS INDEX: 27.97 KG/M2

## 2021-07-09 DIAGNOSIS — Z00.00 MEDICARE ANNUAL WELLNESS VISIT, SUBSEQUENT: ICD-10-CM

## 2021-07-09 DIAGNOSIS — I48.0 PAROXYSMAL ATRIAL FIBRILLATION (HCC): ICD-10-CM

## 2021-07-09 DIAGNOSIS — M25.473 ANKLE SWELLING, UNSPECIFIED LATERALITY: ICD-10-CM

## 2021-07-09 DIAGNOSIS — M54.2 NECK PAIN: ICD-10-CM

## 2021-07-09 DIAGNOSIS — E11.21 TYPE 2 DIABETES WITH NEPHROPATHY (HCC): Primary | ICD-10-CM

## 2021-07-09 DIAGNOSIS — E78.00 PURE HYPERCHOLESTEROLEMIA: ICD-10-CM

## 2021-07-09 DIAGNOSIS — E83.52 HYPERCALCEMIA: ICD-10-CM

## 2021-07-09 DIAGNOSIS — N28.9 RENAL INSUFFICIENCY: ICD-10-CM

## 2021-07-09 DIAGNOSIS — M54.9 DISCOMFORT OF BACK: ICD-10-CM

## 2021-07-09 DIAGNOSIS — Z95.0 CARDIAC PACEMAKER IN SITU: ICD-10-CM

## 2021-07-09 DIAGNOSIS — I10 ESSENTIAL HYPERTENSION: ICD-10-CM

## 2021-07-09 DIAGNOSIS — E04.1 THYROID NODULE: ICD-10-CM

## 2021-07-09 PROCEDURE — 72040 X-RAY EXAM NECK SPINE 2-3 VW: CPT

## 2021-07-09 PROCEDURE — G0463 HOSPITAL OUTPT CLINIC VISIT: HCPCS | Performed by: FAMILY MEDICINE

## 2021-07-09 PROCEDURE — G8754 DIAS BP LESS 90: HCPCS | Performed by: FAMILY MEDICINE

## 2021-07-09 PROCEDURE — G8752 SYS BP LESS 140: HCPCS | Performed by: FAMILY MEDICINE

## 2021-07-09 PROCEDURE — G8510 SCR DEP NEG, NO PLAN REQD: HCPCS | Performed by: FAMILY MEDICINE

## 2021-07-09 PROCEDURE — 72100 X-RAY EXAM L-S SPINE 2/3 VWS: CPT

## 2021-07-09 PROCEDURE — 1101F PT FALLS ASSESS-DOCD LE1/YR: CPT | Performed by: FAMILY MEDICINE

## 2021-07-09 PROCEDURE — G8399 PT W/DXA RESULTS DOCUMENT: HCPCS | Performed by: FAMILY MEDICINE

## 2021-07-09 PROCEDURE — G8427 DOCREV CUR MEDS BY ELIG CLIN: HCPCS | Performed by: FAMILY MEDICINE

## 2021-07-09 PROCEDURE — 1090F PRES/ABSN URINE INCON ASSESS: CPT | Performed by: FAMILY MEDICINE

## 2021-07-09 PROCEDURE — G8536 NO DOC ELDER MAL SCRN: HCPCS | Performed by: FAMILY MEDICINE

## 2021-07-09 PROCEDURE — 99214 OFFICE O/P EST MOD 30 MIN: CPT | Performed by: FAMILY MEDICINE

## 2021-07-09 PROCEDURE — G8417 CALC BMI ABV UP PARAM F/U: HCPCS | Performed by: FAMILY MEDICINE

## 2021-07-09 PROCEDURE — G0439 PPPS, SUBSEQ VISIT: HCPCS | Performed by: FAMILY MEDICINE

## 2021-07-09 NOTE — PROGRESS NOTES
This is the Subsequent Medicare Annual Wellness Exam, performed 12 months or more after the Initial AWV or the last Subsequent AWV    I have reviewed the patient's medical history in detail and updated the computerized patient record. Assessment/Plan   Education and counseling provided:  Are appropriate based on today's review and evaluation  Discussed 5 years health plan. Discussed advance directive. See ACP note from today   1. Medicare annual wellness visit, subsequent       Depression Risk Factor Screening     3 most recent PHQ Screens 3/9/2021   Little interest or pleasure in doing things Not at all   Feeling down, depressed, irritable, or hopeless Not at all   Total Score PHQ 2 0       Alcohol Risk Screen    Do you average more than 1 drink per night or more than 7 drinks a week:  No    On any one occasion in the past three months have you have had more than 3 drinks containing alcohol:  No        Functional Ability and Level of Safety    Hearing: Hearing is good. Activities of Daily Living: The home contains: no safety equipment. Patient needs help with:  transportation      Ambulation: with no difficulty     Fall Risk:  Fall Risk Assessment, last 12 mths 3/9/2021   Able to walk? Yes   Fall in past 12 months? 0   Do you feel unsteady? 0   Are you worried about falling 0      Abuse Screen:  Patient is not abused       Cognitive Screening    Has your family/caregiver stated any concerns about your memory: no     Cognitive Screening: Normal -      Health Maintenance Due     Health Maintenance Due   Topic Date Due    Hepatitis C Screening  Never done    COVID-19 Vaccine (1) Never done    Eye Exam Retinal or Dilated  09/06/2019     Did eye exam at [facility]. Will obtain records.  Done in march 2021   Completed covid vaccine   Patient Care Team   Patient Care Team:  Megan Vaughn MD as PCP - General (Family Medicine)  Megan Vaughn MD as PCP - [facility] Empaneled Provider  Rubi Manzo MD VALERIE (Cardiology)  Brenda Christianson MD as Physician (Colon and Rectal Surgery)  Phan Medellin MD (Ophthalmology)  Josiane Arias MD (Endocrinology)    History     Patient Active Problem List   Diagnosis Code    Type 2 diabetes mellitus with hyperglycemia, without long-term current use of insulin ([address] E11.65    Essential hypertension I10    Bradycardia/ post pacemaker. s/p ablation R00.1    Atrial fibrillation (HCC)/ post ablation I48.91    Hyperlipidemia E78.5    Advance directive discussed with patient/ living wheel and she will provide a copy / want to consider changes. will provide updates  Z71.89    Cardiac pacemaker in situ Z95.0    S/P colonoscopy Z98.890    Thyroid nodule E04.1    Type 2 diabetes with nephropathy ([address] E11.21    Renal insufficiency N28.9    Hypercalcemia E83.52    Elevated PTHrP level R79.89     Past Medical History:   Diagnosis Date    Arrhythmia     history atrial fib    Bradycardia     Diabetes ([address]     borderline    DVT (deep venous thrombosis) ([address] 2010    right    History of ablation March 2016     History of atrial fibrillation     Hypercholesterolemia     Hypertension     Long term current use of anticoagulant therapy     Menopause     Osteoporosis     Seizure ([address]     Seizures ([address]     on medication from 2006 to 2013 no seizure since 2006    [employer] pacemaker implant Set.  2013 Dr. Alvester Brunner, [place]     Thyroid disease     nodules      Past Surgical History:   Procedure Laterality Date    COLONOSCOPY N/A 10/25/2017    COLONOS[facility] Marleni Mason [facility] ENDOSCOPY    HX CATARACT REMOVAL      HX HYSTERECTOMY      HX KNEE REPLACEMENT      left knee in 1/2010 and right knee 7/2016    HX KNEE REPLACEMENT      right knee    HX PACEMAKER      SC CARDIAC SURG PROCEDURE UNLIST      ablation for a-fib     Current Outpatient Medications   Medication Sig Dispense Refill    OneTouch Ultra Blue Test Strip strip USE TO TEST BLOOD SUGAR ONCE DAILY 100 Strip 6    losartan (COZAAR) 100 mg tablet TAKE 1 TABLET BY MOUTH EVERY DAY 90 Tab 1    metoprolol succinate (TOPROL-XL) 50 mg XL tablet TAKE 1 TABLET BY MOUTH EVERY DAY 90 Tab 1    apixaban (ELIQUIS) 5 mg tablet Take 1 Tab by mouth two (2) times a day. 60 Tab 5    metFORMIN (GLUCOPHAGE) 500 mg tablet TAKE 1 TABLET BY MOUTH EVERY DAY IN THE MORNING WITH BREAKFAST 90 Tab 3    lancets misc One Touch Ultra soft testing fasting blood sugar every morning prior to breakfast DX E11.9 100 Each 11    cholecalciferol, vitamin D3, (VITAMIN D3) 2,000 unit tab Take  by mouth.  spironolactone (ALDACTONE) 25 mg tablet Take 1 Tab by mouth daily. 4    aspirin 81 mg chewable tablet Take 81 mg by mouth daily.  DOCOSAHEXANOIC ACID/EPA (FISH OIL PO) Take 100 mg by mouth daily.  COQ10, LIPOSOMAL UBIQUINOL, PO Take 200 mg by mouth daily.        Allergies   Allergen Reactions    Statins-Hmg-Coa Reductase Inhibitors Other (comments)     [place] sick        Family History   Problem Relation Age of Onset    Diabetes Father     Hypertension Sister      Social History     Tobacco Use    Smoking status: Never Smoker    Smokeless tobacco: Never Used   Substance Use Topics    Alcohol use: No         Antonella Beauchamp LPN

## 2021-07-09 NOTE — ACP (ADVANCE CARE PLANNING)
Advance Care Planning     General Advance Care Planning (ACP) Conversation      Date of Conversation: 7/9/2021  Conducted with: Patient with Decision Making Capacity    Healthcare Decision Maker:     Primary Decision Maker: Kristin Cortes - Child - [phone]    Primary Decision Maker: Damion belle Jones - Eliseo - [phone]  Click here to complete [address] including selection of the Healthcare Decision Maker Relationship (ie \"Primary\")  Today we pt has an advance directive. in process to change the things on it so not ready to give the copy yet.  discussed advance directive today and given information with AVS.     Content/Action Overview:   see above     Length of Voluntary ACP Conversation in minutes:  <16 minutes (Non-Billable)    Devan Healy MD

## 2021-07-09 NOTE — PATIENT INSTRUCTIONS
Medicare Wellness Visit, Female     The best way to live healthy is to have a lifestyle where you eat a well-balanced diet, exercise regularly, limit alcohol use, and quit all forms of tobacco/nicotine, if applicable. Regular preventive services are another way to keep healthy. Preventive services (vaccines, screening tests, monitoring & exams) can help personalize your care plan, which helps you manage your own care. Screening tests can find health problems at the earliest stages, when they are easiest to treat. [facility] follows the current, evidence-based guidelines published by the [facility] ([facility]) when recommending preventive services for our patients. Because we follow these guidelines, sometimes recommendations change over time as research supports it. (For example, mammograms used to be recommended annually. Even though Medicare will still pay for an annual mammogram, the newer guidelines recommend a mammogram every two years for women of average risk). Of course, you and your doctor may decide to screen more often for some diseases, based on your risk and your co-morbidities (chronic disease you are already diagnosed with). Preventive services for you include:  - Medicare offers their members a free annual wellness visit, which is time for you and your primary care provider to discuss and plan for your preventive service needs. Take advantage of this benefit every year!  -All adults over the age of 72 should receive the recommended pneumonia vaccines. Current USPSTF guidelines recommend a series of two vaccines for the best pneumonia protection.   -All adults should have a flu vaccine yearly and a tetanus vaccine every 10 years.   -All adults age 48 and older should receive the shingles vaccines (series of two vaccines).       -All adults age 38-68 who are overweight should have a diabetes screening test once every three years.   -All adults born between [birth year] and 1966 should be screened once for Hepatitis C.  -Other screening tests and preventive services for persons with diabetes include: an eye exam to screen for diabetic retinopathy, a kidney function test, a foot exam, and stricter control over your cholesterol.   -Cardiovascular screening for adults with routine risk involves an electrocardiogram (ECG) at intervals determined by your doctor.   -Colorectal cancer screenings should be done for adults age 54-65 with no increased risk factors for colorectal cancer. There are a number of acceptable methods of screening for this type of cancer. Each test has its own benefits and drawbacks. Discuss with your doctor what is most appropriate for you during your annual wellness visit. The different tests include: colonoscopy (considered the best screening method), a fecal occult blood test, a fecal DNA test, and sigmoidoscopy.    -A bone mass density test is recommended when a woman turns 65 to screen for osteoporosis. This test is only recommended one time, as a screening. Some providers will use this same test as a disease monitoring tool if you already have osteoporosis. -Breast cancer screenings are recommended every other year for women of normal risk, age 54-69.  -Cervical cancer screenings for women over age 72 are only recommended with certain risk factors. Here is a list of your current Health Maintenance items (your personalized list of preventive services) with a due date:  Health Maintenance Due   Topic Date Due    Hepatitis C Test  Never done    COVID-19 Vaccine (1) Never done    Eye Exam  09/06/2019            Neck: Exercises  Introduction  Here are some examples of exercises for you to try. The exercises may be suggested for a condition or for rehabilitation. Start each exercise slowly. Ease off the exercises if you start to have pain. You will be told when to start these exercises and which ones will work best for you.   How to do the exercises  Neck stretch   1. This stretch works best if you keep your shoulder down as you lean away from it. To help you remember to do this, start by relaxing your shoulders and lightly holding on to your thighs or your chair. 2. Tilt your head toward your shoulder and hold for 15 to 30 seconds. Let the weight of your head stretch your muscles. 3. If you would like a little added stretch, use your hand to gently and steadily pull your head toward your shoulder. For example, keeping your right shoulder down, lean your head to the left. 4. Repeat 2 to 4 times toward each shoulder. Diagonal neck stretch   1. Turn your head slightly toward the direction you will be stretching, and tilt your head diagonally toward your chest and hold for 15 to 30 seconds. 2. If you would like a little added stretch, use your hand to gently and steadily pull your head forward on the diagonal.  3. Repeat 2 to 4 times toward each side. Dorsal glide stretch   The dorsal glide stretches the back of the neck. If you feel pain, do not glide so far back. Some people find this exercise easier to do while lying on their backs with an ice pack on the neck. 1. Sit or stand tall and look straight ahead. 2. Slowly tuck your chin as you glide your head backward over your body  3. Hold for a count of 6, and then relax for up to 10 seconds. 4. Repeat 8 to 12 times. Chest and shoulder stretch   1. Sit or stand tall and glide your head backward as in the dorsal glide stretch. 2. Raise both arms so that your hands are next to your ears. 3. Take a deep breath, and as you breathe out, lower your elbows down and behind your back. You will feel your shoulder blades slide down and together, and at the same time you will feel a stretch across your chest and the front of your shoulders. 4. Hold for about 6 seconds, and then relax for up to 10 seconds. 5. Repeat 8 to 12 times. Strengthening: Hands on head   1.  Move your head backward, forward, and side to side against gentle pressure from your hands, holding each position for about 6 seconds. 2. Repeat 8 to 12 times. Follow-up care is a key part of your treatment and safety. Be sure to make and go to all appointments, and call your doctor if you are having problems. It's also a good idea to know your test results and keep a list of the medicines you take. Where can you learn more? Go to http://www.negro.com/  Enter P975 in the search box to learn more about \"Neck: Exercises. \"  Current as of: November 16, 2020               Content Version: 12.8  © 2211-3909 [employer]. Care instructions adapted under license by [employer] (which disclaims liability or warranty for this information). If you have questions about a medical condition or this instruction, always ask your healthcare professional. [address] any warranty or liability for your use of this information. Advance Care Planning: Care Instructions  Your Care Instructions     It can be hard to live with an illness that cannot be cured. But if your health is getting worse, you may want to make decisions about end-of-life care. Planning for the end of your life does not mean that you are giving up. It is a way to make sure that your wishes are met. Clearly stating your wishes can make it easier for your loved ones. Making plans while you are still able may also ease your mind and make your final days less stressful and more meaningful. Follow-up care is a key part of your treatment and safety. Be sure to make and go to all appointments, and call your doctor if you are having problems. It's also a good idea to know your test results and keep a list of the medicines you take. What can you do to plan for the end of life? · You can bring these issues up with your doctor. You do not need to wait until your doctor starts the conversation.  You might start with, \"What makes life worth living for me is. Rolo Long \" And then follow it with, \"I would not be willing to live with . Rolo Long \" When you complete this sentence it helps your doctor understand your wishes. · Talk openly and honestly with your doctor. This is the best way to understand the decisions you will need to make as your health changes. Know that you can always change your mind. · Ask your doctor about commonly used life-support measures. These include tube feedings, breathing machines, and fluids given through a vein (IV). Understanding these treatments will help you decide whether you want them. · You may choose to have these life-supporting treatments for a limited time. This allows a trial period to see whether they will help you. You may also decide that you want your doctor to take only certain measures to keep you alive. It may help to think about the big picture, like what makes life worth living for you or what your values and goals are. · Talk to your doctor about how long you are likely to live. Your doctor may be able to give you an idea of what usually happens with your specific illness. · Think about preparing papers that state your wishes. These papers are called advance directives. If you do this early and review them often, there will not be any confusion about what you want. You can change your instructions at any time. Which papers should you prepare? Advance directives are legal papers that tell doctors how you want to be cared for at the end of your life. You do not need a  to write these papers. Ask your doctor or your state health department for information on how to write your advance directives. They may have the forms for each of these types of papers. Make sure your doctor has a copy of these on file, and give a copy to a family member or close friend. · Consider a do-not-resuscitate order (DNR).  This order asks that no extra treatments be done if your heart stops or you stop breathing. Extra treatments may include cardiopulmonary resuscitation (CPR), electrical shock to restart your heart, or a machine to breathe for you. If you decide to have a DNR order, ask your doctor to explain and write it. Place the order in your home where everyone can easily see it. · Consider a living will. A living will explains your wishes about life support and other treatments at the end of your life if you become unable to speak for yourself. Living ramirez tell doctors to use or not use treatments that would keep you alive. You must have one or two witnesses or a notary present when you sign this form. A living will may be called something else in your state. · Consider a medical power of . This form allows you to name a person to make decisions about your care if you are not able to. Most people ask a close friend or family member. Talk to this person about the kinds of treatments you want and those that you do not want. Make sure this person understands your wishes. A medical power of  may be called something else in your state. These legal papers are simple to change. Tell your doctor what you want to change, and ask him or her to make a note in your medical file. Give your family updated copies of the papers. Where can you learn more? Go to http://www.negro.com/  Enter P184 in the search box to learn more about \"Advance Care Planning: Care Instructions. \"  Current as of: July 17, 2020               Content Version: 12.8  © 3136-6177 [employer]. Care instructions adapted under license by "[employer] (which disclaims liability or warranty for this information). If you have questions about a medical condition or this instruction, always ask your healthcare professional. [place] [address] any warranty or liability for your use of this information.          Deciding About IV Fluids or Tube Feedings When You Have a Terminal Illness  How can you decide about having IV fluids or tube feedings when you have a terminal illness? Your Care Instructions  IV fluids and tube feedings can be used when you are no longer able to eat or drink by mouth. IV fluids are given through a needle placed in a vein. Liquid food can be given through a tube that goes down your nose into your stomach. Or it may be given through a tube that is surgically placed in your belly. You get to decide if you want to have IV fluids or tube feedings if you can no longer eat or drink on your own. It will not cure your illness, and it can be uncomfortable. But it may also make you feel better or live longer. Without IV fluids and tube feedings, your body will slow down naturally. Most likely, you will not feel hungry. And your doctor will take steps to keep you comfortable until you die. The decision about whether to have IV fluids and tube feedings is a personal one. You may decide that you would want one but not the other. Be sure to talk it over with your doctor and loved ones. Follow-up care is a key part of your treatment and safety. Be sure to make and go to all appointments, and call your doctor if you are having problems. It's also a good idea to know your test results and keep a list of the medicines you take. Why might you want IV fluids or tube feedings?    · It may help you recover from a short illness or injury.     · It may help improve the quality of your remaining time.     · You believe that every possible step should be taken to preserve life, regardless of quality of life.     · There is hope that there is or will soon be a cure for your condition. Why might you not want IV fluids or tube feedings?    · It cannot cure a terminal illness.     · It may prolong your life, but it would not make you more comfortable or increase the quality of the rest of your life.     · There are more risks than benefits.  Risks include infection, pneumonia, and digestive problems such as diarrhea.     · You do not wish to be kept alive artificially. When should you call for help? Be sure to contact your doctor if:    · You want more information about IV fluids and tube feedings.     · You want to change your decision about receiving IV fluids and tube feedings. Where can you learn more? Go to http://www.gray.com/  Enter N022 in the search box to learn more about \"Deciding About IV Fluids or Tube Feedings When You Have a Terminal Illness. \"  Current as of: July 17, 2020               Content Version: 12.8  © 2006-2021 [employer]. Care instructions adapted under license by [employer] (which disclaims liability or warranty for this information). If you have questions about a medical condition or this instruction, always ask your healthcare professional. [address] any warranty or liability for your use of this information. Deciding About Life-Prolonging Treatment  How can you decide about life-prolonging treatment? What is life-prolonging treatment? There are many kinds of treatment that can help you live longer. These may be needed for only a short time until your illness improves. Or you may use them over the long term to help keep you alive. Some treatments include the use of:  · Medicines to slow the progress of certain diseases, such as heart disease, diabetes, cancer, AIDS, or Alzheimer's disease. · Antibiotics to treat serious infections, such as pneumonia. · Dialysis to clean your blood if your kidneys stop working. · A breathing machine to help you breathe if you can't breathe on your own. This machine pumps air into your lungs through a tube put into your throat. · A feeding tube or an intravenous (IV) line to give you food and fluids if you can't eat or drink. · Cardiopulmonary resuscitation (CPR) to try to restart your heart.   The decision to receive treatments that may help you live longer is a personal one. You may want your doctor to do everything possible to keep you alive, even when your chance for recovery is small. Or you may choose to only have care to manage your pain and other symptoms. What are key points about this decision? · If there is a good chance that your illness can be cured or managed, your doctor may advise you to first try available treatments. If these don't work, then you might think about stopping treatment. · If you stop treatment, you will still receive care that focuses on pain relief and comfort. · A decision to stop treatment that keeps you alive does not have to be permanent. You can always change your mind if your health starts to improve. · Even though treatment focuses on helping you live longer, it may cause side effects that can greatly affect your quality of life. And it could affect how you spend time with your family and friends. · If you still have personal goals that you want to pursue, you may want treatment that keeps you alive long enough to reach them. Why might you choose life-prolonging treatment? · There is a good chance that your illness can be cured or managed. · You think you can manage the possible side effects of treatment. · You don't think treatment will get in the way of your quality of life. · You have personal goals that you still want to pursue and achieve. Why might you choose to stop life-prolonging treatment? · Your chance of surviving your illness is very low. · You have tried all possible treatments for your illness, but they have not helped. · You can no longer deal with the side effects of treatment. · You have already met the goals you set out to achieve in your life. Your decision  Thinking about the facts and your feelings can help you make a decision that is right for you.  Be sure you understand the benefits and risks of your options, and think about what else you need to do before you make the decision. Where can you learn more? Go to http://www.gray.com/  Enter [id number] in the search box to learn more about \"Deciding About Life-Prolonging Treatment. \"  Current as of: July 17, 2020               Content Version: 12.8  © 2926-4000 [employer]. Care instructions adapted under license by [employer] (which disclaims liability or warranty for this information). If you have questions about a medical condition or this instruction, always ask your healthcare professional. [address] any warranty or liability for your use of this information. Learning About Living [place]  What is a living will? A living will, also called a declaration, is a legal form. It tells your family and your doctor your wishes when you can't speak for yourself. It's used by the health professionals who will treat you as you near the end of your life or if you get seriously hurt or ill. If you put your wishes in writing, your loved ones and others will know what kind of care you want. They won't need to guess. This can ease your mind and be helpful to others. And you can change or cancel your living will at any time. A living will is not the same as an estate or property will. An estate will explains what you want to happen with your money and property after you die. How do you use it? A living will is used to describe the kinds of treatment or life support you want as you near the end of your life or if you get seriously hurt or ill. Keep these facts in mind about living ramirez. · Your living will is used only if you can't speak or make decisions for yourself. Most often, one or more doctors must certify that you can't speak or decide for yourself before your living will takes effect. · If you get better and can speak for yourself again, you can accept or refuse any treatment. It doesn't matter what you said in your living will.   · Some states may limit your right to refuse treatment in certain cases. For example, you may need to clearly state in your living will that you don't want artificial hydration and nutrition, such as being fed through a tube. Is a living will a legal document? A living will is a legal document. Each state has its own laws about living ramirez. And a living will may be called something else in your state. Here are some things to know about living ramirez. · You don't need an  to complete a living will. But legal advice can be helpful if your state's laws are unclear. It can also help if your health history is complicated or your family can't agree on what should be in your living will. · You can change your living will at any time. Some people find that their wishes about end-of-life care change as their health changes. If you make big changes to your living will, complete a new form. · If you move to another state, make sure that your living will is legal in the state where you now live. In most cases, doctors will respect your wishes even if you have a form from a different state. · You might use a universal form that has been approved by many states. This kind of form can sometimes be filled out and stored online. Your digital copy will then be available wherever you have a connection to the internet. The doctors and nurses who need to treat you can find it right away. · Your state may offer an online registry. This is another place where you can store your living will online. · It's a good idea to get your living will notarized. This means using a person called a  to watch two people sign, or witness, your living will. What should you know when you create a living will? Here are some questions to ask yourself as you make your living will:  · Do you know enough about life support methods that might be used?  If not, talk to your doctor so you know what might be done if you can't breathe on your own, your heart stops, or you can't swallow. · What things would you still want to be able to do after you receive life-support methods? Would you want to be able to walk? To speak? To eat on your own? To live without the help of machines? · Do you want certain Oriental orthodox practices performed if you become very ill? · If you have a choice, where do you want to be cared for? In your home? At a hospital or nursing home? · If you have a choice at the end of your life, where would you prefer to die? At home? In a hospital or nursing home? Somewhere else? · Would you prefer to be buried or cremated? · Do you want your organs to be donated after you die? What should you do with your living will? · Make sure that your family members and your health care agent have copies of your living will (also called a declaration). · Give your doctor a copy of your living will. Ask him or her to keep it as part of your medical record. If you have more than one doctor, make sure that each one has a copy. · Put a copy of your living will where it can be easily found. For example, some people may put a copy on their refrigerator door. If you are using a digital copy, be sure your doctor, family members, and health care agent know how to find and access it. Where can you learn more? Go to http://www.gray.com/  Enter K356 in the search box to learn more about \"Learning About Living [place]. \"  Current as of: July 17, 2020               Content Version: 12.8  © 2006-2021 [employer]. Care instructions adapted under license by [employer] (which disclaims liability or warranty for this information). If you have questions about a medical condition or this instruction, always ask your healthcare professional. [place] [address] any warranty or liability for your use of this information. Learning About Medical Power of   What is a medical power of ? A medical power of , also called a durable power of  for health care, is one type of the legal forms called advance directives. It lets you name the person you want to make treatment decisions for you if you can't speak or decide for yourself. The person you choose is called your health care agent. This person is also called a health care proxy or health care surrogate. A medical power of  may be called something else in your state. How do you choose a health care agent? Choose your health care agent carefully. This person may or may not be a family member. Talk to the person before you make your final decision. Make sure he or she is comfortable with this responsibility. It's a good idea to choose someone who:  · Is at least 25years old. · Knows you well and understands what makes life meaningful for you. · Understands your Alevism and moral values. · Will do what you want, not what he or she wants. · Will be able to make difficult choices at a stressful time. · Will be able to refuse or stop treatment, if that is what you would want, even if you could die. · Will be firm and confident with health professionals if needed. · Will ask questions to get needed information. · Lives near you or agrees to travel to you if needed. Your family may help you make medical decisions while you can still be part of that process. But it's important to choose one person to be your health care agent in case you aren't able to make decisions for yourself. If you don't fill out the legal form and name a health care agent, the decisions your family can make may be limited. A health care agent may be called something else in your state. Who will make decisions for you if you don't have a health care agent? If you don't have a health care agent or a living will, you may not get the care you want. Decisions may be made by family members who disagree about your medical care.  Or decisions may be made by a medical professional who doesn't know you well. In some cases, a  makes the decisions. When you name a health care agent, it is very clear who has the power to make health decisions for you. How do you name a health care agent? You name your health care agent on a legal form. This form is usually called a medical power of . Ask your hospital, state bar association, or office on aging where to find these forms. You must sign the form to make it legal. Some states require you to get the form notarized. This means that a person called a  watches you sign the form and then he or she signs the form. Some states also require that two or more witnesses sign the form. Be sure to tell your family members and doctors who your health care agent is. Where can you learn more? Go to http://www.gray.com/  Enter P737 in the search box to learn more about \"Learning About [address]. \"  Current as of: July 17, 2020               Content Version: 12.8  © 2006-2021 [employer]. Care instructions adapted under license by [employer] (which disclaims liability or warranty for this information). If you have questions about a medical condition or this instruction, always ask your healthcare professional. [place] [address] any warranty or liability for your use of this information. Advance Directives: Care Instructions  Overview  An advance directive is a legal way to state your wishes at the end of your life. It tells your family and your doctor what to do if you can't say what you want. There are two main types of advance directives. You can change them any time your wishes change. Living will. This form tells your family and your doctor your wishes about life support and other treatment. The form is also called a declaration. Medical power of .    This form lets you name a person to make treatment decisions for you when you can't speak for yourself. This person is called a health care agent (health care proxy, health care surrogate). The form is also called a durable power of  for health care. If you do not have an advance directive, decisions about your medical care may be made by a family member, or by a doctor or a  who doesn't know you. It may help to think of an advance directive as a gift to the people who care for you. If you have one, they won't have to make tough decisions by themselves. Follow-up care is a key part of your treatment and safety. Be sure to make and go to all appointments, and call your doctor if you are having problems. It's also a good idea to know your test results and keep a list of the medicines you take. What should you include in an advance directive? Many states have a unique advance directive form. (It may ask you to address specific issues.) Or you might use a universal form that's approved by many states. If your form doesn't tell you what to address, it may be hard to know what to include in your advance directive. Use the questions below to help you get started. · Who do you want to make decisions about your medical care if you are not able to? · What life-support measures do you want if you have a serious illness that gets worse over time or can't be cured? · What are you most afraid of that might happen? (Maybe you're afraid of having pain, losing your independence, or being kept alive by machines.)  · Where would you prefer to die? (Your home? A hospital? A nursing home?)  · Do you want to donate your organs when you die? · Do you want certain Latter-day practices performed before you die? When should you call for help? Be sure to contact your doctor if you have any questions. Where can you learn more?   Go to http://www.gray.com/  Enter R264 in the search box to learn more about \"Advance Directives: Care Instructions. \"  Current as of: July 17, 2020               Content Version: 12.8  © 6543-3991 Healthwise, [facility]. Care instructions adapted under license by [employer] (which disclaims liability or warranty for this information). If you have questions about a medical condition or this instruction, always ask your healthcare professional. [address] any warranty or liability for your use of this information. Deciding About Being on a Ventilator When You Have a Terminal Illness  How can you decide about being on a ventilator when you have a terminal illness? Your Care Instructions  A ventilator is a life-support machine that helps you breathe if you can no longer breathe on your own. The machine provides oxygen to your lungs through a tube. The tube enters your mouth and goes down your throat to your lungs. Most people on ventilators have to be fed through another tube that goes into the stomach. You may feel that being on a ventilator would prevent a \"natural\" death or would keep you alive longer than necessary. Or you may feel that being on a ventilator would extend your life so you can do certain things, such as saying good-bye to loved ones. The decision about whether to be on a ventilator is a personal one. Be sure to talk it over with your doctor and loved ones. Follow-up care is a key part of your treatment and safety. Be sure to make and go to all appointments, and call your doctor if you are having problems. It's also a good idea to know your test results and keep a list of the medicines you take. Why might you want to be on a ventilator?    · You think you may be able to return to your normal activities.     · You need help breathing because of a short illness or a problem that is not related to your terminal illness.     · You would like more time to say good-bye.     · You feel that there are more benefits than risks.    Why might you not want to be on a ventilator?    · You have other long-term health problems.     · You may not be able to return to your normal activities.     · You want a calm, peaceful death. You do not want to spend the rest of your life on a ventilator.     · You feel that there are more risks than benefits. When should you call for help? Be sure to contact your doctor if:    · You want to learn more about being on a ventilator.     · You change your mind about being on a ventilator. Where can you learn more? Go to http://www.gray.com/  Enter T485 in the search box to learn more about \"Deciding About Being on a Ventilator When You Have a Terminal Illness. \"  Current as of: July 17, 2020               Content Version: 12.8  © 2006-2021 [employer]. Care instructions adapted under license by [employer] (which disclaims liability or warranty for this information). If you have questions about a medical condition or this instruction, always ask your healthcare professional. [place] [address] any warranty or liability for your use of this information. Nutrition Tips for Diabetes: After Your Visit  Your Care Instructions  A healthy diet is important to manage diabetes. It helps you lose weight (if you need to) and keep it off. It gives you the nutrition and energy your body needs and helps prevent heart disease. But a diet for diabetes does not mean that you have to eat special foods. You can eat what your family eats, including occasional sweets and other favorites. But you do have to pay attention to how often you eat and how much you eat of certain foods. The right plan for you will give you meals that help you keep your blood sugar at healthy levels. Try to eat a variety of foods and to spread carbohydrate throughout the day. Carbohydrate raises blood sugar higher and more quickly than any other nutrient does.  Carbohydrate is found in sugar, breads and cereals, fruit, starchy vegetables such as potatoes and corn, and milk and yogurt. You may want to work with a dietitian or diabetes educator to help you plan meals and snacks. A dietitian or diabetes educator also can help you lose weight if that is one of your goals. The following tips can help you enjoy your meals and stay healthy. Follow-up care is a key part of your treatment and safety. Be sure to make and go to all appointments, and call your doctor if you are having problems. Its also a good idea to know your test results and keep a list of the medicines you take. How can you care for yourself at home? · Learn which foods have carbohydrate and how much carbohydrate to eat. A dietitian or diabetes educator can help you learn to keep track of how much carbohydrate you eat. · Spread carbohydrate throughout the day. Eat some carbohydrate at all meals, but do not eat too much at any one time. · Plan meals to include food from all the food groups. These are the food groups and some example portion sizes:  ¨ Grains: 1 slice of bread (1 ounce), ½ cup of cooked cereal, and 1/3 cup of cooked pasta or rice. These have about 15 grams of carbohydrate in a serving. Choose whole grains such as whole wheat bread or crackers, oatmeal, and brown rice more often than refined grains. ¨ Fruit: 1 small fresh fruit, such as an apple or orange; ½ of a banana; ½ cup of chopped, cooked, or canned fruit; ½ cup of fruit juice; 1 cup of melon or raspberries; and 2 tablespoons of dried fruit. These have about 15 grams of carbohydrate in a serving. ¨ Dairy: 1 cup of nonfat or low-fat milk and 2/3 cup of plain yogurt. These have about 15 grams of carbohydrate in a serving. ¨ Protein foods: Beef, chicken, turkey, fish, eggs, tofu, cheese, cottage cheese, and peanut butter. A serving size of meat is 3 ounces, which is about the size of a deck of cards.  Examples of meat substitute serving sizes (equal to 1 ounce of meat) are 1/4 cup of cottage cheese, 1 egg, 1 tablespoon of peanut butter, and ½ cup of tofu. These have very little or no carbohydrate per serving. ¨ Vegetables: Starchy vegetables such as ½ cup of cooked dried beans, peas, potatoes, or corn have about 15 grams of carbohydrate. Nonstarchy vegetables have very little carbohydrate, such as 1 cup of raw leafy vegetables (such as spinach), ½ cup of other vegetables (cooked or chopped), and 3/4 cup of vegetable juice. · Use the plate format to plan meals. It is a good, quick way to make sure that you have a balanced meal. It also helps you spread carbohydrate throughout the day. You divide your plate by types of foods. Put vegetables on half the plate, meat or meat substitutes on one-quarter of the plate, and a grain or starchy vegetable (such as brown rice or a potato) in the final quarter of the plate. To this you can add a small piece of fruit and 1 cup of milk or yogurt, depending on how much carbohydrate you are supposed to eat at a meal.  · Talk to your dietitian or diabetes educator about ways to add limited amounts of sweets into your meal plan. You can eat these foods now and then, as long as you include the amount of carbohydrate they have in your daily carbohydrate allowance. · If you drink alcohol, limit it to no more than 1 drink a day for women and 2 drinks a day for men. If you are pregnant, no amount of alcohol is known to be safe. · Protein, fat, and fiber do not raise blood sugar as much as carbohydrate does. If you eat a lot of these nutrients in a meal, your blood sugar will rise more slowly than it would otherwise. · Limit saturated fats, such as those from meat and dairy products. Try to replace it with monounsaturated fat, such as olive oil. This is a healthier choice because people who have diabetes are at higher-than-average risk of heart disease. But use a modest amount of olive oil. A tablespoon of olive oil has 14 grams of fat and 120 calories.   · Exercise lowers blood sugar. If you take insulin by shots or pump, you can use less than you would if you were not exercising. Keep in mind that timing matters. If you exercise within 1 hour after a meal, your body may need less insulin for that meal than it would if you exercised 3 hours after the meal. Test your blood sugar to find out how exercise affects your need for insulin. · Exercise on most days of the week. Aim for at least 30 minutes. Exercise helps you stay at a healthy weight and helps your body use insulin. Walking is an easy way to get exercise. Gradually increase the amount you walk every day. You also may want to swim, bike, or do other activities. When you eat out  · Learn to estimate the serving sizes of foods that have carbohydrate. If you measure food at home, it will be easier to estimate the amount in a serving of restaurant food. · If the meal you order has too much carbohydrate (such as potatoes, corn, or baked beans), ask to have a low-carbohydrate food instead. Ask for a salad or green vegetables. · If you use insulin, check your blood sugar before and after eating out to help you plan how much to eat in the future. · If you eat more carbohydrate at a meal than you had planned, take a walk or do other exercise. This will help lower your blood sugar. Where can you learn more? Go to [employer]  Enter C651 in the search box to learn more about \"Nutrition Tips for Diabetes: After Your Visit. \"   © 8943-2603 Healthwise, [employer]. Care instructions adapted under license by [facility] (which disclaims liability or warranty for this information). This care instruction is for use with your licensed healthcare professional. If you have questions about a medical condition or this instruction, always ask your healthcare professional. [place] [address] any warranty or liability for your use of this information.   Content Version: 12.1.143513; Current as of: June 4, 2014                 Chronic Kidney Disease: Care Instructions  Your Care Instructions     Chronic kidney disease happens when your kidneys don't work as well as they should. Your kidneys have a few important jobs. They remove waste from your blood. This waste leaves your body in your urine. They also balance your body's fluids and chemicals. When your kidneys don't work well, extra waste and fluid can build up. This can poison the body and sometimes cause death. The most common causes of this disease are diabetes and high blood pressure. In some cases, the disease develops in 2 to 3 months. But it usually develops over many years. If you take medicine and make healthy changes to your lifestyle, you may be able to prevent the disease from getting worse. But if your kidney damage gets worse, you may need dialysis or a kidney transplant. Dialysis uses a machine to filter waste from the blood. A transplant is surgery to give you a healthy kidney from another person. Follow-up care is a key part of your treatment and safety. Be sure to make and go to all appointments, and call your doctor if you are having problems. It's also a good idea to know your test results and keep a list of the medicines you take. How can you care for yourself at home? Treatments and appointments    · Be safe with medicines. Take your medicines exactly as prescribed. Call your doctor if you have any problems with your medicine. You also may take medicine to control your blood pressure or to treat diabetes. Many people who have diabetes take blood pressure medicine.     · If you have diabetes, do your best to keep your blood sugar in your target range. You may do this by eating healthy food and exercising. You may also take medicines.     · Go to your dialysis appointments if you have this treatment.     · Do not take ibuprofen (Advil, Motrin), naproxen (Aleve), or similar medicines, unless your doctor tells you to.  These may make the disease worse.     · Do not take any vitamins, over-the-counter medicines, or herbal products without talking to your doctor first.     · Do not smoke or use other tobacco products. Smoking can reduce blood flow to the kidneys. If you need help quitting, talk to your doctor about stop-smoking programs and medicines. These can increase your chances of quitting for good.     · Do not drink alcohol or use illegal drugs.     · Talk to your doctor about an exercise plan. Exercise helps lower your blood pressure. It also makes you feel better.     · If you have an advance directive, let your doctor know. It may include a living will and a durable power of  for health care. If you don't have one, you may want to prepare one. It lets your doctor and loved ones know your health care wishes if you become unable to speak for yourself. Diet    · Talk to a registered dietitian. He or she can help you make a meal plan that is right for you. Most people with kidney disease need to limit salt (sodium), fluids, and protein. Some also have to limit potassium and phosphorus.     · You may have to give up many foods you like. But try to focus on the fact that this will help you stay healthy for as long as possible.     · If you have a hard time eating enough, talk to your doctor or dietitian about ways to add calories to your diet.     · Your diet may change as your disease changes. See your doctor for regular testing. And work with a dietitian to change your diet as needed. When should you call for help? Call 911 anytime you think you may need emergency care. For example, call if:    · You passed out (lost consciousness).    Call your doctor now or seek immediate medical care if:    · You have less urine than normal or no urine.     · You have trouble urinating or can urinate only very small amounts.     · You are confused or have trouble thinking clearly.     · You feel weaker or more tired than usual.     · You are very thirsty, lightheaded, or dizzy.     · You have nausea and vomiting.     · You have new swelling of your arms or feet, or your swelling is worse.     · You have blood in your urine.     · You have new or worse trouble breathing. Watch closely for changes in your health, and be sure to contact your doctor if:    · You have any problems with your medicine or other treatment. Where can you learn more? Go to http://www.gray.com/  Enter N276 in the search box to learn more about \"Chronic Kidney Disease: Care Instructions. \"  Current as of: December 17, 2020               Content Version: 12.8  © 9301-5745 [employer]. Care instructions adapted under license by [employer] (which disclaims liability or warranty for this information). If you have questions about a medical condition or this instruction, always ask your healthcare professional. [address] any warranty or liability for your use of this information. Low Sodium Diet (2,000 Milligram): Care Instructions  Overview     Limiting sodium can be an important part of managing some health problems. The most common source of sodium is salt. People get most of the salt in their diet from canned, prepared, and packaged foods. Fast food and restaurant meals also are very high in sodium. Your doctor will probably limit your sodium to less than 2,000 milligrams (mg) a day. This limit counts all the sodium in prepared and packaged foods and any salt you add to your food. Follow-up care is a key part of your treatment and safety. Be sure to make and go to all appointments, and call your doctor if you are having problems. It's also a good idea to know your test results and keep a list of the medicines you take. How can you care for yourself at home? Read food labels  · Read labels on cans and food packages. The labels tell you how much sodium is in each serving.  Make sure that you look at the serving size. If you eat more than the serving size, you have eaten more sodium. · Food labels also tell you the Percent Daily Value for sodium. Choose products with low Percent Daily Values for sodium. · Be aware that sodium can come in forms other than salt, including monosodium glutamate (MSG), sodium citrate, and sodium bicarbonate (baking soda). MSG is often added to Asian food. When you eat out, you can sometimes ask for food without MSG or added salt. Buy low-sodium foods  · Buy foods that are labeled \"unsalted\" (no salt added), \"sodium-free\" (less than 5 mg of sodium per serving), or \"low-sodium\" (140 mg or less of sodium per serving). Foods labeled \"reduced-sodium\" and \"light sodium\" may still have too much sodium. Be sure to read the label to see how much sodium you are getting. · Buy fresh vegetables, or frozen vegetables without added sauces. Buy low-sodium versions of canned vegetables, soups, and other canned goods. Prepare low-sodium meals  · Cut back on the amount of salt you use in cooking. This will help you adjust to the taste. Do not add salt after cooking. One teaspoon of salt has about 2,300 mg of sodium. · Take the salt shaker off the table. · Flavor your food with garlic, lemon juice, onion, vinegar, herbs, and spices. Do not use soy sauce, lite soy sauce, steak sauce, onion salt, garlic salt, celery salt, or ketchup on your food. · Use low-sodium salad dressings, sauces, and ketchup. Or make your own salad dressings and sauces without adding salt. · Use less salt (or none) when recipes call for it. You can often use half the salt a recipe calls for without losing flavor. Other foods such as rice, pasta, and grains do not need added salt. · Rinse canned vegetables, and cook them in fresh water. This removes somebut not allof the salt. · Avoid water that is naturally high in sodium or that has been treated with water softeners, which add sodium.  If you buy bottled water, read the label and choose a sodium-free brand. Avoid high-sodium foods  · Avoid eating:  ? Smoked, cured, salted, and canned meat, fish, and poultry. ? Ham, morales, hot dogs, and luncheon meats. ? Regular, hard, and processed cheese and regular peanut butter. ? Crackers with salted tops, and other salted snack foods such as pretzels, chips, and salted popcorn. ? Frozen prepared meals, unless labeled low-sodium. ? Canned and dried soups, broths, and bouillon, unless labeled sodium-free or low-sodium. ? Canned vegetables, unless labeled sodium-free or low-sodium. ? [employer] fries, pizza, tacos, and other fast foods. ? Pickles, olives, ketchup, and other condiments, especially soy sauce, unless labeled sodium-free or low-sodium. Where can you learn more? Go to http://www.gray.com/  Enter V843 in the search box to learn more about \"Low Sodium Diet (2,000 Milligram): Care Instructions. \"  Current as of: December 17, 2020               Content Version: 12.8  © 5961-7942 [employer]. Care instructions adapted under license by [employer] (which disclaims liability or warranty for this information). If you have questions about a medical condition or this instruction, always ask your healthcare professional. [place] [address] any warranty or liability for your use of this information.

## 2021-07-09 NOTE — PROGRESS NOTES
HISTORY OF PRESENT ILLNESS  yNla Moralez is a 67 y.o. female. HPI: Here for follow-up. History of diabetes. A1c fairly stable. No hyper or hypoglycemic symptoms. Working on diet modification. Not checking blood sugar at home. S/p total time under 2020. During visit sitting comfortable without any acute distress. She felt said when she moved to the area from [place]. Currently still adjusting. No concerns with depression or anxiety. History of hypertension. Vitals been fairly stable. Taking medication with compliance and no side effects. Denies any headache, dizziness, no chest pain or trouble breathing, no arm or leg weakness. No nausea or vomiting, no weight or appetite changes, no mood changes . No urine or bowel complains, no palpitation, no diaphoresis. No abdominal pain. No cold or cough. No leg swelling. No fever. No sleep trouble. Currently having concern with the neck pain and lower back pain. No change in activity. Still be trying to be active as much as she can. Neck pain sometimes radiates to the shoulder and upper extremity. No tingling numbness or weakness. No headache or dizziness. Taking heating pad and OTC Advil as needed. I have advised her to avoid NSAIDs and take Tylenol as needed. Also for lower back discomfort she is using heating pad. No loss of urine or bowel control. No recent fall or injury. Pain is mild to moderate in intensity with certain activity. At this time very mild discomfort over the neck and the lower back area. States she is sitting comfortable without any acute distress. Hyperlipidemia. On statin. No side effects  History of renal insufficiency and elevated parathyroid hormone. Has seen Endo. Also seen nephrology. No new concern. Advised to avoid NSAIDs. History of A. fib. Asymptomatic. No palpitation or diaphoresis. No anginal symptoms. Following cardiology. Has pacemaker. No new concern. Thyroid nodule.   No trouble swallowing or change in voice. No unusual fatigue. Visit Vitals  /80 (BP 1 Location: Left arm, BP Patient Position: Sitting, BP Cuff Size: Adult)   Pulse 60   Temp 98.3 °F (36.8 °C) (Oral)   Resp 16   Ht 5' 6\" (1.676 m)   Wt 174 lb (78.9 kg)   SpO2 99%   BMI 28.08 kg/m²     Review medication list, vitals, problem list,allergies. Lab Results   Component Value Date/Time    WBC 9.0 11/23/2020 07:27 AM    HGB 12.2 11/23/2020 07:27 AM    HCT 38.7 11/23/2020 07:27 AM    PLATELET 900 68/14/6022 07:27 AM    MCV 84.9 11/23/2020 07:27 AM     Lab Results   Component Value Date/Time    Sodium 141 11/23/2020 07:27 AM    Potassium 4.5 11/23/2020 07:27 AM    Chloride 108 11/23/2020 07:27 AM    CO2 28 11/23/2020 07:27 AM    Anion gap 5 11/23/2020 07:27 AM    Glucose 114 (H) 11/23/2020 07:27 AM    BUN 19 (H) 11/23/2020 07:27 AM    Creatinine 1.11 11/23/2020 07:27 AM    BUN/Creatinine ratio 17 11/23/2020 07:27 AM    GFR est AA 58 (L) 11/23/2020 07:27 AM    GFR est non-AA 48 (L) 11/23/2020 07:27 AM    Calcium 10.8 (H) 11/23/2020 07:27 AM    Bilirubin, total 0.7 11/23/2020 07:27 AM    Alk.  phosphatase 74 11/23/2020 07:27 AM    Protein, total 7.6 11/23/2020 07:27 AM    Albumin 3.9 11/23/2020 07:27 AM    Globulin 3.7 11/23/2020 07:27 AM    A-G Ratio 1.1 11/23/2020 07:27 AM    ALT (SGPT) 24 11/23/2020 07:27 AM    AST (SGOT) 17 11/23/2020 07:27 AM     Lab Results   Component Value Date/Time    Cholesterol, total 181 11/23/2020 07:27 AM    HDL Cholesterol 48 11/23/2020 07:27 AM    LDL, calculated 103.4 (H) 11/23/2020 07:27 AM    VLDL, calculated 29.6 11/23/2020 07:27 AM    Triglyceride 148 11/23/2020 07:27 AM    CHOL/HDL Ratio 3.8 11/23/2020 07:27 AM     Lab Results   Component Value Date/Time    TSH 1.75 11/23/2020 07:27 AM     Lab Results   Component Value Date/Time    Hemoglobin A1c 6.1 (H) 11/23/2020 07:27 AM    Hemoglobin A1c (POC) 5.9 03/09/2021 10:14 AM     Lab Results   Component Value Date/Time    Microalbumin/Creat ratio (mg/g creat)  11/23/2020 07:27 AM     Cannot calculate ratio due to microalbumin result outside reportable range. Microalbumin,urine random <0.50 11/23/2020 07:27 AM    Microalbumin urine (POC) 10 04/24/2017 09:02 AM     Lab Results   Component Value Date/Time    Calcium 10.8 (H) 11/23/2020 07:27 AM    PTH, Intact 132.0 (H) 03/19/2018 10:27 AM       ROS: See HPI    Physical Exam  Constitutional:       General: She is not in acute distress. Cardiovascular:      Rate and Rhythm: Normal rate and regular rhythm. Heart sounds: Normal heart sounds. Abdominal:      General: Bowel sounds are normal.      Palpations: Abdomen is soft. Tenderness: There is no abdominal tenderness. Musculoskeletal:      Cervical back: Neck supple. Comments: Mild ankle swelling bilaterally. No calf tenderness or any pitting edema over legs    Neck: Generalized discomfort over cervical spine and paraspinal muscle area. Generalized discomfort over trapezius muscle area. Range of motion over cervical spine within normal limit. Lumbar spine: No point tenderness. Paraspinal muscle discomfort present bilaterally throughout the lumbar spine. Range of motion discomfort bending more than 60 degrees. Neurological:      Mental Status: She is oriented to person, place, and time. ASSESSMENT and PLAN    ICD-10-CM ICD-9-CM    1. Type 2 diabetes with nephropathy ([facility] [address]): A1c fairly stable. Continue current plan. Diet modification E11.21 250.40      583.81    2. Medicare annual wellness visit, subsequent  Z00.00 V70.0    3. Paroxysmal atrial fibrillation ([facility] [address]): Asymptomatic. Following cardiology. On anticoagulation I48.0 427.31    4. Hypercalcemia: Seen nephrology and endocrinology. Will follow the recommendation E83.52 275.42    5. Renal insufficiency: Avoid NSAIDs. See nephrology. No new recommendation N28.9 593.9    6. Thyroid nodule: No trouble swallowing or change in voice. Will observe.   Seen Endo E04.1 241.0 7. Cardiac pacemaker in situ  Z95.0 V45.01    8. Essential hypertension :well controlled. Continue current dose of medication and low salt diet. Exercise as tolerated. I10 401.9    9. Pure hypercholesterolemia: On statin. Diet modification. Continue current plan E78.00 272.0    10. Neck pain: Symptomatic treatment. Heating pad and Tylenol. Avoid NSAIDs. Obtaining x-ray and sending for physical therapy M54.2 723.1 REFERRAL TO PHYSICAL THERAPY      XR SPINE CERV PA LAT ODONT 3 V MAX   11. Discomfort of back: At this time continue symptomatic treatment. Obtaining x-ray and sending for physical therapy. Advised to take Tylenol and avoid NSAIDs M54.9 724.5 REFERRAL TO PHYSICAL THERAPY      XR SPINE LUMB 2 OR 3 V   12. Ankle swelling, unspecified laterality: Advised low-salt diet. Compression stocking. Leg elevation. Will observe. M25.944 719.07    Patient understood and agreed with the plan  Review health maintenance  Had an eye exam and [facility]  Follow-up and Dispositions    · Return in about 4 months (around 11/9/2021). Please note that this dictation was completed with [employer], the [employer] voice recognition software. Quite often unanticipated grammatical, syntax, homophones, and other interpretive errors are inadvertently transcribed by the computer software. Please disregard these errors. Please excuse any errors that have escaped final proofreading.

## 2021-07-12 DIAGNOSIS — M51.36 DEGENERATION OF INTERVERTEBRAL DISC OF LUMBAR SPINE WITHOUT DISC HERNIATION: ICD-10-CM

## 2021-07-12 DIAGNOSIS — M54.2 NECK PAIN: Primary | ICD-10-CM

## 2021-07-12 NOTE — PROGRESS NOTES
X-ray neck showed multiple level arthritic changes. Worst is over C5, C6. I would advise physical therapy but spine center referral.  Doing the referral today.

## 2021-07-13 NOTE — PROGRESS NOTES
Spoke with patient (two identifiers verified) to advise lumbar spine xray showed multiple levels of arthritic changes,as well. Dr. Jasmine Cazares has sent a referral to the spine Center; [facility]. Patient verbalized understanding. She is aware [facility] will contact her directly to schedule.

## 2021-07-13 NOTE — PROGRESS NOTES
Spoke with patient (two identifiers verified) to advise X-ray neck showed multiple level arthritic changes. Worst is over C5, C6. Dr. Mitchel Whitley advised physical therapy ,but did referral to spine center, as well. Patient verbalized understanding. She is aware [facility] will contact her to schedule an appointment.

## 2021-07-26 ENCOUNTER — TELEPHONE (OUTPATIENT)
Dept: FAMILY MEDICINE CLINIC | Age: 76
End: 2021-07-26

## 2021-07-26 NOTE — TELEPHONE ENCOUNTER
Pt states that she is back from vacation and would like to talk to [facility] about her referral to spine Dr and something else but wouldn't say what. Please call her back.  Thank you

## 2021-08-06 ENCOUNTER — HOSPITAL ENCOUNTER (OUTPATIENT)
Dept: PHYSICAL THERAPY | Age: 76
Discharge: HOME | End: 2021-08-06
Payer: MEDICARE

## 2021-08-06 PROCEDURE — 97161 PT EVAL LOW COMPLEX 20 MIN: CPT

## 2021-08-06 PROCEDURE — 97110 THERAPEUTIC EXERCISES: CPT

## 2021-08-06 NOTE — PROGRESS NOTES
[facility]  [address]  [place], [address]  [phone] fax    Plan of Care/ Statement of Necessity for Physical Therapy Services    Patient name: Hank Pascual Start of Care: 2021   Referral source: Rob Fatima MD : 1945    Medical Diagnosis: Cervicalgia [M54.2]  Dorsalgia, unspecified [M54.9]  Payor: [place] MEDICARE / Plan: VA MEDICARE PART A & B / Product Type: Medicare /  Onset Date:1 year    Treatment Diagnosis: LBP & neck pain   Prior Hospitalization: see medical history Provider#: [id number]   Medications: Verified on Patient summary List    Comorbidities: B TKA, Heart dz, arthritis, pacemaker, thyroid problems, diabetes, HTN   Prior Level of Function: Pt with improved ADLs and ambulation tolerance      The Plan of Care and following information is based on the information from the initial evaluation. Assessment/ key information: The pt is a 66 y/o F presenting with c/o LBP > neck pain of about 1 year duration. She reports no acute onset to symptoms but did have radiographs taken that showed OA in neck and back. She presents with midline pain and denies any radicular symptoms UE or LE. Pt is TTP at L4-S1 segments as well at C7 and UT B. She reports some lumbar irritation with SKC stretching and with resisted hip flexion. Neck ROM limited with no pain increase reported aside from extension. Pt reports improved pain post examination. Signs and symptoms consistent with mechanical back and neck pain. She would benefit from PT to improve ROM, pain and strength to improve ADL ease and quality of life.     Evaluation Complexity History MEDIUM  Complexity : 1-2 comorbidities / personal factors will impact the outcome/ POC ; Examination MEDIUM Complexity : 3 Standardized tests and measures addressing body structure, function, activity limitation and / or participation in recreation  ;Presentation LOW Complexity : Stable, uncomplicated ;Clinical Decision Making MEDIUM Complexity : FOTO score of 26-74  Overall Complexity Rating: LOW   Problem List: pain affecting function, decrease ROM, decrease strength, decrease ADL/ functional abilitiies, decrease activity tolerance and decrease flexibility/ joint mobility   Treatment Plan may include any combination of the following: Therapeutic exercise, Therapeutic activities, Neuromuscular re-education, Physical agent/modality, Gait/balance training, Manual therapy, Patient education, Self Care training and Functional mobility training  Patient / Family readiness to learn indicated by: trying to perform skills and interest  Persons(s) to be included in education: patient (P)  Barriers to Learning/Limitations: None  Patient Goal (s): Get rid of the pain so I can walk more  Patient Self Reported Health Status: fair  Rehabilitation Potential: good    Short Term Goals: To be accomplished in 2 weeks:  1. Pt will demonstrate I and compliance with HEP to maximize therapeutic effect. 2. Pt will demonstrate B hip flexion PROM to 100 deg without increased back pain to improve dressing. Long Term Goals: To be accomplished in 5 weeks:  1. Pt will demonstrate trunk flexion and B SB to 75% of WNL without back pain for improved ADL ease. 2. Pt will demonstrate B hip flexion strength 4+/5 without back pain to improve ease of sit to stands. 3. Pt will report ability to ambulate for 45 minutes without increased back pain to improve ADL ease. 4. Pt will improve cervical extension AROM to 35 deg without increased pain to improve ease of self care. Frequency / Duration: Patient to be seen 1 times per week for 5 weeks.     Patient/ Caregiver education and instruction: Diagnosis, prognosis, self care, activity modification and exercises   [x]  Plan of care has been reviewed with PTA    Certification Period: 8/6/2021 to 9/3/2021  Sheryl Hodges DPT CMTPT 8/6/2021 10:33 AM    ________________________________________________________________________    I certify that the above Therapy Services are being furnished while the patient is under my care. I agree with the treatment plan and certify that this therapy is necessary.     [facility] Signature:____________________  Date:____________Time: _________     Sol Casillas MD  Please sign and return to [address] [place] [address], [facility] [address]  [phone] fax

## 2021-08-06 NOTE — PROGRESS NOTES
PT DAILY TREATMENT NOTE     Patient Name: Kelley Kunz  [id number]  : 1945  [x]  Patient  Verified  Payor: VA MEDICARE / Plan: VA MEDICARE PART A & B / Product Type: Medicare /    In time:9:51  Out time:10:25  Total Treatment Time (min): 34  Visit #: 1 of 5    Medicare/BCBS Only   Total Timed Codes (min):  16 1:1 Treatment Time:  34       Treatment Area: Cervicalgia [M54.2]  Dorsalgia, unspecified [M54.9]    SUBJECTIVE  Pain Level (0-10 scale): 5  Any medication changes, allergies to medications, adverse drug reactions, diagnosis change, or new procedure performed?: [x] No    [] Yes (see summary sheet for update)  Subjective functional status/changes:   [] No changes reported  The pt reports back greater than neck pain limiting ADLs    OBJECTIVE    18 min [x]Eval                  []Re-Eval       8 min Therapeutic Exercise:  [] See flow sheet :   Rationale: increase ROM and increase strength to improve the patients ability to perform daily tasks     8 min Therapeutic Activity:  []  See flow sheet :   Rationale: self care and pt education  to improve the patients ability to self manage symptoms successfully             With   [] TE   [] TA   [] neuro   [] other: Patient Education: [x] Review HEP    [] Progressed/Changed HEP based on:   [] positioning   [] body mechanics   [] transfers   [] heat/ice application    [] other:      Other Objective/Functional Measures:      Pain Level (0-10 scale) post treatment: 4    ASSESSMENT/Changes in Function: see POC    Patient will continue to benefit from skilled PT services to modify and progress therapeutic interventions, address functional mobility deficits, address ROM deficits, address strength deficits, analyze and address soft tissue restrictions, analyze and cue movement patterns and analyze and modify body mechanics/ergonomics to attain remaining goals.      [x]  See Plan of Care  []  See progress note/recertification  []  See Discharge Summary Progress towards goals / Updated goals:  Short Term Goals: To be accomplished in 2 weeks:  1. Pt will demonstrate I and compliance with HEP to maximize therapeutic effect. IE: HEP issued and instructed  2. Pt will demonstrate B hip flexion PROM to 100 deg without increased back pain to improve dressing. IE: >90 deg with back pain  Long Term Goals: To be accomplished in 5 weeks:  1. Pt will demonstrate trunk flexion and B SB to 75% of WNL without back pain for improved ADL ease. IE: 75% of WNL each but reports of back pain  2. Pt will demonstrate B hip flexion strength 4+/5 without back pain to improve ease of sit to stands. IE: 4+/5 with back pain reported midline  3. Pt will report ability to ambulate for 45 minutes without increased back pain to improve ADL ease. IE: 20 min ambulation  4. Pt will improve cervical extension AROM to 35 deg without increased pain to improve ease of self care.    IE: 25 deg with neck pain     PLAN  []  Upgrade activities as tolerated     [x]  Continue plan of care  []  Update interventions per flow sheet       []  Discharge due to:_  []  Other:_      Jeff Curiel  DPT, CMTPT 8/6/2021  11:45 AM    Future Appointments   Date Time Provider Caorn Wood   8/9/2021  9:00 AM Chloé Limon, PTA [facility]   8/20/2021 10:00 AM Chloé Limon, PTA [facility]   8/26/2021  9:40 AM Zully Fajardo DO [facility] BS AMB   8/26/2021  9:45 AM CSI, PACER [facility] BS AMB   8/27/2021 10:00 AM Chloé PETTY, PTA [facility]   9/3/2021  9:00 AM Tami Funes [facility]   9/10/2021  9:00 AM Tami Funes [facility]   11/9/2021 10:00 AM Greyson Jewell MD [facility] BS AMB

## 2021-08-09 ENCOUNTER — HOSPITAL ENCOUNTER (OUTPATIENT)
Dept: PHYSICAL THERAPY | Age: 76
Discharge: HOME | End: 2021-08-09
Payer: MEDICARE

## 2021-08-09 PROCEDURE — 97110 THERAPEUTIC EXERCISES: CPT

## 2021-08-09 NOTE — PROGRESS NOTES
PT DAILY TREATMENT NOTE     Patient Name: Marlys Hollingsworth  [id number]  : 1945  [x]  Patient  Verified  Payor: VA MEDICARE / Plan: VA MEDICARE PART A & B / Product Type: Medicare /    In time:9:00  Out time:9:55  Total Treatment Time (min): 55  Visit #: 2 of 5    Medicare/BCBS Only   Total Timed Codes (min):  45 1:1 Treatment Time:  45       Treatment Area: Cervicalgia [M54.2]  Dorsalgia, unspecified [M54.9]    SUBJECTIVE  Pain Level (0-10 scale): 5/10  Any medication changes, allergies to medications, adverse drug reactions, diagnosis change, or new procedure performed?: [x] No    [] Yes (see summary sheet for update)  Subjective functional status/changes:   [] No changes reported  \"I went walking this weekend, didn't try the exercises yet. \"    OBJECTIVE    Modality rationale: decrease pain and increase tissue extensibility to improve the patients ability to perform ADL's.    Min Type Additional Details    [] Estim:  []Unatt       []IFC  []Premod                        []Other:  []w/ice   []w/heat  Position:  Location:    [] Estim: []Att    []TENS instruct  []NMES                    []Other:  []w/US   []w/ice   []w/heat  Position:  Location:    []  Traction: [] Cervical       []Lumbar                       [] Prone          []Supine                       []Intermittent   []Continuous Lbs:  [] before manual  [] after manual    []  Ultrasound: []Continuous   [] Pulsed                           []1MHz   []3MHz W/cm2:  Location:    []  Iontophoresis with dexamethasone         Location: [] Take home patch   [] In clinic   10 []  Ice     [x]  heat  []  Ice massage  []  Laser   []  Anodyne Position: Supine with wedge  Location: C/S and L/S    []  Laser with stim  []  Other:  Position:  Location:    []  Vasopneumatic Device    []  Right     []  Left  Pre-treatment girth:  Post-treatment girth:  Measured at (location):  Pressure:       [] lo [] med [] hi   Temperature: [] lo [] med [] hi   [] Skin assessment post-treatment:  []intact []redness- no adverse reaction    []redness  adverse reaction:     45 min Therapeutic Exercise:  [x] See flow sheet :   Rationale: increase ROM, increase strength and increase proprioception to improve the patients ability to perform ADL's. With   [x] TE   [] TA   [] neuro   [] other: Patient Education: [x] Review HEP    [] Progressed/Changed HEP based on:   [] positioning   [] body mechanics   [] transfers   [] heat/ice application    [] other:      Other Objective/Functional Measures: Initiated exercises per flow sheet. Cueing and education regarding proper posture and exercise mechanics. Limited C/S Retraction ROM. Presents with habitual scapular elevation, able to partially correct. Pain Level (0-10 scale) post treatment: 2/10    ASSESSMENT/Changes in Function: First F/U visit. Pt was very receptive to treatment. Encouraged pt to perform HEP and the importance of exercising regularly. Reported a decrease in pain level post-treatment. Patient will continue to benefit from skilled PT services to modify and progress therapeutic interventions, address functional mobility deficits, address ROM deficits, address strength deficits, analyze and cue movement patterns and analyze and modify body mechanics/ergonomics to attain remaining goals. [x]  See Plan of Care  []  See progress note/recertification  []  See Discharge Summary         Progress towards goals / Updated goals:  Short Term Goals: To be accomplished in 2 weeks:  1. Pt will demonstrate I and compliance with HEP to maximize therapeutic effect. IE: HEP issued and instructed   Current: Has not attempted. 8/9/2021  2. Pt will demonstrate B hip flexion PROM to 100 deg without increased back pain to improve dressing. IE: >90 deg with back pain  Long Term Goals: To be accomplished in 5 weeks:  1.  Pt will demonstrate trunk flexion and B SB to 75% of WNL without back pain for improved ADL ease.              IE: 75% of WNL each but reports of back pain  2. Pt will demonstrate B hip flexion strength 4+/5 without back pain to improve ease of sit to stands. IE: 4+/5 with back pain reported midline  3. Pt will report ability to ambulate for 45 minutes without increased back pain to improve ADL ease. IE: 20 min ambulation  4. Pt will improve cervical extension AROM to 35 deg without increased pain to improve ease of self care.               IE: 25 deg with neck pain     PLAN  []  Upgrade activities as tolerated     [x]  Continue plan of care  []  Update interventions per flow sheet       []  Discharge due to:_  []  Other:_      Dionne Martinez PTA 8/9/2021  8:55 AM    Future Appointments   Date Time Provider Caron Nina   8/9/2021  9:00 AM Robert Neely PTA [facility]   8/20/2021 10:00 AM Robert Neely PTA [facility]   8/26/2021  9:40 AM Brian Spears DO [facility] BS AMB   8/26/2021  9:45 AM CSI, PACER [facility] BS AMB   8/27/2021 10:00 AM Robert PETTY PTA [facility]   9/3/2021  9:00 AM Anjali Brianna [facility]   9/10/2021  9:00 AM Anjalijorge Romans [facility]   11/9/2021 10:00 AM Liban Pedersen MD [facility] BS AMB

## 2021-08-20 ENCOUNTER — HOSPITAL ENCOUNTER (OUTPATIENT)
Dept: PHYSICAL THERAPY | Age: 76
Discharge: HOME | End: 2021-08-20
Payer: MEDICARE

## 2021-08-20 PROCEDURE — 97110 THERAPEUTIC EXERCISES: CPT

## 2021-08-20 PROCEDURE — 97112 NEUROMUSCULAR REEDUCATION: CPT

## 2021-08-20 NOTE — PROGRESS NOTES
PT DAILY TREATMENT NOTE     Patient Name: Ryan Yu  ETJD:  : 1945  [x]  Patient  Verified  Payor: [place] MEDICARE / Plan: [place] MEDICARE PART A & B / Product Type: Medicare /    In time:10:01  Out time:11:00  Total Treatment Time (min): 61  Visit #: 3 of 5    Medicare/BCBS Only   Total Timed Codes (min):  49 1:1 Treatment Time:  52       Treatment Area: Cervicalgia [M54.2]  Dorsalgia, unspecified [M54.9]    SUBJECTIVE  Pain Level (0-10 scale): 0/10  Any medication changes, allergies to medications, adverse drug reactions, diagnosis change, or new procedure performed?: [x] No    [] Yes (see summary sheet for update)  Subjective functional status/changes:   [] No changes reported  \"No pain right now. I do the exercises at home. I hope it's helping. \"    OBJECTIVE    Modality rationale: decrease pain and increase tissue extensibility to improve the patients ability to perform ADL's.    Min Type Additional Details    [] Estim:  []Unatt       []IFC  []Premod                        []Other:  []w/ice   []w/heat  Position:  Location:    [] Estim: []Att    []TENS instruct  []NMES                    []Other:  []w/US   []w/ice   []w/heat  Position:  Location:    []  Traction: [] Cervical       []Lumbar                       [] Prone          []Supine                       []Intermittent   []Continuous Lbs:  [] before manual  [] after manual    []  Ultrasound: []Continuous   [] Pulsed                           []1MHz   []3MHz W/cm2:  Location:    []  Iontophoresis with dexamethasone         Location: [] Take home patch   [] In clinic   10 []  Ice     [x]  heat  []  Ice massage  []  Laser   []  Anodyne Position: Supine with wedge  Location: C/S and L/S    []  Laser with stim  []  Other:  Position:  Location:    []  Vasopneumatic Device    []  Right     []  Left  Pre-treatment girth:  Post-treatment girth:  Measured at (location):  Pressure:       [] lo [] med [] hi   Temperature: [] lo [] med [] hi   [] Skin assessment post-treatment:  []intact []redness- no adverse reaction    []redness  adverse reaction:     41 min Therapeutic Exercise:  [x] See flow sheet :   Rationale: increase ROM and increase strength to improve the patients ability to perform ADL's.    8 min Neuromuscular Re-education:  [x]  See flow sheet : Hook-lying core stabs   Rationale: increase strength, improve coordination and increase proprioception  to improve the patients ability to perform functional activities. With   [x] TE   [] TA   [] neuro   [] other: Patient Education: [x] Review HEP    [] Progressed/Changed HEP based on:   [] positioning   [] body mechanics   [] transfers   [] heat/ice application    [] other:      Other Objective/Functional Measures: PROM hip flexion (B) 120 degrees with slight low back discomfort. Added DKTC with SB 10x5\". Pain Level (0-10 scale) post treatment: 0/10    ASSESSMENT/Changes in Function: Pt reports being able to walk for 60 minutes without back pain increasing. Deficit in trunk/pelvic stability, notable rotation/lean while performing hip 3-way and with 1/2 prone series, unable to fully correct after VC's and demonstration. Patient will continue to benefit from skilled PT services to modify and progress therapeutic interventions, address functional mobility deficits, address ROM deficits, address strength deficits, analyze and cue movement patterns and analyze and modify body mechanics/ergonomics to attain remaining goals. [x]  See Plan of Care  []  See progress note/recertification  []  See Discharge Summary         Progress towards goals / Updated goals:  Short Term Goals: To be accomplished in 2 weeks:  1. Pt will demonstrate I and compliance with HEP to maximize therapeutic effect.              IE: HEP issued and instructed              Current: Has not attempted. 8/9/2021  2.  Pt will demonstrate B hip flexion PROM to 100 deg without increased back pain to improve dressing.              DZ: >90 deg with back pain   Current: PROM hip flexion (B) 120 degrees with slight low back discomfort. 8/20/2021  Long Term Goals: To be accomplished in 5 weeks:  1. Pt will demonstrate trunk flexion and B SB to 75% of WNL without back pain for improved ADL ease.              IE: 75% of WNL each but reports of back pain  2. Pt will demonstrate B hip flexion strength 4+/5 without back pain to improve ease of sit to stands.              IE: 4+/5 with back pain reported midline  3. Pt will report ability to ambulate for 45 minutes without increased back pain to improve ADL ease.              IE: 20 min ambulation   Current: Met, pt reports being able to walk for 60 minutes without back pain increasing. 8/20/2021  4.  Pt will improve cervical extension AROM to 35 deg without increased pain to improve ease of self care.              IE: 25 deg with neck pain     PLAN  []  Upgrade activities as tolerated     [x]  Continue plan of care  []  Update interventions per flow sheet       []  Discharge due to:_  []  Other:_      Yury Head, PTA 8/20/2021  9:49 AM    Future Appointments   Date Time Provider Caron Wood   8/20/2021 10:00 AM Shayla Organ, PTA [facility]   8/26/2021  9:40 AM Jessica Ceron DO [facility] BS AMB   8/26/2021  9:45 AM CSI, PACER [facility] BS AMB   8/27/2021 10:00 AM Shayla Organ C, PTA [facility]   9/3/2021  9:00 AM Fuad Verdugo [facility]   9/17/2021  9:15 AM Shayla Organ, PTA [facility]   11/9/2021 10:00 AM Stefano Lance MD [facility] BS AMB

## 2021-08-26 ENCOUNTER — OFFICE VISIT (OUTPATIENT)
Dept: CARDIOLOGY CLINIC | Age: 76
End: 2021-08-26
Payer: MEDICARE

## 2021-08-26 ENCOUNTER — CLINICAL SUPPORT (OUTPATIENT)
Dept: CARDIOLOGY CLINIC | Age: 76
End: 2021-08-26

## 2021-08-26 VITALS
SYSTOLIC BLOOD PRESSURE: 122 MMHG | DIASTOLIC BLOOD PRESSURE: 80 MMHG | OXYGEN SATURATION: 100 % | BODY MASS INDEX: 28.28 KG/M2 | HEIGHT: 66 IN | HEART RATE: 60 BPM | WEIGHT: 176 LBS

## 2021-08-26 DIAGNOSIS — I48.0 PAROXYSMAL ATRIAL FIBRILLATION (HCC): Primary | ICD-10-CM

## 2021-08-26 DIAGNOSIS — Z95.0 CARDIAC PACEMAKER IN SITU: ICD-10-CM

## 2021-08-26 DIAGNOSIS — R00.1 BRADYCARDIA: ICD-10-CM

## 2021-08-26 PROCEDURE — 93000 ELECTROCARDIOGRAM COMPLETE: CPT | Performed by: INTERNAL MEDICINE

## 2021-08-26 PROCEDURE — G8536 NO DOC ELDER MAL SCRN: HCPCS | Performed by: INTERNAL MEDICINE

## 2021-08-26 PROCEDURE — G8417 CALC BMI ABV UP PARAM F/U: HCPCS | Performed by: INTERNAL MEDICINE

## 2021-08-26 PROCEDURE — 99214 OFFICE O/P EST MOD 30 MIN: CPT | Performed by: INTERNAL MEDICINE

## 2021-08-26 PROCEDURE — G8510 SCR DEP NEG, NO PLAN REQD: HCPCS | Performed by: INTERNAL MEDICINE

## 2021-08-26 PROCEDURE — G8399 PT W/DXA RESULTS DOCUMENT: HCPCS | Performed by: INTERNAL MEDICINE

## 2021-08-26 PROCEDURE — 1101F PT FALLS ASSESS-DOCD LE1/YR: CPT | Performed by: INTERNAL MEDICINE

## 2021-08-26 PROCEDURE — G8754 DIAS BP LESS 90: HCPCS | Performed by: INTERNAL MEDICINE

## 2021-08-26 PROCEDURE — G8427 DOCREV CUR MEDS BY ELIG CLIN: HCPCS | Performed by: INTERNAL MEDICINE

## 2021-08-26 PROCEDURE — 1090F PRES/ABSN URINE INCON ASSESS: CPT | Performed by: INTERNAL MEDICINE

## 2021-08-26 PROCEDURE — G8752 SYS BP LESS 140: HCPCS | Performed by: INTERNAL MEDICINE

## 2021-08-26 PROCEDURE — 93280 PM DEVICE PROGR EVAL DUAL: CPT | Performed by: INTERNAL MEDICINE

## 2021-08-26 NOTE — PROGRESS NOTES
Ryan Yu    F/u pAF/ SSS    HPI    Ryan Yu is a 67 y.o. AAF with pAF, SSS s/p PPM here for routine follow up care. As you know she moved here from [place] a few yrs ago and established with my partner, Dr. Burton Agarwal. She was on Eliquis in [place] and says after she moved here it was >$200/ month and that was why it was stopped. She has no complaints today but there is concern for lead fracture by device check today, noise noted.     She has known history of cardiac problems.  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  She had atrial fibrillation and finally underwent the atrial fibrillation ablation successfully in March of 2016.  She was treated with flecainide briefly.  As the flecainide was discontinued, she has been anticoagulated with Eliquis.  She has previous history of DVT after knee surgery.  She has history of hypercholesterolemia, but has not been able to tolerate statins because of severe myalgia and other side effects. Cora Guevara is known to have had type 2 diabetes. Cora Guevara has history of seizure disorder; however, she has not had any recurrent seizures since 2006 off medication.      She has no cardiac complaints. She recently went to [place] and says the minute she got off the plane she coughed quite a bit in 1 day had a nosebleed since coming back this has not happened. Past Medical History:   Diagnosis Date    Arrhythmia     history atrial fib    Bradycardia     Diabetes ([address]     borderline    DVT (deep venous thrombosis) ([address] 2010    right    History of ablation March 2016     History of atrial fibrillation     Hypercholesterolemia     Hypertension     Long term current use of anticoagulant therapy     Menopause     Osteoporosis     Seizure ([address]     Seizures ([address]     on medication from 2006 to 2013 no seizure since 2006    [employer] pacemaker implant Set.  2013 Dr. Ashanti Astudillo, [place]     Thyroid disease     nodules       Past Surgical History: Procedure Laterality Date    COLONOSCOPY N/A 10/25/2017    COLONOSCOPY performed by [facility]ardo Payan [facility] at [facility] ENDOSCOPY    HX CATARACT REMOVAL      HX HYSTERECTOMY      HX KNEE REPLACEMENT      left knee in 1/2010 and right knee 7/2016    HX KNEE REPLACEMENT      right knee    HX PACEMAKER      [place] CARDIAC SURG PROCEDURE UNLIST      ablation for a-fib       Current Outpatient Medications   Medication Sig Dispense Refill    OneTouch Ultra Blue Test Strip strip USE TO TEST BLOOD SUGAR ONCE DAILY 100 Strip 6    losartan (COZAAR) 100 mg tablet TAKE 1 TABLET BY MOUTH EVERY DAY 90 Tab 1    metoprolol succinate (TOPROL-XL) 50 mg XL tablet TAKE 1 TABLET BY MOUTH EVERY DAY 90 Tab 1    apixaban (ELIQUIS) 5 mg tablet Take 1 Tab by mouth two (2) times a day. 60 Tab 5    metFORMIN (GLUCOPHAGE) 500 mg tablet TAKE 1 TABLET BY MOUTH EVERY DAY IN THE MORNING WITH BREAKFAST 90 Tab 3    lancets misc One Touch Ultra soft testing fasting blood sugar every morning prior to breakfast DX E11.9 100 Each 11    cholecalciferol, vitamin D3, (VITAMIN D3) 2,000 unit tab Take  by mouth.  spironolactone (ALDACTONE) 25 mg tablet Take 1 Tab by mouth daily. 4    aspirin 81 mg chewable tablet Take 81 mg by mouth daily.  DOCOSAHEXANOIC ACID/EPA (FISH OIL PO) Take 100 mg by mouth daily.  COQ10, LIPOSOMAL UBIQUINOL, PO Take 200 mg by mouth daily.          Allergies   Allergen Reactions    Statins-Hmg-Coa Reductase Inhibitors Other (comments)     Felt sick        Social History     Socioeconomic History    Marital status:      Spouse name: Not on file    Number of children: Not on file    Years of education: Not on file    Highest education level: Not on file   Occupational History    Not on file   Tobacco Use    Smoking status: Never Smoker    Smokeless tobacco: Never Used   Substance and Sexual Activity    Alcohol use: No    Drug use: No    Sexual activity: Not Currently     Partners: Male     Birth control/protection: Surgical     Comment: hysterectomy   Other Topics Concern    Not on file   Social History Narrative    Not on file     Social Determinants of Health     Financial Resource Strain:     Difficulty of Paying Living Expenses:    Food Insecurity:     Worried About Running Out of Food in the Last Year:     [address] in the Last Year:    Transportation Needs:     Lack of Transportation (Medical):  Lack of Transportation (Non-Medical):    Physical Activity:     Days of Exercise per Week:     Minutes of Exercise per Session:    Stress:     Feeling of Stress :    Social Connections:     Frequency of Communication with Friends and Family:     Frequency of Social Gatherings with Friends and Family:     Attends L[employer] Services:     Active Member of Clubs or Organizations:     Attends Club or Organization Meetings:     Marital Status:    Intimate Partner Violence:     Fear of Current or Ex-Partner:     Emotionally Abused:     Physically Abused:     Sexually Abused:         FH: n/a    Review of Systems    14 pt Review of Systems is negative unless otherwise mentioned in the HPI. Wt Readings from Last 3 Encounters:   08/26/21 79.8 kg (176 lb)   07/09/21 78.9 kg (174 lb)   03/09/21 75.3 kg (166 lb)     Temp Readings from Last 3 Encounters:   07/09/21 98.3 °F (36.8 °C) (Oral)   03/09/21 98.4 °F (36.9 °C) (Temporal)   11/23/20 97.3 °F (36.3 °C)     BP Readings from Last 3 Encounters:   08/26/21 122/80   07/09/21 124/80   03/09/21 130/82     Pulse Readings from Last 3 Encounters:   08/26/21 60   07/09/21 60   03/09/21 60     Physical Exam:    Visit Vitals  /80 (BP 1 Location: Left upper arm, BP Patient Position: Sitting, BP Cuff Size: Adult)   Pulse 60   Ht 5' 6\" (1.676 m)   Wt 79.8 kg (176 lb)   SpO2 100%   BMI 28.41 kg/m²      Physical Exam  HENT:      Head: Normocephalic and atraumatic. Eyes:      Pupils: Pupils are equal, round, and reactive to light.    Cardiovascular: Rate and Rhythm: Normal rate and regular rhythm. Heart sounds: Normal heart sounds. No murmur heard. No friction rub. No gallop. Pulmonary:      Effort: Pulmonary effort is normal. No respiratory distress. Breath sounds: Normal breath sounds. No wheezing or rales. Chest:      Chest wall: No tenderness. Abdominal:      General: Bowel sounds are normal.      Palpations: Abdomen is soft. Musculoskeletal:         General: No tenderness. Skin:     General: Skin is warm and dry. Neurological:      Mental Status: She is alert and oriented to person, place, and time. Lab Results   Component Value Date/Time    Sodium 141 11/23/2020 07:27 AM    Potassium 4.5 11/23/2020 07:27 AM    Chloride 108 11/23/2020 07:27 AM    CO2 28 11/23/2020 07:27 AM    Anion gap 5 11/23/2020 07:27 AM    Glucose 114 (H) 11/23/2020 07:27 AM    BUN 19 (H) 11/23/2020 07:27 AM    Creatinine 1.11 11/23/2020 07:27 AM    BUN/Creatinine ratio 17 11/23/2020 07:27 AM    GFR est AA 58 (L) 11/23/2020 07:27 AM    GFR est non-AA 48 (L) 11/23/2020 07:27 AM    Calcium 10.8 (H) 11/23/2020 07:27 AM    Bilirubin, total 0.7 11/23/2020 07:27 AM    Alk.  phosphatase 74 11/23/2020 07:27 AM    Protein, total 7.6 11/23/2020 07:27 AM    Albumin 3.9 11/23/2020 07:27 AM    Globulin 3.7 11/23/2020 07:27 AM    A-G Ratio 1.1 11/23/2020 07:27 AM    ALT (SGPT) 24 11/23/2020 07:27 AM    AST (SGOT) 17 11/23/2020 07:27 AM     Lab Results   Component Value Date/Time    Hemoglobin A1c 6.1 (H) 11/23/2020 07:27 AM    Hemoglobin A1c (POC) 5.9 03/09/2021 10:14 AM     Lab Results   Component Value Date/Time    TSH 1.75 11/23/2020 07:27 AM     Lab Results   Component Value Date/Time    WBC 9.0 11/23/2020 07:27 AM    HGB 12.2 11/23/2020 07:27 AM    HCT 38.7 11/23/2020 07:27 AM    PLATELET 710 34/87/9272 07:27 AM    MCV 84.9 11/23/2020 07:27 AM         Impression and Plan:  Yudith Solano is a 67 y.o. with:    1.) pAF, s/p ablation, elevated [id number] ~3  2.) SSS s/p PPM, with concern for lead fracture again on device interrogation today (noise)  3.) HTN, well controlled  4.) HL  5.) HFpEF, stable/ neg nuc ~2016    1.) Finally got her on Eliquis and she is doing well  2.) RTC 6 months with device check    Records obtained and reviewed,     Thank you for allowing me to participate in the care of your patient, please do not hesitate to call with questions or concerns. Follow-up and Dispositions    · Return in about 6 months (around 2/26/2022).      [facility],    Domonique Esquivel, DO

## 2021-08-26 NOTE — PROGRESS NOTES
Alejandro Farris presents today for   Chief Complaint   Patient presents with    Follow-up     6 month f/u    Pacemaker [address] preferred language for health care discussion is english/other. Is someone accompanying this pt? no    Is the patient using any DME equipment during [address]? no    Depression Screening:  3 most recent PHQ Screens 8/26/2021   Little interest or pleasure in doing things Not at all   Feeling down, depressed, irritable, or hopeless Not at all   Total Score PHQ 2 0       Learning Assessment:  Learning Assessment 1/23/2020   PRIMARY LEARNER Patient   HIGHEST LEVEL OF EDUCATION - PRIMARY LEARNER  GRADUATED HIGH SCHOOL OR GED   BARRIERS PRIMARY LEARNER NONE   CO-LEARNER CAREGIVER No   PRIMARY LANGUAGE ENGLISH    NEED No   LEARNER PREFERENCE PRIMARY VIDEOS   LEARNING SPECIAL TOPICS No   ANSWERED BY patient   RELATIONSHIP SELF       Abuse Screening:  Abuse Screening Questionnaire 8/26/2021   Do you ever feel afraid of your partner? N   Are you in a relationship with someone who physically or mentally threatens you? N   Is it safe for you to go home? Y       Fall Risk  Fall Risk Assessment, last 12 mths 8/26/2021   Able to walk? Yes   Fall in past 12 months? 0   Do you feel unsteady? 0   Are you worried about falling 0       Pt currently taking Anticoagulant therapy? Eliquis 5mg    Coordination of Care:  1. Have you been to the ER, urgent care clinic since your last visit? Hospitalized since your last visit? no    2. Have you seen or consulted any other health care providers outside of the [address] [place] [address] since your last visit? Include any pap smears or colon screening.  no

## 2021-08-27 ENCOUNTER — HOSPITAL ENCOUNTER (OUTPATIENT)
Dept: PHYSICAL THERAPY | Age: 76
Discharge: HOME | End: 2021-08-27
Payer: MEDICARE

## 2021-08-27 PROCEDURE — 97112 NEUROMUSCULAR REEDUCATION: CPT

## 2021-08-27 PROCEDURE — 97110 THERAPEUTIC EXERCISES: CPT

## 2021-08-27 NOTE — PROGRESS NOTES
I have personally seen and evaluated the device findings. Interrogation reviewed and I agree with assessment.     Chika Ribeiro

## 2021-08-29 ENCOUNTER — APPOINTMENT (OUTPATIENT)
Dept: GENERAL RADIOLOGY | Age: 76
End: 2021-08-29
Attending: EMERGENCY MEDICINE
Payer: MEDICARE

## 2021-08-29 ENCOUNTER — HOSPITAL ENCOUNTER (EMERGENCY)
Age: 76
Discharge: HOME | End: 2021-08-29
Attending: EMERGENCY MEDICINE
Payer: MEDICARE

## 2021-08-29 VITALS
HEIGHT: 66 IN | RESPIRATION RATE: 16 BRPM | OXYGEN SATURATION: 97 % | HEART RATE: 77 BPM | DIASTOLIC BLOOD PRESSURE: 60 MMHG | BODY MASS INDEX: 28.28 KG/M2 | WEIGHT: 176 LBS | SYSTOLIC BLOOD PRESSURE: 142 MMHG | TEMPERATURE: 99.6 F

## 2021-08-29 DIAGNOSIS — Z20.822 SUSPECTED COVID-19 VIRUS INFECTION: Primary | ICD-10-CM

## 2021-08-29 LAB
B PERT DNA SPEC QL NAA+PROBE: NOT DETECTED
BORDETELLA PARAPERTUSSIS PCR, BORPAR: NOT DETECTED
C PNEUM DNA SPEC QL NAA+PROBE: NOT DETECTED
FLUAV SUBTYP SPEC NAA+PROBE: NOT DETECTED
FLUBV RNA SPEC QL NAA+PROBE: NOT DETECTED
HADV DNA SPEC QL NAA+PROBE: NOT DETECTED
HCOV 229E RNA SPEC QL NAA+PROBE: NOT DETECTED
HCOV HKU1 RNA SPEC QL NAA+PROBE: NOT DETECTED
HCOV NL63 RNA SPEC QL NAA+PROBE: NOT DETECTED
HCOV OC43 RNA SPEC QL NAA+PROBE: NOT DETECTED
HMPV RNA SPEC QL NAA+PROBE: NOT DETECTED
HPIV1 RNA SPEC QL NAA+PROBE: NOT DETECTED
HPIV2 RNA SPEC QL NAA+PROBE: NOT DETECTED
HPIV3 RNA SPEC QL NAA+PROBE: NOT DETECTED
HPIV4 RNA SPEC QL NAA+PROBE: NOT DETECTED
M PNEUMO DNA SPEC QL NAA+PROBE: NOT DETECTED
RSV RNA SPEC QL NAA+PROBE: NOT DETECTED
RV+EV RNA SPEC QL NAA+PROBE: NOT DETECTED
SARS-COV-2 PCR, COVPCR: DETECTED
SARS-COV-2, COV2: NORMAL

## 2021-08-29 PROCEDURE — 71045 X-RAY EXAM CHEST 1 VIEW: CPT

## 2021-08-29 PROCEDURE — U0003 INFECTIOUS AGENT DETECTION BY NUCLEIC ACID (DNA OR RNA); SEVERE ACUTE RESPIRATORY SYNDROME CORONAVIRUS 2 (SARS-COV-2) (CORONAVIRUS DISEASE [COVID-19]), AMPLIFIED PROBE TECHNIQUE, MAKING USE OF HIGH THROUGHPUT TECHNOLOGIES AS DESCRIBED BY CMS-2020-01-R: HCPCS

## 2021-08-29 PROCEDURE — 74011000258 HC RX REV CODE- 258: Performed by: EMERGENCY MEDICINE

## 2021-08-29 PROCEDURE — 0202U NFCT DS 22 TRGT SARS-COV-2: CPT

## 2021-08-29 PROCEDURE — 74011000636 HC RX REV CODE- 636: Performed by: EMERGENCY MEDICINE

## 2021-08-29 PROCEDURE — 99282 EMERGENCY DEPT VISIT SF MDM: CPT

## 2021-08-29 PROCEDURE — 96374 THER/PROPH/DIAG INJ IV PUSH: CPT

## 2021-08-29 RX ADMIN — CASIRIVIMAB AND IMDEVIMAB: 600; 600 INJECTION, SOLUTION, CONCENTRATE INTRAVENOUS at 12:11

## 2021-08-29 NOTE — ED TRIAGE NOTES
Pt c/o of a dry cough that started on Friday. Denies any others symptoms, but states she has a mild headache.

## 2021-08-29 NOTE — ED PROVIDER NOTES
EMERGENCY DEPARTMENT HISTORY AND PHYSICAL EXAM      Date: 8/29/2021  Patient Name: Jayro Gentile    History of Presenting Illness     Chief Complaint   Patient presents with    Cough       History (Context): Jayro Gentile is a 67 y.o. ***, with a past medical history as noted below, who presents with ***days of subacute onset, progressive, severe, constant multiple complaints including chills, myalgias, nausea, unwell feeling, headache, ***cough without exacerbating/relieving features or other associated symptoms. The patient does *** have sick contacts. On review of systems, the patient denies diarrhea, abdominal pain, chest pain, shortness of breath    PCP: Mau James MD    Current Outpatient Medications   Medication Sig Dispense Refill    OneTouch Ultra Blue Test Strip strip USE TO TEST BLOOD SUGAR ONCE DAILY 100 Strip 6    losartan (COZAAR) 100 mg tablet TAKE 1 TABLET BY MOUTH EVERY DAY 90 Tab 1    metoprolol succinate (TOPROL-XL) 50 mg XL tablet TAKE 1 TABLET BY MOUTH EVERY DAY 90 Tab 1    apixaban (ELIQUIS) 5 mg tablet Take 1 Tab by mouth two (2) times a day. 60 Tab 5    metFORMIN (GLUCOPHAGE) 500 mg tablet TAKE 1 TABLET BY MOUTH EVERY DAY IN THE MORNING WITH BREAKFAST 90 Tab 3    lancets misc One Touch Ultra soft testing fasting blood sugar every morning prior to breakfast DX E11.9 100 Each 11    cholecalciferol, vitamin D3, (VITAMIN D3) 2,000 unit tab Take  by mouth.  spironolactone (ALDACTONE) 25 mg tablet Take 1 Tab by mouth daily. 4    aspirin 81 mg chewable tablet Take 81 mg by mouth daily.  DOCOSAHEXANOIC ACID/EPA (FISH OIL PO) Take 100 mg by mouth daily.  COQ10, LIPOSOMAL UBIQUINOL, PO Take 200 mg by mouth daily.          Past History     Past Medical History:  Past Medical History:   Diagnosis Date    Arrhythmia     history atrial fib    Bradycardia     [address]     borderline    DVT (deep venous thrombosis) ([facility] 2010    right    History of ablation March 2016     History of atrial fibrillation     Hypercholesterolemia     Hypertension     Long term current use of anticoagulant therapy     Menopause     Osteoporosis     Seizure ([address]     Seizures ([address]     on medication from 2006 to 2013 no seizure since 2006    [employer] pacemaker implant Set. 2013 Dr. Rhea Culp, [place]     Thyroid disease     nodules       Past Surgical History:  Past Surgical History:   Procedure Laterality Date    COLONOSCOPY N/A 10/25/2017    COLONOSCOPY performed by Elaine Reeder MD at [facility] ENDOSCOPY    HX CATARACT REMOVAL      HX HYSTERECTOMY      HX KNEE REPLACEMENT      left knee in 1/2010 and right knee 7/2016    HX KNEE REPLACEMENT      right knee    HX PACEMAKER      [place] CARDIAC SURG PROCEDURE UNLIST      ablation for a-fib       Family History:  Family History   Problem Relation Age of Onset    Diabetes Father     Hypertension Sister        Social History:  Social History     Tobacco Use    Smoking status: Never Smoker    Smokeless tobacco: Never Used   Substance Use Topics    Alcohol use: No    Drug use: No       Allergies: Allergies   Allergen Reactions    Statins-Hmg-Coa Reductase Inhibitors Other (comments)     Felt sick        PMH, PSH, family history, social history, allergies reviewed with the patient with significant items noted above. Review of Systems   As per HPI, otherwise reviewed and negative. Physical Exam     Vitals:    08/29/21 0843   BP: (!) 142/60   Pulse: 77   Resp: 16   Temp: 99.6 °F (37.6 °C)   SpO2: 97%   Weight: 79.8 kg (176 lb)   Height: 5' 6\" (1.676 m)       Gen: Mildly ill-appearing, in no acute distress  HEENT: Normocephalic, sclera anicteric, TMs clear, OP benign  Cardiovascular: ***Normal rate, regular rhythm, no murmurs, rubs, gallops. Pulses intact and equal distally. Pulmonary: No respiratory distress. No stridor. ***Clear lungs. ***scattered crackles  ABD: Soft, nontender, nondistended. Neuro: Alert.   Normal speech. Normal mentation. Psych: Normal thought content and thought processes. : No CVA tenderness  EXT: Moves all extremities well. No cyanosis or clubbing. Skin: Warm and well-perfused. Other:        Diagnostic Study Results     Labs -     Recent Results (from the past 12 hour(s))   SARS-COV-2    Collection Time: 08/29/21  8:50 AM   Result Value Ref Range    SARS-CoV-2 Please find results under separate order         Radiologic Studies -   XR CHEST PORT   Final Result   1. No active disease                  CT Results  (Last 48 hours)    None        CXR Results  (Last 48 hours)               08/29/21 1001  XR CHEST PORT Final result    Impression:  1. No active disease                   Narrative:  EXAM: XR CHEST PORT       CLINICAL INDICATION/HISTORY : chest pain, sob, and/or arrhythmia. COMPARISON: July 31, 2020       TECHNIQUE: 1 VIEWS       FINDINGS:    Similar left pacer. The lungs are clear. The heart and mediastinum are unremarkable. No evidence of pleural effusion. Medical Decision Making   I am the first provider for this patient. I reviewed the vital signs, available nursing notes, past medical history, past surgical history, family history and social history. Vital Signs-Reviewed the patient's vital signs. Records Reviewed: Personally, on initial evaluation    MDM:   Patient presents with viral syndrome consistent with those seen in the COVID-19 outbreak. Exam significant for ***. DDX considered: COVID-19, influenza-like illness, influenza, other respiratory virus  DDX thought to be less likely but also considered due to high risk condition: HIV, lymphoma/leukemia, meningitis, strep pharyngitis.     Patient condition on initial evaluation: Mildly ill, stable***    Plan:   Observation  Orders as below:  Orders Placed This Encounter    RESPIRATORY VIRUS PANEL W/COVID-19, PCR    XR CHEST PORT    SARS-COV-2    NOVEL CORONAVIRUS (COVID-19)    casirivimab-imdevimab (REGEN-COV) 1,200 mg in 0.9% sodium chloride 110 mL IVPB    Pharmacy to Dose Consult        ED Course:        ***Patient stable throughout ED course    Patient condition at time of disposition: Stable  DISCHARGE NOTE:   Pt has been reexamined. Patient has no new complaints, changes, or physical findings. Care plan outlined and precautions discussed. Results were reviewed with the patient. All medications were reviewed with the patient. Patient advised to quarantine until test results come back negative. All of pt's questions and concerns were addressed. Alarm symptoms and return precautions associated with chief complaint and evaluation were reviewed with the patient in detail. The patient demonstrated adequate understanding. Patient was instructed and agrees to follow up with PCP as needed, as well as to return to the ED upon further deterioration. Patient is ready to go home. The patient is happy with this plan    Follow-up Information     Follow up With Specialties Details Why Contact Info    Satya Stuart MD Family Medicine Go to  As needed, If symptoms worsen [address]  [phone]            Current Discharge Medication List            Disposition: ***    Diagnosis     Clinical Impression:   1. Suspected COVID-19 virus infection        Signed,  Saira Anderson MD  Emergency Physician  [facility]    As a voice dictation software was utilized to dictate this note, minor word transpositions can occur. I apologize for confusing wording and typographic errors. Please feel free to contact me for clarification.

## 2021-08-29 NOTE — ED NOTES
Pt discharged at this time. This RN reviewed discharge instructions at this time with the pt, & pt does not have any questions. Pt ambulatory upon discharge, & in stable condition. Pt armband removed & shredded.
normal...

## 2021-08-30 ENCOUNTER — PATIENT OUTREACH (OUTPATIENT)
Dept: CASE MANAGEMENT | Age: 76
End: 2021-08-30

## 2021-08-30 LAB — SARS-COV-2, COV2NT: DETECTED

## 2021-08-30 NOTE — PROGRESS NOTES
.  Patient contacted regarding COVID-19 risk, exposure, pulse oximeter ordered at discharge, monoclonal antibody infusion follow up. Discussed COVID-19 related testing which was available at this time. Test results were positive. Patient informed of results, if available? yes. Ambulatory Care Manager contacted the patient by telephone to perform post discharge assessment. Call within 2 business days of discharge: Yes Verified name and  with patient as identifiers. Provided introduction to self, and explanation of the CTN/ACM role, and reason for call due to risk factors for infection and/or exposure to COVID-19. Symptoms reviewed with patient who verbalized the following symptoms: fatigue, pain or aching joints, loss or taste or smell and no new symptoms      Due to no new or worsening symptoms encounter ACM will route message as patient wants PCP to know her positive results. routed to provider for escalation. Discussed follow-up appointments. If no appointment was previously scheduled, appointment scheduling offered:  yes. [facility] follow up appointment(s):   Future Appointments   Date Time Provider Caron Nina   9/3/2021  9:00 AM Feliberto Baker0 [facility]   2021  9:15 AM Breezy Chaudhari PTA [facility]   2021 10:00 AM Rafael Sears MD [facility]   2022  9:20 AM Stephen MARTINEZ DO [facility]   2022  9:30 AM CSI, PACER [facility]     Non-[facility] follow up appointment(s): No    Interventions to address risk factors: Obtained and reviewed discharge summary and/or continuity of care documents     Advance Care Planning:   Does patient have an Advance Directive: not on file; education provided. Educated patient about risk for severe COVID-19 due to risk factors according to CDC guidelines. ACM reviewed discharge instructions, medical action plan and red flag symptoms with the patient who verbalized understanding. Discussed COVID vaccination status: yes.  Education provided on COVID-19 vaccination as appropriate. Discussed exposure protocols and quarantine with CDC Guidelines. Patient was given an opportunity to verbalize any questions and concerns and agrees to contact ACM or health care provider for questions related to their healthcare. Reviewed and educated patient on any new and changed medications related to discharge diagnosis     Was patient discharged with a pulse oximeter? no Discussed and confirmed pulse oximeter discharge instructions and when to notify provider or seek emergency care. ACM provided contact information. Plan for follow-up call in 3-5 days based on severity of symptoms and risk factors. Patient received Casirivimab-Imabheoimab( Regn-1200 mg IVF . She only has a mild cough last night. Her son said he was coming in to check on her but she did cough later one. No pulse oxy oxygen saturation stayed in the high 90's. Patient was seen in out patient physical therapy 8/27/2021 Voicing her PT was coughing ,blowing his nose ,not wearing a mask he did say that he is washing his hands. She said she could not ask if he had his vaccination or not it is against HIPPI. She has been very careful when she goes any where, the senior center or PT she comes in take her clothes off spray them down with Lysol disinfectant spray. She can only think of the therapist if she was in contact. Patient voicing she will call them and let them know she has tested positive for the COVID virus. He daughter wants her to call Dr Jose Espitia as well, but patient voicing she don't have an appointment with her until November. AC said she will route note to Dr Jose Espitia to let her know. And yes please inform the Physical therapy department that she goes too.

## 2021-09-03 ENCOUNTER — APPOINTMENT (OUTPATIENT)
Dept: PHYSICAL THERAPY | Age: 76
End: 2021-09-03

## 2021-09-03 ENCOUNTER — PATIENT OUTREACH (OUTPATIENT)
Dept: CASE MANAGEMENT | Age: 76
End: 2021-09-03

## 2021-09-03 NOTE — PROGRESS NOTES
ARCENIO made follow up call . Patient received Monoclonal IVF during ED visit 8/29/2021. She is feeling very well. She has been sitting outside daily with mask on for 20 minutes, her daughter suggested this. Her appetite is back, no aches or pain. No fever or sob. She has a dry cough off and on and this has been going on for years. Her daughter told her to ask Dr Chon Jordan to refer her to an allergy specialist. She stayed with her son in [place] for 2 weeks and coughed every day that she was there also had nose bleed. The cough is dry, but here lately is has some mucous. She don't feel congested and has no SOB. The cough sometimes keeps her awake at night.   [facility] will route information to Dr Chon Jordan

## 2021-09-07 NOTE — PROGRESS NOTES
Would you please get in touch with patient and give her sooner appt for ED follow up and on going cough.     Avelino Newton

## 2021-09-08 ENCOUNTER — PATIENT OUTREACH (OUTPATIENT)
Dept: CASE MANAGEMENT | Age: 76
End: 2021-09-08

## 2021-09-08 ENCOUNTER — VIRTUAL VISIT (OUTPATIENT)
Dept: FAMILY MEDICINE CLINIC | Age: 76
End: 2021-09-08
Payer: MEDICARE

## 2021-09-08 DIAGNOSIS — R04.0 BLEEDING FROM THE NOSE: ICD-10-CM

## 2021-09-08 DIAGNOSIS — U07.1 COVID-19 VIRUS INFECTION: ICD-10-CM

## 2021-09-08 DIAGNOSIS — R05.9 COUGH: Primary | ICD-10-CM

## 2021-09-08 PROCEDURE — 99442 PR PHYS/QHP TELEPHONE EVALUATION 11-20 MIN: CPT | Performed by: FAMILY MEDICINE

## 2021-09-08 RX ORDER — ALBUTEROL SULFATE 90 UG/1
2 AEROSOL, METERED RESPIRATORY (INHALATION)
Qty: 18 G | Refills: 0 | Status: SHIPPED | OUTPATIENT
Start: 2021-09-08 | End: 2022-04-20 | Stop reason: SDUPTHER

## 2021-09-08 RX ORDER — FAMOTIDINE 20 MG/1
20 TABLET, FILM COATED ORAL 2 TIMES DAILY
Qty: 60 TABLET | Refills: 0 | Status: SHIPPED | OUTPATIENT
Start: 2021-09-08 | End: 2021-10-04

## 2021-09-08 NOTE — PROGRESS NOTES
Kalpesh Weinstein is a 67 y.o. female, evaluated via audio-only technology on 9/8/2021 for ED Follow-up  . Assessment & Plan:     Cough: Going on since last 3 months. She went to [place] and she had a significant dry cough for couple of weeks. It improved after that and again restarted. No sputum. No shortness of breath or chest congestion. No nasal congestion. Wondering if she has any seasonal allergies. Denies any GERD symptom. No relation to food. Cough is day and night. At this time will give trial of albuterol and the famotidine. If no improvement will consider further evaluation. Her cough has started before COVID-19 infection. -     famotidine (PEPCID) 20 mg tablet; Take 1 Tablet by mouth two (2) times a day., Normal, Disp-60 Tablet, R-0  -     albuterol (PROVENTIL HFA, VENTOLIN HFA, PROAIR HFA) 90 mcg/actuation inhaler; Take 2 Puffs by inhalation every four (4) hours as needed for Wheezing., Normal, Disp-18 g, R-0    COVID-19 virus infection: Has received casirivimab-imdevimab (REGEN-COV). feeling asymptomatic. One-time 14 days total.  Advised to wear facemask and 6 feet social distance. And hygiene. Reviewed chest x-ray which was negative for pneumonia. Comments:  has received casirivimab-imdevimab (REGEN-COV)    Bleeding from the nose: Occasionally. Not a new symptom. Has a already spoke with cardiologist about it. On Eliquis for A. fib. Will follow cardiology and the recommendation. Advised to keep a log. He frequently will talk to the cardiologist about anticoagulation and if needed will consider ENT referral.  Pt understood and agree with the plan     Follow up as scheduled in November 2021     Please note that this dictation was completed with [facility], the computer voice recognition software. Quite often unanticipated grammatical, syntax, homophones, and other interpretive errors are inadvertently transcribed by the computer software. Please disregard these errors.   Please excuse any errors that have escaped final proofreading. 12  Subjective:     Done visit to telephone encounter. Recent ER visit. She felt symptomatic on Saturday. Went to Sunday to emergency room. Covid test was positive. She was given casirivimab-imdevimab (REGEN-COV). Review ER records. Chest x-ray negative for pneumonia. She does have a cough which is started since 2 to 3 months. Day and night. Feeling somewhat better now. Denies any GERD symptoms. No wheezing. No chest congestion. No shortness of breath or chest pain. Does not have a history of asthma. She is fully vaccinated. Does not smoke. No secondhand smoking as well. No nausea vomiting or abdominal pain. No urinary or bowel complaint. History of A. fib. Following cardiology. On anticoagulation. Has on and off nosebleed. She has discussed this with cardiologist.  Will observe. Advised to make a log. Patient was wondering if she can get the allergy specialist referral.  At this time I have advised her to try PPI and albuterol as needed. If no improvement will consider allergy testing. Reviewed most with [place] couple of months ago and noted dry cough for 2 weeks. Cough improved when she came back to [place]. Now restarted again. No mood changes. Today's 11 days of quarantine. She is planning to do for 14 days. After that advised the facemask and 6 feet distance in a crowded place. Prior to Admission medications    Medication Sig Start Date End Date Taking? Authorizing Provider   famotidine (PEPCID) 20 mg tablet Take 1 Tablet by mouth two (2) times a day. 9/8/21  Yes Bal Majano MD   albuterol (PROVENTIL HFA, VENTOLIN HFA, PROAIR HFA) 90 mcg/actuation inhaler Take 2 Puffs by inhalation every four (4) hours as needed for Wheezing.  9/8/21  Yes Bal Majano MD   OneTouch Ultra Blue Test Strip strip USE TO TEST BLOOD SUGAR ONCE DAILY 6/18/21  Yes Bal Majano MD   losartan (COZAAR) 100 mg tablet TAKE 1 TABLET BY MOUTH EVERY DAY 5/3/21  Yes Matt Redding MD   metoprolol succinate (TOPROL-XL) 50 mg XL tablet TAKE 1 TABLET BY MOUTH EVERY DAY 4/2/21  Yes Matt Redding MD   apixaban (ELIQUIS) 5 mg tablet Take 1 Tab by mouth two (2) times a day. 2/25/21  Yes Lu Ordoñez DO   metFORMIN (GLUCOPHAGE) 500 mg tablet TAKE 1 TABLET BY MOUTH EVERY DAY IN THE MORNING WITH BREAKFAST 1/27/21  Yes Matt Redding MD   lancets misc One Touch Ultra soft testing fasting blood sugar every morning prior to breakfast DX E11.9 7/8/20  Yes Matt Redding MD   cholecalciferol, vitamin D3, (VITAMIN D3) 2,000 unit tab Take  by mouth. Yes Provider, Historical   spironolactone (ALDACTONE) 25 mg tablet Take 1 Tab by mouth daily. 5/16/18  Yes Provider, Historical   aspirin 81 mg chewable tablet Take 81 mg by mouth daily. Yes Provider, Historical   DOCOSAHEXANOIC ACID/EPA (FISH OIL PO) Take 100 mg by mouth daily. Yes Provider, Historical   COQ10, LIPOSOMAL UBIQUINOL, PO Take 200 mg by mouth daily. Yes Provider, Historical   metoprolol succinate (TOPROL-XL) 50 mg XL tablet TAKE 1 TABLET BY MOUTH EVERY DAY 11/2/20   Matt Redding MD         ROS: See HPI    No flowsheet data found. Anuel Atkins, who was evaluated through a patient-initiated, synchronous (real-time) audio only encounter, and/or her healthcare decision maker, is aware that it is a billable service, with coverage as determined by her insurance carrier. She provided verbal consent to proceed: Yes. She has not had a related appointment within my department in the past 7 days or scheduled within the next 24 hours. On this date 09/08/2021 I have spent 10-15 minutes reviewing previous notes, test results and face to face (virtual) with the patient discussing the diagnosis and importance of compliance with the treatment plan as well as documenting on the day of the visit.     Juan Vargas MD

## 2021-09-08 NOTE — PROGRESS NOTES
.Patient resolved from [address] Transitions episode on 9/8/2021. Discussed COVID-19 related testing which was available at this time. Test results were positive. Patient informed of results, if available? yes     Patient/family has been provided the following resources and education related to COVID-19:                         Signs, symptoms and red flags related to COVID-19            CDC exposure and quarantine guidelines            [facility] exposure contact - [phone]            Contact for their local Department of Health                 Patient currently reports that the following symptoms have improved:  no new symptoms and no worsening symptoms. Patient had virtual visit with PCP Dr. Tata Pinedo to discuss her chronic cough post COVID. Today is 11 days quarantine also. No further outreach scheduled with this CTN/ACM/LPN/HC/ MA. Episode of Care resolved. Patient has this CTN/ACM/LPN/HC/MA contact information if future needs arise.

## 2021-09-10 ENCOUNTER — APPOINTMENT (OUTPATIENT)
Dept: PHYSICAL THERAPY | Age: 76
End: 2021-09-10

## 2021-09-15 NOTE — PROGRESS NOTES
[facility]  [address]  [place], [address]  [phone] fax    Physical Therapy Discharge Summary    Patient name: Kerrin Bernheim Start of Care: 2021   Referral source: Princess Elkins MD : 1945               Medical Diagnosis: Cervicalgia [M54.2]  Dorsalgia, unspecified [M54.9]  Payor: [place] MEDICARE / Plan: [place] MEDICARE PART A & B / Product Type: Medicare /  Onset Date:1 year               Treatment Diagnosis: LBP & neck pain   Prior Hospitalization: see medical history Provider#: [id number]   Medications: Verified on Patient summary List    Comorbidities: B TKA, Heart dz, arthritis, pacemaker, thyroid problems, diabetes, HTN   Prior Level of Function: Pt with improved ADLs and ambulation tolerance  Visits from Start of Care: 4    Missed Visits: 0  Reporting Period : 2021 to 2021    Summary of Care:  Short Term Goals: To be accomplished in 2 weeks:  1. Pt will demonstrate I and compliance with HEP to maximize therapeutic effect.              OF: HEP issued and instructed              [id number]: Has not attempted. 2021  2. Pt will demonstrate B hip flexion PROM to 100 deg without increased back pain to improve dressing.              IE: >90 deg with back pain              Current: PROM hip flexion (B) 120 degrees with slight low back discomfort. 2021  Long Term Goals: To be accomplished in 5 weeks:  1. Pt will demonstrate trunk flexion and B SB to 75% of WNL without back pain for improved ADL ease.              IE: 75% of WNL each but reports of back pain  2. Pt will demonstrate B hip flexion strength 4+/5 without back pain to improve ease of sit to stands.              IE: 4+/5 with back pain reported midline  3.  Pt will report ability to ambulate for 45 minutes without increased back pain to improve ADL ease.              IE: 20 min ambulation              Current: Met, pt reports being able to walk for 60 minutes without back pain increasing. 8/20/2021  4. Pt will improve cervical extension AROM to 35 deg without increased pain to improve ease of self care.              IE: 25 deg with neck pain               Current: AROM C/S extension 40 degrees with 4/10 pain. 8/27/2021    ASSESSMENT/RECOMMENDATIONS: Pt had made good progress with regards to pain levels with PT.  She was forced to D/C secondary to positive COVID test. Unable to further assess goals at this time    [x]Discontinue therapy: [x]Secondary to medical complications    Shakira Marquez DPT CMTPT 9/15/2021 8:22 AM

## 2021-09-17 ENCOUNTER — APPOINTMENT (OUTPATIENT)
Dept: PHYSICAL THERAPY | Age: 76
End: 2021-09-17

## 2021-09-27 RX ORDER — APIXABAN 5 MG/1
TABLET, FILM COATED ORAL
Qty: 60 TABLET | Refills: 5 | Status: SHIPPED | OUTPATIENT
Start: 2021-09-27 | End: 2022-04-22 | Stop reason: SDUPTHER

## 2021-11-04 ENCOUNTER — TELEPHONE (OUTPATIENT)
Dept: FAMILY MEDICINE CLINIC | Age: 76
End: 2021-11-04

## 2021-11-04 DIAGNOSIS — R04.0 BLEEDING FROM THE NOSE: Primary | ICD-10-CM

## 2021-11-04 DIAGNOSIS — R05.9 COUGH: ICD-10-CM

## 2021-11-05 RX ORDER — FAMOTIDINE 20 MG/1
20 TABLET, FILM COATED ORAL 2 TIMES DAILY
Qty: 180 TABLET | Refills: 1 | Status: SHIPPED | OUTPATIENT
Start: 2021-11-05 | End: 2022-04-20 | Stop reason: SDUPTHER

## 2021-11-09 ENCOUNTER — OFFICE VISIT (OUTPATIENT)
Dept: FAMILY MEDICINE CLINIC | Age: 76
End: 2021-11-09
Payer: MEDICARE

## 2021-11-09 VITALS
WEIGHT: 176 LBS | OXYGEN SATURATION: 100 % | HEIGHT: 66 IN | BODY MASS INDEX: 28.28 KG/M2 | HEART RATE: 64 BPM | TEMPERATURE: 97.4 F | SYSTOLIC BLOOD PRESSURE: 136 MMHG | DIASTOLIC BLOOD PRESSURE: 82 MMHG | RESPIRATION RATE: 16 BRPM

## 2021-11-09 DIAGNOSIS — R04.0 BLEEDING NOSE: ICD-10-CM

## 2021-11-09 DIAGNOSIS — E78.00 PURE HYPERCHOLESTEROLEMIA: ICD-10-CM

## 2021-11-09 DIAGNOSIS — E11.21 TYPE 2 DIABETES WITH NEPHROPATHY (HCC): Primary | ICD-10-CM

## 2021-11-09 DIAGNOSIS — R00.1 BRADYCARDIA: ICD-10-CM

## 2021-11-09 DIAGNOSIS — N28.9 RENAL INSUFFICIENCY: ICD-10-CM

## 2021-11-09 DIAGNOSIS — Z95.0 CARDIAC PACEMAKER IN SITU: ICD-10-CM

## 2021-11-09 DIAGNOSIS — I48.91 ATRIAL FIBRILLATION, UNSPECIFIED TYPE (HCC): ICD-10-CM

## 2021-11-09 DIAGNOSIS — Z11.59 NEED FOR HEPATITIS C SCREENING TEST: ICD-10-CM

## 2021-11-09 DIAGNOSIS — Z23 ENCOUNTER FOR IMMUNIZATION: ICD-10-CM

## 2021-11-09 DIAGNOSIS — E83.52 HYPERCALCEMIA: ICD-10-CM

## 2021-11-09 DIAGNOSIS — I10 ESSENTIAL HYPERTENSION: ICD-10-CM

## 2021-11-09 DIAGNOSIS — E04.1 THYROID NODULE: ICD-10-CM

## 2021-11-09 PROCEDURE — 99214 OFFICE O/P EST MOD 30 MIN: CPT | Performed by: FAMILY MEDICINE

## 2021-11-09 PROCEDURE — G8417 CALC BMI ABV UP PARAM F/U: HCPCS | Performed by: FAMILY MEDICINE

## 2021-11-09 PROCEDURE — G8754 DIAS BP LESS 90: HCPCS | Performed by: FAMILY MEDICINE

## 2021-11-09 PROCEDURE — G8536 NO DOC ELDER MAL SCRN: HCPCS | Performed by: FAMILY MEDICINE

## 2021-11-09 PROCEDURE — G8752 SYS BP LESS 140: HCPCS | Performed by: FAMILY MEDICINE

## 2021-11-09 PROCEDURE — 90694 VACC AIIV4 NO PRSRV 0.5ML IM: CPT | Performed by: FAMILY MEDICINE

## 2021-11-09 PROCEDURE — G8427 DOCREV CUR MEDS BY ELIG CLIN: HCPCS | Performed by: FAMILY MEDICINE

## 2021-11-09 PROCEDURE — 1101F PT FALLS ASSESS-DOCD LE1/YR: CPT | Performed by: FAMILY MEDICINE

## 2021-11-09 PROCEDURE — 1090F PRES/ABSN URINE INCON ASSESS: CPT | Performed by: FAMILY MEDICINE

## 2021-11-09 PROCEDURE — G8510 SCR DEP NEG, NO PLAN REQD: HCPCS | Performed by: FAMILY MEDICINE

## 2021-11-09 PROCEDURE — G8399 PT W/DXA RESULTS DOCUMENT: HCPCS | Performed by: FAMILY MEDICINE

## 2021-11-09 PROCEDURE — G0463 HOSPITAL OUTPT CLINIC VISIT: HCPCS | Performed by: FAMILY MEDICINE

## 2021-11-09 NOTE — PATIENT INSTRUCTIONS
Low Sodium Diet (2,000 Milligram): Care Instructions  Overview     Limiting sodium can be an important part of managing some health problems. The most common source of sodium is salt. People get most of the salt in their diet from canned, prepared, and packaged foods. Fast food and restaurant meals also are very high in sodium. Your doctor will probably limit your sodium to less than 2,000 milligrams (mg) a day. This limit counts all the sodium in prepared and packaged foods and any salt you add to your food. Follow-up care is a key part of your treatment and safety. Be sure to make and go to all appointments, and call your doctor if you are having problems. It's also a good idea to know your test results and keep a list of the medicines you take. How can you care for yourself at home? Read food labels  · Read labels on cans and food packages. The labels tell you how much sodium is in each serving. Make sure that you look at the serving size. If you eat more than the serving size, you have eaten more sodium. · Food labels also tell you the Percent Daily Value for sodium. Choose products with low Percent Daily Values for sodium. · Be aware that sodium can come in forms other than salt, including monosodium glutamate (MSG), sodium citrate, and sodium bicarbonate (baking soda). MSG is often added to Asian food. When you eat out, you can sometimes ask for food without MSG or added salt. Buy low-sodium foods  · Buy foods that are labeled \"unsalted\" (no salt added), \"sodium-free\" (less than 5 mg of sodium per serving), or \"low-sodium\" (140 mg or less of sodium per serving). Foods labeled \"reduced-sodium\" and \"light sodium\" may still have too much sodium. Be sure to read the label to see how much sodium you are getting. · Buy fresh vegetables, or frozen vegetables without added sauces. Buy low-sodium versions of canned vegetables, soups, and other canned goods.   Prepare low-sodium meals  · Cut back on the amount of salt you use in cooking. This will help you adjust to the taste. Do not add salt after cooking. One teaspoon of salt has about 2,300 mg of sodium. · Take the salt shaker off the table. · Flavor your food with garlic, lemon juice, onion, vinegar, herbs, and spices. Do not use soy sauce, lite soy sauce, steak sauce, onion salt, garlic salt, celery salt, or ketchup on your food. · Use low-sodium salad dressings, sauces, and ketchup. Or make your own salad dressings and sauces without adding salt. · Use less salt (or none) when recipes call for it. You can often use half the salt a recipe calls for without losing flavor. Other foods such as rice, pasta, and grains do not need added salt. · Rinse canned vegetables, and cook them in fresh water. This removes somebut not allof the salt. · Avoid water that is naturally high in sodium or that has been treated with water softeners, which add sodium. If you buy bottled water, read the label and choose a sodium-free brand. Avoid high-sodium foods  · Avoid eating:  ? Smoked, cured, salted, and canned meat, fish, and poultry. ? Ham, morales, hot dogs, and luncheon meats. ? Regular, hard, and processed cheese and regular peanut butter. ? Crackers with salted tops, and other salted snack foods such as pretzels, chips, and salted popcorn. ? Frozen prepared meals, unless labeled low-sodium. ? Canned and dried soups, broths, and bouillon, unless labeled sodium-free or low-sodium. ? Canned vegetables, unless labeled sodium-free or low-sodium. ? [employer] fries, pizza, tacos, and other fast foods. ? Pickles, olives, ketchup, and other condiments, especially soy sauce, unless labeled sodium-free or low-sodium. Where can you learn more? Go to http://www.gray.com/  Enter V843 in the search box to learn more about \"Low Sodium Diet (2,000 Milligram): Care Instructions. \"  Current as of: December 17, 2020               Content Version: 13.0  © 6978-4375 Healthwise, Incorporated. Care instructions adapted under license by [employer] (which disclaims liability or warranty for this information). If you have questions about a medical condition or this instruction, always ask your healthcare professional. [address] any warranty or liability for your use of this information. Nutrition Tips for Diabetes: After Your Visit  Your Care Instructions  A healthy diet is important to manage diabetes. It helps you lose weight (if you need to) and keep it off. It gives you the nutrition and energy your body needs and helps prevent heart disease. But a diet for diabetes does not mean that you have to eat special foods. You can eat what your family eats, including occasional sweets and other favorites. But you do have to pay attention to how often you eat and how much you eat of certain foods. The right plan for you will give you meals that help you keep your blood sugar at healthy levels. Try to eat a variety of foods and to spread carbohydrate throughout the day. Carbohydrate raises blood sugar higher and more quickly than any other nutrient does. Carbohydrate is found in sugar, breads and cereals, fruit, starchy vegetables such as potatoes and corn, and milk and yogurt. You may want to work with a dietitian or diabetes educator to help you plan meals and snacks. A dietitian or diabetes educator also can help you lose weight if that is one of your goals. The following tips can help you enjoy your meals and stay healthy. Follow-up care is a key part of your treatment and safety. Be sure to make and go to all appointments, and call your doctor if you are having problems. Its also a good idea to know your test results and keep a list of the medicines you take. How can you care for yourself at home? · Learn which foods have carbohydrate and how much carbohydrate to eat.  A dietitian or diabetes educator can help you learn to keep track of how much carbohydrate you eat. · Spread carbohydrate throughout the day. Eat some carbohydrate at all meals, but do not eat too much at any one time. · Plan meals to include food from all the food groups. These are the food groups and some example portion sizes:  ¨ Grains: 1 slice of bread (1 ounce), ½ cup of cooked cereal, and 1/3 cup of cooked pasta or rice. These have about 15 grams of carbohydrate in a serving. Choose whole grains such as whole wheat bread or crackers, oatmeal, and brown rice more often than refined grains. ¨ Fruit: 1 small fresh fruit, such as an apple or orange; ½ of a banana; ½ cup of chopped, cooked, or canned fruit; ½ cup of fruit juice; 1 cup of melon or raspberries; and 2 tablespoons of dried fruit. These have about 15 grams of carbohydrate in a serving. ¨ Dairy: 1 cup of nonfat or low-fat milk and 2/3 cup of plain yogurt. These have about 15 grams of carbohydrate in a serving. ¨ Protein foods: Beef, chicken, turkey, fish, eggs, tofu, cheese, cottage cheese, and peanut butter. A serving size of meat is 3 ounces, which is about the size of a deck of cards. Examples of meat substitute serving sizes (equal to 1 ounce of meat) are 1/4 cup of cottage cheese, 1 egg, 1 tablespoon of peanut butter, and ½ cup of tofu. These have very little or no carbohydrate per serving. ¨ Vegetables: Starchy vegetables such as ½ cup of cooked dried beans, peas, potatoes, or corn have about 15 grams of carbohydrate. Nonstarchy vegetables have very little carbohydrate, such as 1 cup of raw leafy vegetables (such as spinach), ½ cup of other vegetables (cooked or chopped), and 3/4 cup of vegetable juice. · Use the plate format to plan meals. It is a good, quick way to make sure that you have a balanced meal. It also helps you spread carbohydrate throughout the day. You divide your plate by types of foods.  Put vegetables on half the plate, meat or meat substitutes on one-quarter of the plate, and a grain or starchy vegetable (such as brown rice or a potato) in the final quarter of the plate. To this you can add a small piece of fruit and 1 cup of milk or yogurt, depending on how much carbohydrate you are supposed to eat at a meal.  · Talk to your dietitian or diabetes educator about ways to add limited amounts of sweets into your meal plan. You can eat these foods now and then, as long as you include the amount of carbohydrate they have in your daily carbohydrate allowance. · If you drink alcohol, limit it to no more than 1 drink a day for women and 2 drinks a day for men. If you are pregnant, no amount of alcohol is known to be safe. · Protein, fat, and fiber do not raise blood sugar as much as carbohydrate does. If you eat a lot of these nutrients in a meal, your blood sugar will rise more slowly than it would otherwise. · Limit saturated fats, such as those from meat and dairy products. Try to replace it with monounsaturated fat, such as olive oil. This is a healthier choice because people who have diabetes are at higher-than-average risk of heart disease. But use a modest amount of olive oil. A tablespoon of olive oil has 14 grams of fat and 120 calories. · Exercise lowers blood sugar. If you take insulin by shots or pump, you can use less than you would if you were not exercising. Keep in mind that timing matters. If you exercise within 1 hour after a meal, your body may need less insulin for that meal than it would if you exercised 3 hours after the meal. Test your blood sugar to find out how exercise affects your need for insulin. · Exercise on most days of the week. Aim for at least 30 minutes. Exercise helps you stay at a healthy weight and helps your body use insulin. Walking is an easy way to get exercise. Gradually increase the amount you walk every day. You also may want to swim, bike, or do other activities.   When you eat out  · Learn to estimate the serving sizes of foods that have carbohydrate. If you measure food at home, it will be easier to estimate the amount in a serving of restaurant food. · If the meal you order has too much carbohydrate (such as potatoes, corn, or baked beans), ask to have a low-carbohydrate food instead. Ask for a salad or green vegetables. · If you use insulin, check your blood sugar before and after eating out to help you plan how much to eat in the future. · If you eat more carbohydrate at a meal than you had planned, take a walk or do other exercise. This will help lower your blood sugar. Where can you learn more? Go to [employer]  Enter J563 in the search box to learn more about \"Nutrition Tips for Diabetes: After Your Visit. \"   © 7943-8747 Healthwise, Incorporated. Care instructions adapted under license by [address] [place] [address] (which disclaims liability or warranty for this information). This care instruction is for use with your licensed healthcare professional. If you have questions about a medical condition or this instruction, always ask your healthcare professional. [place] [address] any warranty or liability for your use of this information. Content Version: 71.4.017262; Current as of: June 4, 2014                 Thyroid Nodules: Care Instructions  Your Care Instructions  Thyroid nodules are growths or lumps in the thyroid gland. Your thyroid is in the front of your neck. It controls how your body uses energy. You may have tests to see if the nodule is caused by cancer. Most nodules aren't cancer and don't cause problems. Many don't even need treatment. If you do have cancer, it can usually be cured. Treatment will probably include surgery. You may also get radioactive iodine treatment. If your thyroid can't make thyroid hormone after treatment, you can take a pill every day to replace the hormone. Follow-up care is a key part of your treatment and safety.  Be sure to make and go to all appointments, and call your doctor if you are having problems. It's also a good idea to know your test results and keep a list of the medicines you take. How can you care for yourself at home? · Be safe with medicines. If you take thyroid hormone medicine:  ? Take it exactly as prescribed. Call your doctor if you think you are having a problem with your medicine. If you take the right amount and don't skip doses, you probably won't have side effects. ? Tell your doctor about any medicines you take. This includes over-the-counter medicines. When should you call for help? Call 911 anytime you think you may need emergency care. For example, call if:    · You lose consciousness. Call your doctor now or seek immediate medical care if:    · You have shortness of breath. Watch closely for changes in your health, and be sure to contact your doctor if:    · You have pain in your neck, jaw, or ear.     · You have problems swallowing.     · You feel weak and tired.     · You have nervousness, a fast heartbeat, hand tremors, problems sleeping, increased sweating, and weight loss.     · You do not feel better even though you are taking your medicine. Where can you learn more? Go to http://www.gray.com/  Enter F173 in the search box to learn more about \"Thyroid Nodules: Care Instructions. \"  Current as of: December 2, 2020               Content Version: 13.0  © 5446-4767 Healthwise, Incorporated. Care instructions adapted under license by [employer] (which disclaims liability or warranty for this information). If you have questions about a medical condition or this instruction, always ask your healthcare professional. [address] any warranty or liability for your use of this information.

## 2021-11-09 NOTE — PROGRESS NOTES
Chief Complaint   Patient presents with    Diabetes    Hypertension    Cholesterol Problem    Irregular Heart Beat    Epistaxis     off and on     1. \"Have you been to the ER, urgent care clinic since your last visit? Hospitalized since your last visit? \" No    2. \"Have you seen or consulted any other health care providers outside of the [address] [place] [address] since your last visit? \" Dr. Vy Gupta     3. For patients aged 39-70: Has the patient had a colonoscopy? Yes, HM satisfied with blue hyperlink     If the patient is female:    4. For patients aged 41-77: Has the patient had a mammogram within the past 2 years? Yes, HM satisfied with blue hyperlink    5. For patients aged 21-65: Has the patient had a pap smear?  No- Hysterectomy

## 2021-11-10 NOTE — PROGRESS NOTES
HISTORY OF PRESENT ILLNESS  Divya Campa is a 67 y.o. female. HPI: Here for follow-up. Diabetes. Fairly stable. No hyper or hypoglycemic symptoms. Sitting comfortable without any acute distress and taking medication with compliance. Hypertension. Vitals been stable. Asymptomatic. Again taking medication with compliance and no side effects. Hypercalcemia. Following endocrinology. Also recent labs done and parathyroid scan showed no acute findings or parathyroid adenoma. Currently no trouble swallowing or change in voice. On statin. No side effects. Presence of pacemaker. Been following cardiology. No anginal symptoms. History of A. fib. On anticoagulation and also on aspirin. Getting on and off nosebleed. In the last couple of months for times nosebleed. Average it last for 15 minutes. We will obtain recommendation from cardiology regarding aspirin use. Currently patient is asymptomatic. Visit Vitals  /82 (BP 1 Location: Left arm, BP Patient Position: Sitting, BP Cuff Size: Adult)   Pulse 64   Temp 97.4 °F (36.3 °C) (Temporal)   Resp 16   Ht 5' 6\" (1.676 m)   Wt 176 lb (79.8 kg)   SpO2 100%   BMI 28.41 kg/m²     Review medication list, vitals, problem list,allergies. Denies any headache, dizziness, no chest pain or trouble breathing, no arm or leg weakness. No nausea or vomiting, no weight or appetite changes, no mood changes . No urine or bowel complains, no palpitation, no diaphoresis. No abdominal pain. No cold or cough. No leg swelling. No fever. No sleep trouble.      Lab Results   Component Value Date/Time    WBC 9.0 11/23/2020 07:27 AM    HGB 12.2 11/23/2020 07:27 AM    HCT 38.7 11/23/2020 07:27 AM    PLATELET 905 45/22/3109 07:27 AM    MCV 84.9 11/23/2020 07:27 AM     Lab Results   Component Value Date/Time    Sodium 141 11/23/2020 07:27 AM    Potassium 4.5 11/23/2020 07:27 AM    Chloride 108 11/23/2020 07:27 AM    CO2 28 11/23/2020 07:27 AM    Anion gap 5 11/23/2020 07:27 AM    Glucose 114 (H) 11/23/2020 07:27 AM    BUN 19 (H) 11/23/2020 07:27 AM    Creatinine 1.11 11/23/2020 07:27 AM    BUN/Creatinine ratio 17 11/23/2020 07:27 AM    GFR est AA 58 (L) 11/23/2020 07:27 AM    GFR est non-AA 48 (L) 11/23/2020 07:27 AM    Calcium 10.8 (H) 11/23/2020 07:27 AM    Bilirubin, total 0.7 11/23/2020 07:27 AM    Alk. phosphatase 74 11/23/2020 07:27 AM    Protein, total 7.6 11/23/2020 07:27 AM    Albumin 3.9 11/23/2020 07:27 AM    Globulin 3.7 11/23/2020 07:27 AM    A-G Ratio 1.1 11/23/2020 07:27 AM    ALT (SGPT) 24 11/23/2020 07:27 AM    AST (SGOT) 17 11/23/2020 07:27 AM     Lab Results   Component Value Date/Time    Cholesterol, total 181 11/23/2020 07:27 AM    HDL Cholesterol 48 11/23/2020 07:27 AM    LDL, calculated 103.4 (H) 11/23/2020 07:27 AM    VLDL, calculated 29.6 11/23/2020 07:27 AM    Triglyceride 148 11/23/2020 07:27 AM    CHOL/HDL Ratio 3.8 11/23/2020 07:27 AM     Lab Results   Component Value Date/Time    TSH 1.75 11/23/2020 07:27 AM     Lab Results   Component Value Date/Time    Hemoglobin A1c 6.1 (H) 11/23/2020 07:27 AM    Hemoglobin A1c (POC) 5.9 03/09/2021 10:14 AM     Lab Results   Component Value Date/Time    Microalbumin/Creat ratio (mg/g creat)  11/23/2020 07:27 AM     Cannot calculate ratio due to microalbumin result outside reportable range. Microalbumin,urine random <0.50 11/23/2020 07:27 AM    Microalbumin urine (POC) 10 04/24/2017 09:02 AM     Lab Results   Component Value Date/Time    Calcium 10.8 (H) 11/23/2020 07:27 AM    PTH, Intact 132.0 (H) 03/19/2018 10:27 AM     ROS: See HPI    Physical Exam  Constitutional:       General: She is not in acute distress. Cardiovascular:      Rate and Rhythm: Normal rate and regular rhythm. Heart sounds: Normal heart sounds. Abdominal:      General: Bowel sounds are normal.      Palpations: Abdomen is soft. Tenderness: There is no abdominal tenderness. Musculoskeletal:         General: No swelling.       Cervical back: Neck supple. Neurological:      Mental Status: She is oriented to person, place, and time. Psychiatric:         Behavior: Behavior normal.         ASSESSMENT and PLAN    ICD-10-CM ICD-9-CM    1. Type 2 diabetes with nephropathy ([address]): A1c fairly stable. We will recheck the labs. Continue current plan. No hyper or hypoglycemic symptoms. E11.21 250.40 LIPID PANEL     583.81 HEMOGLOBIN A1C WITH EAG      TSH 3RD GENERATION      CBC W/O DIFF      METABOLIC PANEL, COMPREHENSIVE      MICROALBUMIN, UR, RAND W/ MICROALB/CREAT RATIO   2. Encounter for immunization  Z23 V03.89 FLU (FLUAD QUAD INFLUENZA VACCINE,QUAD,ADJUVANTED)      ADMIN INFLUENZA VIRUS VAC   3. Atrial fibrillation, unspecified type [facility]): On Eliquis and aspirin. Getting on and off nosebleed. Send a message to cardiologist regarding further recommendation on anticoagulation or aspirin use I48.91 427.31    4. Hypercalcemia: Following Dr. Dalia Maya. Will obtain the labs from them. Currently not ordering any labs E83.52 275.42    5. Renal insufficiency: Recently done labs by endocrinology. Will obtain the results N28.9 593.9    6. Thyroid nodule: Asymptomatic. We will continue current plan E04.1 241.0    7. Cardiac pacemaker in situ  Z95.0 V45.01    8. Essential hypertension:well controlled. Continue current dose of medication and low salt diet. Exercise as tolerated. I10 401.9    9. Bradycardia/ post pacemaker. s/p ablation: No concern. Vitals stable R00.1 427.89    10. Pure hypercholesterolemia  E78.00 272.0    11. Need for hepatitis C screening test  Z11.59 V73.89 HEPATITIS C AB   12. Bleeding nose: As per cardiology recommendation regarding taking aspirin/ Eliquis R04.0 784.7    Patient understood and agreed with the plan  Review health maintenance  She had an eye exam.  Will obtain the records  She is done with the Covid vaccine and now will get the booster  She had labs done at the endocrinology office. Will obtain the results.   Nurse to advised to call their office    Follow-up and Dispositions    · Return in about 4 months (around 3/9/2022). Please note that this dictation was completed with [employer], the computer voice recognition software. Quite often unanticipated grammatical, syntax, homophones, and other interpretive errors are inadvertently transcribed by the computer software. Please disregard these errors. Please excuse any errors that have escaped final proofreading.

## 2021-11-11 ENCOUNTER — TELEPHONE (OUTPATIENT)
Dept: FAMILY MEDICINE CLINIC | Age: 76
End: 2021-11-11

## 2021-11-11 NOTE — TELEPHONE ENCOUNTER
I have received a message from Dr. Reyes Done cardiologist. She wants her to continue aspirin and eliquis both. For now recommended ENT referral to r/o other cause of nose bleed. Will do a referral and further discussion after follow up visit.

## 2021-11-22 ENCOUNTER — TELEPHONE (OUTPATIENT)
Dept: INTERNAL MEDICINE CLINIC | Age: 76
End: 2021-11-22

## 2021-11-24 DIAGNOSIS — I10 ESSENTIAL HYPERTENSION: ICD-10-CM

## 2021-11-24 RX ORDER — METOPROLOL SUCCINATE 50 MG/1
TABLET, EXTENDED RELEASE ORAL
Qty: 90 TABLET | Refills: 1 | Status: SHIPPED | OUTPATIENT
Start: 2021-11-24 | End: 2022-04-20 | Stop reason: SDUPTHER

## 2022-01-04 DIAGNOSIS — I10 ESSENTIAL HYPERTENSION: ICD-10-CM

## 2022-01-04 RX ORDER — LOSARTAN POTASSIUM 100 MG/1
TABLET ORAL
Qty: 90 TABLET | Refills: 1 | Status: SHIPPED | OUTPATIENT
Start: 2022-01-04 | End: 2022-01-28 | Stop reason: SDUPTHER

## 2022-01-28 ENCOUNTER — TRANSCRIBE ORDER (OUTPATIENT)
Dept: SCHEDULING | Age: 77
End: 2022-01-28

## 2022-01-28 DIAGNOSIS — I10 ESSENTIAL HYPERTENSION: ICD-10-CM

## 2022-01-28 DIAGNOSIS — Z12.31 VISIT FOR SCREENING MAMMOGRAM: Primary | ICD-10-CM

## 2022-01-28 NOTE — TELEPHONE ENCOUNTER
Patient:   DONELL ALMENDAREZ            MRN: [id number]            FIN: [id number]              Age:   79 years     Sex:  FEMALE     :  38   Associated Diagnoses:   None   Author:   CHARLIE POSADAS     Subjective   Chief complaint fu post op pain.  Additional Info Medications (24) Active  Scheduled: (10)  *Do NOT give Anticoagulant/Antiplatelet Meds  1 each, N/A, Daily  *Do NOT give NSAIDs  1 each, N/A, Daily  Ascorbic acid 500 mg tab  1,000 mg 2 tab, Oral, TID  Atorvastatin 10 mg tab  10 mg 1 tab, Oral, Q Bedtime  Enalapril 10 mg tab  20 mg 2 tab, Oral, Daily  HydroCHLOROthiazide 25 mg tab  25 mg 1 tab, Oral, Daily  Hydroxychloroquine sulfate 200 mg tab  200 mg 1 tab, Oral, BID  Metoprolol succinate 100 mg XL tab  100 mg 1 tab, Oral, Daily  Polyethylene glycol 3350 oral recon powder 17 gm packet UD  17 gm 1 packet, Oral, Daily  Senna-docusate sodium 8.6-50 mg tab  2 tab, Oral, Q Bedtime  Continuous: (0)  PRN: (14)  Acetaminophen 500 mg tab  500 mg 1 tab, Oral, Q4H  Benzocaine-menthol lozenge 8's  1 lozenge, Buccal, Q4H  Bisacodyl 10 mg suppos  10 mg 1 suppository, Rectal, Daily  Calcium carbonate 500 mg chew tab [Ca 200 mg]  1,000 mg 2 tab, Chewed, Q8H  DiphenhydrAMINE 25 mg cap  25 mg 1 cap, Oral, Q4H  Hydrocodone-acetaminophen  mg tab  1 tab, Oral, Q4H  Hydrocodone-acetaminophen  mg tab  2 tab, Oral, Q4H  HYDROmorphone PF 0.5 mg/0.5 mL inj SDV  0.3 mg 0.3 mL, Slow IV Push, Q3H  Milk of magnesia 8% 30 mL oral susp UD  30 mL, Oral, Daily  Nalbuphine 10 mg/1 mL inj SDV  2.5 mg 0.25 mL, IV Push, Q8H  Naloxone 0.4 mg/1 mL inj SDV  0.2 mg 0.5 mL, IV Push, One Time (unscheduled)  Ondansetron 4 mg disintegrating tab  4 mg 1 tab, Oral, Q6H  TiZANidine 2 mg tab  2 mg 1 tab, Oral, Q6H  TraZODone 50 mg tab  25 mg 0.5 tab, Oral, Q Bedtime   .       History of Present Illness   NOTES feeling well, pain controlled  denies n/v, numbness/tingling ext.       Physical Examination   VS/Measurements  Pt called stating she has changed pharmacies and she would like her Losartan switched from [facility] to [facility] [address]  Please advise.     Vitals between:   18-DEC-2019 14:02:05   TO   19-DEC-2019 14:02:05                   LAST RESULT MINIMUM MAXIMUM  Temperature 36.9 36.7 37.0  Heart Rate 69 69 80  Respiratory Rate 15 15 16  NISBP           115 108 127  NIDBP           72 68 73  NIMBP           91 91 91  SpO2                    92 92 94    Intake and Output   I&O 24 hr   I & O between:  18-DEC-2019 14:02 TO 19-DEC-2019 14:02  Med Dosing Weight:  76.4  kg   17-DEC-2019  24 Hour Intake:   1241.00  ( 16.24 mL/kg )  24 Hour Output:   280.00           24 Hour Urine/Stool Output:   0.0  24 Hour Balance:   961.00           24 Hour Urine Output:   200.00  ( 0.11 mL/kg/hr )                   Urine Count:  3.00    Stool Count:  3.00       General:  Alert and oriented, No acute distress.    Eye:  Extraocular movements are intact, Normal conjunctiva.    Neck:  Supple, Non-tender.    Respiratory:  Lungs are clear to auscultation, Respirations are non-labored.   Cardiovascular:  Normal rate, Regular rhythm.    Gastrointestinal:  Soft, Non-tender, Non-distended, Normal bowel sounds.   Musculoskeletal:  b/l UE and LE distally neurovasc intact.    Integumentary:  Warm, Dry.    Neurologic:  Alert, Oriented, No focal deficits, Cranial Nerves II-XII are grossly intact.      Review / Management   Laboratory results:     No Qualifying Labs are resulted on this patient in the last 24 hours  .    Radiology results         Impression and Plan   Dx and Plan:  Diagnosis     Lumbar lami/fusion POD #2  -surgery following  -pain controlled w/ current meds  -acute blood loss anemia--monitor hg-appropriate  -post op abx per surgery  -encourage IS  -prn antiemetics  -PT/OT  -DVT ppx: scd  Chronic-stable  htn-home meds w/ holding parameters  HLD  RA  dispo: inpt. ADOD: home tomorrow   .     .

## 2022-02-11 ENCOUNTER — HOSPITAL ENCOUNTER (OUTPATIENT)
Dept: MAMMOGRAPHY | Age: 77
Discharge: HOME | End: 2022-02-11
Attending: FAMILY MEDICINE
Payer: MEDICARE

## 2022-02-11 DIAGNOSIS — Z12.31 VISIT FOR SCREENING MAMMOGRAM: ICD-10-CM

## 2022-02-11 PROCEDURE — 77063 BREAST TOMOSYNTHESIS BI: CPT

## 2022-02-18 RX ORDER — LOSARTAN POTASSIUM 100 MG/1
100 TABLET ORAL DAILY
Qty: 90 TABLET | Refills: 1 | Status: SHIPPED | OUTPATIENT
Start: 2022-02-18 | End: 2022-07-22 | Stop reason: SDUPTHER

## 2022-02-23 ENCOUNTER — PATIENT OUTREACH (OUTPATIENT)
Dept: CASE MANAGEMENT | Age: 77
End: 2022-02-23

## 2022-02-23 NOTE — PROGRESS NOTES
Complex Case Management      Date/Time:  2022 10:52 AM    Method of communication with patient:phone    [facility]) contacted the patient by telephone to perform Ambulatory Care Coordination. Verified name and  (PHI) with patient as identifiers. Provided introduction to self, and explanation of the Ambulatory Care Manager's role. Reviewed most recent clinic visit w/ patient who verbalized understanding. Patient given an opportunity to ask questions. Top Challenges reviewed with the patient   1. Hx of Seizures, Osteoporosis, HTN, HLD, A-fib on AC, DVT, DM2  2. Pt aware of upcoming pcp and card appt  3. States doing well, no issues/questions     The patient agrees to contact the PCP office or the [facility] for questions related to their healthcare. Provided contact information for future reference. Disease Specific:   N/A    Home Health Active: No    DME Active: No    Barriers to care? none    Advance Care Planning:   Does patient have an Advance Directive:  not on file; education provided     Medication(s):   Medication reconciliation was not performed with patient, who verbalizes understanding of administration of home medications. There were no barriers to obtaining medications identified at this time. Referral to Pharm D needed: no     Current Outpatient Medications   Medication Sig    losartan (COZAAR) 100 mg tablet Take 1 Tablet by mouth daily.  metFORMIN (GLUCOPHAGE) 500 mg tablet TAKE 1 TABLET BY MOUTH EVERY DAY IN THE MORNING WITH BREAKFAST    metoprolol succinate (TOPROL-XL) 50 mg XL tablet TAKE 1 TABLET BY MOUTH EVERY DAY    famotidine (PEPCID) 20 mg tablet TAKE 1 TABLET BY MOUTH TWO (2) TIMES A DAY.  Eliquis 5 mg tablet TAKE 1 TABLET BY MOUTH TWICE A DAY    albuterol (PROVENTIL HFA, VENTOLIN HFA, PROAIR HFA) 90 mcg/actuation inhaler Take 2 Puffs by inhalation every four (4) hours as needed for Wheezing.     OneTouch Ultra Blue Test Strip strip USE TO TEST BLOOD SUGAR ONCE DAILY    lancets misc One Touch Ultra soft testing fasting blood sugar every morning prior to breakfast DX E11.9    cholecalciferol, vitamin D3, (VITAMIN D3) 2,000 unit tab Take  by mouth.  spironolactone (ALDACTONE) 25 mg tablet Take 1 Tab by mouth daily.  aspirin 81 mg chewable tablet Take 81 mg by mouth daily.  DOCOSAHEXANOIC ACID/EPA (FISH OIL PO) Take 100 mg by mouth daily.  COQ10, LIPOSOMAL UBIQUINOL, PO Take 200 mg by mouth daily. No current facility-administered medications for this visit.        [facility] follow up appointment(s):   Future Appointments   Date Time Provider Caron Wood   3/9/2022  9:20 AM Broderick Cintron [facility] BS AMB   3/22/2022  9:15 AM Terence Dickerson MD [facility] BS AMB        Non-[facility] follow up appointment(s):     Goals Addressed                 This Visit's Progress     Attends follow up appointments on schedule        2/23/22 Patient will attend all scheduled appointments through 5/23/22         Knowledge and adherence of prescribed medication (ie. action, side effects, missed dose, etc.).        2/23/22 Pt will take all medications prescribed to be evaluated on each outreach through 5/23/22         Prepare patients and caregivers for end of life decisions (ie. need for hospice, pain management, symptom relief, advance directives etc.)        2/23/22 Pt will complete an ACP and have it scanned into their EMR by 5/23/22

## 2022-03-04 ENCOUNTER — PATIENT OUTREACH (OUTPATIENT)
Dept: CASE MANAGEMENT | Age: 77
End: 2022-03-04

## 2022-03-04 NOTE — PROGRESS NOTES
Patient: Tegan Alan discussed during interdisciplinary rounds 3/4/2022 to optimize plan of care. Patient with a past medical history of   Past Medical History:   Diagnosis Date    Arrhythmia     history atrial fib    Bradycardia     Diabetes ([address]     borderline    DVT (deep venous thrombosis) ([address] 2010    right    History of ablation March 2016     History of atrial fibrillation     Hypercholesterolemia     Hypertension     Long term current use of anticoagulant therapy     Menopause     Osteoporosis     Seizure ([address]     Seizures ([address]     on medication from 2006 to 2013 no seizure since 2006    [employer] pacemaker implant Set. 2013 Dr. Leticia Chavez, [place]     Thyroid disease     nodules     In attendance Winter Godfrey MD, Ember Corey RN. Recommendations from the team:    1. ACM will continue to provider care as needed    Ambulatory  will continue to follow.   Ember Corey RN

## 2022-03-09 ENCOUNTER — OFFICE VISIT (OUTPATIENT)
Dept: CARDIOLOGY CLINIC | Age: 77
End: 2022-03-09
Payer: MEDICARE

## 2022-03-09 ENCOUNTER — CLINICAL SUPPORT (OUTPATIENT)
Dept: CARDIOLOGY CLINIC | Age: 77
End: 2022-03-09

## 2022-03-09 ENCOUNTER — HOSPITAL ENCOUNTER (OUTPATIENT)
Dept: LAB | Age: 77
Discharge: HOME | End: 2022-03-09
Payer: MEDICARE

## 2022-03-09 VITALS
HEART RATE: 60 BPM | OXYGEN SATURATION: 99 % | BODY MASS INDEX: 27.97 KG/M2 | HEIGHT: 66 IN | WEIGHT: 174 LBS | DIASTOLIC BLOOD PRESSURE: 88 MMHG | SYSTOLIC BLOOD PRESSURE: 130 MMHG

## 2022-03-09 DIAGNOSIS — Z95.0 CARDIAC PACEMAKER IN SITU: ICD-10-CM

## 2022-03-09 DIAGNOSIS — R00.1 BRADYCARDIA: ICD-10-CM

## 2022-03-09 DIAGNOSIS — I48.0 PAROXYSMAL ATRIAL FIBRILLATION (HCC): Primary | ICD-10-CM

## 2022-03-09 DIAGNOSIS — E11.21 TYPE 2 DIABETES WITH NEPHROPATHY (HCC): ICD-10-CM

## 2022-03-09 DIAGNOSIS — Z11.59 NEED FOR HEPATITIS C SCREENING TEST: ICD-10-CM

## 2022-03-09 DIAGNOSIS — I10 ESSENTIAL HYPERTENSION: ICD-10-CM

## 2022-03-09 LAB
ALBUMIN SERPL-MCNC: 3.7 G/DL (ref 3.4–5)
ALBUMIN/GLOB SERPL: 1 {RATIO} (ref 0.8–1.7)
ALP SERPL-CCNC: 74 U/L (ref 45–117)
ALT SERPL-CCNC: 22 U/L (ref 13–56)
ANION GAP SERPL CALC-SCNC: 4 MMOL/L (ref 3–18)
AST SERPL-CCNC: 19 U/L (ref 10–38)
BILIRUB SERPL-MCNC: 0.6 MG/DL (ref 0.2–1)
BUN SERPL-MCNC: 15 MG/DL (ref 7–18)
BUN/CREAT SERPL: 15 (ref 12–20)
CALCIUM SERPL-MCNC: 10.7 MG/DL (ref 8.5–10.1)
CHLORIDE SERPL-SCNC: 106 MMOL/L (ref 100–111)
CHOLEST SERPL-MCNC: 189 MG/DL
CO2 SERPL-SCNC: 29 MMOL/L (ref 21–32)
CREAT SERPL-MCNC: 1.02 MG/DL (ref 0.6–1.3)
CREAT UR-MCNC: 134 MG/DL (ref 30–125)
ERYTHROCYTE [DISTWIDTH] IN BLOOD BY AUTOMATED COUNT: 13.9 % (ref 11.6–14.5)
EST. AVERAGE GLUCOSE BLD GHB EST-MCNC: 126 MG/DL
GLOBULIN SER CALC-MCNC: 3.6 G/DL (ref 2–4)
GLUCOSE SERPL-MCNC: 111 MG/DL (ref 74–99)
HBA1C MFR BLD: 6 % (ref 4.2–5.6)
HCT VFR BLD AUTO: 39.5 % (ref 35–45)
HCV AB SER IA-ACNC: <0.02 INDEX
HCV AB SERPL QL IA: NEGATIVE
HCV COMMENT,HCGAC: NORMAL
HDLC SERPL-MCNC: 59 MG/DL (ref 40–60)
HDLC SERPL: 3.2 {RATIO} (ref 0–5)
HGB BLD-MCNC: 12.1 G/DL (ref 12–16)
LDLC SERPL CALC-MCNC: 108.4 MG/DL (ref 0–100)
LIPID PROFILE,FLP: ABNORMAL
MCH RBC QN AUTO: 26.8 PG (ref 24–34)
MCHC RBC AUTO-ENTMCNC: 30.6 G/DL (ref 31–37)
MCV RBC AUTO: 87.4 FL (ref 78–100)
MICROALBUMIN UR-MCNC: 2.01 MG/DL (ref 0–3)
MICROALBUMIN/CREAT UR-RTO: 15 MG/G (ref 0–30)
NRBC # BLD: 0 K/UL (ref 0–0.01)
NRBC BLD-RTO: 0 PER 100 WBC
PLATELET # BLD AUTO: 223 K/UL (ref 135–420)
PMV BLD AUTO: 11.3 FL (ref 9.2–11.8)
POTASSIUM SERPL-SCNC: 4.3 MMOL/L (ref 3.5–5.5)
PROT SERPL-MCNC: 7.3 G/DL (ref 6.4–8.2)
RBC # BLD AUTO: 4.52 M/UL (ref 4.2–5.3)
SODIUM SERPL-SCNC: 139 MMOL/L (ref 136–145)
TRIGL SERPL-MCNC: 108 MG/DL (ref ?–150)
TSH SERPL DL<=0.05 MIU/L-ACNC: 0.89 UIU/ML (ref 0.36–3.74)
VLDLC SERPL CALC-MCNC: 21.6 MG/DL
WBC # BLD AUTO: 6.2 K/UL (ref 4.6–13.2)

## 2022-03-09 PROCEDURE — 93280 PM DEVICE PROGR EVAL DUAL: CPT | Performed by: INTERNAL MEDICINE

## 2022-03-09 PROCEDURE — 80061 LIPID PANEL: CPT

## 2022-03-09 PROCEDURE — 86803 HEPATITIS C AB TEST: CPT

## 2022-03-09 PROCEDURE — 82043 UR ALBUMIN QUANTITATIVE: CPT

## 2022-03-09 PROCEDURE — G8752 SYS BP LESS 140: HCPCS | Performed by: INTERNAL MEDICINE

## 2022-03-09 PROCEDURE — 1101F PT FALLS ASSESS-DOCD LE1/YR: CPT | Performed by: INTERNAL MEDICINE

## 2022-03-09 PROCEDURE — 1090F PRES/ABSN URINE INCON ASSESS: CPT | Performed by: INTERNAL MEDICINE

## 2022-03-09 PROCEDURE — G8427 DOCREV CUR MEDS BY ELIG CLIN: HCPCS | Performed by: INTERNAL MEDICINE

## 2022-03-09 PROCEDURE — G8754 DIAS BP LESS 90: HCPCS | Performed by: INTERNAL MEDICINE

## 2022-03-09 PROCEDURE — G8399 PT W/DXA RESULTS DOCUMENT: HCPCS | Performed by: INTERNAL MEDICINE

## 2022-03-09 PROCEDURE — 83036 HEMOGLOBIN GLYCOSYLATED A1C: CPT

## 2022-03-09 PROCEDURE — 36415 COLL VENOUS BLD VENIPUNCTURE: CPT

## 2022-03-09 PROCEDURE — 85027 COMPLETE CBC AUTOMATED: CPT

## 2022-03-09 PROCEDURE — 84443 ASSAY THYROID STIM HORMONE: CPT

## 2022-03-09 PROCEDURE — 99214 OFFICE O/P EST MOD 30 MIN: CPT | Performed by: INTERNAL MEDICINE

## 2022-03-09 PROCEDURE — G8419 CALC BMI OUT NRM PARAM NOF/U: HCPCS | Performed by: INTERNAL MEDICINE

## 2022-03-09 PROCEDURE — G8510 SCR DEP NEG, NO PLAN REQD: HCPCS | Performed by: INTERNAL MEDICINE

## 2022-03-09 PROCEDURE — G8536 NO DOC ELDER MAL SCRN: HCPCS | Performed by: INTERNAL MEDICINE

## 2022-03-09 PROCEDURE — 80053 COMPREHEN METABOLIC PANEL: CPT

## 2022-03-09 NOTE — PROGRESS NOTES
Nicola Colvin presents today for   Chief Complaint   Patient presents with    Follow-up     6 month follow up   Elie Lombardo [address] preferred language for health care discussion is english/other. Is someone accompanying this pt? no    Is the patient using any DME equipment during [address]? no    Depression Screening:  3 most recent PHQ Screens 3/9/2022   Little interest or pleasure in doing things Not at all   Feeling down, depressed, irritable, or hopeless Not at all   Total Score PHQ 2 0       Learning Assessment:  Learning Assessment 3/9/2022   PRIMARY LEARNER Patient   HIGHEST LEVEL OF EDUCATION - PRIMARY LEARNER  -   BARRIERS PRIMARY LEARNER -   [facility]    NEED -   LEARNER PREFERENCE PRIMARY DEMONSTRATION   LEARNING SPECIAL TOPICS -   ANSWERED BY patient   RELATIONSHIP SELF       Abuse Screening:  Abuse Screening Questionnaire 3/9/2022   Do you ever feel afraid of your partner? N   Are you in a relationship with someone who physically or mentally threatens you? N   Is it safe for you to go home? Y       Fall Risk  Fall Risk Assessment, last 12 mths 3/9/2022   Able to walk? Yes   Fall in past 12 months? 0   Do you feel unsteady? 0   Are you worried about falling 0           Pt currently taking Anticoagulant therapy? eliquis 5 mg BID     Pt currently taking Antiplatelet therapy ? Aspirin 81 mg daily      Coordination of Care:  1. Have you been to the ER, urgent care clinic since your last visit? Hospitalized since your last visit? yes    2. Have you seen or consulted any other health care providers outside of the [address] [place] [address] since your last visit? Include any pap smears or colon screening.  no

## 2022-03-09 NOTE — PROGRESS NOTES
A1c in prediabetic range. Still elevated calcium. Continue current plan and keep follow up with endocrinology.  Further discussion on follow up visit

## 2022-03-09 NOTE — PROGRESS NOTES
Donavon Sender    F/u pAF/ SSS    HPI    Donavon Rivera is a 67 y.o. AAF (originally from [place]) with pAF, SSS s/p PPM here for routine follow up care. As you know she moved here from [place] a few yrs ago and established with my partner, Dr. Maude Ferreira. She has no complaints today but there is concern for lead fracture by device check today, noise noted (but this has been the case last few device checks.)  She enjoys traveling, I am aware she had 4 nosebleeds while in [place] (saw ENT) but luckily not been an issue recently.     She has known history of cardiac problems.  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  She had atrial fibrillation and finally underwent the atrial fibrillation ablation successfully in March of 2016.  She was treated with flecainide briefly.  As the flecainide was discontinued, she has been anticoagulated with Eliquis.  She has previous history of DVT after knee surgery.  She has history of hypercholesterolemia, but has not been able to tolerate statins because of severe myalgia and other side effects. [facility] is known to have had type 2 diabetes. [facility] has history of seizure disorder; however, she has not had any recurrent seizures since 2006 off medication.      She has no cardiac complaints. She recently went to [place] and says the minute she got off the plane she coughed quite a bit in 1 day had a nosebleed since coming back this has not happened. Past Medical History:   Diagnosis Date    Arrhythmia     history atrial fib    Bradycardia     Diabetes ([address]     borderline    DVT (deep venous thrombosis) ([address] 2010    right    History of ablation March 2016     History of atrial fibrillation     Hypercholesterolemia     Hypertension     Long term current use of anticoagulant therapy     Menopause     Osteoporosis     Seizure ([address]     Seizures ([address]     on medication from 2006 to 2013 no seizure since 2006    [employer] pacemaker implant Set. 2013 Dr. Quinn Chauhan, [place]     Thyroid disease     nodules       Past Surgical History:   Procedure Laterality Date    COLONOSCOPY N/A 10/25/2017    COLONOSCOPY performed by Asiya Zarate MD at [facility] ENDOSCOPY    HX CATARACT REMOVAL      HX HYSTERECTOMY      HX KNEE REPLACEMENT      left knee in 1/2010 and right knee 7/2016    HX KNEE REPLACEMENT      right knee    HX PACEMAKER      CO CARDIAC SURG PROCEDURE UNLIST      ablation for a-fib       Current Outpatient Medications   Medication Sig Dispense Refill    losartan (COZAAR) 100 mg tablet Take 1 Tablet by mouth daily. 90 Tablet 1    metFORMIN (GLUCOPHAGE) 500 mg tablet TAKE 1 TABLET BY MOUTH EVERY DAY IN THE MORNING WITH BREAKFAST 90 Tablet 1    metoprolol succinate (TOPROL-XL) 50 mg XL tablet TAKE 1 TABLET BY MOUTH EVERY DAY 90 Tablet 1    famotidine (PEPCID) 20 mg tablet TAKE 1 TABLET BY MOUTH TWO (2) TIMES A DAY. (Patient taking differently: Take 20 mg by mouth as needed.) 180 Tablet 1    Eliquis 5 mg tablet TAKE 1 TABLET BY MOUTH TWICE A DAY 60 Tablet 5    albuterol (PROVENTIL HFA, VENTOLIN HFA, PROAIR HFA) 90 mcg/actuation inhaler Take 2 Puffs by inhalation every four (4) hours as needed for Wheezing. 18 g 0    OneTouch Ultra Blue Test Strip strip USE TO TEST BLOOD SUGAR ONCE DAILY 100 Strip 6    lancets misc One Touch Ultra soft testing fasting blood sugar every morning prior to breakfast DX E11.9 100 Each 11    cholecalciferol, vitamin D3, (VITAMIN D3) 2,000 unit tab Take  by mouth daily.  spironolactone (ALDACTONE) 25 mg tablet Take 1 Tab by mouth daily. 4    aspirin 81 mg chewable tablet Take 81 mg by mouth daily.  DOCOSAHEXANOIC ACID/EPA (FISH OIL PO) Take 100 mg by mouth daily.  COQ10, LIPOSOMAL UBIQUINOL, PO Take 200 mg by mouth daily.          Allergies   Allergen Reactions    Statins-Hmg-Coa Reductase Inhibitors Other (comments)     Felt sick        Social History     Socioeconomic History    Marital status:      Spouse name: Not on file    Number of children: Not on file    Years of education: Not on file    Highest education level: Not on file   Occupational History    Not on file   Tobacco Use    Smoking status: Never Smoker    Smokeless tobacco: Never Used   Vaping Use    Vaping Use: Never used   Substance and Sexual Activity    Alcohol use: No    Drug use: No    Sexual activity: Not Currently     Partners: Male     Birth control/protection: Surgical     Comment: hysterectomy   Other Topics Concern    Not on file   Social History Narrative    Not on file     Social Determinants of Health     Financial Resource Strain:     Difficulty of Paying Living Expenses: Not on file   Food Insecurity:     Worried About Running Out of Food in the Last Year: Not on file    Alex of Food in the Last Year: Not on file   Transportation Needs:     Lack of Transportation (Medical): Not on file    Lack of Transportation (Non-Medical):  Not on file   Physical Activity:     Days of Exercise per Week: Not on file    Minutes of Exercise per Session: Not on file   Stress:     Feeling of Stress : Not on file   Social Connections:     Frequency of Communication with Friends and Family: Not on file    Frequency of Social Gatherings with Friends and Family: Not on file    Attends Rastafari Services: Not on file    Active Member of [address] [place] [address] [employer] or Organizations: Not on file    Attends Club or Organization Meetings: Not on file    Marital Status: Not on file   Intimate Partner Violence:     Fear of Current or Ex-Partner: Not on file    Emotionally Abused: Not on file    Physically Abused: Not on file    Sexually Abused: Not on file   Housing Stability:     Unable to Pay for Housing in the Last Year: Not on file    Number of [place] in the Last Year: Not on file    Unstable Housing in the Last Year: Not on file    originally from [facility]  Raised some of her grandkids    FH: n/a    Review of Systems    14 pt Review of Systems is negative unless otherwise mentioned in the HPI. Wt Readings from Last 3 Encounters:   03/09/22 78.9 kg (174 lb)   11/09/21 79.8 kg (176 lb)   08/29/21 79.8 kg (176 lb)     Temp Readings from Last 3 Encounters:   11/09/21 97.4 °F (36.3 °C) (Temporal)   08/29/21 99.6 °F (37.6 °C)   07/09/21 98.3 °F (36.8 °C) (Oral)     BP Readings from Last 3 Encounters:   03/09/22 130/88   11/09/21 136/82   08/29/21 (!) 142/60     Pulse Readings from Last 3 Encounters:   03/09/22 60   11/09/21 64   08/29/21 77     Physical Exam:    Visit Vitals  /88 (BP 1 Location: Left upper arm, BP Patient Position: Sitting, BP Cuff Size: Adult)   Pulse 60   Ht 5' 6\" (1.676 m)   Wt 78.9 kg (174 lb)   SpO2 99%   BMI 28.08 kg/m²      Physical Exam  HENT:      Head: Normocephalic and atraumatic. Eyes:      Pupils: Pupils are equal, round, and reactive to light. Cardiovascular:      Rate and Rhythm: Normal rate and regular rhythm. Heart sounds: Normal heart sounds. No murmur heard. No friction rub. No gallop. Pulmonary:      Effort: Pulmonary effort is normal. No respiratory distress. Breath sounds: Normal breath sounds. No wheezing or rales. Chest:      Chest wall: No tenderness. Abdominal:      General: Bowel sounds are normal.      Palpations: Abdomen is soft. Musculoskeletal:         General: No tenderness. Skin:     General: Skin is warm and dry. Neurological:      Mental Status: She is alert and oriented to person, place, and time.        Lab Results   Component Value Date/Time    Sodium 141 11/23/2020 07:27 AM    Potassium 4.5 11/23/2020 07:27 AM    Chloride 108 11/23/2020 07:27 AM    CO2 28 11/23/2020 07:27 AM    Anion gap 5 11/23/2020 07:27 AM    Glucose 114 (H) 11/23/2020 07:27 AM    BUN 19 (H) 11/23/2020 07:27 AM    Creatinine 1.11 11/23/2020 07:27 AM    BUN/Creatinine ratio 17 11/23/2020 07:27 AM    GFR est AA 58 (L) 11/23/2020 07:27 AM    GFR est non-AA 48 (L) 11/23/2020 07:27 AM Calcium 10.8 (H) 11/23/2020 07:27 AM    Bilirubin, total 0.7 11/23/2020 07:27 AM    Alk. phosphatase 74 11/23/2020 07:27 AM    Protein, total 7.6 11/23/2020 07:27 AM    Albumin 3.9 11/23/2020 07:27 AM    Globulin 3.7 11/23/2020 07:27 AM    A-G Ratio 1.1 11/23/2020 07:27 AM    ALT (SGPT) 24 11/23/2020 07:27 AM    AST (SGOT) 17 11/23/2020 07:27 AM     Lab Results   Component Value Date/Time    Hemoglobin A1c 6.1 (H) 11/23/2020 07:27 AM    Hemoglobin A1c (POC) 5.9 03/09/2021 10:14 AM     Lab Results   Component Value Date/Time    TSH 1.75 11/23/2020 07:27 AM     Lab Results   Component Value Date/Time    WBC 9.0 11/23/2020 07:27 AM    HGB 12.2 11/23/2020 07:27 AM    HCT 38.7 11/23/2020 07:27 AM    PLATELET 572 87/68/7298 07:27 AM    MCV 84.9 11/23/2020 07:27 AM         Impression and Plan:  Monique Chang is a 67 y.o. with:    1.) pAF, s/p ablation, elevated [id number] ~3  2.) SSS s/p PPM, with concern for lead fracture again on device interrogation today (noise)  3.) HTN, well controlled  4.) HL  5.) HFpEF, stable/ neg nuc ~2016  6.) s/p COVID 8/2021  7.) Nosebleeds (saw ENT)    1.) Finally got her on Eliquis and she is doing well, monitoring nosebleeds (but been an issue lately)  2.) RTC 6 months with device check    35 mins spent    Thank you for allowing me to participate in the care of your patient, please do not hesitate to call with questions or concerns. Follow-up and Dispositions    · Return in about 6 months (around 9/9/2022).      [address],    Danna Lazaro, DO

## 2022-03-10 ENCOUNTER — PATIENT OUTREACH (OUTPATIENT)
Dept: CASE MANAGEMENT | Age: 77
End: 2022-03-10

## 2022-03-10 NOTE — PROGRESS NOTES
Patient attended appointments with her cardiologist, Dr. Corrinne Seton, D. on 3/10/22, Ambulatory Care Manager reviewed EMR and will follow up on next scheduled outreach.

## 2022-03-11 NOTE — PROGRESS NOTES
Contacted patient and verified identity using name and date of birth (2- identifiers)  Spoke with patient and she verbalized understanding of A1c in prediabetic range. Still elevated calcium. Continue current plan and keep follow up with endocrinology.  Further discussion on follow up visit

## 2022-03-14 DIAGNOSIS — E11.9 WELL CONTROLLED DIABETES MELLITUS (HCC): ICD-10-CM

## 2022-03-14 NOTE — TELEPHONE ENCOUNTER
This pharmacy faxed over request for the following prescriptions to be filled:    Medication requested :   Requested Prescriptions     Pending Prescriptions Disp Refills    glucose blood VI test strips (ASCENSIA AUTODISC VI, ONE TOUCH ULTRA TEST VI) strip 100 Strip 3     PCP: [address] or Print: [id number]   Mail order or [address]      Scheduled appointment if not seen by current providers in office: LOV 11/9/2021 F/U 3/22/2022  Please incluse DX code for medicare pt

## 2022-03-15 ENCOUNTER — PATIENT OUTREACH (OUTPATIENT)
Dept: CASE MANAGEMENT | Age: 77
End: 2022-03-15

## 2022-03-15 NOTE — PROGRESS NOTES
Complex Case Management      Date/Time:  3/15/2022 10:52 AM    Method of communication with patient:phone    [facility] ([facility]) contacted the patient by telephone to perform Ambulatory Care Coordination. Verified name and  (PHI) with patient as identifiers. Provided introduction to self, and explanation of the Ambulatory Care Manager's role. Reviewed most recent clinic visit w/ patient who verbalized understanding. Patient given an opportunity to ask questions. Top Challenges reviewed with the patient   1. Hx of Seizures, Osteoporosis, HTN, HLD, A-fib on AC, DVT, DM2  2. Pt states recent cardiology visit went well. 3. Notified patient of refill recently filled  4. States doing well, no issues/questions     The patient agrees to contact the PCP office or the [facility] for questions related to their healthcare. Provided contact information for future reference. Disease Specific:   N/A    Home Health Active: No    DME Active: No    Barriers to care? none    Advance Care Planning:   Does patient have an Advance Directive:  not on file; education provided     Medication(s):   Medication reconciliation was not performed with patient, who verbalizes understanding of administration of home medications. There were no barriers to obtaining medications identified at this time. Referral to Pharm D needed: no     Current Outpatient Medications   Medication Sig    glucose blood VI test strips (ASCENSIA AUTODISC VI, ONE TOUCH ULTRA TEST VI) strip Check once daily    losartan (COZAAR) 100 mg tablet Take 1 Tablet by mouth daily.  metFORMIN (GLUCOPHAGE) 500 mg tablet TAKE 1 TABLET BY MOUTH EVERY DAY IN THE MORNING WITH BREAKFAST    metoprolol succinate (TOPROL-XL) 50 mg XL tablet TAKE 1 TABLET BY MOUTH EVERY DAY    famotidine (PEPCID) 20 mg tablet TAKE 1 TABLET BY MOUTH TWO (2) TIMES A DAY.  (Patient taking differently: Take 20 mg by mouth as needed.)    Eliquis 5 mg tablet TAKE 1 TABLET BY MOUTH TWICE A DAY    albuterol (PROVENTIL HFA, VENTOLIN HFA, PROAIR HFA) 90 mcg/actuation inhaler Take 2 Puffs by inhalation every four (4) hours as needed for Wheezing.  OneTouch Ultra Blue Test Strip strip USE TO TEST BLOOD SUGAR ONCE DAILY    lancets misc One Touch Ultra soft testing fasting blood sugar every morning prior to breakfast DX E11.9    cholecalciferol, vitamin D3, (VITAMIN D3) 2,000 unit tab Take  by mouth daily.  spironolactone (ALDACTONE) 25 mg tablet Take 1 Tab by mouth daily.  aspirin 81 mg chewable tablet Take 81 mg by mouth daily.  DOCOSAHEXANOIC ACID/EPA (FISH OIL PO) Take 100 mg by mouth daily.  COQ10, LIPOSOMAL UBIQUINOL, PO Take 200 mg by mouth daily. No current facility-administered medications for this visit. [facility] follow up appointment(s):   Future Appointments   Date Time Provider Caron Wood   3/22/2022  9:15 AM Stephen Myers MD [facility]   9/7/2022  9:40 AM Shyanne Manning DO [facility]   9/7/2022  9:45 AM CSI, PACER [facility]        Non-[facility] follow up appointment(s):     Goals Addressed                 This Visit's Progress     Attends follow up appointments on schedule   On track     2/23/22 Patient will attend all scheduled appointments through 5/23/22         Knowledge and adherence of prescribed medication (ie. action, side effects, missed dose, etc.).    On track     2/23/22 Pt will take all medications prescribed to be evaluated on each outreach through 5/23/22         Prepare patients and caregivers for end of life decisions (ie. need for hospice, pain management, symptom relief, advance directives etc.)   On track     2/23/22 Pt will complete an ACP and have it scanned into their EMR by 5/23/22

## 2022-03-18 PROBLEM — E11.21 TYPE 2 DIABETES WITH NEPHROPATHY (HCC): Status: ACTIVE | Noted: 2018-09-18

## 2022-03-19 PROBLEM — I48.91 ATRIAL FIBRILLATION (HCC): Status: ACTIVE | Noted: 2017-03-16

## 2022-03-19 PROBLEM — E11.65 TYPE 2 DIABETES MELLITUS WITH HYPERGLYCEMIA, WITHOUT LONG-TERM CURRENT USE OF INSULIN (HCC): Status: ACTIVE | Noted: 2017-03-16

## 2022-03-19 PROBLEM — E83.52 HYPERCALCEMIA: Status: ACTIVE | Noted: 2018-09-18

## 2022-03-19 PROBLEM — N28.9 RENAL INSUFFICIENCY: Status: ACTIVE | Noted: 2018-09-18

## 2022-03-19 PROBLEM — Z71.89 ADVANCE DIRECTIVE DISCUSSED WITH PATIENT: Status: ACTIVE | Noted: 2017-04-24

## 2022-03-19 PROBLEM — Z98.890 S/P COLONOSCOPY: Status: ACTIVE | Noted: 2018-01-12

## 2022-03-19 PROBLEM — I10 ESSENTIAL HYPERTENSION: Status: ACTIVE | Noted: 2017-03-16

## 2022-03-19 PROBLEM — E04.1 THYROID NODULE: Status: ACTIVE | Noted: 2018-03-21

## 2022-03-19 PROBLEM — R79.89 ELEVATED PTHRP LEVEL: Status: ACTIVE | Noted: 2018-09-18

## 2022-03-19 PROBLEM — R00.1 BRADYCARDIA: Status: ACTIVE | Noted: 2017-03-16

## 2022-03-19 PROBLEM — Z95.0 CARDIAC PACEMAKER IN SITU: Status: ACTIVE | Noted: 2017-10-19

## 2022-03-19 PROBLEM — E78.5 HYPERLIPIDEMIA: Status: ACTIVE | Noted: 2017-03-16

## 2022-03-28 ENCOUNTER — TELEPHONE (OUTPATIENT)
Dept: FAMILY MEDICINE CLINIC | Age: 77
End: 2022-03-28

## 2022-03-28 NOTE — TELEPHONE ENCOUNTER
Pt called stating Claude needs a new rx for her test strips with the diagnosis code on the front. Please advise.

## 2022-03-28 NOTE — PROGRESS NOTES
I have personally seen and evaluated the device findings. Interrogation reviewed and I agree with assessment.     Becki Francis

## 2022-03-29 ENCOUNTER — PATIENT OUTREACH (OUTPATIENT)
Dept: CASE MANAGEMENT | Age: 77
End: 2022-03-29

## 2022-03-29 NOTE — PROGRESS NOTES
Complex Case Management      Date/Time:  3/29/2022 10:52 AM    Method of communication with patient:phone    [facility] ([facility]) contacted the patient by telephone to perform Ambulatory Care Coordination. Verified name and  (PHI) with patient as identifiers. Provided introduction to self, and explanation of the Ambulatory Care Manager's role. Reviewed most recent clinic visit w/ patient who verbalized understanding. Patient given an opportunity to ask questions. Top Challenges reviewed with the patient   1. Hx of Seizures, Osteoporosis, HTN, HLD, A-fib on AC, DVT, DM2  2. States doing well, no issues/questions     The patient agrees to contact the PCP office or the [facility] for questions related to their healthcare. Provided contact information for future reference. Disease Specific:   N/A    Home Health Active: No    DME Active: No    Barriers to care? none    Advance Care Planning:   Does patient have an Advance Directive:  not on file; education provided     Medication(s):   Medication reconciliation was not performed with patient, who verbalizes understanding of administration of home medications. There were no barriers to obtaining medications identified at this time. Referral to Pharm D needed: no     Current Outpatient Medications   Medication Sig    glucose blood VI test strips (ASCENSIA AUTODISC VI, ONE TOUCH ULTRA TEST VI) strip Check once daily    losartan (COZAAR) 100 mg tablet Take 1 Tablet by mouth daily.  metFORMIN (GLUCOPHAGE) 500 mg tablet TAKE 1 TABLET BY MOUTH EVERY DAY IN THE MORNING WITH BREAKFAST    metoprolol succinate (TOPROL-XL) 50 mg XL tablet TAKE 1 TABLET BY MOUTH EVERY DAY    famotidine (PEPCID) 20 mg tablet TAKE 1 TABLET BY MOUTH TWO (2) TIMES A DAY.  (Patient taking differently: Take 20 mg by mouth as needed.)    Eliquis 5 mg tablet TAKE 1 TABLET BY MOUTH TWICE A DAY    albuterol (PROVENTIL HFA, VENTOLIN HFA, PROAIR HFA) 90 mcg/actuation inhaler Take 2 Puffs by inhalation every four (4) hours as needed for Wheezing.  OneTouch Ultra Blue Test Strip strip USE TO TEST BLOOD SUGAR ONCE DAILY    lancets misc One Touch Ultra soft testing fasting blood sugar every morning prior to breakfast DX E11.9    cholecalciferol, vitamin D3, (VITAMIN D3) 2,000 unit tab Take  by mouth daily.  spironolactone (ALDACTONE) 25 mg tablet Take 1 Tab by mouth daily.  aspirin 81 mg chewable tablet Take 81 mg by mouth daily.  DOCOSAHEXANOIC ACID/EPA (FISH OIL PO) Take 100 mg by mouth daily.  COQ10, LIPOSOMAL UBIQUINOL, PO Take 200 mg by mouth daily. No current facility-administered medications for this visit. [facility] follow up appointment(s):   Future Appointments   Date Time Provider Caron Wood   4/20/2022 10:45 AM Papo Daniel MD [facility]   9/7/2022  9:40 AM Josie Hawthorne DO [facility]   9/7/2022  9:45 AM CSI, PACER [facility]        Non-[facility] follow up appointment(s):     Goals Addressed                 This Visit's Progress     Attends follow up appointments on schedule   On track     2/23/22 Patient will attend all scheduled appointments through 5/23/22         Knowledge and adherence of prescribed medication (ie. action, side effects, missed dose, etc.).    On track     2/23/22 Pt will take all medications prescribed to be evaluated on each outreach through 5/23/22         Prepare patients and caregivers for end of life decisions (ie. need for hospice, pain management, symptom relief, advance directives etc.)   On track     2/23/22 Pt will complete an ACP and have it scanned into their EMR by 5/23/22

## 2022-04-09 LAB — CREATININE, EXTERNAL: 1.14

## 2022-04-12 ENCOUNTER — PATIENT OUTREACH (OUTPATIENT)
Dept: CASE MANAGEMENT | Age: 77
End: 2022-04-12

## 2022-04-12 NOTE — PROGRESS NOTES
Complex Case Management      Date/Time:  2022 10:52 AM    Method of communication with patient:phone    [facility] ([facility]) contacted the patient by telephone to perform Ambulatory Care Coordination. Verified name and  (PHI) with patient as identifiers. Provided introduction to self, and explanation of the Ambulatory Care Manager's role. Reviewed most recent clinic visit w/ patient who verbalized understanding. Patient given an opportunity to ask questions. Top Challenges reviewed with the patient   1. Hx of Seizures, Osteoporosis, HTN, HLD, A-fib on AC, DVT, DM2  2. States doing well, no issues/questions     The patient agrees to contact the PCP office or the [facility] for questions related to their healthcare. Provided contact information for future reference. Disease Specific:   N/A    Home Health Active: No    DME Active: No    Barriers to care? none    Advance Care Planning:   Does patient have an Advance Directive:  not on file; education provided     Medication(s):   Medication reconciliation was not performed with patient, who verbalizes understanding of administration of home medications. There were no barriers to obtaining medications identified at this time. Referral to Pharm D needed: no     Current Outpatient Medications   Medication Sig    glucose blood VI test strips (ASCENSIA AUTODISC VI, ONE TOUCH ULTRA TEST VI) strip Check once daily DX: DM E11.65    losartan (COZAAR) 100 mg tablet Take 1 Tablet by mouth daily.  metFORMIN (GLUCOPHAGE) 500 mg tablet TAKE 1 TABLET BY MOUTH EVERY DAY IN THE MORNING WITH BREAKFAST    metoprolol succinate (TOPROL-XL) 50 mg XL tablet TAKE 1 TABLET BY MOUTH EVERY DAY    famotidine (PEPCID) 20 mg tablet TAKE 1 TABLET BY MOUTH TWO (2) TIMES A DAY.  (Patient taking differently: Take 20 mg by mouth as needed.)    Eliquis 5 mg tablet TAKE 1 TABLET BY MOUTH TWICE A DAY    albuterol (PROVENTIL HFA, VENTOLIN HFA, PROAIR HFA) 90 mcg/actuation inhaler Take 2 Puffs by inhalation every four (4) hours as needed for Wheezing.  OneTouch Ultra Blue Test Strip strip USE TO TEST BLOOD SUGAR ONCE DAILY    lancets misc One Touch Ultra soft testing fasting blood sugar every morning prior to breakfast DX E11.9    cholecalciferol, vitamin D3, (VITAMIN D3) 2,000 unit tab Take  by mouth daily.  spironolactone (ALDACTONE) 25 mg tablet Take 1 Tab by mouth daily.  aspirin 81 mg chewable tablet Take 81 mg by mouth daily.  DOCOSAHEXANOIC ACID/EPA (FISH OIL PO) Take 100 mg by mouth daily.  COQ10, LIPOSOMAL UBIQUINOL, PO Take 200 mg by mouth daily. No current facility-administered medications for this visit. [facility] follow up appointment(s):   Future Appointments   Date Time Provider Caron Wood   4/20/2022 10:45 AM Pam Dyer MD [facility]   9/7/2022  9:40 AM Edelmira Bennett DO [facility]   9/7/2022  9:45 AM CSI, PACER [facility]        Non-[facility] follow up appointment(s):     Goals Addressed                 This Visit's Progress     Attends follow up appointments on schedule   On track     2/23/22 Patient will attend all scheduled appointments through 5/23/22         Knowledge and adherence of prescribed medication (ie. action, side effects, missed dose, etc.).    On track     2/23/22 Pt will take all medications prescribed to be evaluated on each outreach through 5/23/22         Prepare patients and caregivers for end of life decisions (ie. need for hospice, pain management, symptom relief, advance directives etc.)   On track     2/23/22 Pt will complete an ACP and have it scanned into their EMR by 5/23/22

## 2022-04-20 ENCOUNTER — OFFICE VISIT (OUTPATIENT)
Dept: FAMILY MEDICINE CLINIC | Age: 77
End: 2022-04-20
Payer: MEDICARE

## 2022-04-20 VITALS
SYSTOLIC BLOOD PRESSURE: 144 MMHG | BODY MASS INDEX: 28.25 KG/M2 | HEIGHT: 66 IN | RESPIRATION RATE: 16 BRPM | OXYGEN SATURATION: 99 % | WEIGHT: 175.8 LBS | DIASTOLIC BLOOD PRESSURE: 80 MMHG | HEART RATE: 60 BPM | TEMPERATURE: 96.8 F

## 2022-04-20 DIAGNOSIS — R05.9 COUGH: ICD-10-CM

## 2022-04-20 DIAGNOSIS — E04.1 THYROID NODULE: ICD-10-CM

## 2022-04-20 DIAGNOSIS — N28.9 RENAL INSUFFICIENCY: ICD-10-CM

## 2022-04-20 DIAGNOSIS — E21.3 HYPERPARATHYROIDISM, UNSPECIFIED (HCC): ICD-10-CM

## 2022-04-20 DIAGNOSIS — E78.00 PURE HYPERCHOLESTEROLEMIA: ICD-10-CM

## 2022-04-20 DIAGNOSIS — I10 ESSENTIAL HYPERTENSION: Primary | ICD-10-CM

## 2022-04-20 DIAGNOSIS — R04.0 EPISTAXIS: ICD-10-CM

## 2022-04-20 DIAGNOSIS — Z86.69 HISTORY OF EPILEPSY: ICD-10-CM

## 2022-04-20 DIAGNOSIS — I48.91 ATRIAL FIBRILLATION, UNSPECIFIED TYPE (HCC): ICD-10-CM

## 2022-04-20 DIAGNOSIS — E11.21 TYPE 2 DIABETES WITH NEPHROPATHY (HCC): ICD-10-CM

## 2022-04-20 PROBLEM — G40.802 OTHER EPILEPSY WITHOUT STATUS EPILEPTICUS, NOT INTRACTABLE (HCC): Status: RESOLVED | Noted: 2022-04-20 | Resolved: 2022-04-20

## 2022-04-20 PROBLEM — G40.802 OTHER EPILEPSY WITHOUT STATUS EPILEPTICUS, NOT INTRACTABLE (HCC): Status: ACTIVE | Noted: 2022-04-20

## 2022-04-20 PROCEDURE — G8536 NO DOC ELDER MAL SCRN: HCPCS | Performed by: FAMILY MEDICINE

## 2022-04-20 PROCEDURE — 1090F PRES/ABSN URINE INCON ASSESS: CPT | Performed by: FAMILY MEDICINE

## 2022-04-20 PROCEDURE — G8753 SYS BP > OR = 140: HCPCS | Performed by: FAMILY MEDICINE

## 2022-04-20 PROCEDURE — 3044F HG A1C LEVEL LT 7.0%: CPT | Performed by: FAMILY MEDICINE

## 2022-04-20 PROCEDURE — G8399 PT W/DXA RESULTS DOCUMENT: HCPCS | Performed by: FAMILY MEDICINE

## 2022-04-20 PROCEDURE — G8754 DIAS BP LESS 90: HCPCS | Performed by: FAMILY MEDICINE

## 2022-04-20 PROCEDURE — 1101F PT FALLS ASSESS-DOCD LE1/YR: CPT | Performed by: FAMILY MEDICINE

## 2022-04-20 PROCEDURE — G0463 HOSPITAL OUTPT CLINIC VISIT: HCPCS | Performed by: FAMILY MEDICINE

## 2022-04-20 PROCEDURE — G8419 CALC BMI OUT NRM PARAM NOF/U: HCPCS | Performed by: FAMILY MEDICINE

## 2022-04-20 PROCEDURE — 99214 OFFICE O/P EST MOD 30 MIN: CPT | Performed by: FAMILY MEDICINE

## 2022-04-20 PROCEDURE — G8510 SCR DEP NEG, NO PLAN REQD: HCPCS | Performed by: FAMILY MEDICINE

## 2022-04-20 PROCEDURE — G8427 DOCREV CUR MEDS BY ELIG CLIN: HCPCS | Performed by: FAMILY MEDICINE

## 2022-04-20 RX ORDER — CINACALCET 30 MG/1
30 TABLET, FILM COATED ORAL DAILY
COMMUNITY

## 2022-04-20 RX ORDER — METOPROLOL SUCCINATE 50 MG/1
50 TABLET, EXTENDED RELEASE ORAL DAILY
Qty: 90 TABLET | Refills: 1 | Status: SHIPPED | OUTPATIENT
Start: 2022-04-20 | End: 2022-08-29 | Stop reason: SDUPTHER

## 2022-04-20 RX ORDER — FAMOTIDINE 20 MG/1
20 TABLET, FILM COATED ORAL AS NEEDED
Qty: 90 TABLET | Refills: 1 | Status: SHIPPED | OUTPATIENT
Start: 2022-04-20 | End: 2022-04-26 | Stop reason: SDUPTHER

## 2022-04-20 RX ORDER — ALBUTEROL SULFATE 90 UG/1
2 AEROSOL, METERED RESPIRATORY (INHALATION)
Qty: 18 G | Refills: 0 | Status: SHIPPED | OUTPATIENT
Start: 2022-04-20

## 2022-04-20 NOTE — PATIENT INSTRUCTIONS
Low Sodium Diet (2,000 Milligram): Care Instructions  Overview     Limiting sodium can be an important part of managing some health problems. The most common source of sodium is salt. People get most of the salt in their diet from canned, prepared, and packaged foods. Fast food and restaurant meals also are very high in sodium. Your doctor will probably limit your sodium to less than 2,000 milligrams (mg) a day. This limit counts all the sodium in prepared and packaged foods and any salt you add to your food. Follow-up care is a key part of your treatment and safety. Be sure to make and go to all appointments, and call your doctor if you are having problems. It's also a good idea to know your test results and keep a list of the medicines you take. How can you care for yourself at home? Read food labels  · Read labels on cans and food packages. The labels tell you how much sodium is in each serving. Make sure that you look at the serving size. If you eat more than the serving size, you have eaten more sodium. · Food labels also tell you the Percent Daily Value for sodium. Choose products with low Percent Daily Values for sodium. · Be aware that sodium can come in forms other than salt, including monosodium glutamate (MSG), sodium citrate, and sodium bicarbonate (baking soda). MSG is often added to Asian food. When you eat out, you can sometimes ask for food without MSG or added salt. Buy low-sodium foods  · Buy foods that are labeled \"unsalted\" (no salt added), \"sodium-free\" (less than 5 mg of sodium per serving), or \"low-sodium\" (140 mg or less of sodium per serving). Foods labeled \"reduced-sodium\" and \"light sodium\" may still have too much sodium. Be sure to read the label to see how much sodium you are getting. · Buy fresh vegetables, or frozen vegetables without added sauces. Buy low-sodium versions of canned vegetables, soups, and other canned goods.   Prepare low-sodium meals  · Cut back on the amount of salt you use in cooking. This will help you adjust to the taste. Do not add salt after cooking. One teaspoon of salt has about 2,300 mg of sodium. · Take the salt shaker off the table. · Flavor your food with garlic, lemon juice, onion, vinegar, herbs, and spices. Do not use soy sauce, lite soy sauce, steak sauce, onion salt, garlic salt, celery salt, or ketchup on your food. · Use low-sodium salad dressings, sauces, and ketchup. Or make your own salad dressings and sauces without adding salt. · Use less salt (or none) when recipes call for it. You can often use half the salt a recipe calls for without losing flavor. Other foods such as rice, pasta, and grains do not need added salt. · Rinse canned vegetables, and cook them in fresh water. This removes some--but not all--of the salt. · Avoid water that is naturally high in sodium or that has been treated with water softeners, which add sodium. If you buy bottled water, read the label and choose a sodium-free brand. Avoid high-sodium foods  · Avoid eating:  ? Smoked, cured, salted, and canned meat, fish, and poultry. ? Ham, morales, hot dogs, and luncheon meats. ? Regular, hard, and processed cheese and regular peanut butter. ? Crackers with salted tops, and other salted snack foods such as pretzels, chips, and salted popcorn. ? Frozen prepared meals, unless labeled low-sodium. ? Canned and dried soups, broths, and bouillon, unless labeled sodium-free or low-sodium. ? Canned vegetables, unless labeled sodium-free or low-sodium. ? [employer] fries, pizza, tacos, and other fast foods. ? Pickles, olives, ketchup, and other condiments, especially soy sauce, unless labeled sodium-free or low-sodium. Where can you learn more? Go to http://www.gray.com/  Enter V843 in the search box to learn more about \"Low Sodium Diet (2,000 Milligram): Care Instructions. \"  Current as of: September 8, 2021               Content Version: 13.2  © 2006-2022 [employer]. Care instructions adapted under license by "[employer] (which disclaims liability or warranty for this information). If you have questions about a medical condition or this instruction, always ask your healthcare professional. [address] any warranty or liability for your use of this information. Nutrition Tips for Diabetes: After Your Visit  Your Care Instructions  A healthy diet is important to manage diabetes. It helps you lose weight (if you need to) and keep it off. It gives you the nutrition and energy your body needs and helps prevent heart disease. But a diet for diabetes does not mean that you have to eat special foods. You can eat what your family eats, including occasional sweets and other favorites. But you do have to pay attention to how often you eat and how much you eat of certain foods. The right plan for you will give you meals that help you keep your blood sugar at healthy levels. Try to eat a variety of foods and to spread carbohydrate throughout the day. Carbohydrate raises blood sugar higher and more quickly than any other nutrient does. Carbohydrate is found in sugar, breads and cereals, fruit, starchy vegetables such as potatoes and corn, and milk and yogurt. You may want to work with a dietitian or diabetes educator to help you plan meals and snacks. A dietitian or diabetes educator also can help you lose weight if that is one of your goals. The following tips can help you enjoy your meals and stay healthy. Follow-up care is a key part of your treatment and safety. Be sure to make and go to all appointments, and call your doctor if you are having problems. Its also a good idea to know your test results and keep a list of the medicines you take. How can you care for yourself at home? · Learn which foods have carbohydrate and how much carbohydrate to eat.  A dietitian or diabetes educator can help you learn to keep track of how much carbohydrate you eat. · Spread carbohydrate throughout the day. Eat some carbohydrate at all meals, but do not eat too much at any one time. · Plan meals to include food from all the food groups. These are the food groups and some example portion sizes:  ¨ Grains: 1 slice of bread (1 ounce), ½ cup of cooked cereal, and 1/3 cup of cooked pasta or rice. These have about 15 grams of carbohydrate in a serving. Choose whole grains such as whole wheat bread or crackers, oatmeal, and brown rice more often than refined grains. ¨ Fruit: 1 small fresh fruit, such as an apple or orange; ½ of a banana; ½ cup of chopped, cooked, or canned fruit; ½ cup of fruit juice; 1 cup of melon or raspberries; and 2 tablespoons of dried fruit. These have about 15 grams of carbohydrate in a serving. ¨ Dairy: 1 cup of nonfat or low-fat milk and 2/3 cup of plain yogurt. These have about 15 grams of carbohydrate in a serving. ¨ Protein foods: Beef, chicken, turkey, fish, eggs, tofu, cheese, cottage cheese, and peanut butter. A serving size of meat is 3 ounces, which is about the size of a deck of cards. Examples of meat substitute serving sizes (equal to 1 ounce of meat) are 1/4 cup of cottage cheese, 1 egg, 1 tablespoon of peanut butter, and ½ cup of tofu. These have very little or no carbohydrate per serving. ¨ Vegetables: Starchy vegetables such as ½ cup of cooked dried beans, peas, potatoes, or corn have about 15 grams of carbohydrate. Nonstarchy vegetables have very little carbohydrate, such as 1 cup of raw leafy vegetables (such as spinach), ½ cup of other vegetables (cooked or chopped), and 3/4 cup of vegetable juice. · Use the plate format to plan meals. It is a good, quick way to make sure that you have a balanced meal. It also helps you spread carbohydrate throughout the day. You divide your plate by types of foods.  Put vegetables on half the plate, meat or meat substitutes on one-quarter of the plate, and a grain or starchy vegetable (such as brown rice or a potato) in the final quarter of the plate. To this you can add a small piece of fruit and 1 cup of milk or yogurt, depending on how much carbohydrate you are supposed to eat at a meal.  · Talk to your dietitian or diabetes educator about ways to add limited amounts of sweets into your meal plan. You can eat these foods now and then, as long as you include the amount of carbohydrate they have in your daily carbohydrate allowance. · If you drink alcohol, limit it to no more than 1 drink a day for women and 2 drinks a day for men. If you are pregnant, no amount of alcohol is known to be safe. · Protein, fat, and fiber do not raise blood sugar as much as carbohydrate does. If you eat a lot of these nutrients in a meal, your blood sugar will rise more slowly than it would otherwise. · Limit saturated fats, such as those from meat and dairy products. Try to replace it with monounsaturated fat, such as olive oil. This is a healthier choice because people who have diabetes are at higher-than-average risk of heart disease. But use a modest amount of olive oil. A tablespoon of olive oil has 14 grams of fat and 120 calories. · Exercise lowers blood sugar. If you take insulin by shots or pump, you can use less than you would if you were not exercising. Keep in mind that timing matters. If you exercise within 1 hour after a meal, your body may need less insulin for that meal than it would if you exercised 3 hours after the meal. Test your blood sugar to find out how exercise affects your need for insulin. · Exercise on most days of the week. Aim for at least 30 minutes. Exercise helps you stay at a healthy weight and helps your body use insulin. Walking is an easy way to get exercise. Gradually increase the amount you walk every day. You also may want to swim, bike, or do other activities.   When you eat out  · Learn to estimate the serving sizes of foods that have carbohydrate. If you measure food at home, it will be easier to estimate the amount in a serving of restaurant food. · If the meal you order has too much carbohydrate (such as potatoes, corn, or baked beans), ask to have a low-carbohydrate food instead. Ask for a salad or green vegetables. · If you use insulin, check your blood sugar before and after eating out to help you plan how much to eat in the future. · If you eat more carbohydrate at a meal than you had planned, take a walk or do other exercise. This will help lower your blood sugar. Where can you learn more? Go to [employer]  Enter Y407 in the search box to learn more about \"Nutrition Tips for Diabetes: After Your Visit. \"   © 4649-9244 [employer]. Care instructions adapted under license by [employer] (which disclaims liability or warranty for this information). This care instruction is for use with your licensed healthcare professional. If you have questions about a medical condition or this instruction, always ask your healthcare professional. [place] [address] any warranty or liability for your use of this information.   Content Version: 18.0.604828; Current as of: June 4, 2014

## 2022-04-20 NOTE — PROGRESS NOTES
HISTORY OF PRESENT ILLNESS  Kit Drew is a 67 y.o. female. HPI: Here for follow-up. Elevated blood pressure. Asymptomatic. Not checking blood pressure lately at home. Taking medication with compliance. Asymptomatic and sitting comfortably without any acute distress. Repeat blood pressure also elevated. Denies any headache, dizziness, no chest pain or trouble breathing, no arm or leg weakness. No nausea or vomiting, no weight or appetite changes, no mood changes . No urine or bowel complains, no palpitation, no diaphoresis. No abdominal pain. No cold or cough. No leg swelling. No fever. No sleep trouble. History of hyperparathyroidism. Following endocrinology for difficult swallowing or change in voice. She was given Cinacalcet from Endo. She has not started it yet. Lab eval for cord due to complaint. Will follow specialist recommendation. Risk of seizure with a low calcium. She had history of seizure. Not on Any medication at last episode years ago. Will observe. Diabetes. Diet controlled. Last A1c in prediabetic range. Checking blood sugar at home on and off. Fasting sugar usually around 100. No hyper or hypoglycemic symptoms. Working on lifestyle and diet modification. Hyperlipidemia. On statin. A. fib. On Eliquis and aspirin. She does get on and off epistaxis. Seen ENT. Probably due to anticoagulation. Sending a message to cardiology regarding need for Eliquis and aspirin both for she can stop aspirin at this time. Visit Vitals  BP (!) 144/80 (BP 1 Location: Left upper arm, BP Patient Position: Sitting, BP Cuff Size: Adult)   Pulse 60   Temp 96.8 °F (36 °C) (Temporal)   Resp 16   Ht 5' 6\" (1.676 m)   Wt 175 lb 12.8 oz (79.7 kg)   SpO2 99%   BMI 28.37 kg/m²     Review medication list, vitals, problem list,allergies.    Lab Results   Component Value Date/Time    WBC 6.2 03/09/2022 10:06 AM    HGB 12.1 03/09/2022 10:06 AM    HCT 39.5 03/09/2022 10:06 AM    PLATELET 688 03/09/2022 10:06 AM    MCV 87.4 03/09/2022 10:06 AM     Lab Results   Component Value Date/Time    Sodium 139 03/09/2022 10:06 AM    Potassium 4.3 03/09/2022 10:06 AM    Chloride 106 03/09/2022 10:06 AM    CO2 29 03/09/2022 10:06 AM    Anion gap 4 03/09/2022 10:06 AM    Glucose 111 (H) 03/09/2022 10:06 AM    BUN 15 03/09/2022 10:06 AM    Creatinine 1.02 03/09/2022 10:06 AM    BUN/Creatinine ratio 15 03/09/2022 10:06 AM    GFR est AA >60 03/09/2022 10:06 AM    GFR est non-AA 53 (L) 03/09/2022 10:06 AM    Calcium 10.7 (H) 03/09/2022 10:06 AM    Bilirubin, total 0.6 03/09/2022 10:06 AM    Alk. phosphatase 74 03/09/2022 10:06 AM    Protein, total 7.3 03/09/2022 10:06 AM    Albumin 3.7 03/09/2022 10:06 AM    Globulin 3.6 03/09/2022 10:06 AM    A-G Ratio 1.0 03/09/2022 10:06 AM    ALT (SGPT) 22 03/09/2022 10:06 AM    AST (SGOT) 19 03/09/2022 10:06 AM     Lab Results   Component Value Date/Time    Cholesterol, total 189 03/09/2022 10:06 AM    HDL Cholesterol 59 03/09/2022 10:06 AM    LDL, calculated 108.4 (H) 03/09/2022 10:06 AM    VLDL, calculated 21.6 03/09/2022 10:06 AM    Triglyceride 108 03/09/2022 10:06 AM    CHOL/HDL Ratio 3.2 03/09/2022 10:06 AM     Lab Results   Component Value Date/Time    TSH 0.89 03/09/2022 10:06 AM     Lab Results   Component Value Date/Time    Hemoglobin A1c 6.0 (H) 03/09/2022 10:06 AM    Hemoglobin A1c (POC) 5.9 03/09/2021 10:14 AM      Lab Results   Component Value Date/Time    Microalbumin/Creat ratio (mg/g creat) 15 03/09/2022 10:06 AM    Microalbumin,urine random 2.01 03/09/2022 10:06 AM    Microalbumin urine (POC) 10 04/24/2017 09:02 AM       Lab Results   Component Value Date/Time    Calcium 10.7 (H) 03/09/2022 10:06 AM    PTH, Intact 132.0 (H) 03/19/2018 10:27 AM           ROS: see HPI     Physical Exam  Constitutional:       General: She is not in acute distress. Cardiovascular:      Rate and Rhythm: Normal rate and regular rhythm. Heart sounds: Normal heart sounds. Abdominal:      General: Bowel sounds are normal.      Palpations: Abdomen is soft. Tenderness: There is no abdominal tenderness. Musculoskeletal:         General: No swelling. Cervical back: Neck supple. Neurological:      Mental Status: She is oriented to person, place, and time. Psychiatric:         Behavior: Behavior normal.         ASSESSMENT and PLAN    ICD-10-CM ICD-9-CM    1. Essential hypertension: Elevated. Taking medication with compliance. I will ask her to come back in a month for nurse visit and follow-up in 4 months. Advised low-salt diet. If still elevated will adjust medication I10 401.9 metoprolol succinate (TOPROL-XL) 50 mg XL tablet   2. Cough  R05.9 786.2 famotidine (PEPCID) 20 mg tablet      albuterol (PROVENTIL HFA, VENTOLIN HFA, PROAIR HFA) 90 mcg/actuation inhaler   3. Hyperparathyroidism, unspecified ([facility]): Following Endo. Now going to start seeing a counselor. We will follow Endo and the recommendation E21.3 252.00    4. Type 2 diabetes with nephropathy ([facility]): A1c in prediabetic range. Continue current plan. Following Endo. E11.21 250.40      583.81    5. History of epilepsy: Off medication since years Z86.69 V12.49    6. Atrial fibrillation, unspecified type [facility]): On aspirin and Eliquis. Will ask cardiology for further recommendation as she is getting frequent epistaxis I48.91 427.31    7. Thyroid nodule: No trouble swallowing or change in voice. Following Endo and the recommendation E04.1 241.0    8. Renal insufficiency: Avoid NSAIDs. Following nephrology N28.9 593.9    9. Pure hypercholesterolemia: On statin E78.00 272.0    10. Epistaxis: Seen ENT. Probably due to the anticoagulation.   Last per the recommendation from cardiology regarding aspirin and Eliquis continuation R04.0 784.7    Patient understood agreed with the plan  Follow-up and Dispositions    · Return in about 4 months (around 8/20/2022) for 1 month follow up for blood pressure check as nurse visit . Please note that this dictation was completed with [employer], the computer voice recognition software. Quite often unanticipated grammatical, syntax, homophones, and other interpretive errors are inadvertently transcribed by the computer software. Please disregard these errors. Please excuse any errors that have escaped final proofreading.

## 2022-04-20 NOTE — PROGRESS NOTES
1. Have you been to the ER, urgent care clinic since your last visit? Hospitalized since your last visit? No    2. Have you seen or consulted any other health care providers outside of the [address] [place] [address] since your last visit? Include any pap smears or colon screening. Dr. Martinez Cons 4/08/22.      Chief Complaint   Patient presents with    Thyroid Problem     thyroid nodule    Diabetes    Cholesterol Problem    Slow Heart Rate    Irregular Heart Beat    Other     renal insufficiency

## 2022-04-21 ENCOUNTER — PATIENT OUTREACH (OUTPATIENT)
Dept: CASE MANAGEMENT | Age: 77
End: 2022-04-21

## 2022-04-21 NOTE — PROGRESS NOTES
Patient attended appointments with her PCP, Dr. Aaron Frankel. on 4/20/22, Ambulatory Care Manager reviewed EMR and will follow up on next scheduled outreach.

## 2022-04-25 ENCOUNTER — PATIENT OUTREACH (OUTPATIENT)
Dept: CASE MANAGEMENT | Age: 77
End: 2022-04-25

## 2022-04-26 DIAGNOSIS — R05.9 COUGH: ICD-10-CM

## 2022-04-26 RX ORDER — FAMOTIDINE 20 MG/1
20 TABLET, FILM COATED ORAL
Qty: 180 TABLET | Refills: 1 | Status: SHIPPED | OUTPATIENT
Start: 2022-04-26

## 2022-05-06 RX ORDER — APIXABAN 5 MG/1
TABLET, FILM COATED ORAL
Qty: 180 TABLET | Refills: 0 | OUTPATIENT
Start: 2022-05-06

## 2022-05-09 ENCOUNTER — CLINICAL SUPPORT (OUTPATIENT)
Dept: FAMILY MEDICINE CLINIC | Age: 77
End: 2022-05-09

## 2022-05-09 VITALS — SYSTOLIC BLOOD PRESSURE: 136 MMHG | DIASTOLIC BLOOD PRESSURE: 82 MMHG

## 2022-05-09 DIAGNOSIS — Z01.30 BLOOD PRESSURE CHECK: Primary | ICD-10-CM

## 2022-05-10 ENCOUNTER — PATIENT OUTREACH (OUTPATIENT)
Dept: CASE MANAGEMENT | Age: 77
End: 2022-05-10

## 2022-05-10 NOTE — PROGRESS NOTES
Patient has graduated from the Complex Case Management  program on 5/10/22. Patient's symptoms are stable at this time. Patient/family has the ability to self-manage. Care management goals have been completed at this time. No further nurse navigator follow up scheduled. Goals Addressed                 This Visit's Progress     COMPLETED: Attends follow up appointments on schedule        2/23/22 Patient will attend all scheduled appointments through 5/23/22         COMPLETED: Knowledge and adherence of prescribed medication (ie. action, side effects, missed dose, etc.).        2/23/22 Pt will take all medications prescribed to be evaluated on each outreach through 5/23/22         COMPLETED: Prepare patients and caregivers for end of life decisions (ie. need for hospice, pain management, symptom relief, advance directives etc.)        2/23/22 Pt will complete an ACP and have it scanned into their EMR by 5/23/22            Pt has nurse navigator's contact information for any further questions, concerns, or needs.   Patients upcoming visits:    Future Appointments   Date Time Provider Caron Wood   8/29/2022  9:30 AM Braeden Mckeon MD [facility] BS AMB   9/7/2022  9:40 AM Pooja Barahona DO [facility] BS AMB   9/7/2022  9:45 AM CSI, PACER [facility] BS AMB

## 2022-05-11 RX ORDER — APIXABAN 5 MG/1
TABLET, FILM COATED ORAL
Qty: 180 TABLET | Refills: 0 | OUTPATIENT
Start: 2022-05-11

## 2022-07-22 DIAGNOSIS — I10 ESSENTIAL HYPERTENSION: ICD-10-CM

## 2022-07-22 RX ORDER — LOSARTAN POTASSIUM 100 MG/1
TABLET ORAL
Qty: 90 TABLET | Refills: 0 | Status: SHIPPED | OUTPATIENT
Start: 2022-07-22 | End: 2022-08-29 | Stop reason: SDUPTHER

## 2022-08-21 LAB — CREATININE, EXTERNAL: 1.08

## 2022-08-24 ENCOUNTER — PATIENT OUTREACH (OUTPATIENT)
Dept: CASE MANAGEMENT | Age: 77
End: 2022-08-24

## 2022-08-24 NOTE — PROGRESS NOTES
Complex Case Management      Date/Time:  2022 10:52 AM    Method of communication with patient:phone    [facility] ([facility]) contacted the patient by telephone to perform Ambulatory Care Coordination. Verified name and  (PHI) with patient as identifiers. Provided introduction to self, and explanation of the Ambulatory Care Manager's role. Reviewed most recent clinic visit w/ patient who verbalized understanding. Patient given an opportunity to ask questions. Top Challenges reviewed with the patient   N/a     Hx of Seizures, Osteoporosis, HTN, HLD, A-fib on AC, DVT, DM2  States doing well, no issues/questions    The patient agrees to contact the PCP office or the [facility] for questions related to their healthcare. Provided contact information for future reference. Disease Specific:   N/A    Home Health Active: No    DME Active: No    Barriers to care? none    Advance Care Planning:   Does patient have an Advance Directive:  not on file; education provided     Medication(s):   Medication reconciliation was not performed with patient, who verbalizes understanding of administration of home medications. There were no barriers to obtaining medications identified at this time. Referral to Pharm D needed: no     Current Outpatient Medications   Medication Sig    famotidine (PEPCID) 20 mg tablet Take 1 Tablet by mouth two (2) times daily as needed for Gastroesophageal Reflux Disease (GERD). losartan (COZAAR) 100 mg tablet Take 1 tablet by mouth once daily    apixaban (Eliquis) 5 mg tablet Take 1 Tablet by mouth two (2) times a day. cinacalcet (SENSIPAR) 30 mg tablet Take 30 mg by mouth daily. metoprolol succinate (TOPROL-XL) 50 mg XL tablet Take 1 Tablet by mouth daily. albuterol (PROVENTIL HFA, VENTOLIN HFA, PROAIR HFA) 90 mcg/actuation inhaler Take 2 Puffs by inhalation every four (4) hours as needed for Wheezing.     glucose blood VI test strips (ASCENSIA AUTODISC VI, ONE TOUCH ULTRA TEST VI) strip Check once daily DX: DM E11.65    metFORMIN (GLUCOPHAGE) 500 mg tablet TAKE 1 TABLET BY MOUTH EVERY DAY IN THE MORNING WITH BREAKFAST    OneTouch Ultra Blue Test Strip strip USE TO TEST BLOOD SUGAR ONCE DAILY    lancets misc One Touch Ultra soft testing fasting blood sugar every morning prior to breakfast DX E11.9    cholecalciferol, vitamin D3, (VITAMIN D3) 2,000 unit tab Take  by mouth daily. spironolactone (ALDACTONE) 25 mg tablet Take 1 Tab by mouth daily. aspirin 81 mg chewable tablet Take 81 mg by mouth daily. DOCOSAHEXANOIC ACID/EPA (FISH OIL PO) Take 100 mg by mouth daily. COQ10, LIPOSOMAL UBIQUINOL, PO Take 200 mg by mouth daily. No current facility-administered medications for this visit.        [facility] follow up appointment(s):   Future Appointments   Date Time Provider Caron Wood   8/29/2022  9:30 AM Jennie Monroe MD [facility]   9/7/2022  9:40 AM Evy Solis DO [facility]   9/7/2022  9:45 AM CSI, PACER [facility]        Non-[facility] follow up appointment(s):      Goals Addressed                   This Visit's Progress     Attends follow up appointments on schedule        8/24/22 Patient will attend all scheduled appointments through 11/24/22         Knowledge and adherence of prescribed medication (ie. action, side effects, missed dose, etc.).        8/24/22 Pt will take all medications prescribed to be evaluated on each outreach through 11/24//22         Prepare patients and caregivers for end of life decisions (ie. need for hospice, pain management, symptom relief, advance directives etc.)        8/24/22 Pt will complete an ACP and have it scanned into their EMR by 11/24/22

## 2022-08-29 ENCOUNTER — OFFICE VISIT (OUTPATIENT)
Dept: FAMILY MEDICINE CLINIC | Age: 77
End: 2022-08-29
Payer: MEDICARE

## 2022-08-29 ENCOUNTER — HOSPITAL ENCOUNTER (OUTPATIENT)
Dept: LAB | Age: 77
Discharge: HOME | End: 2022-08-29
Payer: MEDICARE

## 2022-08-29 VITALS
HEIGHT: 66 IN | SYSTOLIC BLOOD PRESSURE: 120 MMHG | RESPIRATION RATE: 16 BRPM | TEMPERATURE: 96.8 F | OXYGEN SATURATION: 98 % | DIASTOLIC BLOOD PRESSURE: 78 MMHG | WEIGHT: 172 LBS | BODY MASS INDEX: 27.64 KG/M2 | HEART RATE: 60 BPM

## 2022-08-29 DIAGNOSIS — I48.91 ATRIAL FIBRILLATION, UNSPECIFIED TYPE (HCC): ICD-10-CM

## 2022-08-29 DIAGNOSIS — R05.9 COUGH: ICD-10-CM

## 2022-08-29 DIAGNOSIS — M54.2 NECK PAIN: ICD-10-CM

## 2022-08-29 DIAGNOSIS — I10 ESSENTIAL HYPERTENSION: ICD-10-CM

## 2022-08-29 DIAGNOSIS — R82.90 ABNORMAL URINE ODOR: ICD-10-CM

## 2022-08-29 DIAGNOSIS — Z95.0 CARDIAC PACEMAKER IN SITU: ICD-10-CM

## 2022-08-29 DIAGNOSIS — N18.30 STAGE 3 CHRONIC KIDNEY DISEASE, UNSPECIFIED WHETHER STAGE 3A OR 3B CKD (HCC): ICD-10-CM

## 2022-08-29 DIAGNOSIS — N39.0 URINARY TRACT INFECTION WITHOUT HEMATURIA, SITE UNSPECIFIED: ICD-10-CM

## 2022-08-29 DIAGNOSIS — Z00.00 MEDICARE ANNUAL WELLNESS VISIT, SUBSEQUENT: ICD-10-CM

## 2022-08-29 DIAGNOSIS — E11.9 WELL CONTROLLED DIABETES MELLITUS (HCC): Primary | ICD-10-CM

## 2022-08-29 DIAGNOSIS — E83.52 HYPERCALCEMIA: ICD-10-CM

## 2022-08-29 DIAGNOSIS — E04.1 THYROID NODULE: ICD-10-CM

## 2022-08-29 DIAGNOSIS — R51.9 SCALP PAIN: ICD-10-CM

## 2022-08-29 DIAGNOSIS — E78.00 PURE HYPERCHOLESTEROLEMIA: ICD-10-CM

## 2022-08-29 LAB
BILIRUB UR QL STRIP: NEGATIVE
GLUCOSE UR-MCNC: NEGATIVE MG/DL
KETONES P FAST UR STRIP-MCNC: NEGATIVE MG/DL
PH UR STRIP: 6.5 [PH] (ref 4.6–8)
PROT UR QL STRIP: NEGATIVE
SP GR UR STRIP: 1.01 (ref 1–1.03)
UA UROBILINOGEN AMB POC: NORMAL (ref 0.2–1)
URINALYSIS CLARITY POC: NORMAL
URINALYSIS COLOR POC: NORMAL
URINE BLOOD POC: NEGATIVE
URINE LEUKOCYTES POC: NORMAL
URINE NITRITES POC: POSITIVE

## 2022-08-29 PROCEDURE — G8752 SYS BP LESS 140: HCPCS | Performed by: FAMILY MEDICINE

## 2022-08-29 PROCEDURE — 3044F HG A1C LEVEL LT 7.0%: CPT | Performed by: FAMILY MEDICINE

## 2022-08-29 PROCEDURE — 1123F ACP DISCUSS/DSCN MKR DOCD: CPT | Performed by: FAMILY MEDICINE

## 2022-08-29 PROCEDURE — 87086 URINE CULTURE/COLONY COUNT: CPT

## 2022-08-29 PROCEDURE — G0439 PPPS, SUBSEQ VISIT: HCPCS | Performed by: FAMILY MEDICINE

## 2022-08-29 PROCEDURE — 81001 URINALYSIS AUTO W/SCOPE: CPT | Performed by: FAMILY MEDICINE

## 2022-08-29 PROCEDURE — G8536 NO DOC ELDER MAL SCRN: HCPCS | Performed by: FAMILY MEDICINE

## 2022-08-29 PROCEDURE — G8399 PT W/DXA RESULTS DOCUMENT: HCPCS | Performed by: FAMILY MEDICINE

## 2022-08-29 PROCEDURE — 87077 CULTURE AEROBIC IDENTIFY: CPT

## 2022-08-29 PROCEDURE — G8417 CALC BMI ABV UP PARAM F/U: HCPCS | Performed by: FAMILY MEDICINE

## 2022-08-29 PROCEDURE — G8510 SCR DEP NEG, NO PLAN REQD: HCPCS | Performed by: FAMILY MEDICINE

## 2022-08-29 PROCEDURE — 99214 OFFICE O/P EST MOD 30 MIN: CPT | Performed by: FAMILY MEDICINE

## 2022-08-29 PROCEDURE — 87186 SC STD MICRODIL/AGAR DIL: CPT

## 2022-08-29 PROCEDURE — G0463 HOSPITAL OUTPT CLINIC VISIT: HCPCS | Performed by: FAMILY MEDICINE

## 2022-08-29 PROCEDURE — G8427 DOCREV CUR MEDS BY ELIG CLIN: HCPCS | Performed by: FAMILY MEDICINE

## 2022-08-29 PROCEDURE — G8754 DIAS BP LESS 90: HCPCS | Performed by: FAMILY MEDICINE

## 2022-08-29 PROCEDURE — 1101F PT FALLS ASSESS-DOCD LE1/YR: CPT | Performed by: FAMILY MEDICINE

## 2022-08-29 PROCEDURE — 1090F PRES/ABSN URINE INCON ASSESS: CPT | Performed by: FAMILY MEDICINE

## 2022-08-29 RX ORDER — METFORMIN HYDROCHLORIDE 500 MG/1
TABLET ORAL
Qty: 90 TABLET | Refills: 1 | Status: SHIPPED | OUTPATIENT
Start: 2022-08-29

## 2022-08-29 RX ORDER — NITROFURANTOIN (MACROCRYSTALS) 100 MG/1
100 CAPSULE ORAL 2 TIMES DAILY
Qty: 14 CAPSULE | Refills: 0 | Status: SHIPPED | OUTPATIENT
Start: 2022-08-29 | End: 2022-09-05

## 2022-08-29 RX ORDER — LOSARTAN POTASSIUM 100 MG/1
100 TABLET ORAL DAILY
Qty: 90 TABLET | Refills: 1 | Status: SHIPPED | OUTPATIENT
Start: 2022-08-29

## 2022-08-29 RX ORDER — METOPROLOL SUCCINATE 50 MG/1
50 TABLET, EXTENDED RELEASE ORAL DAILY
Qty: 90 TABLET | Refills: 1 | Status: SHIPPED | OUTPATIENT
Start: 2022-08-29

## 2022-08-29 RX ORDER — FAMOTIDINE 20 MG/1
20 TABLET, FILM COATED ORAL
Qty: 180 TABLET | Refills: 1 | Status: CANCELLED | OUTPATIENT
Start: 2022-08-29

## 2022-08-29 NOTE — PROGRESS NOTES
1. Have you been to the ER, urgent care clinic since your last visit? Hospitalized since your last visit? No    2. Have you seen or consulted any other health care providers outside of the [address] [place] [address] since your last visit? Include any pap smears or colon screening.  Dr. Mirta Mclean: 8/26/22    Chief Complaint   Patient presents with    Annual Wellness Visit    Hypertension    Diabetes    Cholesterol Problem

## 2022-08-29 NOTE — PROGRESS NOTES
This is the Subsequent Medicare Annual Wellness Exam, performed 12 months or more after the Initial AWV or the last Subsequent AWV    I have reviewed the patient's medical history in detail and updated the computerized patient record. Assessment/Plan   Education and counseling provided:  Are appropriate based on today's review and evaluation    1. Well controlled diabetes mellitus ([facility]  -     metFORMIN (GLUCOPHAGE) 500 mg tablet; TAKE 1 TABLET BY MOUTH EVERY DAY IN THE MORNING WITH BREAKFAST, Normal, Disp-90 Tablet, R-1  2. Essential hypertension  -     metoprolol succinate (TOPROL-XL) 50 mg XL tablet; Take 1 Tablet by mouth daily. , Normal, Disp-90 Tablet, R-1  -     losartan (COZAAR) 100 mg tablet; Take 1 Tablet by mouth daily. , Normal, Disp-90 Tablet, R-1  3. Cough  4. Atrial fibrillation, unspecified type (HCC)  5. Stage 3 chronic kidney disease, unspecified whether stage 3a or 3b CKD ([facility]  6. Thyroid nodule  7. Hypercalcemia  8. Cardiac pacemaker in situ  9. Pure hypercholesterolemia  10. Neck pain  11. Scalp pain  Comments:  cabinate injury   12. Abnormal urine odor  13. Medicare annual wellness visit, subsequent       Depression Risk Factor Screening     3 most recent PHQ Screens 8/29/2022   Little interest or pleasure in doing things Not at all   Feeling down, depressed, irritable, or hopeless Not at all   Total Score PHQ 2 0       Alcohol & Drug Abuse Risk Screen    Do you average more than 1 drink per night or more than 7 drinks a week:  No    On any one occasion in the past three months have you have had more than 3 drinks containing alcohol:  No          Functional Ability and Level of Safety    Hearing:  no concern from pt. Following ENT. No hearing AID needed. Activities of Daily Living: The home contains: no safety equipment. Patient does total self care      Ambulation: with no difficulty     Fall Risk:  Fall Risk Assessment, last 12 mths 8/29/2022   Able to walk?  Yes   Fall in past 15 months? 0   Do you feel unsteady? 0   Are you worried about falling 0      Abuse Screen:  Patient is not abused       Cognitive Screening    Has your family/caregiver stated any concerns about your memory: no       Health Maintenance Due     Health Maintenance Due   Topic Date Due    Eye Exam Retinal or Dilated  09/06/2019    Foot Exam Q1  03/09/2022    COVID-19 Vaccine (4 - Booster for Hawley Peter series) 04/30/2022       Patient Care Team   Patient Care Team:  Rony Viera MD as PCP - General (Family Medicine)  Rony Viera MD as PCP - [facility]  Cici Campbell MD (Cardiovascular Disease Physician)  Christiano Angeles MD as Physician (Colon and Rectal Surgery)  Juan Carlos Oswald MD (Ophthalmology)  Deja Russell MD (Endocrinology Physician)  Risa Capps MD as Physician (Ophthalmology)  Kirt Luna RN as Ambulatory Care Manager    History     Patient Active Problem List   Diagnosis Code    Essential hypertension I10    Bradycardia/ post pacemaker. s/p ablation R00.1    Atrial fibrillation (HCC)/ post ablation I48.91    Hyperlipidemia E78.5    Advance directive discussed with patient/ living wheel and she will provide a copy / want to consider changes.  will provide updates  Z71.89    Cardiac pacemaker in situ Z95.0    S/P colonoscopy Z98.890    Thyroid nodule E04.1    Type 2 diabetes with nephropathy ([address] E11.21    Renal insufficiency N28.9    Hypercalcemia E83.52    Elevated PTHrP level R79.89    Hyperparathyroidism, unspecified (HCC) E21.3    History of epilepsy Z86.69    Chronic renal disease, stage III N18.30     Past Medical History:   Diagnosis Date    Arrhythmia     history atrial fib    Bradycardia     Diabetes ([address]     borderline    DVT (deep venous thrombosis) ([address] 2010    right    History of ablation March 2016     History of atrial fibrillation     Hypercholesterolemia     Hypertension     Long term current use of anticoagulant therapy     Menopause     Osteoporosis Seizure ([facility]     Seizures ([facility]     on medication from 2006 to 2013 no seizure since 2006    [employer] pacemaker implant Set. 2013 Dr. Paul Reaves, [place]     Thyroid disease     nodules      Past Surgical History:   Procedure Laterality Date    COLONOSCOPY N/A 10/25/2017    COLONOSCOPY performed by Justina Lee MD at [facility] ENDOSCOPY    HX CATARACT REMOVAL      HX HYSTERECTOMY      HX KNEE REPLACEMENT      left knee in 1/2010 and right knee 7/2016    HX KNEE REPLACEMENT      right knee    HX PACEMAKER      NM CARDIAC SURG PROCEDURE UNLIST      ablation for a-fib     Current Outpatient Medications   Medication Sig Dispense Refill    metFORMIN (GLUCOPHAGE) 500 mg tablet TAKE 1 TABLET BY MOUTH EVERY DAY IN THE MORNING WITH BREAKFAST 90 Tablet 1    metoprolol succinate (TOPROL-XL) 50 mg XL tablet Take 1 Tablet by mouth daily. 90 Tablet 1    losartan (COZAAR) 100 mg tablet Take 1 Tablet by mouth daily. 90 Tablet 1    apixaban (Eliquis) 5 mg tablet Take 1 Tablet by mouth two (2) times a day. 180 Tablet 3    famotidine (PEPCID) 20 mg tablet Take 1 Tablet by mouth two (2) times daily as needed for Gastroesophageal Reflux Disease (GERD). 180 Tablet 1    cinacalcet (SENSIPAR) 30 mg tablet Take 30 mg by mouth daily. albuterol (PROVENTIL HFA, VENTOLIN HFA, PROAIR HFA) 90 mcg/actuation inhaler Take 2 Puffs by inhalation every four (4) hours as needed for Wheezing. 18 g 0    glucose blood VI test strips (ASCENSIA AUTODISC VI, ONE TOUCH ULTRA TEST VI) strip Check once daily DX: DM E11.65 100 Strip 3    OneTouch Ultra Blue Test Strip strip USE TO TEST BLOOD SUGAR ONCE DAILY 100 Strip 6    lancets misc One Touch Ultra soft testing fasting blood sugar every morning prior to breakfast DX E11.9 100 Each 11    cholecalciferol, vitamin D3, 50 mcg (2,000 unit) tab Take  by mouth daily. spironolactone (ALDACTONE) 25 mg tablet Take 1 Tab by mouth daily. 4    DOCOSAHEXANOIC ACID/EPA (FISH OIL PO) Take 100 mg by mouth daily. COQ10, LIPOSOMAL UBIQUINOL, PO Take 200 mg by mouth daily. aspirin 81 mg chewable tablet Take 81 mg by mouth daily.  (Patient not taking: Reported on 8/29/2022)       Allergies   Allergen Reactions    Statins-Hmg-Coa Reductase Inhibitors Other (comments)     [place] sick        Family History   Problem Relation Age of Onset    Diabetes Father     Hypertension Sister      Social History     Tobacco Use    Smoking status: Never    Smokeless tobacco: Never   Substance Use Topics    Alcohol use: No         Abigail Cid MD

## 2022-08-29 NOTE — ACP (ADVANCE CARE PLANNING)
Advance Care Planning     General Advance Care Planning (ACP) Conversation      Date of Conversation: 8/29/2022  Conducted with: Patient with Decision Making Capacity    Healthcare Decision Maker:     Primary Decision Maker: Emmanuelle Sesay - [phone]    Primary Decision Maker: Cliff Mcdonough - Daughter - [phone]  Click here to complete [address] including selection of the Healthcare Decision Maker Relationship (ie \"Primary\")    Today we discussed advance directive. Does not want resuccitation. Also has that wish in his wheel. Will hand out DNR and DNI form today     Content/Action Overview:   See above   Wants no resuscitation.        Length of Voluntary ACP Conversation in minutes:  <16 minutes (Non-Billable)    Inés Orozco MD

## 2022-08-29 NOTE — PROGRESS NOTES
HISTORY OF PRESENT ILLNESS  Tao Salguero is a 65 y.o. female. HPI: Here for follow-up. Has done labs recently at Endo office and will obtain the records. If needed will repeat A1c. No hyper or hypoglycemic symptoms. Not checking blood sugar at home. Taking medication with compliance and no side effects. Hypertension. Vitals been stable. Taking medication with compliance and asymptomatic. A. fib. Asymptomatic at this time. Was not able to afford anticoagulation and does not want to take the Coumadin so currently on aspirin. Renal insufficiency. Avoiding NSAIDs. Hyperlipidemia. On statin. No concern. Thyroid nodule, hypercalcemia. Following Endo and the recommendation. On treatment. Recently had a repeat labs Endo will obtain the results and the recommendations. Stated she was in [place] a month ago had dinner plates fell on her scalp and that she is having a localized soreness over the scalp still after a month. No redness or swelling noted. No headache or any dizziness. No nausea vomiting, no extremity weakness. No tingling numbness. No vision change. On a clinical exam no swelling or any open skin or any bruise. Will observe. Felt abnormal urine order. No abdominal pain. No nausea vomiting or any fever. No back pain. No prior history of kidney stone. No blood in the urine. Urine analysis showed about Ceder nitrate. Sending for urine culture. Given antibiotic. Has been feeling on and off chronic neck pain. Prior x-ray showed degenerative changes. Discussed importance of symptomatic treatment with a heating pad and home exercise and taking Tylenol as needed. If no improvement discussed the physical therapy. She will work on her symptomatic treatment. Again no headache 40 minutes.   No extremity weakness tingling , numbness or weakness   Visit Vitals  /78 (BP 1 Location: Left upper arm, BP Patient Position: Sitting, BP Cuff Size: Adult)   Pulse 60   Temp 96.8 °F (36 °C) (Temporal)   Resp 16   Ht 5' 6\" (1.676 m)   Wt 172 lb (78 kg)   SpO2 98%   BMI 27.76 kg/m²     Review medication list, vitals, problem list,allergies. ROS: see HPI     Physical Exam  Constitutional:       General: She is not in acute distress. Cardiovascular:      Rate and Rhythm: Normal rate and regular rhythm. Heart sounds: Normal heart sounds. Abdominal:      General: Bowel sounds are normal.      Palpations: Abdomen is soft. Tenderness: There is no abdominal tenderness. Musculoskeletal:         General: No swelling. Comments: No callus, no open skin area, peripheral pulsations of dorsalis pedis palpable bilaterally. Monofilament test normal.   Peripheral sensations of dorsalis pedis palpable bilaterally. Neurological:      Mental Status: She is oriented to person, place, and time. Psychiatric:         Behavior: Behavior normal.       ASSESSMENT and PLAN    ICD-10-CM ICD-9-CM    1. Well controlled diabetes mellitus ([facility].): A1c fairly stable. No hyper or hypoglycemic symptoms. Checking home blood sugar with compliance. No side effect of medication. Recently done labs that Endo office and will obtain those results E11.9 250.00 metFORMIN (GLUCOPHAGE) 500 mg tablet       DIABETES FOOT EXAM      2. Essential hypertension:well controlled. Continue current dose of medication and low salt diet. Exercise as tolerated. I10 401.9 metoprolol succinate (TOPROL-XL) 50 mg XL tablet      losartan (COZAAR) 100 mg tablet      3. Cough: Asymptomatic R05.9 786.2       4. Atrial fibrillation, unspecified type [facility]): Taking aspirin as unable to afford the anticoagulation and does not want to take Coumadin I48.91 427.31       5. Stage 3 chronic kidney disease, unspecified whether stage 3a or 3b CKD ([email or web address]): Avoiding NSAIDs N18.30 585.3       6. Thyroid nodule: No change in voice or trouble swallowing.   Recent thyroid function within normal limit following Endo E04.1 241.0       7. Hypercalcemia: On medication. Following Endo no concern E83.52 275.42       8. Cardiac pacemaker in situ  Z95.0 V45.01       9. Pure hypercholesterolemia: On statin. No side effects. Continue diet and lifestyle modification E78.00 272.0       10. Neck pain: Discussed symptomatic treatment with heating pad and home exercise. Tylenol as needed. Prior x-ray showed degenerative changes. If no improvement can consider physical therapy M54.2 723.1       11. Scalp pain: Had dinner plate fell on the scalp. Local tenderness and discomfort. Happened a month ago. No nausea vomiting or any dizziness. No headaches. No vision change. No extremity weakness. Discussed to observe R51.9 784.0     cabinate injury       12. Abnormal urine odor: UA showed positive nitrate. Given Macrobid. If still symptoms persist advised patient to come back R82.90 791.9 AMB POC URINALYSIS DIP STICK AUTO W/ MICRO      13. Medicare annual wellness visit, subsequent  Z00.00 V70.0       14. Urinary tract infection without hematuria, site unspecified  N39.0 599.0 nitrofurantoin (MACRODANTIN) 100 mg capsule      CULTURE, URINE      Pt understood and agree with the plan   She has an upcoming up appointment for eye exam  Follow-up and Dispositions    Return in about 3 months (around 11/29/2022). Please note that this dictation was completed with Stipple, the computer voice recognition software. Quite often unanticipated grammatical, syntax, homophones, and other interpretive errors are inadvertently transcribed by the computer software. Please disregard these errors. Please excuse any errors that have escaped final proofreading.

## 2022-08-29 NOTE — PATIENT INSTRUCTIONS
Medicare Wellness Visit, Female     The best way to live healthy is to have a lifestyle where you eat a well-balanced diet, exercise regularly, limit alcohol use, and quit all forms of tobacco/nicotine, if applicable. Regular preventive services are another way to keep healthy. Preventive services (vaccines, screening tests, monitoring & exams) can help personalize your care plan, which helps you manage your own care. Screening tests can find health problems at the earliest stages, when they are easiest to treat. [facility] follows the current, evidence-based guidelines published by the [facility] ([facility]) when recommending preventive services for our patients. Because we follow these guidelines, sometimes recommendations change over time as research supports it. (For example, mammograms used to be recommended annually. Even though Medicare will still pay for an annual mammogram, the newer guidelines recommend a mammogram every two years for women of average risk). Of course, you and your doctor may decide to screen more often for some diseases, based on your risk and your co-morbidities (chronic disease you are already diagnosed with). Preventive services for you include:  - Medicare offers their members a free annual wellness visit, which is time for you and your primary care provider to discuss and plan for your preventive service needs. Take advantage of this benefit every year!  -All adults over the age of 72 should receive the recommended pneumonia vaccines. Current USPSTF guidelines recommend a series of two vaccines for the best pneumonia protection.   -All adults should have a flu vaccine yearly and a tetanus vaccine every 10 years.   -All adults age 48 and older should receive the shingles vaccines (series of two vaccines).       -All adults age 38-68 who are overweight should have a diabetes screening test once every three years.   -All adults born between [birth year] and 1965 should be screened once for Hepatitis C.  -Other screening tests and preventive services for persons with diabetes include: an eye exam to screen for diabetic retinopathy, a kidney function test, a foot exam, and stricter control over your cholesterol.   -Cardiovascular screening for adults with routine risk involves an electrocardiogram (ECG) at intervals determined by your doctor.   -Colorectal cancer screenings should be done for adults age 54-65 with no increased risk factors for colorectal cancer. There are a number of acceptable methods of screening for this type of cancer. Each test has its own benefits and drawbacks. Discuss with your doctor what is most appropriate for you during your annual wellness visit. The different tests include: colonoscopy (considered the best screening method), a fecal occult blood test, a fecal DNA test, and sigmoidoscopy.    -A bone mass density test is recommended when a woman turns 65 to screen for osteoporosis. This test is only recommended one time, as a screening. Some providers will use this same test as a disease monitoring tool if you already have osteoporosis. -Breast cancer screenings are recommended every other year for women of normal risk, age 54-69.  -Cervical cancer screenings for women over age 72 are only recommended with certain risk factors.      Here is a list of your current Health Maintenance items (your personalized list of preventive services) with a due date:  Health Maintenance Due   Topic Date Due    Eye Exam  09/06/2019    Diabetic Foot Care  03/09/2022    COVID-19 Vaccine (4 - Booster for Hawley Peter series) 04/30/2022

## 2022-09-01 LAB
BACTERIA SPEC CULT: ABNORMAL
CC UR VC: ABNORMAL
SERVICE CMNT-IMP: ABNORMAL

## 2022-09-02 NOTE — PROGRESS NOTES
Spoke with patient (two identifiers verified) to advise bacteria in urine culture is sensitive to given antibiotic. Patient verbalized understanding. She stated she will  Nitrofurantoin today from pharmacy.

## 2022-09-07 ENCOUNTER — OFFICE VISIT (OUTPATIENT)
Dept: CARDIOLOGY CLINIC | Age: 77
End: 2022-09-07
Payer: MEDICARE

## 2022-09-07 ENCOUNTER — CLINICAL SUPPORT (OUTPATIENT)
Dept: CARDIOLOGY CLINIC | Age: 77
End: 2022-09-07

## 2022-09-07 VITALS
OXYGEN SATURATION: 96 % | WEIGHT: 171 LBS | HEART RATE: 60 BPM | SYSTOLIC BLOOD PRESSURE: 130 MMHG | BODY MASS INDEX: 27.48 KG/M2 | HEIGHT: 66 IN | DIASTOLIC BLOOD PRESSURE: 80 MMHG

## 2022-09-07 DIAGNOSIS — R00.1 BRADYCARDIA: ICD-10-CM

## 2022-09-07 DIAGNOSIS — I10 ESSENTIAL HYPERTENSION: ICD-10-CM

## 2022-09-07 DIAGNOSIS — I48.0 PAROXYSMAL ATRIAL FIBRILLATION (HCC): Primary | ICD-10-CM

## 2022-09-07 DIAGNOSIS — Z95.0 CARDIAC PACEMAKER IN SITU: ICD-10-CM

## 2022-09-07 DIAGNOSIS — Z95.0 CARDIAC PACEMAKER IN SITU: Primary | ICD-10-CM

## 2022-09-07 PROCEDURE — 99214 OFFICE O/P EST MOD 30 MIN: CPT | Performed by: INTERNAL MEDICINE

## 2022-09-07 PROCEDURE — G8417 CALC BMI ABV UP PARAM F/U: HCPCS | Performed by: INTERNAL MEDICINE

## 2022-09-07 PROCEDURE — G8510 SCR DEP NEG, NO PLAN REQD: HCPCS | Performed by: INTERNAL MEDICINE

## 2022-09-07 PROCEDURE — G8536 NO DOC ELDER MAL SCRN: HCPCS | Performed by: INTERNAL MEDICINE

## 2022-09-07 PROCEDURE — 1101F PT FALLS ASSESS-DOCD LE1/YR: CPT | Performed by: INTERNAL MEDICINE

## 2022-09-07 PROCEDURE — G8754 DIAS BP LESS 90: HCPCS | Performed by: INTERNAL MEDICINE

## 2022-09-07 PROCEDURE — 1123F ACP DISCUSS/DSCN MKR DOCD: CPT | Performed by: INTERNAL MEDICINE

## 2022-09-07 PROCEDURE — G8752 SYS BP LESS 140: HCPCS | Performed by: INTERNAL MEDICINE

## 2022-09-07 PROCEDURE — 1090F PRES/ABSN URINE INCON ASSESS: CPT | Performed by: INTERNAL MEDICINE

## 2022-09-07 PROCEDURE — G8427 DOCREV CUR MEDS BY ELIG CLIN: HCPCS | Performed by: INTERNAL MEDICINE

## 2022-09-07 PROCEDURE — 93288 INTERROG EVL PM/LDLS PM IP: CPT | Performed by: INTERNAL MEDICINE

## 2022-09-07 PROCEDURE — 93000 ELECTROCARDIOGRAM COMPLETE: CPT | Performed by: INTERNAL MEDICINE

## 2022-09-07 PROCEDURE — G8399 PT W/DXA RESULTS DOCUMENT: HCPCS | Performed by: INTERNAL MEDICINE

## 2022-09-07 NOTE — PROGRESS NOTES
Tao Salguero    F/u pAF/ SSS    HPI    Tao Salguero is a 65 y.o. AAF (originally from [place]) with pAF, SSS s/p PPM here for routine follow up care. As you know she moved here from [place] a few yrs ago and established with my partner, Dr. Misha Pepper. She has no complaints today but there is concern for lead fracture by device check today, noise noted (but this has been the case last few device checks.)  She enjoys traveling, I am aware she had 4 nosebleeds while in [place] (saw ENT) but luckily not been an issue recently. She has known history of cardiac problems. 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 She had atrial fibrillation and finally underwent the atrial fibrillation ablation successfully in March of 2016. She was treated with flecainide briefly. As the flecainide was discontinued, she has been anticoagulated with Eliquis. She has previous history of DVT after knee surgery. She has history of hypercholesterolemia, but has not been able to tolerate statins because of severe myalgia and other side effects. She is known to have had type 2 diabetes. She has history of seizure disorder; however, she has not had any recurrent seizures since 2006 off medication. She has no cardiac complaints. She recently went to [place] and says the minute she got off the plane she coughed quite a bit in 1 day had a nosebleed since coming back this has not happened. Past Medical History:   Diagnosis Date    Arrhythmia     history atrial fib    Bradycardia     Diabetes ([address]     borderline    DVT (deep venous thrombosis) ([address] 2010    right    History of ablation March 2016     History of atrial fibrillation     Hypercholesterolemia     Hypertension     Long term current use of anticoagulant therapy     Menopause     Osteoporosis     Seizure ([address]     Seizures ([address]     on medication from 2006 to 2013 no seizure since 2006    [employer] pacemaker implant Set.  2013 Dr. Lakhwinder Denis [place], [place]     Thyroid disease     nodules       Past Surgical History:   Procedure Laterality Date    COLONOSCOPY N/A 10/25/2017    COLONOSCOPY performed by Julio C Avila MD at [facility] ENDOSCOPY    HX CATARACT REMOVAL      HX HYSTERECTOMY      HX KNEE REPLACEMENT      left knee in 1/2010 and right knee 7/2016    HX KNEE REPLACEMENT      right knee    HX PACEMAKER      [place] CARDIAC SURG PROCEDURE UNLIST      ablation for a-fib       Current Outpatient Medications   Medication Sig Dispense Refill    metFORMIN (GLUCOPHAGE) 500 mg tablet TAKE 1 TABLET BY MOUTH EVERY DAY IN THE MORNING WITH BREAKFAST 90 Tablet 1    metoprolol succinate (TOPROL-XL) 50 mg XL tablet Take 1 Tablet by mouth daily. 90 Tablet 1    losartan (COZAAR) 100 mg tablet Take 1 Tablet by mouth daily. 90 Tablet 1    apixaban (Eliquis) 5 mg tablet Take 1 Tablet by mouth two (2) times a day. 180 Tablet 3    famotidine (PEPCID) 20 mg tablet Take 1 Tablet by mouth two (2) times daily as needed for Gastroesophageal Reflux Disease (GERD). 180 Tablet 1    cinacalcet (SENSIPAR) 30 mg tablet Take 30 mg by mouth daily. albuterol (PROVENTIL HFA, VENTOLIN HFA, PROAIR HFA) 90 mcg/actuation inhaler Take 2 Puffs by inhalation every four (4) hours as needed for Wheezing. 18 g 0    glucose blood VI test strips (ASCENSIA AUTODISC VI, ONE TOUCH ULTRA TEST VI) strip Check once daily DX: DM E11.65 100 Strip 3    OneTouch Ultra Blue Test Strip strip USE TO TEST BLOOD SUGAR ONCE DAILY 100 Strip 6    lancets misc One Touch Ultra soft testing fasting blood sugar every morning prior to breakfast DX E11.9 100 Each 11    cholecalciferol, vitamin D3, 50 mcg (2,000 unit) tab Take  by mouth daily. spironolactone (ALDACTONE) 25 mg tablet Take 1 Tab by mouth daily. 4    DOCOSAHEXANOIC ACID/EPA (FISH OIL PO) Take 100 mg by mouth daily. COQ10, LIPOSOMAL UBIQUINOL, PO Take 200 mg by mouth daily.          Allergies   Allergen Reactions    Statins-Hmg-Coa Reductase Inhibitors Other (comments)     Felt sick        Social History     Socioeconomic History    Marital status:      Spouse name: Not on file    Number of children: Not on file    Years of education: Not on file    Highest education level: Not on file   Occupational History    Not on file   Tobacco Use    Smoking status: Never    Smokeless tobacco: Never   Vaping Use    Vaping Use: Never used   Substance and Sexual Activity    Alcohol use: No    Drug use: No    Sexual activity: Not Currently     Partners: Male     Birth control/protection: Surgical     Comment: hysterectomy   Other Topics Concern    Not on file   Social History Narrative    Not on file     Social Determinants of Health     Financial Resource Strain: Not on file   Food Insecurity: Not on file   Transportation Needs: Not on file   Physical Activity: Not on file   Stress: Not on file   Social Connections: Not on file   Intimate Partner Violence: Not on file   Housing Stability: Not on file    originally from [facility]  Raised some of her grandkids    FH: n/a    Review of Systems    14 pt Review of Systems is negative unless otherwise mentioned in the HPI. Wt Readings from Last 3 Encounters:   09/07/22 77.6 kg (171 lb)   08/29/22 78 kg (172 lb)   04/20/22 79.7 kg (175 lb 12.8 oz)     Temp Readings from Last 3 Encounters:   08/29/22 96.8 °F (36 °C) (Temporal)   04/20/22 96.8 °F (36 °C) (Temporal)   11/09/21 97.4 °F (36.3 °C) (Temporal)     BP Readings from Last 3 Encounters:   09/07/22 130/80   08/29/22 120/78   05/09/22 136/82     Pulse Readings from Last 3 Encounters:   09/07/22 60   08/29/22 60   04/20/22 60     Physical Exam:    Visit Vitals  /80 (BP 1 Location: Left upper arm, BP Patient Position: Sitting, BP Cuff Size: Small adult)   Pulse 60   Ht 5' 6\" (1.676 m)   Wt 77.6 kg (171 lb)   SpO2 96%   BMI 27.60 kg/m²      Physical Exam  HENT:      Head: Normocephalic and atraumatic. Eyes:      Pupils: Pupils are equal, round, and reactive to light. Cardiovascular:      Rate and Rhythm: Normal rate and regular rhythm. Heart sounds: Normal heart sounds. No murmur heard. No friction rub. No gallop. Pulmonary:      Effort: Pulmonary effort is normal. No respiratory distress. Breath sounds: Normal breath sounds. No wheezing or rales. Chest:      Chest wall: No tenderness. Abdominal:      General: Bowel sounds are normal.      Palpations: Abdomen is soft. Musculoskeletal:         General: No tenderness. Skin:     General: Skin is warm and dry. Neurological:      Mental Status: She is alert and oriented to person, place, and time. Lab Results   Component Value Date/Time    Sodium 139 03/09/2022 10:06 AM    Potassium 4.3 03/09/2022 10:06 AM    Chloride 106 03/09/2022 10:06 AM    CO2 29 03/09/2022 10:06 AM    Anion gap 4 03/09/2022 10:06 AM    Glucose 111 (H) 03/09/2022 10:06 AM    BUN 15 03/09/2022 10:06 AM    Creatinine 1.02 03/09/2022 10:06 AM    BUN/Creatinine ratio 15 03/09/2022 10:06 AM    GFR est AA >60 03/09/2022 10:06 AM    GFR est non-AA 53 (L) 03/09/2022 10:06 AM    Calcium 10.7 (H) 03/09/2022 10:06 AM    Bilirubin, total 0.6 03/09/2022 10:06 AM    Alk.  phosphatase 74 03/09/2022 10:06 AM    Protein, total 7.3 03/09/2022 10:06 AM    Albumin 3.7 03/09/2022 10:06 AM    Globulin 3.6 03/09/2022 10:06 AM    A-G Ratio 1.0 03/09/2022 10:06 AM    ALT (SGPT) 22 03/09/2022 10:06 AM    AST (SGOT) 19 03/09/2022 10:06 AM     Lab Results   Component Value Date/Time    Hemoglobin A1c 6.0 (H) 03/09/2022 10:06 AM    Hemoglobin A1c (POC) 5.9 03/09/2021 10:14 AM     Lab Results   Component Value Date/Time    TSH 0.89 03/09/2022 10:06 AM     Lab Results   Component Value Date/Time    WBC 6.2 03/09/2022 10:06 AM    HGB 12.1 03/09/2022 10:06 AM    HCT 39.5 03/09/2022 10:06 AM    PLATELET 800 16/01/7889 10:06 AM    MCV 87.4 03/09/2022 10:06 AM         Impression and Plan:  Arjun Ryne is a 65 y.o. with:    1.) pAF, s/p ablation, elevated [id number] ~3  2.) SSS s/p PPM, with concern for lead fracture again on device interrogation today (noise)  3.) HTN, well controlled  4.) HL  5.) HFpEF, stable/ neg nuc ~2016  6.) s/p COVID 8/2021  7.) Nosebleeds (saw ENT)    1.) Finally got her on Eliquis and she is doing well, monitoring nosebleeds  2.) RTC 6 months with device check    35 mins spent    Thank you for allowing me to participate in the care of your patient, please do not hesitate to call with questions or concerns.       Pilar Freeman,    Paula Kiran, DO

## 2022-09-07 NOTE — PROGRESS NOTES
Ge Mendenhall presents today for   Chief Complaint   Patient presents with    Follow-up     6 month follow up    Pacemaker [email or web address] preferred language for health care discussion is english/other. Is someone accompanying this pt? no    Is the patient using any DME equipment during [address]? no    Depression Screening:  3 most recent PHQ Screens 9/7/2022   Little interest or pleasure in doing things Not at all   Feeling down, depressed, irritable, or hopeless Not at all   Total Score PHQ 2 0       Learning Assessment:  Learning Assessment 9/7/2022   PRIMARY LEARNER Patient   HIGHEST LEVEL OF EDUCATION - PRIMARY LEARNER  -   BARRIERS PRIMARY LEARNER -   [facility]    NEED -   LEARNER PREFERENCE PRIMARY DEMONSTRATION   LEARNING SPECIAL TOPICS -   ANSWERED BY patient   RELATIONSHIP SELF       Abuse Screening:  Abuse Screening Questionnaire 9/7/2022   Do you ever feel afraid of your partner? N   Are you in a relationship with someone who physically or mentally threatens you? N   Is it safe for you to go home? Y       Fall Risk  Fall Risk Assessment, last 12 mths 9/7/2022   Able to walk? Yes   Fall in past 12 months? 0   Do you feel unsteady? 0   Are you worried about falling 0           Pt currently taking Anticoagulant therapy? Eliquis 5 mg twice a day    Pt currently taking Antiplatelet therapy ? no      Coordination of Care:  1. Have you been to the ER, urgent care clinic since your last visit? Hospitalized since your last visit? no    2. Have you seen or consulted any other health care providers outside of the [address] [place] [address] since your last visit? Include any pap smears or colon screening.  no

## 2022-09-09 NOTE — PROGRESS NOTES
Routine in office dual chamber Abbott pacemaker interrogation. Battery longevity is 1.3 years.  AP 94%,  3.0%   Threshold, impedance, and sensing remain stable. Hx. Of PAF, S/P ablation. AT/AF burdeon is <1.0% since 2/25/21. Pt. Takes Eliquis   No V. High rates recorded.

## 2022-09-15 ENCOUNTER — PATIENT OUTREACH (OUTPATIENT)
Dept: CASE MANAGEMENT | Age: 77
End: 2022-09-15

## 2022-09-15 NOTE — PROGRESS NOTES
Complex Case Management      Date/Time:  9/15/2022 10:52 AM    Method of communication with patient:phone    [facility] ([facility]) contacted the patient by telephone to perform Ambulatory Care Coordination. Verified name and  (PHI) with patient as identifiers. Provided introduction to self, and explanation of the Ambulatory Care Manager's role. Reviewed most recent clinic visit w/ patient who verbalized understanding. Patient given an opportunity to ask questions. Top Challenges reviewed with the patient   N/a     Hx of Seizures, Osteoporosis, HTN, HLD, A-fib on AC, DVT, DM2  Recent PCP and Cards visits  States no further neck pain. Reports UTI sx have resolved. Having no issues w/ eliquis. States doing well, no issues/questions    The patient agrees to contact the PCP office or the [address] [place] [address] for questions related to their healthcare. Provided contact information for future reference. Disease Specific:   N/A    Home Health Active: No    DME Active: No    Barriers to care? none    Advance Care Planning:   Does patient have an Advance Directive:  not on file; education provided     Medication(s):   Medication reconciliation was not performed with patient, who verbalizes understanding of administration of home medications. There were no barriers to obtaining medications identified at this time. Referral to Pharm D needed: no     Current Outpatient Medications   Medication Sig    metFORMIN (GLUCOPHAGE) 500 mg tablet TAKE 1 TABLET BY MOUTH EVERY DAY IN THE MORNING WITH BREAKFAST    metoprolol succinate (TOPROL-XL) 50 mg XL tablet Take 1 Tablet by mouth daily. losartan (COZAAR) 100 mg tablet Take 1 Tablet by mouth daily. apixaban (Eliquis) 5 mg tablet Take 1 Tablet by mouth two (2) times a day. famotidine (PEPCID) 20 mg tablet Take 1 Tablet by mouth two (2) times daily as needed for Gastroesophageal Reflux Disease (GERD).     cinacalcet (SENSIPAR) 30 mg tablet Take 30 mg by mouth daily. albuterol (PROVENTIL HFA, VENTOLIN HFA, PROAIR HFA) 90 mcg/actuation inhaler Take 2 Puffs by inhalation every four (4) hours as needed for Wheezing. glucose blood VI test strips (ASCENSIA AUTODISC VI, ONE TOUCH ULTRA TEST VI) strip Check once daily DX: DM E11.65    OneTouch Ultra Blue Test Strip strip USE TO TEST BLOOD SUGAR ONCE DAILY    lancets misc One Touch Ultra soft testing fasting blood sugar every morning prior to breakfast DX E11.9    cholecalciferol, vitamin D3, 50 mcg (2,000 unit) tab Take  by mouth daily. spironolactone (ALDACTONE) 25 mg tablet Take 1 Tab by mouth daily. DOCOSAHEXANOIC ACID/EPA (FISH OIL PO) Take 100 mg by mouth daily. COQ10, LIPOSOMAL UBIQUINOL, PO Take 200 mg by mouth daily. No current facility-administered medications for this visit. [facility] follow up appointment(s):   Future Appointments   Date Time Provider Caron Nina   11/29/2022  9:30 AM Yair English MD [facility] BS AMB   3/8/2023  9:00 AM CSI, PACER [facility] BS AMB   3/8/2023  9:20 AM Piero Thornton DO [facility] BS AMB        Non-[facility] follow up appointment(s):      Goals Addressed                   This Visit's Progress     Attends follow up appointments on schedule   On track     8/24/22 Patient will attend all scheduled appointments through 11/24/22         Knowledge and adherence of prescribed medication (ie. action, side effects, missed dose, etc.).    On track     8/24/22 Pt will take all medications prescribed to be evaluated on each outreach through 11/24//22         Prepare patients and caregivers for end of life decisions (ie. need for hospice, pain management, symptom relief, advance directives etc.)   On track     8/24/22 Pt will complete an ACP and have it scanned into their EMR by 11/24/22

## 2022-09-22 ENCOUNTER — PATIENT OUTREACH (OUTPATIENT)
Dept: CASE MANAGEMENT | Age: 77
End: 2022-09-22

## 2022-09-22 NOTE — PROGRESS NOTES
Complex Case Management      Date/Time:  2022 10:52 AM    Method of communication with patient:phone    [facility] ([facility]) contacted the patient by telephone to perform Ambulatory Care Coordination. Verified name and  (PHI) with patient as identifiers. Provided introduction to self, and explanation of the Ambulatory Care Manager's role. Reviewed most recent clinic visit w/ patient who verbalized understanding. Patient given an opportunity to ask questions. Top Challenges reviewed with the patient   N/a     Hx of Seizures, Osteoporosis, HTN, HLD, A-fib on AC, DVT, DM2  Pt called w/ question regarding COVID-19 booster shots. States she had missed last booster and had upcoming travel out of country soon. Wanted to know if it was ok that she missed her last one as long as she gets the next one. Reassured patient that it's ok she missed but it was very important to get the current booster. Pt stated understanding. States doing well, no issues/questions    The patient agrees to contact the PCP office or the [facility] for questions related to their healthcare. Provided contact information for future reference. Disease Specific:   N/A    Home Health Active: No    DME Active: No    Barriers to care? none    Advance Care Planning:   Does patient have an Advance Directive:  not on file; education provided     Medication(s):   Medication reconciliation was not performed with patient, who verbalizes understanding of administration of home medications. There were no barriers to obtaining medications identified at this time. Referral to Pharm D needed: no     Current Outpatient Medications   Medication Sig    metFORMIN (GLUCOPHAGE) 500 mg tablet TAKE 1 TABLET BY MOUTH EVERY DAY IN THE MORNING WITH BREAKFAST    metoprolol succinate (TOPROL-XL) 50 mg XL tablet Take 1 Tablet by mouth daily. losartan (COZAAR) 100 mg tablet Take 1 Tablet by mouth daily.     apixaban (Eliquis) 5 mg tablet Take 1 Tablet by mouth two (2) times a day. famotidine (PEPCID) 20 mg tablet Take 1 Tablet by mouth two (2) times daily as needed for Gastroesophageal Reflux Disease (GERD). cinacalcet (SENSIPAR) 30 mg tablet Take 30 mg by mouth daily. albuterol (PROVENTIL HFA, VENTOLIN HFA, PROAIR HFA) 90 mcg/actuation inhaler Take 2 Puffs by inhalation every four (4) hours as needed for Wheezing. glucose blood VI test strips (ASCENSIA AUTODISC VI, ONE TOUCH ULTRA TEST VI) strip Check once daily DX: DM E11.65    OneTouch Ultra Blue Test Strip strip USE TO TEST BLOOD SUGAR ONCE DAILY    lancets misc One Touch Ultra soft testing fasting blood sugar every morning prior to breakfast DX E11.9    cholecalciferol, vitamin D3, 50 mcg (2,000 unit) tab Take  by mouth daily. spironolactone (ALDACTONE) 25 mg tablet Take 1 Tab by mouth daily. DOCOSAHEXANOIC ACID/EPA (FISH OIL PO) Take 100 mg by mouth daily. COQ10, LIPOSOMAL UBIQUINOL, PO Take 200 mg by mouth daily. No current facility-administered medications for this visit. [facility] follow up appointment(s):   Future Appointments   Date Time Provider Caron Wood   11/29/2022  9:30 AM Mk Buckley MD [facility] BS AMB   3/8/2023  9:00 AM CSI, PACER [facility] BS AMB   3/8/2023  9:20 AM John Ynu DO [facility] BS AMB        Non-[facility] follow up appointment(s):      Goals Addressed                   This Visit's Progress     Attends follow up appointments on schedule   On track     8/24/22 Patient will attend all scheduled appointments through 11/24/22         Knowledge and adherence of prescribed medication (ie. action, side effects, missed dose, etc.).    On track     8/24/22 Pt will take all medications prescribed to be evaluated on each outreach through 11/24//22         Prepare patients and caregivers for end of life decisions (ie. need for hospice, pain management, symptom relief, advance directives etc.)   On track     8/24/22 Pt will complete an ACP and have it scanned into their EMR by 11/24/22

## 2022-10-19 ENCOUNTER — PATIENT OUTREACH (OUTPATIENT)
Dept: CASE MANAGEMENT | Age: 77
End: 2022-10-19

## 2022-10-19 NOTE — PROGRESS NOTES
Attempted to contact patient for Complex Care follow up. Patient had called and left a message stating refill issue. No answer upon return call, left message with contact information provided. Will attempt to contact at a later time.

## 2022-11-01 ENCOUNTER — PATIENT OUTREACH (OUTPATIENT)
Dept: CASE MANAGEMENT | Age: 77
End: 2022-11-01

## 2022-11-01 NOTE — PROGRESS NOTES
Complex Case Management      Date/Time:  2022 10:52 AM    Method of communication with patient:phone    [facility] ([facility]) contacted the patient by telephone to perform Ambulatory Care Coordination. Verified name and  (PHI) with patient as identifiers. Provided introduction to self, and explanation of the Ambulatory Care Manager's role. Reviewed most recent clinic visit w/ patient who verbalized understanding. Patient given an opportunity to ask questions. Top Challenges reviewed with the patient   N/a     Hx of Seizures, Osteoporosis, HTN, HLD, A-fib on AC, DVT, DM2  States doing well, no issues/questions    The patient agrees to contact the PCP office or the [facility] for questions related to their healthcare. Provided contact information for future reference. Disease Specific:   N/A    Home Health Active: No    DME Active: No    Barriers to care? none    Advance Care Planning:   Does patient have an Advance Directive:  not on file; education provided     Medication(s):   Medication reconciliation was not performed with patient, who verbalizes understanding of administration of home medications. There were no barriers to obtaining medications identified at this time. Referral to Pharm D needed: no     Current Outpatient Medications   Medication Sig    metFORMIN (GLUCOPHAGE) 500 mg tablet TAKE 1 TABLET BY MOUTH EVERY DAY IN THE MORNING WITH BREAKFAST    metoprolol succinate (TOPROL-XL) 50 mg XL tablet Take 1 Tablet by mouth daily. losartan (COZAAR) 100 mg tablet Take 1 Tablet by mouth daily. apixaban (Eliquis) 5 mg tablet Take 1 Tablet by mouth two (2) times a day. famotidine (PEPCID) 20 mg tablet Take 1 Tablet by mouth two (2) times daily as needed for Gastroesophageal Reflux Disease (GERD). cinacalcet (SENSIPAR) 30 mg tablet Take 30 mg by mouth daily.     albuterol (PROVENTIL HFA, VENTOLIN HFA, PROAIR HFA) 90 mcg/actuation inhaler Take 2 Puffs by inhalation every four (4) hours as needed for Wheezing. glucose blood VI test strips (ASCENSIA AUTODISC VI, ONE TOUCH ULTRA TEST VI) strip Check once daily DX: DM E11.65    OneTouch Ultra Blue Test Strip strip USE TO TEST BLOOD SUGAR ONCE DAILY    lancets misc One Touch Ultra soft testing fasting blood sugar every morning prior to breakfast DX E11.9    cholecalciferol, vitamin D3, 50 mcg (2,000 unit) tab Take  by mouth daily. spironolactone (ALDACTONE) 25 mg tablet Take 1 Tab by mouth daily. DOCOSAHEXANOIC ACID/EPA (FISH OIL PO) Take 100 mg by mouth daily. COQ10, LIPOSOMAL UBIQUINOL, PO Take 200 mg by mouth daily. No current facility-administered medications for this visit. [facility] follow up appointment(s):   Future Appointments   Date Time Provider Caron Wood   11/29/2022  9:30 AM Essence Rashid MD [facility] AMB   3/8/2023  9:00 AM CSI, PACER [facility] AMB   3/8/2023  9:20 AM Kriss Choi DO [facility] BS AMB        Non-[facility] follow up appointment(s):      Goals Addressed                   This Visit's Progress     Attends follow up appointments on schedule   On track     8/24/22 Patient will attend all scheduled appointments through 11/24/22         Knowledge and adherence of prescribed medication (ie. action, side effects, missed dose, etc.).    On track     8/24/22 Pt will take all medications prescribed to be evaluated on each outreach through 11/24//22         Prepare patients and caregivers for end of life decisions (ie. need for hospice, pain management, symptom relief, advance directives etc.)   On track     8/24/22 Pt will complete an ACP and have it scanned into their EMR by 11/24/22

## 2022-11-14 ENCOUNTER — PATIENT OUTREACH (OUTPATIENT)
Dept: CASE MANAGEMENT | Age: 77
End: 2022-11-14

## 2022-11-14 NOTE — PROGRESS NOTES
Complex Case Management      Date/Time:  2022 10:52 AM    Method of communication with patient:phone    [facility] ([facility]) contacted the patient by telephone to perform Ambulatory Care Coordination. Verified name and  (PHI) with patient as identifiers. Provided introduction to self, and explanation of the Ambulatory Care Manager's role. Reviewed most recent clinic visit w/ patient who verbalized understanding. Patient given an opportunity to ask questions. Top Challenges reviewed with the patient   N/a     Hx of Seizures, Osteoporosis, HTN, HLD, A-fib on AC, DVT, DM2  States doing well, no issues/questions    The patient agrees to contact the PCP office or the [facility] for questions related to their healthcare. Provided contact information for future reference. Disease Specific:   N/A    Home Health Active: No    DME Active: No    Barriers to care? none    Advance Care Planning:   Does patient have an Advance Directive:  not on file; education provided     Medication(s):   Medication reconciliation was not performed with patient, who verbalizes understanding of administration of home medications. There were no barriers to obtaining medications identified at this time. Referral to Pharm D needed: no     Current Outpatient Medications   Medication Sig    metFORMIN (GLUCOPHAGE) 500 mg tablet TAKE 1 TABLET BY MOUTH EVERY DAY IN THE MORNING WITH BREAKFAST    metoprolol succinate (TOPROL-XL) 50 mg XL tablet Take 1 Tablet by mouth daily. losartan (COZAAR) 100 mg tablet Take 1 Tablet by mouth daily. apixaban (Eliquis) 5 mg tablet Take 1 Tablet by mouth two (2) times a day. famotidine (PEPCID) 20 mg tablet Take 1 Tablet by mouth two (2) times daily as needed for Gastroesophageal Reflux Disease (GERD). cinacalcet (SENSIPAR) 30 mg tablet Take 30 mg by mouth daily.     albuterol (PROVENTIL HFA, VENTOLIN HFA, PROAIR HFA) 90 mcg/actuation inhaler Take 2 Puffs by inhalation every four (4) hours as needed for Wheezing. glucose blood VI test strips (ASCENSIA AUTODISC VI, ONE TOUCH ULTRA TEST VI) strip Check once daily DX: DM E11.65    OneTouch Ultra Blue Test Strip strip USE TO TEST BLOOD SUGAR ONCE DAILY    lancets misc One Touch Ultra soft testing fasting blood sugar every morning prior to breakfast DX E11.9    cholecalciferol, vitamin D3, 50 mcg (2,000 unit) tab Take  by mouth daily. spironolactone (ALDACTONE) 25 mg tablet Take 1 Tab by mouth daily. DOCOSAHEXANOIC ACID/EPA (FISH OIL PO) Take 100 mg by mouth daily. COQ10, LIPOSOMAL UBIQUINOL, PO Take 200 mg by mouth daily. No current facility-administered medications for this visit. [facility] follow up appointment(s):   Future Appointments   Date Time Provider Caron Wood   11/29/2022  9:30 AM Navi Tanner MD [facility] AMB   3/8/2023  9:00 AM CSI, PACER [facility] AMB   3/8/2023  9:20 AM Prosper Galindo DO [facility] BS AMB        Non-[facility] follow up appointment(s):      Goals Addressed                   This Visit's Progress     Attends follow up appointments on schedule   On track     8/24/22 Patient will attend all scheduled appointments through 11/24/22         Knowledge and adherence of prescribed medication (ie. action, side effects, missed dose, etc.).    On track     8/24/22 Pt will take all medications prescribed to be evaluated on each outreach through 11/24//22         Prepare patients and caregivers for end of life decisions (ie. need for hospice, pain management, symptom relief, advance directives etc.)   On track     8/24/22 Pt will complete an ACP and have it scanned into their EMR by 11/24/22

## 2022-11-29 ENCOUNTER — OFFICE VISIT (OUTPATIENT)
Dept: FAMILY MEDICINE CLINIC | Age: 77
End: 2022-11-29
Payer: MEDICARE

## 2022-11-29 VITALS
BODY MASS INDEX: 27.98 KG/M2 | DIASTOLIC BLOOD PRESSURE: 70 MMHG | WEIGHT: 174.13 LBS | OXYGEN SATURATION: 96 % | SYSTOLIC BLOOD PRESSURE: 134 MMHG | RESPIRATION RATE: 18 BRPM | HEART RATE: 69 BPM | TEMPERATURE: 98.1 F | HEIGHT: 66 IN

## 2022-11-29 DIAGNOSIS — E04.1 THYROID NODULE: ICD-10-CM

## 2022-11-29 DIAGNOSIS — I48.91 ATRIAL FIBRILLATION, UNSPECIFIED TYPE (HCC): ICD-10-CM

## 2022-11-29 DIAGNOSIS — N18.30 STAGE 3 CHRONIC KIDNEY DISEASE, UNSPECIFIED WHETHER STAGE 3A OR 3B CKD (HCC): ICD-10-CM

## 2022-11-29 DIAGNOSIS — I10 ESSENTIAL HYPERTENSION: ICD-10-CM

## 2022-11-29 DIAGNOSIS — M54.9 DISCOMFORT OF BACK: ICD-10-CM

## 2022-11-29 DIAGNOSIS — R51.9 SCALP PAIN: ICD-10-CM

## 2022-11-29 DIAGNOSIS — E11.21 TYPE 2 DIABETES WITH NEPHROPATHY (HCC): Primary | ICD-10-CM

## 2022-11-29 DIAGNOSIS — E78.00 PURE HYPERCHOLESTEROLEMIA: ICD-10-CM

## 2022-11-29 DIAGNOSIS — M54.2 NECK PAIN: ICD-10-CM

## 2022-11-29 DIAGNOSIS — Z23 ENCOUNTER FOR IMMUNIZATION: ICD-10-CM

## 2022-11-29 DIAGNOSIS — R00.1 BRADYCARDIA: ICD-10-CM

## 2022-11-29 DIAGNOSIS — E21.3 HYPERPARATHYROIDISM, UNSPECIFIED (HCC): ICD-10-CM

## 2022-11-29 LAB — HBA1C MFR BLD HPLC: 5.9 %

## 2022-11-29 PROCEDURE — G8427 DOCREV CUR MEDS BY ELIG CLIN: HCPCS | Performed by: FAMILY MEDICINE

## 2022-11-29 PROCEDURE — 83036 HEMOGLOBIN GLYCOSYLATED A1C: CPT | Performed by: FAMILY MEDICINE

## 2022-11-29 PROCEDURE — G8536 NO DOC ELDER MAL SCRN: HCPCS | Performed by: FAMILY MEDICINE

## 2022-11-29 PROCEDURE — 1123F ACP DISCUSS/DSCN MKR DOCD: CPT | Performed by: FAMILY MEDICINE

## 2022-11-29 PROCEDURE — 1090F PRES/ABSN URINE INCON ASSESS: CPT | Performed by: FAMILY MEDICINE

## 2022-11-29 PROCEDURE — 1101F PT FALLS ASSESS-DOCD LE1/YR: CPT | Performed by: FAMILY MEDICINE

## 2022-11-29 PROCEDURE — G8752 SYS BP LESS 140: HCPCS | Performed by: FAMILY MEDICINE

## 2022-11-29 PROCEDURE — 90694 VACC AIIV4 NO PRSRV 0.5ML IM: CPT | Performed by: FAMILY MEDICINE

## 2022-11-29 PROCEDURE — 3074F SYST BP LT 130 MM HG: CPT | Performed by: FAMILY MEDICINE

## 2022-11-29 PROCEDURE — G0463 HOSPITAL OUTPT CLINIC VISIT: HCPCS | Performed by: FAMILY MEDICINE

## 2022-11-29 PROCEDURE — 3078F DIAST BP <80 MM HG: CPT | Performed by: FAMILY MEDICINE

## 2022-11-29 PROCEDURE — G8754 DIAS BP LESS 90: HCPCS | Performed by: FAMILY MEDICINE

## 2022-11-29 PROCEDURE — 99214 OFFICE O/P EST MOD 30 MIN: CPT | Performed by: FAMILY MEDICINE

## 2022-11-29 PROCEDURE — G8399 PT W/DXA RESULTS DOCUMENT: HCPCS | Performed by: FAMILY MEDICINE

## 2022-11-29 PROCEDURE — 3044F HG A1C LEVEL LT 7.0%: CPT | Performed by: FAMILY MEDICINE

## 2022-11-29 PROCEDURE — G8417 CALC BMI ABV UP PARAM F/U: HCPCS | Performed by: FAMILY MEDICINE

## 2022-11-29 PROCEDURE — G8510 SCR DEP NEG, NO PLAN REQD: HCPCS | Performed by: FAMILY MEDICINE

## 2022-11-29 RX ORDER — IBUPROFEN 200 MG
CAPSULE ORAL
Qty: 100 STRIP | Refills: 5 | Status: SHIPPED | OUTPATIENT
Start: 2022-11-29

## 2022-11-29 RX ORDER — LANCETS
EACH MISCELLANEOUS
Qty: 100 EACH | Refills: 5 | Status: SHIPPED | OUTPATIENT
Start: 2022-11-29

## 2022-11-29 NOTE — PROGRESS NOTES
HISTORY OF PRESENT ILLNESS  Willi Ordaz is a 65 y.o. female. HPI: Here for routine follow-up. Diabetes. Checking blood sugar at home. Mostly staying under 130. No hyper or hypoglycemic symptoms. Today A1c around 5.9. Continue diet and lifestyle modification. Following Dr. Deirdre Elizondo for hyperparathyroidism. We will follow the recommendation. No trouble swallowing or change in voice. Hypertension. Vitals been stable. Taking medication with compliance and no side effects. Chronic kidney disease. Following nephrology. Avoiding NSAIDs. On statin no side effects. Again working on diet and lifestyle modification. A. fib. On Eliquis. No side effects. Asymptomatic. History of thyroid nodule. Following Dr. Deirdre Elizondo as well. No trouble swallowing or change in voice. Has a pacemaker. Vitals been stable  Last visit had a neck pain and scalp pain along with the lower back discomfort. On and off. Right now wants to observe and symptomatic treatment by herself at home. If anything changes she will call back. X-ray showed arthritic changes. She went for physical therapy but then she had COVID infection so she has stopped it and does not want to start it again at this time. Sitting without any acute distress  Denies any headache, dizziness, no chest pain or trouble breathing, no arm or leg weakness. No nausea or vomiting, no weight or appetite changes, no mood changes . No urine or bowel complains, no palpitation, no diaphoresis. No abdominal pain. No cold or cough. No leg swelling. No fever. No sleep trouble. Visit Vitals  /70 (BP 1 Location: Right upper arm, BP Patient Position: Sitting, BP Cuff Size: Adult)   Pulse 69   Temp 98.1 °F (36.7 °C) (Temporal)   Resp 18   Ht 5' 6\" (1.676 m)   Wt 174 lb 2 oz (79 kg)   SpO2 96%   BMI 28.10 kg/m²     Review medication list, vitals, problem list,allergies. Review prior labs.     Lab Results   Component Value Date/Time    WBC 6.2 03/09/2022 10:06 AM    HGB 12.1 03/09/2022 10:06 AM    HCT 39.5 03/09/2022 10:06 AM    PLATELET 574 57/76/0746 10:06 AM    MCV 87.4 03/09/2022 10:06 AM     Lab Results   Component Value Date/Time    Sodium 139 03/09/2022 10:06 AM    Potassium 4.3 03/09/2022 10:06 AM    Chloride 106 03/09/2022 10:06 AM    CO2 29 03/09/2022 10:06 AM    Anion gap 4 03/09/2022 10:06 AM    Glucose 111 (H) 03/09/2022 10:06 AM    BUN 15 03/09/2022 10:06 AM    Creatinine 1.02 03/09/2022 10:06 AM    BUN/Creatinine ratio 15 03/09/2022 10:06 AM    GFR est AA >60 03/09/2022 10:06 AM    GFR est non-AA 53 (L) 03/09/2022 10:06 AM    Calcium 10.7 (H) 03/09/2022 10:06 AM    Bilirubin, total 0.6 03/09/2022 10:06 AM    Alk. phosphatase 74 03/09/2022 10:06 AM    Protein, total 7.3 03/09/2022 10:06 AM    Albumin 3.7 03/09/2022 10:06 AM    Globulin 3.6 03/09/2022 10:06 AM    A-G Ratio 1.0 03/09/2022 10:06 AM    ALT (SGPT) 22 03/09/2022 10:06 AM    AST (SGOT) 19 03/09/2022 10:06 AM       Lab Results   Component Value Date/Time    Cholesterol, total 189 03/09/2022 10:06 AM    HDL Cholesterol 59 03/09/2022 10:06 AM    LDL, calculated 108.4 (H) 03/09/2022 10:06 AM    VLDL, calculated 21.6 03/09/2022 10:06 AM    Triglyceride 108 03/09/2022 10:06 AM    CHOL/HDL Ratio 3.2 03/09/2022 10:06 AM     Lab Results   Component Value Date/Time    TSH 0.89 03/09/2022 10:06 AM       Lab Results   Component Value Date/Time    Hemoglobin A1c 6.0 (H) 03/09/2022 10:06 AM    Hemoglobin A1c (POC) 5.9 11/29/2022 10:33 AM           ROS: See HPI    Physical Exam  Constitutional:       General: She is not in acute distress. Cardiovascular:      Rate and Rhythm: Normal rate and regular rhythm. Heart sounds: Normal heart sounds. Abdominal:      General: Bowel sounds are normal.      Palpations: Abdomen is soft. Tenderness: There is no abdominal tenderness. Musculoskeletal:         General: No swelling. Cervical back: Neck supple.    Neurological:      Mental Status: She is oriented to person, place, and time. Psychiatric:         Behavior: Behavior normal.       ASSESSMENT and PLAN    ICD-10-CM ICD-9-CM    1. Type 2 diabetes with nephropathy ([facility] 75.): A1c done today 5.9. Continue current plan. Diet and lifestyle modification E11.21 250.40 AMB POC HEMOGLOBIN A1C     583.81       2. Encounter for immunization  Z23 V03.89 INFLUENZA, FLUAD, (AGE 72 Y+), IM, PF, 0.5 ML      3. Hyperparathyroidism, unspecified ([facility] 75.): Following Dr. Tanya Parnell. Asymptomatic. We will follow the recommendation E21.3 252.00       4. Stage 3 chronic kidney disease, unspecified whether stage 3a or 3b CKD ([facility] 75.): Following nephrology. Avoiding NSAIDs. Will observe and follow specialist recommendation N18.30 585.3       5. Essential hypertension:well controlled. Continue current dose of medication and low salt diet. Exercise as tolerated. I10 401.9       6. Bradycardia/ post pacemaker. s/p ablation: Asymptomatic. Following cardiology. On anticoagulation for A. fib. Asymptomatic R00.1 427.89       7. Pure hypercholesterolemia: On statin. Diet and lifestyle modification. We will E78.00 272.0       8. Thyroid nodule: Following Dr. Tanya Parnell. No trouble swallowing or change in voice. Will observe. Recent thyroid function within normal E04.1 241.0       9. Atrial fibrillation, unspecified type [facility]): On anticoagulation. Following cardiology and the recommendation I48.91 427.31       10. Neck pain: Went to physical therapy then got a COVID infection so right now wants to observe on symptomatic treatment. If needed she will call back for restarting PT M54.2 723.1       11. Scalp pain: Head plate fell on the scalp. Currently no concern no pain R51.9 784.0       12. Discomfort of back: Again started physical therapy and then got COVID infection. Has stopped physical therapy and currently stable symptomatically. Wants to hold off on PT.   Continue symptomatic treatment at home M54.9 724.5       Patient understood agreed with the plan  Follow-up and Dispositions    Return in about 6 months (around 5/29/2023). Please note that this dictation was completed with for[MD], the computer voice recognition software. Quite often unanticipated grammatical, syntax, homophones, and other interpretive errors are inadvertently transcribed by the computer software. Please disregard these errors. Please excuse any errors that have escaped final proofreading.

## 2022-11-29 NOTE — PROGRESS NOTES
Chief Complaint   Patient presents with    Hypertension    Thyroid Problem    Irregular Heart Beat    Epilepsy    Slow Heart Rate      Visit Vitals  /70 (BP 1 Location: Right upper arm, BP Patient Position: Sitting, BP Cuff Size: Adult)   Pulse 69   Temp 98.1 °F (36.7 °C) (Temporal)   Resp 18   Ht 5' 6\" (1.676 m)   Wt 174 lb 2 oz (79 kg)   SpO2 96%   BMI 28.10 kg/m²        PHQ 9 Score: 1 (11/29/2022  9:13 AM)  Thoughts of being better off dead, or hurting yourself in some way: 0 (11/29/2022  9:13 AM)     Fall Risk Assessment, last 12 mths 11/29/2022   Able to walk? Yes   Fall in past 12 months? 0   Do you feel unsteady? 0   Are you worried about falling 1   Is the gait abnormal? 0     1. \"Have you been to the ER, urgent care clinic since your last visit? Hospitalized since your last visit? \" No    2. \"Have you seen or consulted any other health care providers outside of the [address] [place] [address] since your last visit? \" No     3. For patients aged 39-70: Has the patient had a colonoscopy / FIT/ Cologuard? Yes - no Care Gap present      If the patient is female:    4. For patients aged 41-77: Has the patient had a mammogram within the past 2 years? Yes - no Care Gap present      5. For patients aged 21-65: Has the patient had a pap smear? No- not required for age      Physician order obtained. Patient completed adult immunization consent form. Allergies, contraindications and recommendations reviewed with patient. Seasonal influenza vaccine administered IM right deltoid. Patient tolerated well. Patient remained in office for 15 minutes after injection and no adverse reactions were noted.      Josue Ding  RIR:[id number]  AST:[id number]

## 2022-12-07 RX ORDER — IBUPROFEN 200 MG
CAPSULE ORAL
Qty: 100 STRIP | Refills: 5 | Status: SHIPPED | OUTPATIENT
Start: 2022-12-07

## 2022-12-07 RX ORDER — LANCETS
EACH MISCELLANEOUS
Qty: 100 EACH | Refills: 5 | Status: SHIPPED | OUTPATIENT
Start: 2022-12-07

## 2022-12-09 ENCOUNTER — PATIENT OUTREACH (OUTPATIENT)
Dept: CASE MANAGEMENT | Age: 77
End: 2022-12-09

## 2022-12-09 NOTE — PROGRESS NOTES
Patient has graduated from the Complex Case Management  program on 12/9/22. Patient/family has the ability to self-manage at this time. Care management goals have been completed. No further Ambulatory Care Manager follow up scheduled. Goals Addressed                   This Visit's Progress     COMPLETED: Attends follow up appointments on schedule        8/24/22 Patient will attend all scheduled appointments through 11/24/22         COMPLETED: Knowledge and adherence of prescribed medication (ie. action, side effects, missed dose, etc.).        8/24/22 Pt will take all medications prescribed to be evaluated on each outreach through 11/24//22         COMPLETED: Prepare patients and caregivers for end of life decisions (ie. need for hospice, pain management, symptom relief, advance directives etc.)        8/24/22 Pt will complete an ACP and have it scanned into their EMR by 11/24/22              Patient has Ambulatory Care Manager's contact information for any further questions, concerns, or needs.   Patients upcoming visits:    Future Appointments   Date Time Provider Caron Wood   3/8/2023  9:00 AM CSI, PACER [facility] BS AMB   3/8/2023  9:20 AM Husam Hassan DO [facility] BS AMB   5/30/2023  9:30 AM Leonard Arauz MD hospitals BS AMB

## 2023-01-24 ENCOUNTER — TRANSCRIBE ORDER (OUTPATIENT)
Dept: SCHEDULING | Age: 78
End: 2023-01-24

## 2023-01-24 DIAGNOSIS — Z12.31 SCREENING MAMMOGRAM, ENCOUNTER FOR: Primary | ICD-10-CM

## 2023-01-28 ENCOUNTER — TRANSCRIBE ORDERS (OUTPATIENT)
Facility: HOSPITAL | Age: 78
End: 2023-01-28

## 2023-01-28 DIAGNOSIS — Z12.31 VISIT FOR SCREENING MAMMOGRAM: Primary | ICD-10-CM

## 2023-02-01 DIAGNOSIS — Z12.31 SCREENING MAMMOGRAM, ENCOUNTER FOR: Primary | ICD-10-CM

## 2023-02-05 DIAGNOSIS — Z12.31 SCREENING MAMMOGRAM, ENCOUNTER FOR: Primary | ICD-10-CM

## 2023-02-21 ENCOUNTER — HOSPITAL ENCOUNTER (OUTPATIENT)
Facility: HOSPITAL | Age: 78
Discharge: HOME | End: 2023-02-24
Payer: MEDICARE

## 2023-02-21 DIAGNOSIS — Z12.31 VISIT FOR SCREENING MAMMOGRAM: ICD-10-CM

## 2023-02-21 PROCEDURE — 77063 BREAST TOMOSYNTHESIS BI: CPT

## 2023-03-06 ENCOUNTER — CARE COORDINATION (OUTPATIENT)
Facility: CLINIC | Age: 78
End: 2023-03-06

## 2023-03-06 NOTE — CARE COORDINATION
Ambulatory Care Coordination Note  3/6/2023    Patient Current Location:  [place]    LPN CC contacted the patient by telephone. Verified name and  with patient as identifiers. Provided introduction to self, and explanation of the LPN CC role. ACM: [place] LPN    Challenges to be reviewed by the provider   Additional needs identified to be addressed with provider: Yes  Patient c/o neck pain going into head. Patient would like to see seen sooner than follow up. Method of communication with provider: phone. Spoke with patient. Appointment scheduled at [facility] on 3/10 at 840 am. Patient aware for c/o neck pain radiating to head. Offered patient enrollment in the Remote Patient Monitoring (RPM) program for in-home monitoring: NA.     Ambulatory Care Coordination Assessment    Care Coordination Protocol  Referral from Primary Care Provider: No  Week 1 - Initial Assessment                             Suggested Interventions and Community Resources                  Future Appointments   Date Time Provider Jhonatan Mora   3/10/2023  8:40 AM MD ANDRADE Sharif BS AMB   3/29/2023  9:40 AM Nanine Sandifer Zebedee Fischer, DO [facility] BS AMB   3/29/2023 10:30 AM CSI, PACER [facility] BS AMB   2023  9:30 AM Oneil Enriquez MD [facility] BS AMB

## 2023-03-10 ENCOUNTER — OFFICE VISIT (OUTPATIENT)
Facility: CLINIC | Age: 78
End: 2023-03-10
Payer: MEDICARE

## 2023-03-10 VITALS
HEART RATE: 60 BPM | DIASTOLIC BLOOD PRESSURE: 67 MMHG | RESPIRATION RATE: 16 BRPM | HEIGHT: 66 IN | OXYGEN SATURATION: 99 % | WEIGHT: 178 LBS | BODY MASS INDEX: 28.61 KG/M2 | SYSTOLIC BLOOD PRESSURE: 150 MMHG

## 2023-03-10 DIAGNOSIS — M54.2 CERVICAL PAIN (NECK): ICD-10-CM

## 2023-03-10 DIAGNOSIS — M47.812 OSTEOARTHRITIS OF CERVICAL SPINE, UNSPECIFIED SPINAL OSTEOARTHRITIS COMPLICATION STATUS: Primary | ICD-10-CM

## 2023-03-10 PROCEDURE — G8484 FLU IMMUNIZE NO ADMIN: HCPCS | Performed by: STUDENT IN AN ORGANIZED HEALTH CARE EDUCATION/TRAINING PROGRAM

## 2023-03-10 PROCEDURE — G8399 PT W/DXA RESULTS DOCUMENT: HCPCS | Performed by: STUDENT IN AN ORGANIZED HEALTH CARE EDUCATION/TRAINING PROGRAM

## 2023-03-10 PROCEDURE — 3078F DIAST BP <80 MM HG: CPT | Performed by: STUDENT IN AN ORGANIZED HEALTH CARE EDUCATION/TRAINING PROGRAM

## 2023-03-10 PROCEDURE — G8427 DOCREV CUR MEDS BY ELIG CLIN: HCPCS | Performed by: STUDENT IN AN ORGANIZED HEALTH CARE EDUCATION/TRAINING PROGRAM

## 2023-03-10 PROCEDURE — G8419 CALC BMI OUT NRM PARAM NOF/U: HCPCS | Performed by: STUDENT IN AN ORGANIZED HEALTH CARE EDUCATION/TRAINING PROGRAM

## 2023-03-10 PROCEDURE — 3077F SYST BP >= 140 MM HG: CPT | Performed by: STUDENT IN AN ORGANIZED HEALTH CARE EDUCATION/TRAINING PROGRAM

## 2023-03-10 PROCEDURE — 1123F ACP DISCUSS/DSCN MKR DOCD: CPT | Performed by: STUDENT IN AN ORGANIZED HEALTH CARE EDUCATION/TRAINING PROGRAM

## 2023-03-10 PROCEDURE — 99213 OFFICE O/P EST LOW 20 MIN: CPT | Performed by: STUDENT IN AN ORGANIZED HEALTH CARE EDUCATION/TRAINING PROGRAM

## 2023-03-10 PROCEDURE — 1036F TOBACCO NON-USER: CPT | Performed by: STUDENT IN AN ORGANIZED HEALTH CARE EDUCATION/TRAINING PROGRAM

## 2023-03-10 PROCEDURE — 1090F PRES/ABSN URINE INCON ASSESS: CPT | Performed by: STUDENT IN AN ORGANIZED HEALTH CARE EDUCATION/TRAINING PROGRAM

## 2023-03-10 SDOH — ECONOMIC STABILITY: HOUSING INSECURITY
IN THE LAST 12 MONTHS, WAS THERE A TIME WHEN YOU DID NOT HAVE A STEADY PLACE TO SLEEP OR SLEPT IN A SHELTER (INCLUDING NOW)?: NO

## 2023-03-10 SDOH — ECONOMIC STABILITY: FOOD INSECURITY: WITHIN THE PAST 12 MONTHS, THE FOOD YOU BOUGHT JUST DIDN'T LAST AND YOU DIDN'T HAVE MONEY TO GET MORE.: NEVER TRUE

## 2023-03-10 SDOH — ECONOMIC STABILITY: FOOD INSECURITY: WITHIN THE PAST 12 MONTHS, YOU WORRIED THAT YOUR FOOD WOULD RUN OUT BEFORE YOU GOT MONEY TO BUY MORE.: NEVER TRUE

## 2023-03-10 SDOH — ECONOMIC STABILITY: INCOME INSECURITY: HOW HARD IS IT FOR YOU TO PAY FOR THE VERY BASICS LIKE FOOD, HOUSING, MEDICAL CARE, AND HEATING?: NOT VERY HARD

## 2023-03-10 ASSESSMENT — ENCOUNTER SYMPTOMS
BLOOD IN STOOL: 0
WHEEZING: 0
BACK PAIN: 0
RHINORRHEA: 0
VOMITING: 0
SHORTNESS OF BREATH: 0
COUGH: 0
ABDOMINAL PAIN: 0
CHEST TIGHTNESS: 0
DIARRHEA: 0
NAUSEA: 0

## 2023-03-10 NOTE — PROGRESS NOTES
Radha Pringle is a 65 y.o. female presenting today for Neck Pain (For the last 4 weeks has had an increase in pain, does radiate through neck but primarily on right side. Did have accident last year where dishes fell out of cabinet and hit her in head, reports some headaches and soreness to the touch on top of head. )  . Chief Complaint   Patient presents with    Neck Pain     For the last 4 weeks has had an increase in pain, does radiate through neck but primarily on right side. Did have accident last year where dishes fell out of cabinet and hit her in head, reports some headaches and soreness to the touch on top of head. HPI:  Radha Pringle presents to the office today for neck pain. PCP: Dr. Yumiko Brooke    Patient has a past medical history of diabetes, hyperparathyroidism, HTN, CKD, atrial fibrillation. Patient has chronic neck pain and scalp pain. She has been following up for this with her PCP. X-ray cervical spine in 7/21 showed advanced multilevel degenerative findings within the upper to mid cervical spine. She was referred for physical therapy but did not follow up. She has also been referred to spine center in the past but did not follow up. Today, reports that pain is still present, has been worse in the last 3 weeks along with tenderness to the scalp. She reports that she is more concerned about this since she had an accident in July when dishes fell out of the cabinet and hit her head. Denies any headaches. No numbness or tingling noted. Denies any weakness. Patient interested in seeing PT again. She has been taking Tylenol rarely. Review of Systems   Constitutional:  Negative for activity change, appetite change, chills, diaphoresis, fatigue, fever and unexpected weight change. HENT:  Negative for congestion, nosebleeds, postnasal drip and rhinorrhea. Respiratory:  Negative for cough, chest tightness, shortness of breath and wheezing.     Cardiovascular: Negative for chest pain and leg swelling. Gastrointestinal:  Negative for abdominal pain, blood in stool, diarrhea, nausea and vomiting. Endocrine: Negative for polydipsia and polyphagia. Genitourinary:  Negative for decreased urine volume, dysuria, frequency and pelvic pain. Musculoskeletal:  Positive for arthralgias and neck pain. Negative for back pain and myalgias. Neurological:  Negative for dizziness, tremors, seizures, syncope, speech difficulty, weakness, numbness and headaches. Psychiatric/Behavioral:  Negative for agitation and confusion. The patient is not nervous/anxious. All other systems reviewed and are negative. Allergies   Allergen Reactions    Statins Other (See Comments)     Felt sick        PHQ Screening   No flowsheet data found. History  Past Medical History:   Diagnosis Date    Arrhythmia     history atrial fib    Bradycardia     Diabetes ([address]     borderline    DVT (deep venous thrombosis) ([address] 2010    right    H/O prior ablation treatment     History of atrial fibrillation     Hypercholesterolemia     Hypertension     Long term current use of anticoagulant therapy     Menopause     Osteoporosis     Pacemaker     Seizure ([address]     Seizures ([address]     on medication from 2006 to 2013 no seizure since 2006    Thyroid disease     nodules       Past Surgical History:   Procedure Laterality Date    CATARACT REMOVAL      COLONOSCOPY N/A 10/25/2017    COLONOSCOPY performed by Kelly Romero MD at [address] ([address] [place] [address])      PACEMAKER      AL UNLISTED PROCEDURE CARDIAC SURGERY      ablation for a-fib    TOTAL KNEE ARTHROPLASTY      right knee    TOTAL KNEE ARTHROPLASTY      left knee in 1/2010 and right knee 7/2016       Social History     Socioeconomic History    Marital status:       Spouse name: Not on file    Number of children: Not on file    Years of education: Not on file    Highest education level: Not on file   Occupational History    Not on file   Tobacco Use    Smoking status: Never    Smokeless tobacco: Never   Substance and Sexual Activity    Alcohol use: No    Drug use: No    Sexual activity: Not on file     Comment: hysterectomy   Other Topics Concern    Not on file   Social History Narrative    Not on file     Social Determinants of Health     Financial Resource Strain: Low Risk     Difficulty of Paying Living Expenses: Not very hard   Food Insecurity: No Food Insecurity    Worried About Running Out of Food in the Last Year: Never true    Ran Out of Food in the Last Year: Never true   Transportation Needs: Unknown    Lack of Transportation (Medical): Not on file    Lack of Transportation (Non-Medical):  No   Physical Activity: Not on file   Stress: Not on file   Social Connections: Not on file   Intimate Partner Violence: Not on file   Housing Stability: Unknown    Unable to Pay for Housing in the Last Year: Not on file    Number of Places Lived in the Last Year: Not on file    Unstable Housing in the Last Year: No       Current Outpatient Medications   Medication Sig Dispense Refill    metFORMIN (GLUCOPHAGE) 500 MG tablet TAKE 1 TABLET BY MOUTH ONCE DAILY IN THE MORNING WITH BREAKFAST 30 tablet 0    Lancets MISC Provide one touch ultras soft lancets / DX E11.21      apixaban (ELIQUIS) 5 MG TABS tablet Take 5 mg by mouth 2 times daily      Cholecalciferol 50 MCG (2000 UT) TABS Take by mouth daily      cinacalcet (SENSIPAR) 30 MG tablet Take 30 mg by mouth daily      famotidine (PEPCID) 20 MG tablet Take 20 mg by mouth 2 times daily as needed      losartan (COZAAR) 100 MG tablet Take 100 mg by mouth daily      metoprolol succinate (TOPROL XL) 50 MG extended release tablet Take 50 mg by mouth daily      spironolactone (ALDACTONE) 25 MG tablet Take 1 tablet by mouth daily      diclofenac sodium (VOLTAREN) 1 % GEL Apply 2 g topically 4 times daily 150 g 1    albuterol sulfate HFA (PROVENTIL;VENTOLIN;PROAIR) 108 (90 Base) MCG/ACT inhaler Inhale 2 puffs into the lungs every 4 hours as needed (Patient not taking: Reported on 3/10/2023)       No current facility-administered medications for this visit. BP (!) 150/67 (Site: Right Upper Arm, Position: Sitting, Cuff Size: Large Adult) Comment: takes meds daily  Pulse 60   Resp 16   Ht 5' 6\" (1.676 m)   Wt 178 lb (80.7 kg)   SpO2 99%   BMI 28.73 kg/m²      Physical Exam  Vitals and nursing note reviewed. Constitutional:       General: She is not in acute distress. Appearance: Normal appearance. She is not ill-appearing, toxic-appearing or diaphoretic. HENT:      Head: Normocephalic and atraumatic. Eyes:      Extraocular Movements: Extraocular movements intact. Conjunctiva/sclera: Conjunctivae normal.      Pupils: Pupils are equal, round, and reactive to light. Cardiovascular:      Rate and Rhythm: Normal rate. Pulses: Normal pulses. Heart sounds: Normal heart sounds. No murmur heard. Pulmonary:      Effort: Pulmonary effort is normal. No respiratory distress. Breath sounds: Normal breath sounds. Musculoskeletal:         General: Normal range of motion. Cervical back: Tenderness present. Skin:     General: Skin is warm and dry. Neurological:      General: No focal deficit present. Mental Status: She is alert and oriented to person, place, and time. Mental status is at baseline. Cranial Nerves: No cranial nerve deficit. Motor: No weakness. Gait: Gait normal.   Psychiatric:         Mood and Affect: Mood normal.         Behavior: Behavior normal.         Thought Content: Thought content normal.         Judgment: Judgment normal.        No visits with results within 3 Month(s) from this visit. Latest known visit with results is:   Office Visit on 11/29/2022   Component Date Value Ref Range Status    Hemoglobin A1C, POC 11/29/2022 5.9  % Final       No results found for any visits on 03/10/23.     Patient Care Team:  Patient Care Team:  Theresa Espino MD as PCP - General  Theresa Espino MD as PCP - Empaneled Provider  Dontae Guerrero MD as Physician  Elizabeth Cody LPN as Care Coordinator      Assessment / Plan:     Diagnosis Orders   1. Osteoarthritis of cervical spine, unspecified spinal osteoarthritis complication status  diclofenac sodium (VOLTAREN) 1 % GEL      2. Cervical pain (neck)            Cervical neck pain: Patient has osteoarthritis noted on imaging previously.  Advised to take Tylenol arthritis.  Will prescribe topical diclofenac.  Patient has not been taking any pain medication.  Counseled to avoid NSAIDs as she is on Eliquis for atrial fibrillation.    She is interested in starting physical therapy.  Will speak to manager from Dr. Espino's office to set up for referral.    Patient will follow-up for this chronic condition with her PCP.    No follow-ups on file.     I asked the patient if she  had any questions and answered her  questions.  The patient stated that she understands the treatment plan and agrees with the treatment plan    This document was created with a voice activated dictation system and may contain transcription errors.

## 2023-03-13 ENCOUNTER — TELEPHONE (OUTPATIENT)
Age: 78
End: 2023-03-13

## 2023-03-13 NOTE — TELEPHONE ENCOUNTER
Dr. Rahat Guevara, patient is requesting a referral to PT. Please see message below and advise, thank you.

## 2023-03-14 DIAGNOSIS — M54.2 NECK PAIN: Primary | ICD-10-CM

## 2023-03-14 DIAGNOSIS — M54.9 DISCOMFORT OF BACK: ICD-10-CM

## 2023-03-16 ENCOUNTER — CARE COORDINATION (OUTPATIENT)
Facility: CLINIC | Age: 78
End: 2023-03-16

## 2023-03-16 NOTE — CARE COORDINATION
Ambulatory Care Coordination Note  3/16/2023    Patient Current Location:  [place]     LPN CC contacted the patient by telephone. Verified name and  with patient as identifiers. Provided introduction to self, and explanation of the LPN CC role. Challenges to be reviewed by the provider   Additional needs identified to be addressed with provider: No  none               Method of communication with provider: none. ACM: Ting Read LPN    Spoke with patient. Patient states she is doing ok. Patient states she has scheduled an appointment for Physical Therapy. Patient has no questions or concerns at this time. Care Coordination Interventions    Referral from Primary Care Provider: No  Suggested Interventions and Community Resources  No Identified Needs          Goals Addressed                   This Visit's Progress     Conditions and Symptoms   On track     I will schedule office visits, as directed by my provider. I will keep my appointment or reschedule if I have to cancel. I will notify my provider of any barriers to my plan of care. I will notify my provider of any symptoms that indicate a worsening of my condition. Barriers: none  Plan for overcoming my barriers: N/A  Confidence: 10/10  Anticipated Goal Completion Date:          Medication Management   On track     I will take my medication as directed. I will notify my provider of any problems with medications, like adverse effects or side effects. I will notify my provider/Care Coordinator if I am unable to afford my medications. I will notify my provider for advice before I stop taking any of my medication. Barriers: none  Plan for overcoming my barriers: N/A  Confidence: 10/10  Anticipated Goal Completion Date:        Patient verbalizes understanding of self -management goals of living with Diabetes.    On track     Check blood sugars  Eat a healthy diet              Future Appointments   Date Time Provider Department [place]   3/29/2023  9:40 AM Martha Russo DO [facility] BS AMB   3/29/2023 10:30 AM CSI, PACER [facility]   3/29/2023 12:00 PM [facility]   5/30/2023  9:30 AM Rowdy Weems MD [facility] BS AMB

## 2023-03-22 RX ORDER — BLOOD SUGAR DIAGNOSTIC
STRIP MISCELLANEOUS
Qty: 100 EACH | Refills: 1 | Status: SHIPPED | OUTPATIENT
Start: 2023-03-22 | End: 2023-03-29 | Stop reason: SDUPTHER

## 2023-03-28 ENCOUNTER — CARE COORDINATION (OUTPATIENT)
Facility: CLINIC | Age: 78
End: 2023-03-28

## 2023-03-28 NOTE — CARE COORDINATION
Care Coordination Follow up      Date/Time:  3/28/2023 2:49 PM  Attempted to reach patient by telephone. Left HIPPA compliant message requesting a return call. Will attempt to reach patient again.

## 2023-03-29 ENCOUNTER — NURSE ONLY (OUTPATIENT)
Age: 78
End: 2023-03-29

## 2023-03-29 ENCOUNTER — CARE COORDINATION (OUTPATIENT)
Facility: CLINIC | Age: 78
End: 2023-03-29

## 2023-03-29 ENCOUNTER — OFFICE VISIT (OUTPATIENT)
Age: 78
End: 2023-03-29
Payer: MEDICARE

## 2023-03-29 VITALS
BODY MASS INDEX: 29.09 KG/M2 | HEIGHT: 66 IN | DIASTOLIC BLOOD PRESSURE: 66 MMHG | SYSTOLIC BLOOD PRESSURE: 130 MMHG | WEIGHT: 181 LBS | HEART RATE: 62 BPM | OXYGEN SATURATION: 96 %

## 2023-03-29 DIAGNOSIS — E11.21 DIABETES MELLITUS WITH NEPHROPATHY (HCC): Primary | ICD-10-CM

## 2023-03-29 DIAGNOSIS — I10 ESSENTIAL (PRIMARY) HYPERTENSION: ICD-10-CM

## 2023-03-29 DIAGNOSIS — Z95.0 PRESENCE OF CARDIAC PACEMAKER: ICD-10-CM

## 2023-03-29 DIAGNOSIS — R00.1 BRADYCARDIA, UNSPECIFIED: ICD-10-CM

## 2023-03-29 DIAGNOSIS — I48.0 PAROXYSMAL ATRIAL FIBRILLATION (HCC): Primary | ICD-10-CM

## 2023-03-29 PROCEDURE — 1090F PRES/ABSN URINE INCON ASSESS: CPT | Performed by: INTERNAL MEDICINE

## 2023-03-29 PROCEDURE — G8399 PT W/DXA RESULTS DOCUMENT: HCPCS | Performed by: INTERNAL MEDICINE

## 2023-03-29 PROCEDURE — 99214 OFFICE O/P EST MOD 30 MIN: CPT | Performed by: INTERNAL MEDICINE

## 2023-03-29 PROCEDURE — 1036F TOBACCO NON-USER: CPT | Performed by: INTERNAL MEDICINE

## 2023-03-29 PROCEDURE — G8417 CALC BMI ABV UP PARAM F/U: HCPCS | Performed by: INTERNAL MEDICINE

## 2023-03-29 PROCEDURE — G8427 DOCREV CUR MEDS BY ELIG CLIN: HCPCS | Performed by: INTERNAL MEDICINE

## 2023-03-29 PROCEDURE — 3078F DIAST BP <80 MM HG: CPT | Performed by: INTERNAL MEDICINE

## 2023-03-29 PROCEDURE — 1123F ACP DISCUSS/DSCN MKR DOCD: CPT | Performed by: INTERNAL MEDICINE

## 2023-03-29 PROCEDURE — 3075F SYST BP GE 130 - 139MM HG: CPT | Performed by: INTERNAL MEDICINE

## 2023-03-29 PROCEDURE — G8484 FLU IMMUNIZE NO ADMIN: HCPCS | Performed by: INTERNAL MEDICINE

## 2023-03-29 RX ORDER — DIMENHYDRINATE 50 MG
100 TABLET ORAL DAILY
COMMUNITY

## 2023-03-29 RX ORDER — CHLORAL HYDRATE 500 MG
1000 CAPSULE ORAL DAILY
COMMUNITY

## 2023-03-29 RX ORDER — BLOOD SUGAR DIAGNOSTIC
STRIP MISCELLANEOUS
Qty: 100 EACH | Refills: 5 | Status: SHIPPED | OUTPATIENT
Start: 2023-03-29

## 2023-03-29 RX ORDER — METOPROLOL SUCCINATE 50 MG/1
TABLET, EXTENDED RELEASE ORAL
Qty: 90 TABLET | Refills: 0 | Status: SHIPPED | OUTPATIENT
Start: 2023-03-29

## 2023-03-29 RX ORDER — BLOOD SUGAR DIAGNOSTIC
STRIP MISCELLANEOUS
Qty: 100 EACH | Refills: 1 | Status: CANCELLED | OUTPATIENT
Start: 2023-03-29

## 2023-03-29 ASSESSMENT — PATIENT HEALTH QUESTIONNAIRE - PHQ9
SUM OF ALL RESPONSES TO PHQ QUESTIONS 1-9: 0
SUM OF ALL RESPONSES TO PHQ9 QUESTIONS 1 & 2: 0
2. FEELING DOWN, DEPRESSED OR HOPELESS: 0
SUM OF ALL RESPONSES TO PHQ QUESTIONS 1-9: 0
1. LITTLE INTEREST OR PLEASURE IN DOING THINGS: 0
SUM OF ALL RESPONSES TO PHQ QUESTIONS 1-9: 0
SUM OF ALL RESPONSES TO PHQ QUESTIONS 1-9: 0

## 2023-03-29 NOTE — PROGRESS NOTES
Cole Guardian presents today for   Chief Complaint   Patient presents with    Follow-up     6 month follow up    [address] preferred language for health care discussion is english/other. Is someone accompanying this pt? no    Is the patient using any DME equipment during OV? no    Depression Screening:  Depression: Not at risk    PHQ-2 Score: 0        Learning Assessment:  Who is the primary learner? Patient    What is the preferred language for health care of the primary learner? ENGLISH    How does the primary learner prefer to learn new concepts? DEMONSTRATION    Answered By patient    Relationship to Learner SELF           Pt currently taking Anticoagulant therapy? Eliquis 5 mg twice a day    Pt currently taking Antiplatelet therapy ? no      Coordination of Care:  1. Have you been to the ER, urgent care clinic since your last visit? Hospitalized since your last visit? no    2. Have you seen or consulted any other health care providers outside of the [address] [place] [address] since your last visit? Include any pap smears or colon screening.  no
Other (comments)     Felt sick        Social History     Socioeconomic History    Marital status:      Spouse name: Not on file    Number of children: Not on file    Years of education: Not on file    Highest education level: Not on file   Occupational History    Not on file   Tobacco Use    Smoking status: Never    Smokeless tobacco: Never   Vaping Use    Vaping Use: Never used   Substance and Sexual Activity    Alcohol use: No    Drug use: No    Sexual activity: Not Currently     Partners: Male     Birth control/protection: Surgical     Comment: hysterectomy   Other Topics Concern    Not on file   Social History Narrative    Not on file     Social Determinants of Health     Financial Resource Strain: Not on file   Food Insecurity: Not on file   Transportation Needs: Not on file   Physical Activity: Not on file   Stress: Not on file   Social Connections: Not on file   Intimate Partner Violence: Not on file   Housing Stability: Not on file    originally from [facility]  Raised some of her grandkids    FH: n/a    Review of Systems    14 pt Review of Systems is negative unless otherwise mentioned in the HPI. Wt Readings from Last 3 Encounters:   09/07/22 77.6 kg (171 lb)   08/29/22 78 kg (172 lb)   04/20/22 79.7 kg (175 lb 12.8 oz)     Temp Readings from Last 3 Encounters:   08/29/22 96.8 °F (36 °C) (Temporal)   04/20/22 96.8 °F (36 °C) (Temporal)   11/09/21 97.4 °F (36.3 °C) (Temporal)     BP Readings from Last 3 Encounters:   09/07/22 130/80   08/29/22 120/78   05/09/22 136/82     Pulse Readings from Last 3 Encounters:   09/07/22 60   08/29/22 60   04/20/22 60     Physical Exam:    Visit Vitals  /80 (BP 1 Location: Left upper arm, BP Patient Position: Sitting, BP Cuff Size: Small adult)   Pulse 60   Ht 5' 6\" (1.676 m)   Wt 77.6 kg (171 lb)   SpO2 96%   BMI 27.60 kg/m²      Physical Exam  HENT:      Head: Normocephalic and atraumatic. Eyes:      Pupils: Pupils are equal, round, and reactive to light.

## 2023-03-29 NOTE — CARE COORDINATION
Ambulatory Care Coordination Note  3/29/2023    Patient Current Location:  [place]     LPN CC contacted the patient by telephone. Verified name and  with patient as identifiers. Provided introduction to self, and explanation of the LPN CC role. Challenges to be reviewed by the provider   Additional needs identified to be addressed with provider: Yes  medications-patient states pharmacy will not fill prescription for the blood strips. She was told a code is needed on the rx. Spoke with patient's pharmacy and the icd 10 code is needed and to resubmit rx. Method of communication with provider: chart routing.     ACM: Alethea Wiseman LPN    See above          Care Coordination Interventions    Referral from Primary Care Provider: No  Suggested Interventions and Community Resources  No Identified Needs           Future Appointments   Date Time Provider Jhonatan Mora   2023  9:00 AM JOANNA Pascal   2023  9:30 AM MD BALJINDER Santana BS AMB   2023  9:15 AM CSI, PACER [facility] BS AMB   2023  9:20 AM Martha Munson DO [facility] BS AMB

## 2023-04-06 ENCOUNTER — CARE COORDINATION (OUTPATIENT)
Facility: CLINIC | Age: 78
End: 2023-04-06

## 2023-04-06 NOTE — CARE COORDINATION
Shannon Junior MD [facility] BS AMB   9/29/2023  9:15 AM CSSAKSHI HODGSON [facility] BS AMB   9/29/2023  9:20 AM Martha Saldana DO [facility] BS AMB

## 2023-04-14 ENCOUNTER — HOSPITAL ENCOUNTER (OUTPATIENT)
Facility: HOSPITAL | Age: 78
Setting detail: RECURRING SERIES
Discharge: HOME | End: 2023-04-17
Payer: MEDICARE

## 2023-04-14 PROCEDURE — 97110 THERAPEUTIC EXERCISES: CPT

## 2023-04-14 PROCEDURE — 97162 PT EVAL MOD COMPLEX 30 MIN: CPT

## 2023-04-14 PROCEDURE — 97535 SELF CARE MNGMENT TRAINING: CPT

## 2023-04-27 ENCOUNTER — APPOINTMENT (OUTPATIENT)
Facility: HOSPITAL | Age: 78
End: 2023-04-27
Payer: MEDICARE

## 2023-04-27 RX ORDER — LOSARTAN POTASSIUM 100 MG/1
TABLET ORAL
Qty: 90 TABLET | Refills: 0 | Status: SHIPPED | OUTPATIENT
Start: 2023-04-27

## 2023-05-05 ENCOUNTER — HOSPITAL ENCOUNTER (OUTPATIENT)
Facility: HOSPITAL | Age: 78
Setting detail: RECURRING SERIES
Discharge: HOME | End: 2023-05-08
Payer: MEDICARE

## 2023-05-05 PROCEDURE — 97112 NEUROMUSCULAR REEDUCATION: CPT

## 2023-05-05 PROCEDURE — 97140 MANUAL THERAPY 1/> REGIONS: CPT

## 2023-05-05 PROCEDURE — 97110 THERAPEUTIC EXERCISES: CPT

## 2023-05-08 ENCOUNTER — CARE COORDINATION (OUTPATIENT)
Facility: CLINIC | Age: 78
End: 2023-05-08

## 2023-05-08 ENCOUNTER — CLINICAL DOCUMENTATION (OUTPATIENT)
Facility: CLINIC | Age: 78
End: 2023-05-08

## 2023-05-11 ENCOUNTER — CARE COORDINATION (OUTPATIENT)
Facility: CLINIC | Age: 78
End: 2023-05-11

## 2023-05-11 NOTE — CARE COORDINATION
Ambulatory Care Coordination Note  2023    Patient Current Location:  [place]     LPN CC contacted the patient by telephone. Verified name and  with patient as identifiers. Provided introduction to self, and explanation of the LPN CC role. Challenges to be reviewed by the provider   Additional needs identified to be addressed with provider: No  none               Method of communication with provider: none. ACM: Christy Hay LPN    Patient aware refill was sent to her pharmacy on 5/10/23. Patient will  prescription today. Patient has no questions or concerns at this time.           Care Coordination Interventions    Referral from Primary Care Provider: No  Suggested Interventions and Community Resources  No Identified Needs             Future Appointments   Date Time Provider Jhonatan Mora   2023  9:10 AM Amber Taylor, PT [facility]   2023  9:10 AM [facility] Albert [facility]   2023  9:30 AM Jarrod Bhagat MD [facility] BS AMB   2023 11:50 AM Indra Fortune PT [facility]   2023  9:15 AM CSI, PACER [facility] BS AMB   2023  9:20 AM Martha Arnold DO [facility] BS AMB

## 2023-05-12 ENCOUNTER — HOSPITAL ENCOUNTER (OUTPATIENT)
Facility: HOSPITAL | Age: 78
Setting detail: RECURRING SERIES
Discharge: HOME | End: 2023-05-15
Payer: MEDICARE

## 2023-05-12 PROCEDURE — 97140 MANUAL THERAPY 1/> REGIONS: CPT

## 2023-05-12 PROCEDURE — 97110 THERAPEUTIC EXERCISES: CPT

## 2023-05-12 PROCEDURE — 97112 NEUROMUSCULAR REEDUCATION: CPT

## 2023-05-19 ENCOUNTER — HOSPITAL ENCOUNTER (OUTPATIENT)
Facility: HOSPITAL | Age: 78
Setting detail: RECURRING SERIES
Discharge: HOME | End: 2023-05-22
Payer: MEDICARE

## 2023-05-19 PROCEDURE — 97112 NEUROMUSCULAR REEDUCATION: CPT

## 2023-05-19 PROCEDURE — 97110 THERAPEUTIC EXERCISES: CPT

## 2023-05-19 PROCEDURE — 97140 MANUAL THERAPY 1/> REGIONS: CPT

## 2023-05-30 ENCOUNTER — HOSPITAL ENCOUNTER (OUTPATIENT)
Facility: HOSPITAL | Age: 78
Setting detail: RECURRING SERIES
Discharge: HOME | End: 2023-06-02
Payer: MEDICARE

## 2023-05-30 PROCEDURE — 97112 NEUROMUSCULAR REEDUCATION: CPT

## 2023-05-30 PROCEDURE — 97110 THERAPEUTIC EXERCISES: CPT

## 2023-05-30 NOTE — PROGRESS NOTES
Physical Therapy Discharge Instructions      [facility]  [address]  [place], [address]  [phone] fax    Patient: Belia Malone  : 1945      Continue Home Exercise Program 1-2 times per day for 5 weeks, then decrease to 3-5 times per week      Continue with    [] Ice  as needed 2-3 times per day     [x] Heat           Follow up with MD:     [] Upon completion of therapy     [x] As needed      Recommendations:     [x]   Return to activity with home program    []   Return to activity with the following modifications:       []Post Rehab Program    []Join Independent aquatic program     []Return to/join local gym        Additional Comments:            Daysi Iniguez, PT 2023 11:07 AM

## 2023-05-30 NOTE — PROGRESS NOTES
PT DISCHARGE DAILY NOTE AND SUMMARY     Date:2023     Patient name: Narcisa Fletcher Start of Care: 2023   Referral source: Nidia Lennon MD : 1945               Medical Diagnosis: Neck pain [M54.2]  Discomfort of back [M54.9]        Onset Date:chronic with recent increase               Treatment Diagnosis: M54.2, G89.29  CHRONIC NECK PAIN and M54.59, G89.29  CHRONIC LOWER BACK PAIN                                     Prior Hospitalization: see medical history Provider#: [id number]   Medications: Verified on Patient Summary List      Comorbidities: OA, c/s pain, LBP, pacemaker, DM, thyroid disease, HTN, B TKA  Prior Level of Function: functionally I with all activities       Insurance: Payor: MEDICARE / Plan: MEDICARE PART A AND B / Product Type: *No Product type* /      Visits from Start of Care: 5 Missed Visits: 0    Reporting Period : 23 to 23    Patient  verified yes     Visit #   Current / Total 5 24   Time   In / Out [id number] [id number]   Pain   In / Out 0 0   Subjective Functional Status/Changes: The pt reports 98% improvement and feels ready for discharge   Changes to:  Meds, Allergies, Med Hx, Sx Hx? If yes, update Summary List no       TREATMENT AREA =  Neck pain [M54.2]  Discomfort of back [M54.9]    OBJECTIVE    Modalities Rationale:     increase tissue extensibility to improve patient's ability to progress to PLOF and address remaining functional goals.      min [] Estim Unattended, type/location:                                      []  w/ice    []  w/heat    min [] Estim Attended, type/location:                                     []  w/US     []  w/ice    []  w/heat    []  TENS insruct      min []  Mechanical Traction: type/lbs                   []  pro   []  sup   []  int   []  cont    []  before manual    []  after manual    min []  Ultrasound, settings/location:      min []  Iontophoresis w/ dexamethasone, location:                                               []  take

## 2023-06-06 ENCOUNTER — CARE COORDINATION (OUTPATIENT)
Facility: CLINIC | Age: 78
End: 2023-06-06

## 2023-06-06 NOTE — CARE COORDINATION
Patient has graduated from the Complex Case Management  program on 6/6/23. Patient/family has the ability to self-manage at this time. Care management goals have been completed. No further Ambulatory Care Manager follow up scheduled. Goals Addressed                   This Visit's Progress     COMPLETED: Blood Pressure < 140/90        Conditions and Symptoms   On track     I will schedule office visits, as directed by my provider. I will keep my appointment or reschedule if I have to cancel. I will notify my provider of any barriers to my plan of care. I will notify my provider of any symptoms that indicate a worsening of my condition. Barriers: none  Plan for overcoming my barriers: N/A  Confidence: 10/10  Anticipated Goal Completion Date:          Medication Management   On track     I will take my medication as directed. I will notify my provider of any problems with medications, like adverse effects or side effects. I will notify my provider/Care Coordinator if I am unable to afford my medications. I will notify my provider for advice before I stop taking any of my medication. Barriers: none  Plan for overcoming my barriers: N/A  Confidence: 10/10  Anticipated Goal Completion Date:        Patient verbalizes understanding of self -management goals of living with Diabetes. On track     Check blood sugars  Eat a healthy diet              Patient has LPN CC's contact information for any further questions, concerns, or needs.   Patients upcoming visits:    Future Appointments   Date Time Provider Jhonatna Mora   7/14/2023 10:15 AM Jennifer Arana MD [facility] BS AMB   9/29/2023  9:15 AM CSI, PACER HV [facility] BS AMB   9/29/2023  9:20 AM Martha Otero DO [facility] BS AMB

## 2023-06-28 RX ORDER — METOPROLOL SUCCINATE 50 MG/1
TABLET, EXTENDED RELEASE ORAL
Qty: 90 TABLET | Refills: 0 | Status: SHIPPED | OUTPATIENT
Start: 2023-06-28

## 2023-07-12 NOTE — PROGRESS NOTES
1. \"Have you been to the ER, urgent care clinic since your last visit? Hospitalized since your last visit? \" No    2. \"Have you seen or consulted any other health care providers outside of the [address] [place] [address] since your last visit? \" Yes When: Dr. Jazmyn Michel: 12/22/22. Dr. Elane Sever, ENT LOV: 1/03/23. 3. For patients aged 43-73: Has the patient had a colonoscopy / FIT/ Cologuard? NA - based on age      If the patient is female:    4. For patients aged 43-66: Has the patient had a mammogram within the past 2 years? Yes - no Care Gap present 2/21/23      5. For patients aged 21-65: Has the patient had a pap smear?  NA - based on age or sex    Chief Complaint   Patient presents with    Diabetes    Hypertension    Cholesterol Problem

## 2023-07-13 ENCOUNTER — OFFICE VISIT (OUTPATIENT)
Age: 78
End: 2023-07-13
Payer: MEDICARE

## 2023-07-13 VITALS
HEART RATE: 60 BPM | DIASTOLIC BLOOD PRESSURE: 66 MMHG | TEMPERATURE: 96.6 F | RESPIRATION RATE: 16 BRPM | SYSTOLIC BLOOD PRESSURE: 128 MMHG | HEIGHT: 66 IN | WEIGHT: 180 LBS | BODY MASS INDEX: 28.93 KG/M2 | OXYGEN SATURATION: 98 %

## 2023-07-13 DIAGNOSIS — I48.20 CHRONIC ATRIAL FIBRILLATION (HCC): ICD-10-CM

## 2023-07-13 DIAGNOSIS — Z95.0 CARDIAC PACEMAKER IN SITU: ICD-10-CM

## 2023-07-13 DIAGNOSIS — M54.9 UPPER BACK PAIN ON RIGHT SIDE: ICD-10-CM

## 2023-07-13 DIAGNOSIS — N18.31 STAGE 3A CHRONIC KIDNEY DISEASE (HCC): ICD-10-CM

## 2023-07-13 DIAGNOSIS — I10 ESSENTIAL HYPERTENSION: ICD-10-CM

## 2023-07-13 DIAGNOSIS — E04.1 THYROID NODULE: ICD-10-CM

## 2023-07-13 DIAGNOSIS — M25.473 ANKLE SWELLING, UNSPECIFIED LATERALITY: ICD-10-CM

## 2023-07-13 DIAGNOSIS — E11.21 TYPE 2 DIABETES MELLITUS WITH DIABETIC NEPHROPATHY, UNSPECIFIED WHETHER LONG TERM INSULIN USE (HCC): Primary | ICD-10-CM

## 2023-07-13 DIAGNOSIS — E78.00 PURE HYPERCHOLESTEROLEMIA: ICD-10-CM

## 2023-07-13 DIAGNOSIS — E21.3 HYPERPARATHYROIDISM, UNSPECIFIED (HCC): ICD-10-CM

## 2023-07-13 LAB — HBA1C MFR BLD: 5.9 %

## 2023-07-13 PROCEDURE — 3074F SYST BP LT 130 MM HG: CPT | Performed by: FAMILY MEDICINE

## 2023-07-13 PROCEDURE — G8428 CUR MEDS NOT DOCUMENT: HCPCS | Performed by: FAMILY MEDICINE

## 2023-07-13 PROCEDURE — 1090F PRES/ABSN URINE INCON ASSESS: CPT | Performed by: FAMILY MEDICINE

## 2023-07-13 PROCEDURE — 99214 OFFICE O/P EST MOD 30 MIN: CPT | Performed by: FAMILY MEDICINE

## 2023-07-13 PROCEDURE — 83036 HEMOGLOBIN GLYCOSYLATED A1C: CPT | Performed by: FAMILY MEDICINE

## 2023-07-13 PROCEDURE — 1123F ACP DISCUSS/DSCN MKR DOCD: CPT | Performed by: FAMILY MEDICINE

## 2023-07-13 PROCEDURE — PBSHW AMB POC HEMOGLOBIN A1C: Performed by: FAMILY MEDICINE

## 2023-07-13 PROCEDURE — 1036F TOBACCO NON-USER: CPT | Performed by: FAMILY MEDICINE

## 2023-07-13 PROCEDURE — G8417 CALC BMI ABV UP PARAM F/U: HCPCS | Performed by: FAMILY MEDICINE

## 2023-07-13 PROCEDURE — 3078F DIAST BP <80 MM HG: CPT | Performed by: FAMILY MEDICINE

## 2023-07-13 PROCEDURE — G8399 PT W/DXA RESULTS DOCUMENT: HCPCS | Performed by: FAMILY MEDICINE

## 2023-07-13 ASSESSMENT — PATIENT HEALTH QUESTIONNAIRE - PHQ9
SUM OF ALL RESPONSES TO PHQ9 QUESTIONS 1 & 2: 0
1. LITTLE INTEREST OR PLEASURE IN DOING THINGS: 0
SUM OF ALL RESPONSES TO PHQ QUESTIONS 1-9: 0
2. FEELING DOWN, DEPRESSED OR HOPELESS: 0
SUM OF ALL RESPONSES TO PHQ QUESTIONS 1-9: 0

## 2023-07-13 NOTE — PROGRESS NOTES
Daksha Jefferson is a 65 y.o. female complains of   Chief Complaint   Patient presents with    Diabetes    Hypertension    Cholesterol Problem       HPI: Here for routine follow-up. Diabetes. A1c of 5.9. No hypo or hyperglycemic symptoms. Working on diet modification. Also following Endo for hyperparathyroidism. On vitamin D supplement. Reviewed their note from February. Plan to repeat DEXA scan by endocrinology. Thyroid nodule. Will follow endo and their  recommendation. No trouble swallowing or change in voice. Hyperlipidemia. On statin. No side effects. History of A-fib. Presence of pacemaker. Probably need to change the battery for cardiology this year. Advised patient to follow the recommendation. On anticoagulation. No side effects. Clinically stable. Asymptomatic. Right upper back pain. Physical therapy. Helpful. I have noticed that her purse is so heavy and she puts it ON her right shoulder.   I have advised her to change the site and make it light    CMP:  Lab Results   Component Value Date/Time     03/09/2022 10:06 AM    K 4.3 03/09/2022 10:06 AM     03/09/2022 10:06 AM    CO2 29 03/09/2022 10:06 AM    BUN 15 03/09/2022 10:06 AM    CREATININE 1.02 03/09/2022 10:06 AM    GLUCOSE 111 03/09/2022 10:06 AM    CALCIUM 10.7 03/09/2022 10:06 AM    PROT 7.3 03/09/2022 10:06 AM    LABALBU 3.7 03/09/2022 10:06 AM    BILITOT 0.6 03/09/2022 10:06 AM    AST 19 03/09/2022 10:06 AM    ALT 22 03/09/2022 10:06 AM          CBC:  Lab Results   Component Value Date/Time    WBC 6.2 03/09/2022 10:06 AM    RBC 4.52 03/09/2022 10:06 AM    HGB 12.1 03/09/2022 10:06 AM    HCT 39.5 03/09/2022 10:06 AM    MCV 87.4 03/09/2022 10:06 AM    MCH 26.8 03/09/2022 10:06 AM    MCHC 30.6 03/09/2022 10:06 AM    RDW 13.9 03/09/2022 10:06 AM     03/09/2022 10:06 AM    MPV 11.3 03/09/2022 10:06 AM        Lipids   Lab Results   Component Value Date/Time    CHOL 189 03/09/2022 10:06 AM    TRIG

## 2023-07-17 RX ORDER — LOSARTAN POTASSIUM 100 MG/1
TABLET ORAL
Qty: 90 TABLET | Refills: 0 | Status: SHIPPED | OUTPATIENT
Start: 2023-07-17

## 2023-07-17 NOTE — TELEPHONE ENCOUNTER
Requested Prescriptions     Pending Prescriptions Disp Refills    losartan (COZAAR) 100 MG tablet [Pharmacy Med Name: Losartan Potassium 100 MG Oral Tablet] 90 tablet 0     Sig: Take 1 tablet by mouth once daily     Last OV: 7/13/2023  Last labs: 7/13/2023 (POC a1c)  Next OV and labs: 1/19/2024

## 2023-09-27 RX ORDER — METOPROLOL SUCCINATE 50 MG/1
TABLET, EXTENDED RELEASE ORAL
Qty: 90 TABLET | Refills: 0 | OUTPATIENT
Start: 2023-09-27

## 2023-09-28 RX ORDER — LANCETS
EACH MISCELLANEOUS
Qty: 100 EACH | Refills: 5 | Status: SHIPPED | OUTPATIENT
Start: 2023-09-28 | End: 2023-10-05

## 2023-09-28 RX ORDER — METOPROLOL SUCCINATE 50 MG/1
50 TABLET, EXTENDED RELEASE ORAL DAILY
Qty: 90 TABLET | Refills: 0 | Status: SHIPPED | OUTPATIENT
Start: 2023-09-28

## 2023-09-29 ENCOUNTER — NURSE ONLY (OUTPATIENT)
Age: 78
End: 2023-09-29

## 2023-09-29 ENCOUNTER — HOSPITAL ENCOUNTER (OUTPATIENT)
Facility: HOSPITAL | Age: 78
End: 2023-09-29
Payer: MEDICARE

## 2023-09-29 ENCOUNTER — OFFICE VISIT (OUTPATIENT)
Age: 78
End: 2023-09-29

## 2023-09-29 VITALS
HEIGHT: 66 IN | OXYGEN SATURATION: 99 % | SYSTOLIC BLOOD PRESSURE: 162 MMHG | WEIGHT: 180 LBS | DIASTOLIC BLOOD PRESSURE: 102 MMHG | BODY MASS INDEX: 28.93 KG/M2 | HEART RATE: 60 BPM

## 2023-09-29 DIAGNOSIS — E11.21 TYPE 2 DIABETES MELLITUS WITH DIABETIC NEPHROPATHY, UNSPECIFIED WHETHER LONG TERM INSULIN USE (HCC): ICD-10-CM

## 2023-09-29 DIAGNOSIS — I48.0 PAROXYSMAL ATRIAL FIBRILLATION (HCC): Primary | ICD-10-CM

## 2023-09-29 DIAGNOSIS — Z95.0 PRESENCE OF CARDIAC PACEMAKER: ICD-10-CM

## 2023-09-29 DIAGNOSIS — I10 ESSENTIAL (PRIMARY) HYPERTENSION: ICD-10-CM

## 2023-09-29 DIAGNOSIS — R00.1 BRADYCARDIA, UNSPECIFIED: ICD-10-CM

## 2023-09-29 LAB
ALBUMIN SERPL-MCNC: 3.8 G/DL (ref 3.4–5)
ALBUMIN/GLOB SERPL: 1 (ref 0.8–1.7)
ALP SERPL-CCNC: 82 U/L (ref 45–117)
ALT SERPL-CCNC: 19 U/L (ref 13–56)
ANION GAP SERPL CALC-SCNC: 5 MMOL/L (ref 3–18)
AST SERPL-CCNC: 16 U/L (ref 10–38)
BASOPHILS # BLD: 0 K/UL (ref 0–0.1)
BASOPHILS NFR BLD: 1 % (ref 0–2)
BILIRUB SERPL-MCNC: 0.7 MG/DL (ref 0.2–1)
BUN SERPL-MCNC: 18 MG/DL (ref 7–18)
BUN/CREAT SERPL: 14 (ref 12–20)
CALCIUM SERPL-MCNC: 9 MG/DL (ref 8.5–10.1)
CHLORIDE SERPL-SCNC: 106 MMOL/L (ref 100–111)
CHOLEST SERPL-MCNC: 202 MG/DL
CO2 SERPL-SCNC: 28 MMOL/L (ref 21–32)
CREAT SERPL-MCNC: 1.25 MG/DL (ref 0.6–1.3)
DIFFERENTIAL METHOD BLD: NORMAL
EOSINOPHIL # BLD: 0.1 K/UL (ref 0–0.4)
EOSINOPHIL NFR BLD: 1 % (ref 0–5)
ERYTHROCYTE [DISTWIDTH] IN BLOOD BY AUTOMATED COUNT: 14 % (ref 11.6–14.5)
GLOBULIN SER CALC-MCNC: 3.8 G/DL (ref 2–4)
GLUCOSE SERPL-MCNC: 104 MG/DL (ref 74–99)
HCT VFR BLD AUTO: 38.7 % (ref 35–45)
HDLC SERPL-MCNC: 56 MG/DL (ref 40–60)
HDLC SERPL: 3.6 (ref 0–5)
HGB BLD-MCNC: 12 G/DL (ref 12–16)
IMM GRANULOCYTES # BLD AUTO: 0 K/UL (ref 0–0.04)
IMM GRANULOCYTES NFR BLD AUTO: 0 % (ref 0–0.5)
LDLC SERPL CALC-MCNC: 127.8 MG/DL (ref 0–100)
LIPID PANEL: ABNORMAL
LYMPHOCYTES # BLD: 2.7 K/UL (ref 0.9–3.6)
LYMPHOCYTES NFR BLD: 34 % (ref 21–52)
MCH RBC QN AUTO: 26.8 PG (ref 24–34)
MCHC RBC AUTO-ENTMCNC: 31 G/DL (ref 31–37)
MCV RBC AUTO: 86.6 FL (ref 78–100)
MONOCYTES # BLD: 0.6 K/UL (ref 0.05–1.2)
MONOCYTES NFR BLD: 7 % (ref 3–10)
NEUTS SEG # BLD: 4.5 K/UL (ref 1.8–8)
NEUTS SEG NFR BLD: 57 % (ref 40–73)
NRBC # BLD: 0 K/UL (ref 0–0.01)
NRBC BLD-RTO: 0 PER 100 WBC
PLATELET # BLD AUTO: 228 K/UL (ref 135–420)
PMV BLD AUTO: 11.2 FL (ref 9.2–11.8)
POTASSIUM SERPL-SCNC: 4.3 MMOL/L (ref 3.5–5.5)
PROT SERPL-MCNC: 7.6 G/DL (ref 6.4–8.2)
RBC # BLD AUTO: 4.47 M/UL (ref 4.2–5.3)
SODIUM SERPL-SCNC: 139 MMOL/L (ref 136–145)
TRIGL SERPL-MCNC: 91 MG/DL
TSH SERPL DL<=0.05 MIU/L-ACNC: 0.57 UIU/ML (ref 0.36–3.74)
VLDLC SERPL CALC-MCNC: 18.2 MG/DL
WBC # BLD AUTO: 7.8 K/UL (ref 4.6–13.2)

## 2023-09-29 PROCEDURE — 80053 COMPREHEN METABOLIC PANEL: CPT

## 2023-09-29 PROCEDURE — 84443 ASSAY THYROID STIM HORMONE: CPT

## 2023-09-29 PROCEDURE — 80061 LIPID PANEL: CPT

## 2023-09-29 PROCEDURE — 85025 COMPLETE CBC W/AUTO DIFF WBC: CPT

## 2023-09-29 PROCEDURE — 36415 COLL VENOUS BLD VENIPUNCTURE: CPT

## 2023-09-29 ASSESSMENT — PATIENT HEALTH QUESTIONNAIRE - PHQ9
SUM OF ALL RESPONSES TO PHQ9 QUESTIONS 1 & 2: 0
1. LITTLE INTEREST OR PLEASURE IN DOING THINGS: 0
SUM OF ALL RESPONSES TO PHQ QUESTIONS 1-9: 0
2. FEELING DOWN, DEPRESSED OR HOPELESS: 0

## 2023-09-29 NOTE — PROGRESS NOTES
Colten Gant presents today for   Chief Complaint   Patient presents with    Follow-up     6 month f/u     Device Check     ST FREDDIE     Edema     Bilateral feet edema on/off       Colten Peru preferred language for health care discussion is english/other. Is someone accompanying this pt? NO    Is the patient using any DME equipment during OV? NO    Depression Screening:  Depression: Not at risk (9/29/2023)    PHQ-2     PHQ-2 Score: 0        Learning Assessment:  Who is the primary learner? Patient    What is the preferred language for health care of the primary learner? ENGLISH    How does the primary learner prefer to learn new concepts? DEMONSTRATION    Answered By PATIENT    Relationship to Learner SELF           Pt currently taking Anticoagulant therapy? ELIQUIS 5 MG 2X DAILY     Pt currently taking Antiplatelet therapy ? NO      Coordination of Care:  1. Have you been to the ER, urgent care clinic since your last visit? Hospitalized since your last visit? NO    2. Have you seen or consulted any other health care providers outside of the [address] [place] [address] since your last visit? Include any pap smears or colon screening.  NO
KATALINA kilpatrick recheck device 1 month  35 mins spent    Thank you for allowing me to participate in the care of your patient, please do not hesitate to call with questions or concerns.       [address],    Yannick Treviño, DO

## 2023-09-29 NOTE — RESULT ENCOUNTER NOTE
Device check personally reviewed by me. Device approaching elective replacement time. Otherwise normal device function on interrogation. See scanned interrogation document for complete details.

## 2023-10-02 DIAGNOSIS — N28.9 RENAL INSUFFICIENCY: Primary | ICD-10-CM

## 2023-10-05 DIAGNOSIS — E11.21 TYPE 2 DIABETES WITH NEPHROPATHY (HCC): Primary | ICD-10-CM

## 2023-10-05 RX ORDER — LANCETS 30 GAUGE
1 EACH MISCELLANEOUS DAILY
Qty: 100 EACH | Refills: 5 | Status: SHIPPED | OUTPATIENT
Start: 2023-10-05

## 2023-10-15 DIAGNOSIS — E11.21 TYPE 2 DIABETES WITH NEPHROPATHY (HCC): ICD-10-CM

## 2023-10-17 RX ORDER — LANCETS
EACH MISCELLANEOUS
Qty: 100 EACH | Refills: 0 | OUTPATIENT
Start: 2023-10-17

## 2023-10-31 NOTE — PROGRESS NOTES
Patient presents for BP check. Any recent (exertional) chest pain? No   SOB? No  Palpitations? No  LE edema? No  Side effects of medication? No    Plan:     Patient requests Rx for new glucose meter kit. She uses One Touch Ultra 2 Glucometer. Pharmacy states that onetouch ultra 2 soft lancets have been discontinued. She needs to have a new machine sent to the pharmacy.

## 2023-11-01 ENCOUNTER — OFFICE VISIT (OUTPATIENT)
Age: 78
End: 2023-11-01
Payer: MEDICARE

## 2023-11-01 ENCOUNTER — NURSE ONLY (OUTPATIENT)
Age: 78
End: 2023-11-01

## 2023-11-01 VITALS
SYSTOLIC BLOOD PRESSURE: 138 MMHG | HEART RATE: 60 BPM | DIASTOLIC BLOOD PRESSURE: 80 MMHG | TEMPERATURE: 98 F | OXYGEN SATURATION: 99 %

## 2023-11-01 DIAGNOSIS — Z95.0 PRESENCE OF CARDIAC PACEMAKER: ICD-10-CM

## 2023-11-01 DIAGNOSIS — E11.21 TYPE 2 DIABETES WITH NEPHROPATHY (HCC): Primary | ICD-10-CM

## 2023-11-01 DIAGNOSIS — I48.0 PAROXYSMAL ATRIAL FIBRILLATION (HCC): Primary | ICD-10-CM

## 2023-11-01 PROCEDURE — 90694 VACC AIIV4 NO PRSRV 0.5ML IM: CPT | Performed by: FAMILY MEDICINE

## 2023-11-01 RX ORDER — BLOOD-GLUCOSE METER
1 KIT MISCELLANEOUS DAILY PRN
COMMUNITY
End: 2023-11-01 | Stop reason: SDUPTHER

## 2023-11-01 RX ORDER — GLUCOSAMINE HCL/CHONDROITIN SU 500-400 MG
CAPSULE ORAL
Qty: 100 STRIP | Refills: 5 | Status: SHIPPED | OUTPATIENT
Start: 2023-11-01

## 2023-11-01 RX ORDER — BLOOD-GLUCOSE METER
1 KIT MISCELLANEOUS DAILY PRN
Qty: 1 KIT | Refills: 0 | Status: SHIPPED | OUTPATIENT
Start: 2023-11-01

## 2023-11-01 NOTE — PROGRESS NOTES
Patient presents for the following vaccines: FLUAD. Patient consent obtained by patient. Patient tolerated procedure well at right deltoid. Patient advised to remain in lobby for period of 10 minutes post injection to ensure no reaction. VIS was given to patient.     Lot # [id number]  Exp: 6/30/24  MFG: [employer]  [facility] [address]: [phone]

## 2024-01-11 ENCOUNTER — NURSE ONLY (OUTPATIENT)
Age: 79
End: 2024-01-11

## 2024-01-11 DIAGNOSIS — Z95.0 PRESENCE OF CARDIAC PACEMAKER: ICD-10-CM

## 2024-01-11 DIAGNOSIS — R00.1 BRADYCARDIA, UNSPECIFIED: Primary | ICD-10-CM

## 2024-01-22 ENCOUNTER — OFFICE VISIT (OUTPATIENT)
Age: 79
End: 2024-01-22
Payer: MEDICARE

## 2024-01-22 VITALS
SYSTOLIC BLOOD PRESSURE: 132 MMHG | TEMPERATURE: 97 F | BODY MASS INDEX: 29.06 KG/M2 | HEIGHT: 66 IN | RESPIRATION RATE: 16 BRPM | DIASTOLIC BLOOD PRESSURE: 70 MMHG | OXYGEN SATURATION: 99 % | HEART RATE: 61 BPM | WEIGHT: 180.8 LBS

## 2024-01-22 DIAGNOSIS — M85.80 OSTEOPENIA, UNSPECIFIED LOCATION: ICD-10-CM

## 2024-01-22 DIAGNOSIS — E11.21 TYPE 2 DIABETES WITH NEPHROPATHY (HCC): ICD-10-CM

## 2024-01-22 DIAGNOSIS — Z95.0 CARDIAC PACEMAKER IN SITU: ICD-10-CM

## 2024-01-22 DIAGNOSIS — Z00.00 MEDICARE ANNUAL WELLNESS VISIT, SUBSEQUENT: Primary | ICD-10-CM

## 2024-01-22 DIAGNOSIS — H91.93 HEARING PROBLEM OF BOTH EARS: ICD-10-CM

## 2024-01-22 DIAGNOSIS — E21.3 HYPERPARATHYROIDISM, UNSPECIFIED (HCC): ICD-10-CM

## 2024-01-22 DIAGNOSIS — R00.1 BRADYCARDIA: ICD-10-CM

## 2024-01-22 DIAGNOSIS — N18.31 STAGE 3A CHRONIC KIDNEY DISEASE (HCC): ICD-10-CM

## 2024-01-22 DIAGNOSIS — I10 ESSENTIAL HYPERTENSION: ICD-10-CM

## 2024-01-22 DIAGNOSIS — I48.0 PAROXYSMAL ATRIAL FIBRILLATION (HCC): ICD-10-CM

## 2024-01-22 PROCEDURE — G0439 PPPS, SUBSEQ VISIT: HCPCS | Performed by: FAMILY MEDICINE

## 2024-01-22 PROCEDURE — 1123F ACP DISCUSS/DSCN MKR DOCD: CPT | Performed by: FAMILY MEDICINE

## 2024-01-22 PROCEDURE — 1090F PRES/ABSN URINE INCON ASSESS: CPT | Performed by: FAMILY MEDICINE

## 2024-01-22 PROCEDURE — G8428 CUR MEDS NOT DOCUMENT: HCPCS | Performed by: FAMILY MEDICINE

## 2024-01-22 PROCEDURE — 1036F TOBACCO NON-USER: CPT | Performed by: FAMILY MEDICINE

## 2024-01-22 PROCEDURE — 3075F SYST BP GE 130 - 139MM HG: CPT | Performed by: FAMILY MEDICINE

## 2024-01-22 PROCEDURE — G8399 PT W/DXA RESULTS DOCUMENT: HCPCS | Performed by: FAMILY MEDICINE

## 2024-01-22 PROCEDURE — G8484 FLU IMMUNIZE NO ADMIN: HCPCS | Performed by: FAMILY MEDICINE

## 2024-01-22 PROCEDURE — G8417 CALC BMI ABV UP PARAM F/U: HCPCS | Performed by: FAMILY MEDICINE

## 2024-01-22 PROCEDURE — 99213 OFFICE O/P EST LOW 20 MIN: CPT | Performed by: FAMILY MEDICINE

## 2024-01-22 PROCEDURE — 3078F DIAST BP <80 MM HG: CPT | Performed by: FAMILY MEDICINE

## 2024-01-22 RX ORDER — LANCING DEVICE/LANCETS
KIT MISCELLANEOUS
COMMUNITY
Start: 2023-11-03

## 2024-01-22 ASSESSMENT — PATIENT HEALTH QUESTIONNAIRE - PHQ9
1. LITTLE INTEREST OR PLEASURE IN DOING THINGS: 0
SUM OF ALL RESPONSES TO PHQ QUESTIONS 1-9: 0
2. FEELING DOWN, DEPRESSED OR HOPELESS: 0
SUM OF ALL RESPONSES TO PHQ QUESTIONS 1-9: 0
SUM OF ALL RESPONSES TO PHQ9 QUESTIONS 1 & 2: 0
SUM OF ALL RESPONSES TO PHQ QUESTIONS 1-9: 0
SUM OF ALL RESPONSES TO PHQ QUESTIONS 1-9: 0

## 2024-01-22 ASSESSMENT — LIFESTYLE VARIABLES
HOW MANY STANDARD DRINKS CONTAINING ALCOHOL DO YOU HAVE ON A TYPICAL DAY: PATIENT DOES NOT DRINK
HOW OFTEN DO YOU HAVE A DRINK CONTAINING ALCOHOL: NEVER

## 2024-01-22 NOTE — PROGRESS NOTES
Virgie Ortiz is a 78 y.o. female complains of   Chief Complaint   Patient presents with    Medicare AWV    Diabetes    Cholesterol Problem       HPI: Here for follow-up and wellness.  Diabetes.  Well-controlled.  No hypo or hyperglycemic symptoms.  Checking blood sugar at home and mostly under 120.  Following nephrology for renal insufficiency.  Avoiding NSAIDs.  Following endocrinology for elevated calcium and hyperparathyroidism.  Currently asymptomatic.  Vitals been stable.  Has been following ENT for hearing trouble.  Was told to mild in intensity and does not need any hearing aid.  Has pacemaker.  Battery is getting low so she is afraid of traveling.  She did talk to cardiology about some replacement as she would like to go for graduation.  Denies any headache, dizziness, no chest pain or trouble breathing, no arm or leg weakness. No nausea or vomiting, no weight or appetite changes, no mood changes . No urine or bowel complains, no palpitation, no diaphoresis. No abdominal pain. No cold or cough. No leg swelling. No fever. No sleep trouble.   CMP:  Lab Results   Component Value Date/Time     09/29/2023 10:42 AM    K 4.3 09/29/2023 10:42 AM     09/29/2023 10:42 AM    CO2 28 09/29/2023 10:42 AM    BUN 18 09/29/2023 10:42 AM    CREATININE 1.25 09/29/2023 10:42 AM    GLUCOSE 104 09/29/2023 10:42 AM    CALCIUM 9.0 09/29/2023 10:42 AM    PROT 7.6 09/29/2023 10:42 AM    LABALBU 3.8 09/29/2023 10:42 AM    BILITOT 0.7 09/29/2023 10:42 AM    AST 16 09/29/2023 10:42 AM    ALT 19 09/29/2023 10:42 AM          CBC:  Lab Results   Component Value Date/Time    WBC 7.8 09/29/2023 10:42 AM    RBC 4.47 09/29/2023 10:42 AM    HGB 12.0 09/29/2023 10:42 AM    HCT 38.7 09/29/2023 10:42 AM    MCV 86.6 09/29/2023 10:42 AM    MCH 26.8 09/29/2023 10:42 AM    MCHC 31.0 09/29/2023 10:42 AM    RDW 14.0 09/29/2023 10:42 AM     09/29/2023 10:42 AM    MPV 11.2 09/29/2023 10:42 AM        Lipids   Lab Results

## 2024-01-22 NOTE — PROGRESS NOTES
Medicare Annual Wellness Visit    Virgie Ortiz is here for Medicare AWV, Diabetes, and Cholesterol Problem    Assessment & Plan   Type 2 diabetes with nephropathy (HCC)  -     AMB POC HEMOGLOBIN A1C  Medicare annual wellness visit, subsequent:  Wants to get mammogram every 2 years.  Has living wheel and she will provide it if she decides to   Due immunization from the pharmacy if decides to .    Recommendations for Preventive Services Due: see orders and patient instructions/AVS.  Recommended screening schedule for the next 5-10 years is provided to the patient in written form: see Patient Instructions/AVS.     No follow-ups on file.     Subjective   See other note.     Patient's complete Health Risk Assessment and screening values have been reviewed and are found in Flowsheets. The following problems were reviewed today and where indicated follow up appointments were made and/or referrals ordered.    Positive Risk Factor Screenings with Interventions:                  Hearing Screen:  Do you or your family notice any trouble with your hearing that hasn't been managed with hearing aids?: (!) Yes (Dr. Grajeda 1/09/24)    Interventions:  Seen ENT. Mild hearing loss. Does not need any intervention at this time.      Safety:  Do you have non-slip mats or non-slip surfaces or shower bars or grab bars in your shower or bathtub?: (!) No  Do you always fasten your seatbelt when you are in a car?: (!) No  Interventions:  Observe for now.                  Objective   Vitals:    01/22/24 1001 01/22/24 1023   BP: (!) 152/70 132/70   Site: Right Upper Arm Right Upper Arm   Position: Sitting Sitting   Cuff Size: Small Adult Large Adult   Pulse: 61    Resp: 16    Temp: 97 °F (36.1 °C)    TempSrc: Temporal    SpO2: 99%    Weight: 82 kg (180 lb 12.8 oz)    Height: 1.676 m (5' 6\")       Body mass index is 29.18 kg/m².               Allergies   Allergen Reactions    Statins Other (See Comments)     Felt sick      Prior to Visit

## 2024-01-22 NOTE — ACP (ADVANCE CARE PLANNING)
Advance Care Planning     General Advance Care Planning (ACP) Conversation    Date of Conversation: 1/22/2024  Conducted with: Patient with Decision Making Capacity    Healthcare Decision Maker:    Primary Decision Maker: Cal Ortiz - Child - [phone]    Primary Decision Maker: Shanthi Maya - Child - [phone]  Click here to complete Healthcare Decision Makers including selection of the Healthcare Decision Maker Relationship (ie \"Primary\").   Today we  discussed advance directive. Wants resuscitation. Wants prolong life support but has made a living wheel.     Content/Action Overview:  See above. Requested copy and pt will think about giving it to the office.   Reviewed DNR/DNI and patient elects Full Code (Attempt Resuscitation)  treatment goals, benefit/burden of treatment options, artificial nutrition, ventilation preferences, hospitalization preferences, resuscitation preferences, end of life care preferences (vegetative state/imminent death), and hospice care      Length of Voluntary ACP Conversation in minutes:  <16 minutes (Non-Billable)    Theresa Espino MD

## 2024-01-22 NOTE — PROGRESS NOTES
1. \"Have you been to the ER, urgent care clinic since your last visit?  Hospitalized since your last visit?\" No    2. \"Have you seen or consulted any other health care providers outside of the [facility] since your last visit?\" Dr. Corrales LOV: 1/15/24

## 2024-02-15 ENCOUNTER — PREP FOR PROCEDURE (OUTPATIENT)
Age: 79
End: 2024-02-15

## 2024-02-15 ENCOUNTER — NURSE ONLY (OUTPATIENT)
Age: 79
End: 2024-02-15

## 2024-02-15 DIAGNOSIS — Z95.0 PRESENCE OF CARDIAC PACEMAKER: Primary | ICD-10-CM

## 2024-02-15 DIAGNOSIS — R00.1 BRADYCARDIA, UNSPECIFIED: Primary | ICD-10-CM

## 2024-02-15 DIAGNOSIS — R00.1 BRADYCARDIA, UNSPECIFIED: ICD-10-CM

## 2024-02-15 RX ORDER — SODIUM CHLORIDE 0.9 % (FLUSH) 0.9 %
5-40 SYRINGE (ML) INJECTION EVERY 12 HOURS SCHEDULED
OUTPATIENT
Start: 2024-02-28

## 2024-02-15 RX ORDER — SODIUM CHLORIDE 0.9 % (FLUSH) 0.9 %
5-40 SYRINGE (ML) INJECTION PRN
OUTPATIENT
Start: 2024-02-28

## 2024-02-15 RX ORDER — SODIUM CHLORIDE 9 MG/ML
INJECTION, SOLUTION INTRAVENOUS PRN
OUTPATIENT
Start: 2024-02-28

## 2024-02-15 NOTE — PATIENT INSTRUCTIONS
[facility]   Patient  EP Instructions  [address] [place] [address]                You are scheduled to have a ST Tomas Pacemaker gen change on  February 28th , 2024 , at 800 am.     Please check in at 0645 am.     Please go to [facility] and park in the outpatient parking lot that is located around to the back of the hospital and enter through the [facility].   Once you enter through the [facility] check in with the  there.  The  will either give you directions or assist you in getting to the cath holding area.           3.  You are not to eat or drink anything after midnight the night before your procedure.      Please continue to take your medications with a small sip of water on the morning of the procedure with the following exceptions: Hold Eliquis 48 hours prior to procedure. Hold Glucophage the morning of your procedure.      If you are diabetic, do not take your insulin/sugar pill the morning of the procedure.     We encourage families to wait in the waiting room on the first floor while the procedure is being done.  The Doctor will come out and talk with you as soon as the procedure is over. You will not be able to drive yourself home after your procedure is done.      There is the possibility that you may spend the night in the hospital, depending on the results of the procedure.  This will be determined after the procedure is done.      8.   If you or your family have any questions, please call our office Monday-Friday 8:30 am - 4:30 pm , at [phone], and ask to speak to one of the nurses.    Post Implant Instructions for Pacemakers,AICD, and BIV devices.      You may remove the big bulky bandage from the incision site after 24-48 hours.  Keep the incision clean and dry for another 2 days.  After that, it is ok to get the incision wet but do not soak or scrub the incision.  Pat the area dry with a clean towel.  The small

## 2024-02-16 NOTE — RESULT ENCOUNTER NOTE
Device check personally reviewed by me.  Device reached KATALINA this month. Generator change scheduled.  See scanned interrogation document for complete details.

## 2024-02-24 ENCOUNTER — HOSPITAL ENCOUNTER (OUTPATIENT)
Facility: HOSPITAL | Age: 79
Discharge: HOME | End: 2024-02-27
Payer: MEDICARE

## 2024-02-24 DIAGNOSIS — R00.1 BRADYCARDIA, UNSPECIFIED: ICD-10-CM

## 2024-02-24 DIAGNOSIS — E11.21 TYPE 2 DIABETES WITH NEPHROPATHY (HCC): ICD-10-CM

## 2024-02-24 DIAGNOSIS — Z95.0 PRESENCE OF CARDIAC PACEMAKER: ICD-10-CM

## 2024-02-24 LAB
ALBUMIN SERPL-MCNC: 3.7 G/DL (ref 3.4–5)
ALBUMIN/GLOB SERPL: 1.2 (ref 0.8–1.7)
ALP SERPL-CCNC: 78 U/L (ref 45–117)
ALT SERPL-CCNC: 13 U/L (ref 13–56)
ANION GAP SERPL CALC-SCNC: 2 MMOL/L (ref 3–18)
AST SERPL-CCNC: 13 U/L (ref 10–38)
BILIRUB SERPL-MCNC: 0.8 MG/DL (ref 0.2–1)
BUN SERPL-MCNC: 18 MG/DL (ref 7–18)
BUN/CREAT SERPL: 17 (ref 12–20)
CALCIUM SERPL-MCNC: 9.4 MG/DL (ref 8.5–10.1)
CHLORIDE SERPL-SCNC: 105 MMOL/L (ref 100–111)
CO2 SERPL-SCNC: 29 MMOL/L (ref 21–32)
CREAT SERPL-MCNC: 1.06 MG/DL (ref 0.6–1.3)
ERYTHROCYTE [DISTWIDTH] IN BLOOD BY AUTOMATED COUNT: 14 % (ref 11.6–14.5)
EST. AVERAGE GLUCOSE BLD GHB EST-MCNC: 126 MG/DL
GLOBULIN SER CALC-MCNC: 3.1 G/DL (ref 2–4)
GLUCOSE SERPL-MCNC: 98 MG/DL (ref 74–99)
HBA1C MFR BLD: 6 % (ref 4.2–5.6)
HCT VFR BLD AUTO: 37 % (ref 35–45)
HGB BLD-MCNC: 11.6 G/DL (ref 12–16)
INR PPP: 1.2 (ref 0.9–1.1)
MCH RBC QN AUTO: 27.2 PG (ref 24–34)
MCHC RBC AUTO-ENTMCNC: 31.4 G/DL (ref 31–37)
MCV RBC AUTO: 86.7 FL (ref 78–100)
NRBC # BLD: 0 K/UL (ref 0–0.01)
NRBC BLD-RTO: 0 PER 100 WBC
PLATELET # BLD AUTO: 201 K/UL (ref 135–420)
PMV BLD AUTO: 11.3 FL (ref 9.2–11.8)
POTASSIUM SERPL-SCNC: 4.7 MMOL/L (ref 3.5–5.5)
PROT SERPL-MCNC: 6.8 G/DL (ref 6.4–8.2)
PROTHROMBIN TIME: 15.4 SEC (ref 11.9–14.7)
RBC # BLD AUTO: 4.27 M/UL (ref 4.2–5.3)
SODIUM SERPL-SCNC: 136 MMOL/L (ref 136–145)
WBC # BLD AUTO: 5.6 K/UL (ref 4.6–13.2)

## 2024-02-24 PROCEDURE — 83036 HEMOGLOBIN GLYCOSYLATED A1C: CPT

## 2024-02-24 PROCEDURE — 36415 COLL VENOUS BLD VENIPUNCTURE: CPT

## 2024-02-24 PROCEDURE — 85610 PROTHROMBIN TIME: CPT

## 2024-02-24 PROCEDURE — 80053 COMPREHEN METABOLIC PANEL: CPT

## 2024-02-24 PROCEDURE — 85027 COMPLETE CBC AUTOMATED: CPT

## 2024-02-28 ENCOUNTER — HOSPITAL ENCOUNTER (OUTPATIENT)
Facility: HOSPITAL | Age: 79
Setting detail: OUTPATIENT SURGERY
Discharge: HOME | End: 2024-02-28
Attending: INTERNAL MEDICINE | Admitting: INTERNAL MEDICINE
Payer: MEDICARE

## 2024-02-28 VITALS
HEART RATE: 63 BPM | OXYGEN SATURATION: 97 % | SYSTOLIC BLOOD PRESSURE: 143 MMHG | HEIGHT: 66 IN | RESPIRATION RATE: 24 BRPM | WEIGHT: 176.37 LBS | DIASTOLIC BLOOD PRESSURE: 66 MMHG | BODY MASS INDEX: 28.34 KG/M2

## 2024-02-28 DIAGNOSIS — Z45.010 ENCOUNTER FOR PACEMAKER AT END OF BATTERY LIFE: ICD-10-CM

## 2024-02-28 LAB — ECHO BSA: 1.93 M2

## 2024-02-28 PROCEDURE — 2500000003 HC RX 250 WO HCPCS: Performed by: INTERNAL MEDICINE

## 2024-02-28 PROCEDURE — 2580000003 HC RX 258: Performed by: INTERNAL MEDICINE

## 2024-02-28 PROCEDURE — C1785 PMKR, DUAL, RATE-RESP: HCPCS | Performed by: INTERNAL MEDICINE

## 2024-02-28 PROCEDURE — 33228 REMV&REPLC PM GEN DUAL LEAD: CPT | Performed by: INTERNAL MEDICINE

## 2024-02-28 PROCEDURE — 2709999900 HC NON-CHARGEABLE SUPPLY: Performed by: INTERNAL MEDICINE

## 2024-02-28 PROCEDURE — C1894 INTRO/SHEATH, NON-LASER: HCPCS | Performed by: INTERNAL MEDICINE

## 2024-02-28 PROCEDURE — 99153 MOD SED SAME PHYS/QHP EA: CPT | Performed by: INTERNAL MEDICINE

## 2024-02-28 PROCEDURE — 2720000010 HC SURG SUPPLY STERILE: Performed by: INTERNAL MEDICINE

## 2024-02-28 PROCEDURE — 99152 MOD SED SAME PHYS/QHP 5/>YRS: CPT | Performed by: INTERNAL MEDICINE

## 2024-02-28 PROCEDURE — 6360000002 HC RX W HCPCS: Performed by: INTERNAL MEDICINE

## 2024-02-28 DEVICE — DR PACEMAKER DDDR
Type: IMPLANTABLE DEVICE | Status: FUNCTIONAL
Brand: ASSURITY MRI™

## 2024-02-28 RX ORDER — MIDAZOLAM HYDROCHLORIDE 1 MG/ML
INJECTION INTRAMUSCULAR; INTRAVENOUS PRN
Status: DISCONTINUED | OUTPATIENT
Start: 2024-02-28 | End: 2024-02-28 | Stop reason: HOSPADM

## 2024-02-28 RX ORDER — SODIUM CHLORIDE 0.9 % (FLUSH) 0.9 %
SYRINGE (ML) INJECTION PRN
Status: DISCONTINUED | OUTPATIENT
Start: 2024-02-28 | End: 2024-02-28 | Stop reason: HOSPADM

## 2024-02-28 RX ORDER — FENTANYL CITRATE 50 UG/ML
INJECTION, SOLUTION INTRAMUSCULAR; INTRAVENOUS PRN
Status: DISCONTINUED | OUTPATIENT
Start: 2024-02-28 | End: 2024-02-28 | Stop reason: HOSPADM

## 2024-02-28 ASSESSMENT — PAIN - FUNCTIONAL ASSESSMENT: PAIN_FUNCTIONAL_ASSESSMENT: NONE - DENIES PAIN

## 2024-02-28 NOTE — DISCHARGE INSTRUCTIONS
be retaining fluid if you have a history of heart failure or if you experience any of the following symptoms:  Weight gain of 3 pounds or more overnight or 5 pounds in a week, increased swelling in our hands or feet or shortness of breath while lying flat in bed.  Please call your doctor as soon as you notice any of these symptoms; do not wait until your next office visit.        The discharge information has been reviewed with the patient.  The patient verbalized understanding.  Discharge medications reviewed with the patient and appropriate educational materials and side effects teaching were provided.  ___________________________________________________________________________________________________________________________________

## 2024-02-28 NOTE — H&P
HPI    Virgie Ortiz is a 77 y.o. AAF (originally from [facility]) with pAF, SSS s/p PPM here for routine follow up care. As you know she moved here from [place] a few yrs ago and established with my partner, Dr. Lee.     She had a pacemaker implantation for symptomatic bradycardia in September of 2013.  She had atrial fibrillation and finally underwent the atrial fibrillation ablation successfully in March of 2016.  She was treated with flecainide briefly.  As the flecainide was discontinued, she has been anticoagulated with Eliquis.  She has previous history of DVT after knee surgery.  She has history of hypercholesterolemia, but has not been able to tolerate statins because of severe myalgia and other side effects.  She is known to have had type 2 diabetes.  She has history of seizure disorder; however, she has not had any recurrent seizures since 2006 off medication.       Patient's pacemaker generator recently reached the elective replacement time.  She presents today for scheduled pacemaker generator exchange.  She has been holding her Eliquis for greater than 48 hours.              Past Medical History:   Diagnosis Date    Arrhythmia       history atrial fib    Bradycardia      Diabetes (HCC)       borderline    DVT (deep venous thrombosis) ([facility]) 2010     right    History of ablation March 2016      History of atrial fibrillation      Hypercholesterolemia      Hypertension      Long term current use of anticoagulant therapy      Menopause      Osteoporosis      Seizure (HCC)      Seizures ([facility])       on medication from 2006 to 2013 no seizure since 2006    St. Tomas pacemaker implant Set. 2013 Dr. Shyam French, [place]      Thyroid disease       nodules               Past Surgical History:   Procedure Laterality Date    COLONOSCOPY N/A 10/25/2017     COLONOSCOPY performed by Dontae Guerrero MD at [facility] ENDOSCOPY    HX CATARACT REMOVAL        HX HYSTERECTOMY        HX KNEE REPLACEMENT         left knee in 1/2010

## 2024-02-28 NOTE — PROGRESS NOTES
Cath holding summary:    0710: Patient ambulated from waiting area without difficulty, placed on monitor Paced. A&O, no c/o. ID, NPO status, allergies verified. H&P reviewed, med rec completed. PIV x2 inserted, blood sent to lab. Groin prep completed, consent ready for signature.    0759: Verbal report given to Tuyet on patient being transferred to EP lab for ordered procedure. Report consisted of patient's Situation, Background, Assessment and Recommendations (SBAR). Information from the following report(s) Nurse Handoff Report, Intake/Output, MAR, Recent Results, Med Rec Status, Cardiac Rhythm Paced, Pre Procedure Checklist, and Procedure Verification was reviewed with the receiving nurse. Opportunity for questions and clarification was provided.    0903: Verbal report received from Felecia on patient being received from EP for routine post-op. Report consisted of patient's Situation, Background, Assessment and Recommendations (SBAR). Information from the following report(s) Nurse Handoff Report, Surgery Report, Intake/Output, MAR, Recent Results, Med Rec Status, Cardiac Rhythm DDDR , and Event Log was reviewed with the receiving nurse. Opportunity for questions and clarification was provided. Assessment completed upon patient's arrival to unit and care assumed.   Procedure: Pacemaker/ICD  Intervention: Yes  Site: Left, Chest      1100: AVS Discharge instructions reviewed with patient, all questions answered. Patient verbalized understanding, copy given. Procedural site within normal limits, PIV removed. No hematoma or bleeding noted from procedural or IV site. Patient denied complaints, discharged with support person in stable condition. Escorted out to vehicle for transport home.

## 2024-03-07 ENCOUNTER — HOSPITAL ENCOUNTER (OUTPATIENT)
Facility: HOSPITAL | Age: 79
Discharge: HOME | End: 2024-03-07
Payer: MEDICARE

## 2024-03-07 ENCOUNTER — NURSE ONLY (OUTPATIENT)
Age: 79
End: 2024-03-07

## 2024-03-07 LAB
25(OH)D3 SERPL-MCNC: 57.4 NG/ML (ref 30–100)
ALBUMIN SERPL-MCNC: 3.9 G/DL (ref 3.4–5)
ANION GAP SERPL CALC-SCNC: 4 MMOL/L (ref 3–18)
APPEARANCE UR: CLEAR
BACTERIA URNS QL MICRO: ABNORMAL /HPF
BILIRUB UR QL: NEGATIVE
BUN SERPL-MCNC: 16 MG/DL (ref 7–18)
BUN/CREAT SERPL: 15 (ref 12–20)
CALCIUM SERPL-MCNC: 9.3 MG/DL (ref 8.5–10.1)
CALCIUM SERPL-MCNC: 9.9 MG/DL (ref 8.5–10.1)
CHLORIDE SERPL-SCNC: 106 MMOL/L (ref 100–111)
CO2 SERPL-SCNC: 29 MMOL/L (ref 21–32)
COLOR UR: YELLOW
CREAT SERPL-MCNC: 1.05 MG/DL (ref 0.6–1.3)
CREAT UR-MCNC: 42 MG/DL (ref 30–125)
EPITH CASTS URNS QL MICRO: ABNORMAL /LPF (ref 0–5)
GLUCOSE SERPL-MCNC: 126 MG/DL (ref 74–99)
GLUCOSE UR STRIP.AUTO-MCNC: NEGATIVE MG/DL
HGB UR QL STRIP: NEGATIVE
KETONES UR QL STRIP.AUTO: NEGATIVE MG/DL
LEUKOCYTE ESTERASE UR QL STRIP.AUTO: ABNORMAL
MICROALBUMIN UR-MCNC: <0.5 MG/DL (ref 0–3)
MICROALBUMIN/CREAT UR-RTO: NORMAL MG/G (ref 0–30)
NITRITE UR QL STRIP.AUTO: POSITIVE
PH UR STRIP: 6 (ref 5–8)
PHOSPHATE SERPL-MCNC: 3.6 MG/DL (ref 2.5–4.9)
POTASSIUM SERPL-SCNC: 4.6 MMOL/L (ref 3.5–5.5)
PROT UR STRIP-MCNC: NEGATIVE MG/DL
PTH-INTACT SERPL-MCNC: 74.1 PG/ML (ref 18.4–88)
RBC #/AREA URNS HPF: ABNORMAL /HPF (ref 0–5)
SODIUM SERPL-SCNC: 139 MMOL/L (ref 136–145)
SP GR UR REFRACTOMETRY: 1.01 (ref 1–1.03)
UROBILINOGEN UR QL STRIP.AUTO: 0.2 EU/DL (ref 0.2–1)
WBC URNS QL MICRO: ABNORMAL /HPF (ref 0–4)

## 2024-03-07 PROCEDURE — 82043 UR ALBUMIN QUANTITATIVE: CPT

## 2024-03-07 PROCEDURE — 36415 COLL VENOUS BLD VENIPUNCTURE: CPT

## 2024-03-07 PROCEDURE — 83970 ASSAY OF PARATHORMONE: CPT

## 2024-03-07 PROCEDURE — 86334 IMMUNOFIX E-PHORESIS SERUM: CPT

## 2024-03-07 PROCEDURE — 82570 ASSAY OF URINE CREATININE: CPT

## 2024-03-07 PROCEDURE — 84165 PROTEIN E-PHORESIS SERUM: CPT

## 2024-03-07 PROCEDURE — 80069 RENAL FUNCTION PANEL: CPT

## 2024-03-07 PROCEDURE — 82306 VITAMIN D 25 HYDROXY: CPT

## 2024-03-07 PROCEDURE — 83521 IG LIGHT CHAINS FREE EACH: CPT

## 2024-03-07 PROCEDURE — 82784 ASSAY IGA/IGD/IGG/IGM EACH: CPT

## 2024-03-07 PROCEDURE — 81001 URINALYSIS AUTO W/SCOPE: CPT

## 2024-03-07 NOTE — PROGRESS NOTES
Patient came into the office for a 1 week post [employer]. Tomas Gen Change on February 28, 2024 placement for a wound check. Removed the bandage and there was no redness, swelling or draining. Pt denies any pain.

## 2024-03-08 LAB
KAPPA LC FREE SER-MCNC: 61.8 MG/L (ref 3.3–19.4)
KAPPA LC FREE/LAMBDA FREE SER: 4.12 (ref 0.26–1.65)
LAMBDA LC FREE SERPL-MCNC: 15 MG/L (ref 5.7–26.3)

## 2024-03-09 LAB
IGA SERPL-MCNC: 598 MG/DL (ref 64–422)
IGG SERPL-MCNC: 1206 MG/DL (ref 586–1602)
IGM SERPL-MCNC: 57 MG/DL (ref 26–217)
PROT PATTERN SERPL IFE-IMP: ABNORMAL

## 2024-03-12 LAB
ALBUMIN SERPL ELPH-MCNC: 4 G/DL (ref 2.9–4.4)
ALBUMIN/GLOB SERPL: 1.2 (ref 0.7–1.7)
ALPHA1 GLOB SERPL ELPH-MCNC: 0.2 G/DL (ref 0–0.4)
ALPHA2 GLOB SERPL ELPH-MCNC: 0.7 G/DL (ref 0.4–1)
B-GLOBULIN SERPL ELPH-MCNC: 1.3 G/DL (ref 0.7–1.3)
GAMMA GLOB SERPL ELPH-MCNC: 1.2 G/DL (ref 0.4–1.8)
GLOBULIN SER CALC-MCNC: 3.3 G/DL (ref 2.2–3.9)
M PROTEIN SERPL ELPH-MCNC: 0.2 G/DL
PROT SERPL-MCNC: 7.3 G/DL (ref 6–8.5)

## 2024-03-27 RX ORDER — METOPROLOL SUCCINATE 50 MG/1
50 TABLET, EXTENDED RELEASE ORAL DAILY
Qty: 90 TABLET | Refills: 1 | Status: SHIPPED | OUTPATIENT
Start: 2024-03-27

## 2024-04-15 ENCOUNTER — TELEPHONE (OUTPATIENT)
Age: 79
End: 2024-04-15

## 2024-04-15 RX ORDER — LOSARTAN POTASSIUM 100 MG/1
100 TABLET ORAL DAILY
Qty: 90 TABLET | Refills: 1 | Status: SHIPPED | OUTPATIENT
Start: 2024-04-15

## 2024-04-15 NOTE — TELEPHONE ENCOUNTER
This pharmacy faxed over request for the following prescriptions to be filled:    Medication requested : losartan (COZAAR) 100 MG tablet QTY 90 Refill 1   PCP: Kenzie  Pharmacy or Print: [facility]  Mail order or Local pharmacy [facility]      Scheduled appointment if not seen by current providers in office:  LOV 1/22/2024 ANGELA 7/22/2024

## 2024-04-18 ENCOUNTER — OFFICE VISIT (OUTPATIENT)
Age: 79
End: 2024-04-18
Payer: MEDICARE

## 2024-04-18 ENCOUNTER — NURSE ONLY (OUTPATIENT)
Age: 79
End: 2024-04-18

## 2024-04-18 VITALS
DIASTOLIC BLOOD PRESSURE: 70 MMHG | HEIGHT: 66 IN | HEART RATE: 82 BPM | OXYGEN SATURATION: 97 % | WEIGHT: 183 LBS | SYSTOLIC BLOOD PRESSURE: 142 MMHG | BODY MASS INDEX: 29.41 KG/M2

## 2024-04-18 DIAGNOSIS — Z95.0 PRESENCE OF CARDIAC PACEMAKER: Primary | ICD-10-CM

## 2024-04-18 DIAGNOSIS — R00.1 BRADYCARDIA, UNSPECIFIED: ICD-10-CM

## 2024-04-18 DIAGNOSIS — I48.0 PAROXYSMAL ATRIAL FIBRILLATION (HCC): ICD-10-CM

## 2024-04-18 DIAGNOSIS — I10 ESSENTIAL (PRIMARY) HYPERTENSION: ICD-10-CM

## 2024-04-18 PROCEDURE — 3078F DIAST BP <80 MM HG: CPT | Performed by: INTERNAL MEDICINE

## 2024-04-18 PROCEDURE — G8399 PT W/DXA RESULTS DOCUMENT: HCPCS | Performed by: INTERNAL MEDICINE

## 2024-04-18 PROCEDURE — G8427 DOCREV CUR MEDS BY ELIG CLIN: HCPCS | Performed by: INTERNAL MEDICINE

## 2024-04-18 PROCEDURE — G8417 CALC BMI ABV UP PARAM F/U: HCPCS | Performed by: INTERNAL MEDICINE

## 2024-04-18 PROCEDURE — 99214 OFFICE O/P EST MOD 30 MIN: CPT | Performed by: INTERNAL MEDICINE

## 2024-04-18 PROCEDURE — 1090F PRES/ABSN URINE INCON ASSESS: CPT | Performed by: INTERNAL MEDICINE

## 2024-04-18 PROCEDURE — 1036F TOBACCO NON-USER: CPT | Performed by: INTERNAL MEDICINE

## 2024-04-18 PROCEDURE — 1123F ACP DISCUSS/DSCN MKR DOCD: CPT | Performed by: INTERNAL MEDICINE

## 2024-04-18 PROCEDURE — 3077F SYST BP >= 140 MM HG: CPT | Performed by: INTERNAL MEDICINE

## 2024-04-18 ASSESSMENT — PATIENT HEALTH QUESTIONNAIRE - PHQ9
2. FEELING DOWN, DEPRESSED OR HOPELESS: NOT AT ALL
SUM OF ALL RESPONSES TO PHQ QUESTIONS 1-9: 0
1. LITTLE INTEREST OR PLEASURE IN DOING THINGS: NOT AT ALL
SUM OF ALL RESPONSES TO PHQ QUESTIONS 1-9: 0
SUM OF ALL RESPONSES TO PHQ9 QUESTIONS 1 & 2: 0
SUM OF ALL RESPONSES TO PHQ QUESTIONS 1-9: 0
SUM OF ALL RESPONSES TO PHQ QUESTIONS 1-9: 0

## 2024-04-18 NOTE — PROGRESS NOTES
Virgie Ortiz    F/u pAF/ SSS    HPI    Virgie Ortiz is a 77 y.o. AAF (originally from [facility]) with pAF, SSS s/p PPM here for routine follow up care. As you know she moved here from [place] a few yrs ago and established with my partner, Dr. Lee.     She has known history of cardiac problems.  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  She had atrial fibrillation and finally underwent the atrial fibrillation ablation successfully in March of 2016.  She was treated with flecainide briefly.  As the flecainide was discontinued, she has been anticoagulated with Eliquis.  She has previous history of DVT after knee surgery.  She has history of hypercholesterolemia, but has not been able to tolerate statins because of severe myalgia and other side effects.  She is known to have had type 2 diabetes.  She has history of seizure disorder; however, she has not had any recurrent seizures since 2006 off medication.      She has no cardiac complaints.  For last three days her left arm is aching, has a hard time sleeping on it. Pain goes up into her neck/ head on that side.    Past Medical History:   Diagnosis Date    Arrhythmia     history atrial fib    Bradycardia     Diabetes (HCC)     borderline    DVT (deep venous thrombosis) ([facility]) 2010    right    History of ablation March 2016     History of atrial fibrillation     Hypercholesterolemia     Hypertension     Long term current use of anticoagulant therapy     Menopause     Osteoporosis     Seizure (HCC)     Seizures (HCC)     on medication from 2006 to 2013 no seizure since 2006    St. Tomas pacemaker implant Set. 2013 Dr. Shyam [place], [place]     Thyroid disease     nodules       Past Surgical History:   Procedure Laterality Date    COLONOSCOPY N/A 10/25/2017    COLONOSCOPY performed by Dontae Guerrero MD at [facility] ENDOSCOPY    HX CATARACT REMOVAL      HX HYSTERECTOMY      HX KNEE REPLACEMENT      left knee in 1/2010 and right knee 7/2016    HX

## 2024-04-18 NOTE — PROGRESS NOTES
Virgie Ortiz presents today for   Chief Complaint   Patient presents with    Follow-up     6 month f/u     Device Check     St Tomas     Palpitations     Fluttering palps at times     Edema     Bilateral legs/ankle edema on/off       Virgie Ortiz preferred language for health care discussion is english/other.    Is someone accompanying this pt? no    Is the patient using any DME equipment during OV? no    Depression Screening:  Depression: Not at risk (4/18/2024)    PHQ-2     PHQ-2 Score: 0        Learning Assessment:  Who is the primary learner? Patient    What is the preferred language for health care of the primary learner? ENGLISH    How does the primary learner prefer to learn new concepts? DEMONSTRATION    Answered By patient    Relationship to Learner SELF           Pt currently taking Anticoagulant therapy? Eliquis 5 mg 2x daily     Pt currently taking Antiplatelet therapy ? no      Coordination of Care:  1. Have you been to the ER, urgent care clinic since your last visit? Hospitalized since your last visit? no    2. Have you seen or consulted any other health care providers outside of the [facility] since your last visit? Include any pap smears or colon screening. no

## 2024-06-18 RX ORDER — BLOOD SUGAR DIAGNOSTIC
STRIP MISCELLANEOUS
Qty: 100 EACH | Refills: 5 | Status: SHIPPED | OUTPATIENT
Start: 2024-06-18

## 2024-07-01 ENCOUNTER — TELEPHONE (OUTPATIENT)
Facility: CLINIC | Age: 79
End: 2024-07-01

## 2024-07-01 RX ORDER — BLOOD SUGAR DIAGNOSTIC
STRIP MISCELLANEOUS
Qty: 100 EACH | Refills: 5 | Status: SHIPPED | OUTPATIENT
Start: 2024-07-01 | End: 2024-07-02 | Stop reason: SDUPTHER

## 2024-07-01 NOTE — TELEPHONE ENCOUNTER
This pharmacy faxed over request for the following prescriptions to be filled:    Medication requested : ONE TOUCH ULTRA BLUE TEST STP   PCP: DR. DELACRUZ   Pharmacy or Print: PHARMACY   Mail order or Local pharmacy [facility] [address]     Scheduled appointment if not seen by current providers in office: 07/22/2024

## 2024-07-01 NOTE — TELEPHONE ENCOUNTER
This pharmacy faxed over request for the following prescriptions to be filled:    Medication requested : ONETOUCH ULTRA BLUE TEST   PCP: DR. DELACRUZ  Pharmacy or Print: [facility]   Mail order or Local pharmacy [facility]     Scheduled appointment if not seen by current providers in office: 07/22/2024

## 2024-07-02 RX ORDER — BLOOD SUGAR DIAGNOSTIC
STRIP MISCELLANEOUS
Qty: 100 EACH | Refills: 5 | Status: CANCELLED | OUTPATIENT
Start: 2024-07-02

## 2024-07-02 NOTE — TELEPHONE ENCOUNTER
Spoke with Aretha at [facility] pharmacy to follow-up on test strip refill request. Aretha stated we must send an updated Rx for \"OneTouch Ultra Test Strips\" which needs to include the number of times patient is testing and the diagnosis code, per Medicare guidelines. Rx has been pended with the requested information - routing to provider to sign.    I did speak with the patient (two identifiers verified) to inquire how many times she is testing blood sugar. Patient stated she tests blood sugar one time daily. She is aware I am routing request for Dr. Espino to resend Rx to pharmacy for test strips with this information. Patient was advised to check status of refill with [facility] pharmacy tomorrow. She verbalized understanding.

## 2024-07-03 RX ORDER — BLOOD SUGAR DIAGNOSTIC
STRIP MISCELLANEOUS
Qty: 100 EACH | Refills: 5 | Status: SHIPPED | OUTPATIENT
Start: 2024-07-03

## 2024-07-22 ENCOUNTER — HOSPITAL ENCOUNTER (OUTPATIENT)
Facility: HOSPITAL | Age: 79
Discharge: HOME | End: 2024-07-25
Payer: MEDICARE

## 2024-07-22 ENCOUNTER — OFFICE VISIT (OUTPATIENT)
Facility: CLINIC | Age: 79
End: 2024-07-22
Payer: MEDICARE

## 2024-07-22 VITALS
WEIGHT: 180 LBS | HEIGHT: 66 IN | SYSTOLIC BLOOD PRESSURE: 112 MMHG | TEMPERATURE: 96.9 F | DIASTOLIC BLOOD PRESSURE: 60 MMHG | OXYGEN SATURATION: 98 % | HEART RATE: 60 BPM | RESPIRATION RATE: 16 BRPM | BODY MASS INDEX: 28.93 KG/M2

## 2024-07-22 DIAGNOSIS — E78.00 PURE HYPERCHOLESTEROLEMIA: ICD-10-CM

## 2024-07-22 DIAGNOSIS — I10 ESSENTIAL HYPERTENSION: Primary | ICD-10-CM

## 2024-07-22 DIAGNOSIS — M54.2 NECK PAIN: ICD-10-CM

## 2024-07-22 DIAGNOSIS — E83.52 HYPERCALCEMIA: ICD-10-CM

## 2024-07-22 DIAGNOSIS — Z95.0 CARDIAC PACEMAKER IN SITU: ICD-10-CM

## 2024-07-22 DIAGNOSIS — R00.1 BRADYCARDIA: ICD-10-CM

## 2024-07-22 DIAGNOSIS — N18.31 STAGE 3A CHRONIC KIDNEY DISEASE (HCC): ICD-10-CM

## 2024-07-22 DIAGNOSIS — I48.20 CHRONIC ATRIAL FIBRILLATION (HCC): ICD-10-CM

## 2024-07-22 DIAGNOSIS — Z86.69 HISTORY OF EPILEPSY: ICD-10-CM

## 2024-07-22 DIAGNOSIS — E04.1 THYROID NODULE: ICD-10-CM

## 2024-07-22 DIAGNOSIS — E11.21 TYPE 2 DIABETES WITH NEPHROPATHY (HCC): ICD-10-CM

## 2024-07-22 DIAGNOSIS — E21.3 HYPERPARATHYROIDISM, UNSPECIFIED (HCC): ICD-10-CM

## 2024-07-22 DIAGNOSIS — R53.83 FATIGUE, UNSPECIFIED TYPE: ICD-10-CM

## 2024-07-22 LAB
ALBUMIN SERPL-MCNC: 3.7 G/DL (ref 3.4–5)
ALBUMIN/GLOB SERPL: 1.1 (ref 0.8–1.7)
ALP SERPL-CCNC: 71 U/L (ref 45–117)
ALT SERPL-CCNC: 17 U/L (ref 13–56)
ANION GAP SERPL CALC-SCNC: 5 MMOL/L (ref 3–18)
AST SERPL-CCNC: 15 U/L (ref 10–38)
BILIRUB SERPL-MCNC: 0.8 MG/DL (ref 0.2–1)
BUN SERPL-MCNC: 17 MG/DL (ref 7–18)
BUN/CREAT SERPL: 15 (ref 12–20)
CALCIUM SERPL-MCNC: 9.2 MG/DL (ref 8.5–10.1)
CHLORIDE SERPL-SCNC: 105 MMOL/L (ref 100–111)
CO2 SERPL-SCNC: 29 MMOL/L (ref 21–32)
CREAT SERPL-MCNC: 1.16 MG/DL (ref 0.6–1.3)
GLOBULIN SER CALC-MCNC: 3.4 G/DL (ref 2–4)
GLUCOSE SERPL-MCNC: 93 MG/DL (ref 74–99)
HBA1C MFR BLD: 5.8 % (ref 4.2–5.6)
POTASSIUM SERPL-SCNC: 4.4 MMOL/L (ref 3.5–5.5)
PROT SERPL-MCNC: 7.1 G/DL (ref 6.4–8.2)
SODIUM SERPL-SCNC: 139 MMOL/L (ref 136–145)
T4 FREE SERPL-MCNC: 0.9 NG/DL (ref 0.7–1.5)
TSH SERPL DL<=0.05 MIU/L-ACNC: 0.74 UIU/ML (ref 0.36–3.74)

## 2024-07-22 PROCEDURE — 36415 COLL VENOUS BLD VENIPUNCTURE: CPT

## 2024-07-22 PROCEDURE — 99214 OFFICE O/P EST MOD 30 MIN: CPT | Performed by: FAMILY MEDICINE

## 2024-07-22 PROCEDURE — 1123F ACP DISCUSS/DSCN MKR DOCD: CPT | Performed by: FAMILY MEDICINE

## 2024-07-22 PROCEDURE — G8399 PT W/DXA RESULTS DOCUMENT: HCPCS | Performed by: FAMILY MEDICINE

## 2024-07-22 PROCEDURE — 84443 ASSAY THYROID STIM HORMONE: CPT

## 2024-07-22 PROCEDURE — G8417 CALC BMI ABV UP PARAM F/U: HCPCS | Performed by: FAMILY MEDICINE

## 2024-07-22 PROCEDURE — 3078F DIAST BP <80 MM HG: CPT | Performed by: FAMILY MEDICINE

## 2024-07-22 PROCEDURE — G8427 DOCREV CUR MEDS BY ELIG CLIN: HCPCS | Performed by: FAMILY MEDICINE

## 2024-07-22 PROCEDURE — 84439 ASSAY OF FREE THYROXINE: CPT

## 2024-07-22 PROCEDURE — 3074F SYST BP LT 130 MM HG: CPT | Performed by: FAMILY MEDICINE

## 2024-07-22 PROCEDURE — 83036 HEMOGLOBIN GLYCOSYLATED A1C: CPT

## 2024-07-22 PROCEDURE — 80053 COMPREHEN METABOLIC PANEL: CPT

## 2024-07-22 PROCEDURE — 1090F PRES/ABSN URINE INCON ASSESS: CPT | Performed by: FAMILY MEDICINE

## 2024-07-22 PROCEDURE — 3044F HG A1C LEVEL LT 7.0%: CPT | Performed by: FAMILY MEDICINE

## 2024-07-22 PROCEDURE — 1036F TOBACCO NON-USER: CPT | Performed by: FAMILY MEDICINE

## 2024-07-22 NOTE — PROGRESS NOTES
\"Have you been to the ER, urgent care clinic since your last visit?  Hospitalized since your last visit?\"    [facility] LOV: 2/28/24 for pacemaker encounter - Dr. Escalante.    “Have you seen or consulted any other health care providers outside of [facility] since your last visit?”    Dr. Corrales, Nephrology LOV: 3/20/24.      Chief Complaint   Patient presents with    Diabetes    Chronic Kidney Disease    Hypertension           Click Here for Release of Records Request

## 2024-07-22 NOTE — PROGRESS NOTES
Virgie Ortiz (:  1945) is a 78 y.o. female, Established patient, here for evaluation of the following chief complaint(s):  Diabetes, Chronic Kidney Disease, and Hypertension         Assessment & Plan  1. Fatigue.  Mild renal insufficiency and protein chain abnormality were observed, as indicated by Dr. Ojeda's blood work. Electrophoresis was performed, but the detailed results are not available. The fatigue could be weather-related. Thyroid abnormalities can also contribute to fatigue. A daily women's multivitamin was recommended. Thyroid levels, A1c, and CMP will be rechecked. She was advised to schedule an appointment with her nephrologist.    2. Hypertension, chronic atrial fibrillation, and bradycardia.  Blood pressure readings are within normal range. Recent pacemaker battery replacement has been maintained. No changes were made to her current medication regimen. She was advised to maintain a low-salt diet and continue her blood pressure medication as recommended by her cardiologist.    3. Diabetes.  Her last A1c in 2023 was approximately 6, placing her in the prediabetic range. If her blood sugar is well-controlled, she should check her blood sugar 2 to 3 times a week and monitor it regularly. She was advised to follow a low-fat, low-salt, and low-carb diet.    4. Hypercalcemia, hyperparathyroidism, and thyroid nodule.  She was advised to continue following up with Dr. Gleason.      Pt understood and agree with the plan   F/u in 6 months.   Results  CMP:  Lab Results   Component Value Date/Time     2024 09:32 AM    K 4.6 2024 09:32 AM     2024 09:32 AM    CO2 29 2024 09:32 AM    BUN 16 2024 09:32 AM    CREATININE 1.05 2024 09:32 AM    GLUCOSE 126 2024 09:32 AM    CALCIUM 9.3 2024 09:33 AM    BILITOT 0.8 2024 08:10 AM    AST 13 2024 08:10 AM    ALT 13 2024 08:10 AM          CBC:  Lab Results   Component Value

## 2024-10-04 ENCOUNTER — TELEPHONE (OUTPATIENT)
Facility: CLINIC | Age: 79
End: 2024-10-04

## 2024-10-04 NOTE — TELEPHONE ENCOUNTER
Pt states that she has an appt with Dr Castillo cardiology on 10/16/2024 and would like to know if she can get her flu vaccine here on the same day. She states that she doesn't drive and has to use [employer]. She states that she would rather get her flu vaccine here that at a pharmacy. Please advise.

## 2024-10-09 RX ORDER — LOSARTAN POTASSIUM 100 MG/1
100 TABLET ORAL DAILY
Qty: 90 TABLET | Refills: 1 | Status: SHIPPED | OUTPATIENT
Start: 2024-10-09

## 2024-10-16 ENCOUNTER — OFFICE VISIT (OUTPATIENT)
Facility: CLINIC | Age: 79
End: 2024-10-16
Payer: MEDICARE

## 2024-10-16 VITALS — TEMPERATURE: 97.3 F

## 2024-10-16 DIAGNOSIS — Z23 NEED FOR VACCINATION: Primary | ICD-10-CM

## 2024-10-16 PROCEDURE — G0008 ADMIN INFLUENZA VIRUS VAC: HCPCS | Performed by: FAMILY MEDICINE

## 2024-10-16 PROCEDURE — 90653 IIV ADJUVANT VACCINE IM: CPT | Performed by: FAMILY MEDICINE

## 2024-12-16 RX ORDER — METOPROLOL SUCCINATE 50 MG/1
50 TABLET, EXTENDED RELEASE ORAL DAILY
Qty: 90 TABLET | Refills: 1 | Status: SHIPPED | OUTPATIENT
Start: 2024-12-16

## 2025-01-23 ENCOUNTER — OFFICE VISIT (OUTPATIENT)
Facility: CLINIC | Age: 80
End: 2025-01-23

## 2025-01-23 VITALS
OXYGEN SATURATION: 98 % | WEIGHT: 178.8 LBS | SYSTOLIC BLOOD PRESSURE: 128 MMHG | DIASTOLIC BLOOD PRESSURE: 82 MMHG | BODY MASS INDEX: 28.73 KG/M2 | RESPIRATION RATE: 16 BRPM | TEMPERATURE: 96.9 F | HEART RATE: 66 BPM | HEIGHT: 66 IN

## 2025-01-23 DIAGNOSIS — Z78.0 POSTMENOPAUSAL: ICD-10-CM

## 2025-01-23 DIAGNOSIS — R00.1 BRADYCARDIA: ICD-10-CM

## 2025-01-23 DIAGNOSIS — Z12.31 ENCOUNTER FOR SCREENING MAMMOGRAM FOR MALIGNANT NEOPLASM OF BREAST: ICD-10-CM

## 2025-01-23 DIAGNOSIS — I48.20 CHRONIC ATRIAL FIBRILLATION (HCC): ICD-10-CM

## 2025-01-23 DIAGNOSIS — E04.1 THYROID NODULE: ICD-10-CM

## 2025-01-23 DIAGNOSIS — M85.80 OSTEOPENIA, UNSPECIFIED LOCATION: ICD-10-CM

## 2025-01-23 DIAGNOSIS — E21.3 HYPERPARATHYROIDISM, UNSPECIFIED (HCC): ICD-10-CM

## 2025-01-23 DIAGNOSIS — E83.52 HYPERCALCEMIA: ICD-10-CM

## 2025-01-23 DIAGNOSIS — I11.0 HYPERTENSIVE HEART DISEASE WITH HEART FAILURE (HCC): ICD-10-CM

## 2025-01-23 DIAGNOSIS — R41.3 MEMORY CHANGES: ICD-10-CM

## 2025-01-23 DIAGNOSIS — I10 ESSENTIAL HYPERTENSION: ICD-10-CM

## 2025-01-23 DIAGNOSIS — E11.21 TYPE 2 DIABETES WITH NEPHROPATHY (HCC): ICD-10-CM

## 2025-01-23 DIAGNOSIS — Z00.00 MEDICARE ANNUAL WELLNESS VISIT, SUBSEQUENT: Primary | ICD-10-CM

## 2025-01-23 DIAGNOSIS — Z95.0 CARDIAC PACEMAKER IN SITU: ICD-10-CM

## 2025-01-23 DIAGNOSIS — N18.31 STAGE 3A CHRONIC KIDNEY DISEASE (HCC): ICD-10-CM

## 2025-01-23 SDOH — ECONOMIC STABILITY: FOOD INSECURITY: WITHIN THE PAST 12 MONTHS, THE FOOD YOU BOUGHT JUST DIDN'T LAST AND YOU DIDN'T HAVE MONEY TO GET MORE.: NEVER TRUE

## 2025-01-23 SDOH — ECONOMIC STABILITY: FOOD INSECURITY: WITHIN THE PAST 12 MONTHS, YOU WORRIED THAT YOUR FOOD WOULD RUN OUT BEFORE YOU GOT MONEY TO BUY MORE.: NEVER TRUE

## 2025-01-23 ASSESSMENT — PATIENT HEALTH QUESTIONNAIRE - PHQ9
SUM OF ALL RESPONSES TO PHQ QUESTIONS 1-9: 0
SUM OF ALL RESPONSES TO PHQ QUESTIONS 1-9: 0
1. LITTLE INTEREST OR PLEASURE IN DOING THINGS: NOT AT ALL
2. FEELING DOWN, DEPRESSED OR HOPELESS: NOT AT ALL
SUM OF ALL RESPONSES TO PHQ QUESTIONS 1-9: 0
SUM OF ALL RESPONSES TO PHQ9 QUESTIONS 1 & 2: 0
SUM OF ALL RESPONSES TO PHQ QUESTIONS 1-9: 0

## 2025-01-23 NOTE — PATIENT INSTRUCTIONS
[facility] Transportation Resources*  (Call [facility]/211 if need more resources.)    [facility]  What they offer: [employer] [facility] [employer] offers fixed routes, medical transportation, and on-demand response transit for those age 60+.  Accepts donations for regular service.  Fare: Approximately $4.00 each way  Phone Number: [phone]  Website: https://www.VideoStep    [facility] [employer]  What they offer: In compliance with the American Disabilities Act, [facility] [employer] provides a shared ride, advanced reservation, origin to destination service for disabled individuals who are unable to use regular fixed route public transportation services because of their disabilities.   Phone Number: [phone]  Apply online: https://www.Iotera/TransitAgencyChoice.aspx     Medicare Advantage Plans  Some plans may provide transportation. Members should contact the Member Services or Transportation number on the back of their insurance card for more information or to schedule transportation.    Medicaid Plans - [facility] Plus     Each health plan has options for transportation services. Contact your insurance provider to schedule transportation. Transportation or Member Services contact information is listed on the back of the insurance card.         Insurance Contacts     o [facility] [facility]   Phone: [phone]   Website:  [email or web address]    o [employer] [facility] Plus   Phone: 1-163.193.5395  Website: https://www.OOgave/vamedicaid    o [employer]   Phone: [phone]  Website: [email or web address]://www.[employer].[email or web address]/members    o [facility]   Phone: 1-949.160.9228 or 1-376.113.9194   Website: [email or web address]/communitycare    o [employer] Community Plan   Phone: [phone]  Website: [email or web address]

## 2025-01-23 NOTE — ACP (ADVANCE CARE PLANNING)
Advance Care Planning     General Advance Care Planning (ACP) Conversation    Date of Conversation: 1/23/2025  Conducted with: Patient with Decision Making Capacity  Other persons present: None    Healthcare Decision Maker:   Primary Decision Maker: Cal Ortiz - Child - [phone]    Primary Decision Maker: Shanthi Maya - Child - [phone]     Today we discussed advance directive. Life support and DNR ,DNI. She will think about it and discuss it with her kids. Forms are given to patient.   Content/Action Overview:  See above   Reviewed DNR/DNI and patient elects Full Code (Attempt Resuscitation)  treatment goals, benefit/burden of treatment options, artificial nutrition, ventilation preferences, hospitalization preferences, resuscitation preferences, end of life care preferences (vegetative state/imminent death), and hospice care      Length of Voluntary ACP Conversation in minutes:  <16 minutes (Non-Billable)    Theresa Espino MD

## 2025-01-23 NOTE — PROGRESS NOTES
Medicare Annual Wellness Visit    Virgie Ortiz is here for Medicare AWV    Assessment & Plan  1. Medicare wellness visit.  Her cognitive function and memory tests were satisfactory. She has been adhering to her nephrologist's recommendations and has a scheduled appointment next week. Her blood pressure and heart rate are well-regulated. Her diabetes is well-managed, with an A1c level within the prediabetic range. Her last mammogram was conducted in 2023, and her most recent bone density test in 2018 revealed osteopenia. A mammogram and bone density test will be ordered. She is advised to receive the COVID-19 booster and RSV vaccines at her local pharmacy. Laboratory tests will be ordered to be completed prior to her next visit in 6 months.    2. Hypertension.  Her blood pressure is well-controlled with her current medications: losartan, spironolactone, and metoprolol. She should continue her current medication regimen and follow up with her cardiologist as scheduled.    3. Atrial fibrillation.  She is currently taking Eliquis for atrial fibrillation. She should continue this medication and follow up with her cardiologist as scheduled.    4. Hypercalcemia.  She is taking medication for hypercalcemia and is under the care of Dr. Ham. She should continue her current treatment plan and follow up with Dr. Ham as needed.    5. Diabetes mellitus.  Her diabetes is well-controlled, with an A1c level within the prediabetic range. She should continue her current diabetes management plan. Laboratory tests will be ordered to be completed prior to her next visit in 6 months.    PROCEDURE  The patient had a pacemaker battery replacement last year.    Medicare annual wellness visit, subsequent  Results  Laboratory Studies  Kidney functions stable, no protein in urine. Thyroid functions within normal limit. Electrolytes, liver, kidney function stable. A1c in prediabetic range.       No follow-ups on file.     Subjective

## 2025-01-23 NOTE — PROGRESS NOTES
\"Have you been to the ER, urgent care clinic since your last visit?  Hospitalized since your last visit?\"    NO    “Have you seen or consulted any other health care providers outside our system since your last visit?”    Nephrology, Dr. Corrales LOV: 11/2024. ENT LOV: 1/2024 - Dr. Braxton Grajeda.    Chief Complaint   Patient presents with    Medicare AWV

## 2025-02-26 ENCOUNTER — OFFICE VISIT (OUTPATIENT)
Age: 80
End: 2025-02-26

## 2025-02-26 ENCOUNTER — NURSE ONLY (OUTPATIENT)
Age: 80
End: 2025-02-26
Payer: MEDICARE

## 2025-02-26 VITALS
WEIGHT: 179 LBS | OXYGEN SATURATION: 98 % | DIASTOLIC BLOOD PRESSURE: 88 MMHG | HEIGHT: 66 IN | SYSTOLIC BLOOD PRESSURE: 138 MMHG | HEART RATE: 60 BPM | BODY MASS INDEX: 28.77 KG/M2

## 2025-02-26 DIAGNOSIS — Z95.0 PRESENCE OF CARDIAC PACEMAKER: Primary | ICD-10-CM

## 2025-02-26 DIAGNOSIS — I10 ESSENTIAL (PRIMARY) HYPERTENSION: ICD-10-CM

## 2025-02-26 DIAGNOSIS — I48.0 PAROXYSMAL ATRIAL FIBRILLATION (HCC): ICD-10-CM

## 2025-02-26 DIAGNOSIS — R00.1 BRADYCARDIA, UNSPECIFIED: ICD-10-CM

## 2025-02-26 PROCEDURE — 93288 INTERROG EVL PM/LDLS PM IP: CPT | Performed by: INTERNAL MEDICINE

## 2025-02-26 ASSESSMENT — PATIENT HEALTH QUESTIONNAIRE - PHQ9
SUM OF ALL RESPONSES TO PHQ QUESTIONS 1-9: 0
SUM OF ALL RESPONSES TO PHQ QUESTIONS 1-9: 0
1. LITTLE INTEREST OR PLEASURE IN DOING THINGS: NOT AT ALL
SUM OF ALL RESPONSES TO PHQ QUESTIONS 1-9: 0
SUM OF ALL RESPONSES TO PHQ QUESTIONS 1-9: 0
2. FEELING DOWN, DEPRESSED OR HOPELESS: NOT AT ALL
SUM OF ALL RESPONSES TO PHQ9 QUESTIONS 1 & 2: 0

## 2025-02-26 NOTE — PROGRESS NOTES
Virgie Ortiz    F/u pAF/ SSS    HPI    Virgie Ortiz is a 79 y.o. AAF (originally from [facility]) with pAF, SSS s/p PPM here for routine follow up care. As you know she moved here from [place] a few yrs ago and established with my partner, Dr. Lee.     She has known history of cardiac problems.  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  She had atrial fibrillation and finally underwent the atrial fibrillation ablation successfully in March of 2016.  She was treated with flecainide briefly.  As the flecainide was discontinued, she has been anticoagulated with Eliquis.  She has previous history of DVT after knee surgery.  She has history of hypercholesterolemia, but has not been able to tolerate statins because of severe myalgia and other side effects.  She is known to have had type 2 diabetes.  She has history of seizure disorder; however, she has not had any recurrent seizures since 2006 off medication.      She has no cardiac complaints.      Past Medical History:   Diagnosis Date    Arrhythmia     history atrial fib    Bradycardia     Diabetes (HCC)     borderline    DVT (deep venous thrombosis) ([facility]) 2010    right    History of ablation March 2016     History of atrial fibrillation     Hypercholesterolemia     Hypertension     Long term current use of anticoagulant therapy     Menopause     Osteoporosis     Seizure (HCC)     Seizures (HCC)     on medication from 2006 to 2013 no seizure since 2006    [employer] pacemaker implant Set. 2013 Dr. Shyam French, [place]     Thyroid disease     nodules       Past Surgical History:   Procedure Laterality Date    COLONOSCOPY N/A 10/25/2017    COLONOSCOPY performed by Dontae Guerrero MD at [facility] ENDOSCOPY    HX CATARACT REMOVAL      HX HYSTERECTOMY      HX KNEE REPLACEMENT      left knee in 1/2010 and right knee 7/2016    HX KNEE REPLACEMENT      right knee    HX PACEMAKER      [facility] CARDIAC SURG PROCEDURE UNLIST      ablation for a-fib

## 2025-02-26 NOTE — PROGRESS NOTES
Virgie Ortiz presents today for No chief complaint on file.      Virgie Ortiz preferred language for health care discussion is english/other.    Is someone accompanying this pt? no    Is the patient using any DME equipment during OV? no    Depression Screening:  Depression: Not at risk (1/23/2025)    PHQ-2     PHQ-2 Score: 0        Learning Assessment:  No question data found.       Pt currently taking Anticoagulant therapy? Eliquis 5 mg BID     Pt currently taking Antiplatelet therapy ? no      Coordination of Care:  1. Have you been to the ER, urgent care clinic since your last visit? Hospitalized since your last visit? no    2. Have you seen or consulted any other health care providers outside of the [facility] since your last visit? Include any pap smears or colon screening. no     LUE moderate impairment/Right UE

## 2025-02-28 ENCOUNTER — HOSPITAL ENCOUNTER (OUTPATIENT)
Facility: HOSPITAL | Age: 80
End: 2025-02-28
Attending: FAMILY MEDICINE
Payer: MEDICARE

## 2025-02-28 ENCOUNTER — HOSPITAL ENCOUNTER (OUTPATIENT)
Facility: HOSPITAL | Age: 80
Discharge: HOME | End: 2025-02-28
Attending: FAMILY MEDICINE
Payer: MEDICARE

## 2025-02-28 VITALS — HEIGHT: 66 IN | WEIGHT: 179.01 LBS | BODY MASS INDEX: 28.77 KG/M2

## 2025-02-28 DIAGNOSIS — Z12.31 ENCOUNTER FOR SCREENING MAMMOGRAM FOR MALIGNANT NEOPLASM OF BREAST: ICD-10-CM

## 2025-02-28 DIAGNOSIS — Z78.0 POSTMENOPAUSAL: ICD-10-CM

## 2025-02-28 DIAGNOSIS — M85.80 OSTEOPENIA, UNSPECIFIED LOCATION: ICD-10-CM

## 2025-02-28 PROCEDURE — 77067 SCR MAMMO BI INCL CAD: CPT

## 2025-02-28 PROCEDURE — 77080 DXA BONE DENSITY AXIAL: CPT

## 2025-03-10 ENCOUNTER — RESULTS FOLLOW-UP (OUTPATIENT)
Age: 80
End: 2025-03-10

## 2025-03-11 ENCOUNTER — RESULTS FOLLOW-UP (OUTPATIENT)
Facility: HOSPITAL | Age: 80
End: 2025-03-11

## 2025-03-12 ENCOUNTER — RESULTS FOLLOW-UP (OUTPATIENT)
Facility: HOSPITAL | Age: 80
End: 2025-03-12

## 2025-04-07 RX ORDER — LOSARTAN POTASSIUM 100 MG/1
100 TABLET ORAL DAILY
Qty: 90 TABLET | Refills: 1 | Status: SHIPPED | OUTPATIENT
Start: 2025-04-07

## 2025-04-23 RX ORDER — APIXABAN 5 MG/1
5 TABLET, FILM COATED ORAL 2 TIMES DAILY
Qty: 180 TABLET | Refills: 3 | Status: SHIPPED | OUTPATIENT
Start: 2025-04-23

## 2025-04-23 NOTE — TELEPHONE ENCOUNTER
PCP: Theresa Espino MD    Last appt:  Visit date not found   Future Appointments   Date Time Provider Department Center   5/28/2025  1:45 PM [facility], PACER [facility]   7/23/2025 11:00 AM Theresa Espino MD [facility]   8/27/2025 10:40 AM Martha Castillo DO [facility]   8/27/2025 10:45 AM [facility], PACER [facility]       Requested Prescriptions     Pending Prescriptions Disp Refills    ELIQUIS 5 MG TABS tablet [Pharmacy Med Name: Eliquis 5 MG Oral Tablet] 180 tablet 0     Sig: Take 1 tablet by mouth twice daily       Request for a 30 or 90 day supply? Provider Discretion    Pharmacy: Confirmed     Other Comments: Per your last office note:  As the flecainide was discontinued, she has been anticoagulated with Eliquis.

## 2025-05-02 DIAGNOSIS — E11.21 TYPE 2 DIABETES WITH NEPHROPATHY (HCC): ICD-10-CM

## 2025-05-05 RX ORDER — LANCETS 33 GAUGE
EACH MISCELLANEOUS
Qty: 100 EACH | Refills: 0 | Status: SHIPPED | OUTPATIENT
Start: 2025-05-05

## 2025-05-05 NOTE — PROGRESS NOTES
Notes rec'd from Dr. Rocky Vogt and place in Dr. Satya Reza in basket Detail Level: Detailed Size Of Lesion: 3mm Morphology Per Location (Optional): Two toned

## 2025-06-19 RX ORDER — METOPROLOL SUCCINATE 50 MG/1
50 TABLET, EXTENDED RELEASE ORAL DAILY
Qty: 90 TABLET | Refills: 0 | Status: SHIPPED | OUTPATIENT
Start: 2025-06-19

## 2025-07-14 ENCOUNTER — HOSPITAL ENCOUNTER (OUTPATIENT)
Age: 80
Discharge: HOME | End: 2025-07-14
Payer: MEDICARE

## 2025-07-14 DIAGNOSIS — E04.1 THYROID NODULE: ICD-10-CM

## 2025-07-14 DIAGNOSIS — E11.21 TYPE 2 DIABETES WITH NEPHROPATHY (HCC): ICD-10-CM

## 2025-07-14 LAB
ALBUMIN SERPL-MCNC: 4 G/DL (ref 3.4–5)
ALBUMIN/GLOB SERPL: 1.2 (ref 0.8–1.7)
ALP SERPL-CCNC: 79 U/L (ref 45–117)
ALT SERPL-CCNC: 16 U/L (ref 10–35)
ANION GAP SERPL CALC-SCNC: 13 MMOL/L (ref 3–18)
AST SERPL-CCNC: 21 U/L (ref 10–38)
BILIRUB SERPL-MCNC: 0.7 MG/DL (ref 0.2–1)
BUN SERPL-MCNC: 15 MG/DL (ref 6–23)
BUN/CREAT SERPL: 14 (ref 12–20)
CALCIUM SERPL-MCNC: 10.3 MG/DL (ref 8.5–10.1)
CHLORIDE SERPL-SCNC: 102 MMOL/L (ref 98–107)
CHOLEST SERPL-MCNC: 196 MG/DL
CO2 SERPL-SCNC: 26 MMOL/L (ref 21–32)
CREAT SERPL-MCNC: 1.13 MG/DL (ref 0.6–1.3)
EST. AVERAGE GLUCOSE BLD GHB EST-MCNC: 126 MG/DL
GLOBULIN SER CALC-MCNC: 3.4 G/DL (ref 2–4)
GLUCOSE SERPL-MCNC: 108 MG/DL (ref 74–108)
HBA1C MFR BLD: 6 % (ref 4.2–5.6)
HDLC SERPL-MCNC: 49 MG/DL (ref 40–60)
HDLC SERPL: 4 (ref 0–5)
LDLC SERPL CALC-MCNC: 126 MG/DL (ref 0–100)
POTASSIUM SERPL-SCNC: 5 MMOL/L (ref 3.5–5.5)
PROT SERPL-MCNC: 7.4 G/DL (ref 6.4–8.2)
SODIUM SERPL-SCNC: 140 MMOL/L (ref 136–145)
T4 FREE SERPL-MCNC: 1.1 NG/DL (ref 0.9–1.7)
TRIGL SERPL-MCNC: 105 MG/DL (ref 0–150)
TSH, 3RD GENERATION: 0.75 UIU/ML (ref 0.27–4.2)
VLDLC SERPL CALC-MCNC: 21 MG/DL

## 2025-07-14 PROCEDURE — 84443 ASSAY THYROID STIM HORMONE: CPT

## 2025-07-14 PROCEDURE — 84439 ASSAY OF FREE THYROXINE: CPT

## 2025-07-14 PROCEDURE — 36415 COLL VENOUS BLD VENIPUNCTURE: CPT

## 2025-07-14 PROCEDURE — 80061 LIPID PANEL: CPT

## 2025-07-14 PROCEDURE — 83036 HEMOGLOBIN GLYCOSYLATED A1C: CPT

## 2025-07-14 PROCEDURE — 80053 COMPREHEN METABOLIC PANEL: CPT

## 2025-07-17 ENCOUNTER — OFFICE VISIT (OUTPATIENT)
Facility: CLINIC | Age: 80
End: 2025-07-17

## 2025-07-17 VITALS
BODY MASS INDEX: 28.64 KG/M2 | HEIGHT: 66 IN | WEIGHT: 178.2 LBS | OXYGEN SATURATION: 99 % | RESPIRATION RATE: 16 BRPM | TEMPERATURE: 97.4 F | HEART RATE: 76 BPM | DIASTOLIC BLOOD PRESSURE: 70 MMHG | SYSTOLIC BLOOD PRESSURE: 130 MMHG

## 2025-07-17 DIAGNOSIS — E83.52 SERUM CALCIUM ELEVATED: ICD-10-CM

## 2025-07-17 DIAGNOSIS — I48.20 CHRONIC ATRIAL FIBRILLATION (HCC): ICD-10-CM

## 2025-07-17 DIAGNOSIS — N18.31 STAGE 3A CHRONIC KIDNEY DISEASE (HCC): ICD-10-CM

## 2025-07-17 DIAGNOSIS — M54.9 DISCOMFORT OF BACK: ICD-10-CM

## 2025-07-17 DIAGNOSIS — R77.8 ABNORMAL SPEP: ICD-10-CM

## 2025-07-17 DIAGNOSIS — E21.3 HYPERPARATHYROIDISM, UNSPECIFIED: ICD-10-CM

## 2025-07-17 DIAGNOSIS — E11.21 TYPE 2 DIABETES WITH NEPHROPATHY (HCC): ICD-10-CM

## 2025-07-17 DIAGNOSIS — I10 ESSENTIAL HYPERTENSION: ICD-10-CM

## 2025-07-17 DIAGNOSIS — M54.2 NECK PAIN: Primary | ICD-10-CM

## 2025-07-17 NOTE — PROGRESS NOTES
Have you been to the ER, urgent care clinic since your last visit?  Hospitalized since your last visit?   NO    Have you seen or consulted any other health care providers outside our system since your last visit?   Nephrology, Dr. Corrales LOV: 1/29/25. [facility], Dr. Pritesh Sparks LOV: 4/2025.    Chief Complaint   Patient presents with    Hypertension    Atrial Fibrillation    Diabetes    Hypercalcemia

## 2025-07-30 RX ORDER — BLOOD SUGAR DIAGNOSTIC
STRIP MISCELLANEOUS
Qty: 100 EACH | Refills: 0 | Status: SHIPPED | OUTPATIENT
Start: 2025-07-30 | End: 2025-07-31 | Stop reason: SDUPTHER

## 2025-07-31 ENCOUNTER — TELEPHONE (OUTPATIENT)
Facility: CLINIC | Age: 80
End: 2025-07-31

## 2025-07-31 RX ORDER — BLOOD SUGAR DIAGNOSTIC
STRIP MISCELLANEOUS
Qty: 100 EACH | Refills: 3 | Status: SHIPPED | OUTPATIENT
Start: 2025-07-31

## 2025-07-31 NOTE — TELEPHONE ENCOUNTER
[facility] is request to please send pt's Rx for   blood glucose test strips (ONETOUCH ULTRA) strip again The prescription  needs to have the DX code and NPI. To bill through Medicare Please send to [facility]. [address]

## 2025-08-27 ENCOUNTER — CLINICAL SUPPORT (OUTPATIENT)
Age: 80
End: 2025-08-27
Payer: MEDICARE

## 2025-08-27 ENCOUNTER — OFFICE VISIT (OUTPATIENT)
Age: 80
End: 2025-08-27
Payer: MEDICARE

## 2025-08-27 VITALS
HEART RATE: 68 BPM | WEIGHT: 177 LBS | SYSTOLIC BLOOD PRESSURE: 139 MMHG | OXYGEN SATURATION: 98 % | HEIGHT: 66 IN | DIASTOLIC BLOOD PRESSURE: 76 MMHG | BODY MASS INDEX: 28.45 KG/M2

## 2025-08-27 DIAGNOSIS — Z95.0 PRESENCE OF CARDIAC PACEMAKER: Primary | ICD-10-CM

## 2025-08-27 DIAGNOSIS — Z95.0 PRESENCE OF CARDIAC PACEMAKER: ICD-10-CM

## 2025-08-27 DIAGNOSIS — R00.1 BRADYCARDIA, UNSPECIFIED: ICD-10-CM

## 2025-08-27 DIAGNOSIS — I48.0 PAROXYSMAL ATRIAL FIBRILLATION (HCC): Primary | ICD-10-CM

## 2025-08-27 PROCEDURE — 3075F SYST BP GE 130 - 139MM HG: CPT | Performed by: INTERNAL MEDICINE

## 2025-08-27 PROCEDURE — 3078F DIAST BP <80 MM HG: CPT | Performed by: INTERNAL MEDICINE

## 2025-08-27 PROCEDURE — 93280 PM DEVICE PROGR EVAL DUAL: CPT | Performed by: INTERNAL MEDICINE

## 2025-08-27 PROCEDURE — 1123F ACP DISCUSS/DSCN MKR DOCD: CPT | Performed by: INTERNAL MEDICINE

## 2025-08-27 PROCEDURE — 99214 OFFICE O/P EST MOD 30 MIN: CPT | Performed by: INTERNAL MEDICINE

## 2025-08-27 PROCEDURE — 1126F AMNT PAIN NOTED NONE PRSNT: CPT | Performed by: INTERNAL MEDICINE

## 2025-08-27 PROCEDURE — G8428 CUR MEDS NOT DOCUMENT: HCPCS | Performed by: INTERNAL MEDICINE

## 2025-08-27 PROCEDURE — 1036F TOBACCO NON-USER: CPT | Performed by: INTERNAL MEDICINE

## 2025-08-27 PROCEDURE — 1090F PRES/ABSN URINE INCON ASSESS: CPT | Performed by: INTERNAL MEDICINE

## 2025-08-27 PROCEDURE — G8399 PT W/DXA RESULTS DOCUMENT: HCPCS | Performed by: INTERNAL MEDICINE

## 2025-08-27 PROCEDURE — G8417 CALC BMI ABV UP PARAM F/U: HCPCS | Performed by: INTERNAL MEDICINE

## 2025-08-27 ASSESSMENT — PATIENT HEALTH QUESTIONNAIRE - PHQ9
2. FEELING DOWN, DEPRESSED OR HOPELESS: NOT AT ALL
SUM OF ALL RESPONSES TO PHQ QUESTIONS 1-9: 0
1. LITTLE INTEREST OR PLEASURE IN DOING THINGS: NOT AT ALL

## (undated) DEVICE — DRESSING HEMSTAT W4XL4IN 4 PLY WHT IMPREG KAOLIN HYDRPHLC

## (undated) DEVICE — MEDI-TRACE CADENCE ADULT, DEFIBRILLATION ELECTRODE -RTS  (10 PR/PK) - PHYSIO-CONTROL: Brand: MEDI-TRACE CADENCE

## (undated) DEVICE — SUTURE MCRYL SZ 4 0 L18IN ABSRB VLT PS 1 L24MM 3 8 CIR REV Y682H

## (undated) DEVICE — PLASMABLADE PS210-030S 3.0S LOCK: Brand: PLASMABLADE™

## (undated) DEVICE — SYRINGE IRRIG 60ML SFT PLIABLE BLB EZ TO GRP 1 HND USE W/

## (undated) DEVICE — SUTURE PERMA HND SZ 0 L18IN NONABSORBABLE BLK L30MM PSL REV 580H

## (undated) DEVICE — KENDALL RADIOLUCENT FOAM MONITORING ELECTRODE RECTANGULAR SHAPE: Brand: KENDALL

## (undated) DEVICE — DRESSING FOAM 4X6 DISP POSTOP MEPILEX BORD AG

## (undated) DEVICE — CATH IV SAFE STR 22GX1IN BLU -- PROTECTIV PLUS

## (undated) DEVICE — LIMB HOLDER, WRIST/ANKLE: Brand: DEROYAL

## (undated) DEVICE — INTENDED FOR TISSUE SEPARATION, AND OTHER PROCEDURES THAT REQUIRE A SHARP SURGICAL BLADE TO PUNCTURE OR CUT.: Brand: BARD-PARKER ®  SAFETY SCALPED

## (undated) DEVICE — ELECTRODE PT RET AD L9FT HI MOIST COND ADH HYDRGEL CORDED

## (undated) DEVICE — 3M™ IOBAN™ 2 ANTIMICROBIAL INCISE DRAPE 6640EZ: Brand: IOBAN™ 2

## (undated) DEVICE — SUTURE ABSORBABLE BRAIDED 2-0 CT-1 27 IN UD VICRYL J259H

## (undated) DEVICE — PACEMAKER PACK: Brand: MEDLINE INDUSTRIES, INC.

## (undated) DEVICE — Device